# Patient Record
Sex: MALE | Race: WHITE | Employment: OTHER | URBAN - METROPOLITAN AREA
[De-identification: names, ages, dates, MRNs, and addresses within clinical notes are randomized per-mention and may not be internally consistent; named-entity substitution may affect disease eponyms.]

---

## 2017-02-15 ENCOUNTER — HOSPITAL ENCOUNTER (EMERGENCY)
Age: 82
Discharge: HOME | End: 2017-02-15
Attending: EMERGENCY MEDICINE
Payer: MEDICARE

## 2017-02-15 ENCOUNTER — APPOINTMENT (OUTPATIENT)
Dept: CT IMAGING | Age: 82
End: 2017-02-15
Attending: EMERGENCY MEDICINE
Payer: MEDICARE

## 2017-02-15 VITALS
SYSTOLIC BLOOD PRESSURE: 142 MMHG | HEART RATE: 61 BPM | DIASTOLIC BLOOD PRESSURE: 68 MMHG | TEMPERATURE: 97.8 F | HEIGHT: 65 IN | WEIGHT: 160 LBS | BODY MASS INDEX: 26.66 KG/M2 | RESPIRATION RATE: 15 BRPM | OXYGEN SATURATION: 95 %

## 2017-02-15 DIAGNOSIS — T50.905A ADVERSE EFFECTS OF MEDICATION, INITIAL ENCOUNTER: ICD-10-CM

## 2017-02-15 DIAGNOSIS — R42 DIZZINESS: Primary | ICD-10-CM

## 2017-02-15 DIAGNOSIS — M25.552 LEFT HIP PAIN: ICD-10-CM

## 2017-02-15 LAB
ALBUMIN SERPL BCP-MCNC: 3.8 G/DL (ref 3.5–5)
ALBUMIN/GLOB SERPL: 1.3 {RATIO} (ref 1.1–2.2)
ALP SERPL-CCNC: 96 U/L (ref 45–117)
ALT SERPL-CCNC: 60 U/L (ref 12–78)
ANION GAP BLD CALC-SCNC: 7 MMOL/L (ref 5–15)
AST SERPL W P-5'-P-CCNC: 41 U/L (ref 15–37)
BASOPHILS # BLD AUTO: 0 K/UL (ref 0–0.1)
BASOPHILS # BLD: 0 % (ref 0–1)
BILIRUB SERPL-MCNC: 1.1 MG/DL (ref 0.2–1)
BUN SERPL-MCNC: 18 MG/DL (ref 6–20)
BUN/CREAT SERPL: 20 (ref 12–20)
CALCIUM SERPL-MCNC: 8.7 MG/DL (ref 8.5–10.1)
CHLORIDE SERPL-SCNC: 103 MMOL/L (ref 97–108)
CO2 SERPL-SCNC: 27 MMOL/L (ref 21–32)
CREAT SERPL-MCNC: 0.9 MG/DL (ref 0.7–1.3)
EOSINOPHIL # BLD: 0 K/UL (ref 0–0.4)
EOSINOPHIL NFR BLD: 0 % (ref 0–7)
ERYTHROCYTE [DISTWIDTH] IN BLOOD BY AUTOMATED COUNT: 13.9 % (ref 11.5–14.5)
GLOBULIN SER CALC-MCNC: 2.9 G/DL (ref 2–4)
GLUCOSE SERPL-MCNC: 118 MG/DL (ref 65–100)
HCT VFR BLD AUTO: 46.6 % (ref 36.6–50.3)
HGB BLD-MCNC: 16.3 G/DL (ref 12.1–17)
LYMPHOCYTES # BLD AUTO: 11 % (ref 12–49)
LYMPHOCYTES # BLD: 1 K/UL (ref 0.8–3.5)
MCH RBC QN AUTO: 31.8 PG (ref 26–34)
MCHC RBC AUTO-ENTMCNC: 35 G/DL (ref 30–36.5)
MCV RBC AUTO: 91 FL (ref 80–99)
MONOCYTES # BLD: 0.7 K/UL (ref 0–1)
MONOCYTES NFR BLD AUTO: 8 % (ref 5–13)
NEUTS SEG # BLD: 7.4 K/UL (ref 1.8–8)
NEUTS SEG NFR BLD AUTO: 81 % (ref 32–75)
PLATELET # BLD AUTO: 162 K/UL (ref 150–400)
POTASSIUM SERPL-SCNC: 3.8 MMOL/L (ref 3.5–5.1)
PROT SERPL-MCNC: 6.7 G/DL (ref 6.4–8.2)
RBC # BLD AUTO: 5.12 M/UL (ref 4.1–5.7)
SODIUM SERPL-SCNC: 137 MMOL/L (ref 136–145)
TROPONIN I SERPL-MCNC: <0.04 NG/ML
WBC # BLD AUTO: 9.2 K/UL (ref 4.1–11.1)

## 2017-02-15 PROCEDURE — 99284 EMERGENCY DEPT VISIT MOD MDM: CPT

## 2017-02-15 PROCEDURE — 93005 ELECTROCARDIOGRAM TRACING: CPT

## 2017-02-15 PROCEDURE — 36415 COLL VENOUS BLD VENIPUNCTURE: CPT | Performed by: EMERGENCY MEDICINE

## 2017-02-15 PROCEDURE — 74011250636 HC RX REV CODE- 250/636: Performed by: EMERGENCY MEDICINE

## 2017-02-15 PROCEDURE — 96375 TX/PRO/DX INJ NEW DRUG ADDON: CPT

## 2017-02-15 PROCEDURE — 96361 HYDRATE IV INFUSION ADD-ON: CPT

## 2017-02-15 PROCEDURE — 84484 ASSAY OF TROPONIN QUANT: CPT | Performed by: EMERGENCY MEDICINE

## 2017-02-15 PROCEDURE — 85025 COMPLETE CBC W/AUTO DIFF WBC: CPT | Performed by: EMERGENCY MEDICINE

## 2017-02-15 PROCEDURE — 70450 CT HEAD/BRAIN W/O DYE: CPT

## 2017-02-15 PROCEDURE — 96374 THER/PROPH/DIAG INJ IV PUSH: CPT

## 2017-02-15 PROCEDURE — 80053 COMPREHEN METABOLIC PANEL: CPT | Performed by: EMERGENCY MEDICINE

## 2017-02-15 RX ORDER — OMEPRAZOLE 40 MG/1
40 CAPSULE, DELAYED RELEASE ORAL EVERY OTHER DAY
COMMUNITY
End: 2017-02-15

## 2017-02-15 RX ORDER — IBUPROFEN 200 MG
200 TABLET ORAL
COMMUNITY
End: 2018-03-30 | Stop reason: ALTCHOICE

## 2017-02-15 RX ORDER — METRONIDAZOLE 7.5 MG/G
GEL TOPICAL DAILY
COMMUNITY
End: 2018-06-29

## 2017-02-15 RX ORDER — ONDANSETRON 2 MG/ML
4 INJECTION INTRAMUSCULAR; INTRAVENOUS
Status: COMPLETED | OUTPATIENT
Start: 2017-02-15 | End: 2017-02-15

## 2017-02-15 RX ORDER — ASPIRIN 81 MG/1
81 TABLET ORAL DAILY
COMMUNITY

## 2017-02-15 RX ORDER — SODIUM CHLORIDE 0.9 % (FLUSH) 0.9 %
5-10 SYRINGE (ML) INJECTION EVERY 8 HOURS
Status: DISCONTINUED | OUTPATIENT
Start: 2017-02-15 | End: 2017-02-15 | Stop reason: HOSPADM

## 2017-02-15 RX ORDER — GUAIFENESIN 100 MG/5ML
81 LIQUID (ML) ORAL DAILY
COMMUNITY
End: 2017-02-15

## 2017-02-15 RX ORDER — LEVOTHYROXINE SODIUM 100 UG/1
0.1 TABLET ORAL
COMMUNITY
End: 2018-04-13 | Stop reason: DRUGHIGH

## 2017-02-15 RX ORDER — METRONIDAZOLE 10 MG/G
GEL TOPICAL DAILY
COMMUNITY
End: 2017-02-15

## 2017-02-15 RX ORDER — KETOROLAC TROMETHAMINE 30 MG/ML
15 INJECTION, SOLUTION INTRAMUSCULAR; INTRAVENOUS
Status: COMPLETED | OUTPATIENT
Start: 2017-02-15 | End: 2017-02-15

## 2017-02-15 RX ORDER — GLUCOSAMINE/CHONDR SU A SOD 750-600 MG
5000 TABLET ORAL DAILY
COMMUNITY
End: 2018-04-13 | Stop reason: ALTCHOICE

## 2017-02-15 RX ORDER — SODIUM CHLORIDE 0.9 % (FLUSH) 0.9 %
5-10 SYRINGE (ML) INJECTION AS NEEDED
Status: DISCONTINUED | OUTPATIENT
Start: 2017-02-15 | End: 2017-02-15 | Stop reason: HOSPADM

## 2017-02-15 RX ORDER — ATORVASTATIN CALCIUM 40 MG/1
40 TABLET, FILM COATED ORAL DAILY
COMMUNITY
End: 2018-04-13 | Stop reason: DRUGHIGH

## 2017-02-15 RX ADMIN — SODIUM CHLORIDE 500 ML: 900 INJECTION, SOLUTION INTRAVENOUS at 17:20

## 2017-02-15 RX ADMIN — KETOROLAC TROMETHAMINE 15 MG: 30 INJECTION, SOLUTION INTRAMUSCULAR at 17:19

## 2017-02-15 RX ADMIN — ONDANSETRON 4 MG: 2 INJECTION INTRAMUSCULAR; INTRAVENOUS at 17:19

## 2017-02-15 NOTE — ED TRIAGE NOTES
Patient comes to the ER via EMS from [facility]. Patient took Tramadol yesterday and was okay, today when he took Tramadol patient was nauseated, dizziness and dry mouth.

## 2017-02-15 NOTE — ED PROVIDER NOTES
HPI Comments: 80 y.o. male with past medical history significant for hypothyroid, GERD, hypercholestermia, coronary stent placement, and coronary artery bypass graft who presents via EMS with chief complaint of nausea. Pt c/o nausea, hoarseness, light-headedness, and difficulty walking straight after taking 2 tramadol for his L hip pain this morning. Pt denies that anything makes his light-headedness worse, or that its similar to past bouts of vertigo. Pt states he's been experiencing aching pain in his L-hip that radiates down to L thigh for the past 4-5 weeks that is worsened with sudden movement. Pt states he's been seeing physicians at [facility], but hasn't received any answer for his pain and wishes to have an MRI of his hip. Pt claims that he has taken tylenol arthritis pain medicine which is helpful. Pt claims he took 1 tramadol yesterday and didn't experience any unusual symptoms. Pt denies any recent falls. Pt denies any fever,cough, congestion, or HA. Pt denies having any appetite, and was only able to eat half a grapefruit today. There are no other acute medical concerns at this time. PCP: Brinda Monreal MD    Note written by Eugene Saavedra, as dictated by Tim Barber MD 5:21 PM        The history is provided by the patient. No  was used. Past Medical History:   Diagnosis Date    GERD (gastroesophageal reflux disease)     Hypercholesteremia     Hypothyroid        Past Surgical History:   Procedure Laterality Date    Hx coronary stent placement      Hx coronary artery bypass graft  1996         History reviewed. No pertinent family history. Social History     Social History    Marital status: SINGLE     Spouse name: N/A    Number of children: N/A    Years of education: N/A     Occupational History    Not on file.      Social History Main Topics    Smoking status: Former Smoker    Smokeless tobacco: Not on file    Alcohol use 4.2 oz/week 7 Glasses of wine per week    Drug use: No    Sexual activity: Not on file     Other Topics Concern    Not on file     Social History Narrative    No narrative on file         ALLERGIES: Sulfa (sulfonamide antibiotics)    Review of Systems   Constitutional: Positive for appetite change (No appetite). Negative for fever. HENT: Positive for sore throat (Dry, hoarseness). Negative for congestion. Respiratory: Negative for cough. Gastrointestinal: Positive for nausea. Negative for vomiting. Musculoskeletal: Positive for gait problem (Harder than usual to walk with cane. Felt off-balance). L hip pain   Neurological: Positive for light-headedness. Negative for headaches. All other systems reviewed and are negative. Vitals:    02/15/17 1641   BP: 133/62   Pulse: (!) 58   Resp: 18   Temp: 97.8 °F (36.6 °C)   SpO2: 94%   Weight: 72.6 kg (160 lb)   Height: 5' 5\" (1.651 m)            Physical Exam   Constitutional: He is oriented to person, place, and time. He appears well-developed and well-nourished. No distress. HENT:   Head: Normocephalic and atraumatic. Eyes: Conjunctivae are normal. Pupils are equal, round, and reactive to light. No scleral icterus. Pupils equal roudn reactive to light. Horizontal nystagmus   Neck: Neck supple. No tracheal deviation present. Cardiovascular: Normal rate, regular rhythm, normal heart sounds and intact distal pulses. Exam reveals no gallop and no friction rub. No murmur heard. Pulmonary/Chest: Effort normal and breath sounds normal. He has no wheezes. He has no rales. Abdominal: Soft. He exhibits no distension. There is no tenderness. There is no rebound and no guarding. Musculoskeletal: He exhibits no edema. Tenderness over L hip   Neurological: He is alert and oriented to person, place, and time. No cranial nerve deficit. Strength and sensation normal.     Skin: Skin is warm and dry. No rash noted.    Psychiatric: He has a normal mood and affect. Nursing note and vitals reviewed. Note written by Eugene Covington, as dictated by Franck Denis MD 5:32 PM    [facility]  ED Course       Procedures    ED EKG interpretation:  Rhythm: sinus bradycardia; and regular . Rate (approx.): 58;ST/T wave: normal.  Note written by Eugene Covington, as dictated by Franck Denis MD 5:40 PM    Progress Note:  Results, treatment, and follow up plan have been discussed with patient/family. Questions were answered. He feels much better. Franck Denis MD  7:06 PM    A/P: dizziness/N/unsteady after taking two Ultram this morning - suspect medication related; normal neuro exam; labs, CT, EKG ok; home with PCP/ortho f/u for hip pain.   Franck Denis MD  7:08 PM

## 2017-02-16 ENCOUNTER — OFFICE VISIT (OUTPATIENT)
Dept: GERIATRIC MEDICINE | Age: 82
End: 2017-02-16

## 2017-02-16 VITALS
TEMPERATURE: 98.5 F | RESPIRATION RATE: 18 BRPM | BODY MASS INDEX: 26.77 KG/M2 | HEART RATE: 63 BPM | OXYGEN SATURATION: 95 % | DIASTOLIC BLOOD PRESSURE: 63 MMHG | WEIGHT: 160.7 LBS | HEIGHT: 65 IN | SYSTOLIC BLOOD PRESSURE: 130 MMHG

## 2017-02-16 DIAGNOSIS — K21.9 GERD WITHOUT ESOPHAGITIS: ICD-10-CM

## 2017-02-16 DIAGNOSIS — I25.10 CORONARY ARTERY DISEASE INVOLVING NATIVE CORONARY ARTERY OF NATIVE HEART WITHOUT ANGINA PECTORIS: ICD-10-CM

## 2017-02-16 DIAGNOSIS — M16.0 PRIMARY OSTEOARTHRITIS OF BOTH HIPS: ICD-10-CM

## 2017-02-16 DIAGNOSIS — M16.0 PRIMARY OSTEOARTHRITIS OF BOTH HIPS: Primary | ICD-10-CM

## 2017-02-16 DIAGNOSIS — E78.2 MIXED HYPERLIPIDEMIA: ICD-10-CM

## 2017-02-16 DIAGNOSIS — M54.40 LOW BACK PAIN WITH SCIATICA, SCIATICA LATERALITY UNSPECIFIED, UNSPECIFIED BACK PAIN LATERALITY, UNSPECIFIED CHRONICITY: ICD-10-CM

## 2017-02-16 DIAGNOSIS — M54.32 SCIATICA OF LEFT SIDE: ICD-10-CM

## 2017-02-16 DIAGNOSIS — E03.9 ACQUIRED HYPOTHYROIDISM: Primary | ICD-10-CM

## 2017-02-16 LAB
ATRIAL RATE: 58 BPM
CALCULATED P AXIS, ECG09: 53 DEGREES
CALCULATED R AXIS, ECG10: 25 DEGREES
CALCULATED T AXIS, ECG11: 64 DEGREES
DIAGNOSIS, 93000: NORMAL
P-R INTERVAL, ECG05: 180 MS
Q-T INTERVAL, ECG07: 398 MS
QRS DURATION, ECG06: 92 MS
QTC CALCULATION (BEZET), ECG08: 390 MS
VENTRICULAR RATE, ECG03: 58 BPM

## 2017-02-16 RX ORDER — ACETAMINOPHEN AND CODEINE PHOSPHATE 300; 60 MG/1; MG/1
1 TABLET ORAL
Qty: 160 TAB | Refills: 3 | Status: SHIPPED | OUTPATIENT
Start: 2017-02-16 | End: 2018-03-30

## 2017-02-16 NOTE — DISCHARGE INSTRUCTIONS
Dizziness: Care Instructions  Your Care Instructions  Dizziness is the feeling of unsteadiness or fuzziness in your head. It is different than having vertigo, which is a feeling that the room is spinning or that you are moving or falling. It is also different from lightheadedness, which is the feeling that you are about to faint. It can be hard to know what causes dizziness. Some people feel dizzy when they have migraine headaches. Sometimes bouts of flu can make you feel dizzy. Some medical conditions, such as heart problems or high blood pressure, can make you feel dizzy. Many medicines can cause dizziness, including medicines for high blood pressure, pain, or anxiety. If a medicine causes your symptoms, your doctor may recommend that you stop or change the medicine. If it is a problem with your heart, you may need medicine to help your heart work better. If there is no clear reason for your symptoms, your doctor may suggest watching and waiting for a while to see if the dizziness goes away on its own. Follow-up care is a key part of your treatment and safety. Be sure to make and go to all appointments, and call your doctor if you are having problems. It's also a good idea to know your test results and keep a list of the medicines you take. How can you care for yourself at home? · If your doctor recommends or prescribes medicine, take it exactly as directed. Call your doctor if you think you are having a problem with your medicine. · Do not drive while you feel dizzy. · Try to prevent falls. Steps you can take include:  ¨ Using nonskid mats, adding grab bars near the tub, and using night-lights. ¨ Clearing your home so that walkways are free of anything you might trip on. ¨ Letting family and friends know that you have been feeling dizzy. This will help them know how to help you. When should you call for help? Call 911 anytime you think you may need emergency care.  For example, call if:  · You passed out (lost consciousness). · You have dizziness along with symptoms of a heart attack. These may include:  ¨ Chest pain or pressure, or a strange feeling in the chest.  ¨ Sweating. ¨ Shortness of breath. ¨ Nausea or vomiting. ¨ Pain, pressure, or a strange feeling in the back, neck, jaw, or upper belly or in one or both shoulders or arms. ¨ Lightheadedness or sudden weakness. ¨ A fast or irregular heartbeat. · You have symptoms of a stroke. These may include:  ¨ Sudden numbness, tingling, weakness, or loss of movement in your face, arm, or leg, especially on only one side of your body. ¨ Sudden vision changes. ¨ Sudden trouble speaking. ¨ Sudden confusion or trouble understanding simple statements. ¨ Sudden problems with walking or balance. ¨ A sudden, severe headache that is different from past headaches. Call your doctor now or seek immediate medical care if:  · You feel dizzy and have a fever, headache, or ringing in your ears. · You have new or increased nausea and vomiting. · Your dizziness does not go away or comes back. Watch closely for changes in your health, and be sure to contact your doctor if:  · You do not get better as expected. Where can you learn more? Go to http://sloan-george.info/. Enter O826 in the search box to learn more about \"Dizziness: Care Instructions. \"  Current as of: May 27, 2016  Content Version: 11.1  © 9287-1904 [employer]. Care instructions adapted under license by [employer] (which disclaims liability or warranty for this information). If you have questions about a medical condition or this instruction, always ask your healthcare professional. [place] [address] any warranty or liability for your use of this information. Hip Pain: Care Instructions  Your Care Instructions  Hip pain may be caused by many things, including overuse, a fall, or a twisting movement.  Another cause of hip pain is arthritis. Your pain may increase when you stand up, walk, or squat. The pain may come and go or may be constant. Home treatment can help relieve hip pain, swelling, and stiffness. If your pain is ongoing, you may need more tests and treatment. Follow-up care is a key part of your treatment and safety. Be sure to make and go to all appointments, and call your doctor if you are having problems. Its also a good idea to know your test results and keep a list of the medicines you take. How can you care for yourself at home? · Take pain medicines exactly as directed. ¨ If the doctor gave you a prescription medicine for pain, take it as prescribed. ¨ If you are not taking a prescription pain medicine, ask your doctor if you can take an over-the-counter medicine. · Rest and protect your hip. Take a break from any activity, including standing or walking, that may cause pain. · Put ice or a cold pack against your hip for 10 to 20 minutes at a time. Try to do this every 1 to 2 hours for the next 3 days (when you are awake) or until the swelling goes down. Put a thin cloth between the ice and your skin. · Sleep on your healthy side with a pillow between your knees, or sleep on your back with pillows under your knees. · If there is no swelling, you can put moist heat, a heating pad, or a warm cloth on your hip. Do gentle stretching exercises to help keep your hip flexible. · Learn how to prevent falls. Have your vision and hearing checked regularly. Wear slippers or shoes with a nonskid sole. · Stay at a healthy weight. · Wear comfortable shoes. When should you call for help? Call 911 anytime you think you may need emergency care. For example, call if:  · You have sudden chest pain and shortness of breath, or you cough up blood. · You are not able to stand or walk or bear weight. · Your buttocks, legs, or feet feel numb or tingly. · Your leg or foot is cool or pale or changes color.   · You have severe pain.  Call your doctor now or seek immediate medical care if:  · You have signs of infection, such as:  ¨ Increased pain, swelling, warmth, or redness in the hip area. ¨ Red streaks leading from the hip area. ¨ Pus draining from the hip area. ¨ A fever. · You have signs of a blood clot, such as:  ¨ Pain in your calf, back of the knee, thigh, or groin. ¨ Redness and swelling in your leg or groin. · You are not able to bend, straighten, or move your leg normally. · You have trouble urinating or having bowel movements. Watch closely for changes in your health, and be sure to contact your doctor if:  · You do not get better as expected. Where can you learn more? Go to http://sloan-george.info/. Enter Z614 in the search box to learn more about \"Hip Pain: Care Instructions. \"  Current as of: May 27, 2016  Content Version: 11.1  © 5745-2698 [employer]. Care instructions adapted under license by [employer] (which disclaims liability or warranty for this information). If you have questions about a medical condition or this instruction, always ask your healthcare professional. [place] [address] any warranty or liability for your use of this information. We hope that we have addressed all of your medical concerns. The examination and treatment you received in the Emergency Department were for an emergent problem and were not intended as complete care. It is important that you follow up with your healthcare provider(s) for ongoing care. If your symptoms worsen or do not improve as expected, and you are unable to reach your usual health care provider(s), you should return to the Emergency Department. Today's healthcare is undergoing tremendous change, and patient satisfaction surveys are one of the many tools to assess the quality of medical care.   You may receive a survey from the [employer] regarding your experience in the Emergency Department. I hope that your experience has been completely positive, particularly the medical care that I provided. As such, please participate in the survey; anything less than excellent does not meet my expectations or intentions. [facility] and [facility] participate in nationally recognized quality of care measures. If your blood pressure is greater than 120/80, as reported below, we urge that you seek medical care to address the potential of high blood pressure, commonly known as hypertension. Hypertension can be hereditary or can be caused by certain medical conditions, pain, stress, or \"white coat syndrome. \"       Please make an appointment with your health care provider(s) for follow up of your Emergency Department visit. VITALS:   Patient Vitals for the past 8 hrs:   Temp Pulse Resp BP SpO2   02/15/17 1745 - 70 15 134/57 93 %   02/15/17 1730 - - - 127/60 94 %   02/15/17 1700 - - - (!) 119/98 93 %   02/15/17 1641 97.8 °F (36.6 °C) (!) 58 18 133/62 94 %          Thank you for allowing us to provide you with medical care today. We realize that you have many choices for your emergency care needs. Please choose us in the future for any continued health care needs. Xavier Coombs MD    [facility], [facility].   Office: [phone]            Recent Results (from the past 24 hour(s))   CBC WITH AUTOMATED DIFF    Collection Time: 02/15/17  5:07 PM   Result Value Ref Range    WBC 9.2 4.1 - 11.1 K/uL    RBC 5.12 4.10 - 5.70 M/uL    HGB 16.3 12.1 - 17.0 g/dL    HCT 46.6 36.6 - 50.3 %    MCV 91.0 80.0 - 99.0 FL    MCH 31.8 26.0 - 34.0 PG    MCHC 35.0 30.0 - 36.5 g/dL    RDW 13.9 11.5 - 14.5 %    PLATELET 684 509 - 977 K/uL    NEUTROPHILS 81 (H) 32 - 75 %    LYMPHOCYTES 11 (L) 12 - 49 %    MONOCYTES 8 5 - 13 %    EOSINOPHILS 0 0 - 7 %    BASOPHILS 0 0 - 1 %    ABS. NEUTROPHILS 7.4 1.8 - 8.0 K/UL    ABS. LYMPHOCYTES 1.0 0.8 - 3.5 K/UL    ABS. MONOCYTES 0.7 0.0 - 1.0 K/UL    ABS. EOSINOPHILS 0.0 0.0 - 0.4 K/UL    ABS. BASOPHILS 0.0 0.0 - 0.1 K/UL   METABOLIC PANEL, COMPREHENSIVE    Collection Time: 02/15/17  5:07 PM   Result Value Ref Range    Sodium 137 136 - 145 mmol/L    Potassium 3.8 3.5 - 5.1 mmol/L    Chloride 103 97 - 108 mmol/L    CO2 27 21 - 32 mmol/L    Anion gap 7 5 - 15 mmol/L    Glucose 118 (H) 65 - 100 mg/dL    BUN 18 6 - 20 MG/DL    Creatinine 0.90 0.70 - 1.30 MG/DL    BUN/Creatinine ratio 20 12 - 20      GFR est AA >60 >60 ml/min/1.73m2    GFR est non-AA >60 >60 ml/min/1.73m2    Calcium 8.7 8.5 - 10.1 MG/DL    Bilirubin, total 1.1 (H) 0.2 - 1.0 MG/DL    ALT (SGPT) 60 12 - 78 U/L    AST (SGOT) 41 (H) 15 - 37 U/L    Alk. phosphatase 96 45 - 117 U/L    Protein, total 6.7 6.4 - 8.2 g/dL    Albumin 3.8 3.5 - 5.0 g/dL    Globulin 2.9 2.0 - 4.0 g/dL    A-G Ratio 1.3 1.1 - 2.2     TROPONIN I    Collection Time: 02/15/17  5:07 PM   Result Value Ref Range    Troponin-I, Qt. <0.04 <0.05 ng/mL       Ct Head Wo Cont    Result Date: 2/15/2017  EXAM:  CT HEAD WO CONT INDICATION:   Acute nausea and dizziness today COMPARISON: None. TECHNIQUE: Unenhanced CT of the head was performed using 5 mm images. Brain and bone windows were generated. CT dose reduction was achieved through use of a standardized protocol tailored for this examination and automatic exposure control for dose modulation. Adaptive statistical iterative reconstruction (ASIR) was utilized. FINDINGS: The ventricles and sulci are normal in size, shape and configuration and midline. Minimal small vessel ischemic changes are seen in the periventricular white matter. There is no intracranial hemorrhage, extra-axial collection, mass, mass effect or midline shift. The basilar cisterns are open. No acute infarct is identified. The bone windows demonstrate no abnormalities. The visualized portions of the paranasal sinuses and mastoid air cells are clear. Vascular calcification is noted. IMPRESSION: No acute abnormality.

## 2017-02-16 NOTE — ED NOTES
Discharge instructions given to pt. All questions answered and pt verbalized understanding. V/S stable @ time of discharge. Pt out of unit by wheelchair.

## 2017-02-16 NOTE — PROGRESS NOTES
HISTORY OF PRESENT ILLNESS  Talisha Ramesh is a 80 y.o. male. HPI   Patient is here to establish care with me . He has multiple issues going on today. He is complaining of light headedness. He feels dizzy as well. I checked the bp in the clinic and it records 110/60. I have d/w him that this is a very low bp and he should keep himself hydrated. He forgets to drink he states. He is also having the low back pain with the radiation to the lle. He has had spinal shots before but still continues to have a lot of pain the the low back and the left hip. I have d/w him the use of the tyl #4 for prn use for the pain. I have explained the SE and risks and benefits. He is interested in trying it. I have also d/w him the need to do the MRI to see the extent of the pathology in the low back and see the nerve compression if any. His lipids are stable on the lipitor. He is euthyroid on the current dose of the supplement. For the cvs prophylaxis he is taking the baby asa. He sees cardiology on a regular basis for the cad. Past Medical History   Diagnosis Date    GERD (gastroesophageal reflux disease)     Hypercholesteremia     Hypothyroid      No family history on file. Past Surgical History   Procedure Laterality Date    Hx coronary stent placement      Hx coronary artery bypass graft  1996     Current Outpatient Prescriptions   Medication Sig Dispense Refill    acetaminophen-codeine (TYLENOL #4) 300-60 mg per tablet Take 1 Tab by mouth every four (4) hours as needed for Pain. Max Daily Amount: 6 Tabs. 160 Tab 3    levothyroxine (SYNTHROID) 100 mcg tablet Take 100 mcg by mouth Daily (before breakfast).  Methylcellulose, with Sugar, (CITRUCEL, SUCROSE,) powd Take  by mouth daily.  atorvastatin (LIPITOR) 40 mg tablet Take 40 mg by mouth daily.  aspirin delayed-release 81 mg tablet Take 81 mg by mouth daily.  Biotin 2,500 mcg cap Take  by mouth daily.       metroNIDAZOLE (METROGEL) 0.75 % topical gel Apply  to affected area daily.  OMEGA-3 FATTY ACIDS/FISH OIL (OMEGA 3 FISH OIL PO) Take  by mouth daily.  vit C,H-Ss-avrbc-lutein-zeaxan (PRESERVISION AREDS 2) 198-881-37-1 mg-unit-mg-mg cap Take 2 Caps by mouth daily.  ibuprofen (ADVIL) 200 mg tablet Take 200 mg by mouth every six (6) hours as needed for Pain.  White Petrolatum-Mineral Oil (GENTEAL PM) 94-3 % oint Apply  to eye two (2) times a day. Allergies   Allergen Reactions    Toprol Xl [Metoprolol Succinate] Diarrhea    Sulfa (Sulfonamide Antibiotics) Hives           Review of Systems   Constitutional: Negative for chills, fever and malaise/fatigue. HENT: Negative for congestion and sore throat. Eyes: Negative for blurred vision and double vision. Respiratory: Negative for cough, sputum production and shortness of breath. Cardiovascular: Negative for chest pain and palpitations. Gastrointestinal: Negative for blood in stool, heartburn, nausea and vomiting. Genitourinary: Negative for dysuria, frequency and urgency. Musculoskeletal: Positive for joint pain (low back and left hip). Negative for falls and neck pain. Skin: Negative for itching and rash. Neurological: Positive for weakness (due to dehydration. his mouth is very dry. ). Negative for dizziness, focal weakness, seizures, loss of consciousness and headaches. Psychiatric/Behavioral: Negative for depression and suicidal ideas. The patient is not nervous/anxious. Physical Exam   Constitutional: He is oriented to person, place, and time. He appears well-developed and well-nourished. No distress. HENT:   Head: Atraumatic. Eyes: Conjunctivae and EOM are normal. Pupils are equal, round, and reactive to light. Right eye exhibits no discharge. Left eye exhibits no discharge. Neck: Normal range of motion. Neck supple. Cardiovascular: Normal rate, regular rhythm, normal heart sounds and intact distal pulses.   Exam reveals no gallop and no friction rub. No murmur heard. Pulmonary/Chest: Effort normal and breath sounds normal. No respiratory distress. He has no wheezes. He has no rales. Abdominal: Soft. Bowel sounds are normal. There is no tenderness. There is no guarding. Musculoskeletal: He exhibits no edema, tenderness or deformity. Left hip: He exhibits decreased range of motion. Lumbar back: He exhibits decreased range of motion and pain. Neurological: He is alert and oriented to person, place, and time. He has normal reflexes. No cranial nerve deficit. Skin: Skin is dry. No rash noted. He is not diaphoretic. No erythema. Psychiatric: He has a normal mood and affect. Judgment and thought content normal.       ASSESSMENT and PLAN    ICD-10-CM ICD-9-CM    1. Primary osteoarthritis of both hips M16.0 715.15    2. Coronary artery disease involving native coronary artery of native heart without angina pectoris I25.10 414.01    3. GERD without esophagitis K21.9 530.81    4. Mixed hyperlipidemia E78.2 272.2    5. Sciatica of left side M54.32 724.3 MRI LUMB SPINE W WO CONT   6. Low back pain with sciatica, sciatica laterality unspecified, unspecified back pain laterality, unspecified chronicity M54.40 724.3      Encounter Diagnoses   Name Primary?  Primary osteoarthritis of both hips Yes    Coronary artery disease involving native coronary artery of native heart without angina pectoris     GERD without esophagitis     Mixed hyperlipidemia     Sciatica of left side     Low back pain with sciatica, sciatica laterality unspecified, unspecified back pain laterality, unspecified chronicity      Orders Placed This Encounter    MRI LUMB SPINE W WO CONT    acetaminophen-codeine (TYLENOL #4) 300-60 mg per tablet     Orders Placed This Encounter    MRI LUMB SPINE W WO CONT     Standing Status:   Future     Standing Expiration Date:   3/16/2018     Order Specific Question:   Is Patient Allergic to Contrast Dye? Answer:    No  acetaminophen-codeine (TYLENOL #4) 300-60 mg per tablet     Sig: Take 1 Tab by mouth every four (4) hours as needed for Pain. Max Daily Amount: 6 Tabs. Dispense:  160 Tab     Refill:  3     Orders Placed This Encounter    MRI LUMB SPINE W WO CONT    acetaminophen-codeine (TYLENOL #4) 300-60 mg per tablet     Follow-up Disposition:  Return in about 4 weeks (around 3/16/2017).   reviewed diet, exercise and weight control

## 2017-02-16 NOTE — PROGRESS NOTES
Chief Complaint   Patient presents with    Physical     wellness check established care     Visit Vitals    /63 (BP 1 Location: Left arm, BP Patient Position: At rest)    Pulse 63    Temp 98.5 °F (36.9 °C) (Oral)    Resp 18    Ht 5' 5\" (1.651 m)    Wt 160 lb 11.2 oz (72.9 kg)    SpO2 95%    BMI 26.74 kg/m2     Pt is here wanting  to established care with [facility] MD.

## 2017-02-19 ENCOUNTER — HOSPITAL ENCOUNTER (EMERGENCY)
Age: 82
Discharge: HOME | End: 2017-02-19
Attending: EMERGENCY MEDICINE
Payer: MEDICARE

## 2017-02-19 VITALS
HEIGHT: 65 IN | SYSTOLIC BLOOD PRESSURE: 158 MMHG | DIASTOLIC BLOOD PRESSURE: 74 MMHG | BODY MASS INDEX: 26.66 KG/M2 | HEART RATE: 92 BPM | WEIGHT: 160 LBS | TEMPERATURE: 98.4 F | OXYGEN SATURATION: 97 % | RESPIRATION RATE: 18 BRPM

## 2017-02-19 DIAGNOSIS — K56.41 FECAL IMPACTION (HCC): ICD-10-CM

## 2017-02-19 DIAGNOSIS — M54.31 SCIATICA OF RIGHT SIDE: Primary | ICD-10-CM

## 2017-02-19 PROCEDURE — 96372 THER/PROPH/DIAG INJ SC/IM: CPT

## 2017-02-19 PROCEDURE — 99284 EMERGENCY DEPT VISIT MOD MDM: CPT

## 2017-02-19 PROCEDURE — 74011250636 HC RX REV CODE- 250/636: Performed by: EMERGENCY MEDICINE

## 2017-02-19 RX ORDER — POLYETHYLENE GLYCOL 3350 17 G/17G
17 POWDER, FOR SOLUTION ORAL DAILY
Qty: 595 G | Refills: 1 | Status: SHIPPED | OUTPATIENT
Start: 2017-02-19 | End: 2018-04-13 | Stop reason: ALTCHOICE

## 2017-02-19 RX ORDER — KETOROLAC TROMETHAMINE 30 MG/ML
30 INJECTION, SOLUTION INTRAMUSCULAR; INTRAVENOUS
Status: COMPLETED | OUTPATIENT
Start: 2017-02-19 | End: 2017-02-19

## 2017-02-19 RX ADMIN — KETOROLAC TROMETHAMINE 30 MG: 30 INJECTION, SOLUTION INTRAMUSCULAR at 21:37

## 2017-02-19 NOTE — ED NOTES
Pt reports last BM was Friday. Pt reports that he started a new prescription on Thursday. Pt reports that he has the urge to defecate but does not produce stool. Pt reports that he gave himself an enema at 0400 and has had 3-4 small liquid BM's. Pt states \"it feels like something is there\".

## 2017-02-19 NOTE — ED TRIAGE NOTES
Triage:  Pt to ED from independent living due to rectal pain and constipation. Pt states last BM Friday and since no BM, Pt has called PCP and informed to take laxative, Pt states minimal relief. Pt also states attempted digital disimpaction with no relief. EMS states stable transport, Pt able to ambulate to stretcher from EMS cot.

## 2017-02-20 NOTE — ED PROVIDER NOTES
HPI Comments: 80 y.o. male with past medical history significant for hypothyroid, GERD, and hypercholesteremia who presents from home via EMS with chief complaint of constipation. Patient notes he has not had a bowel movement for the past 2 days. Patient admits he has not had a normal bowel movement yesterday or today. Patient admits he had some small liquid bowel movements after an enema 3 hours ago; however, patient reports he still needs to go and has pain. Patient states hx of the present sx. Patient reports he started Tramadol, switched to Tylenol 3 due to side effects, and ultimately started Prednisone today. Patient reports he has tried these medications for a day each. Patient notes he has been taking the medication for a pinched nerve affecting the right thigh. There are no other acute medical concerns at this time. Social hx: Tobacco Use: No, Alcohol Use: Yes    PCP: Lorna Bermudez MD    Note written by Eugene Blanc, as dictated by Humberto Barrientos MD 6:58 PM      The history is provided by the patient. Past Medical History:   Diagnosis Date    GERD (gastroesophageal reflux disease)     Hypercholesteremia     Hypothyroid        Past Surgical History:   Procedure Laterality Date    Hx coronary stent placement      Hx coronary artery bypass graft  1996         History reviewed. No pertinent family history. Social History     Social History    Marital status: SINGLE     Spouse name: N/A    Number of children: N/A    Years of education: N/A     Occupational History    Not on file.      Social History Main Topics    Smoking status: Former Smoker    Smokeless tobacco: Not on file    Alcohol use 4.2 oz/week     7 Glasses of wine per week    Drug use: No    Sexual activity: Not on file     Other Topics Concern    Not on file     Social History Narrative         ALLERGIES: Toprol xl [metoprolol succinate] and Sulfa (sulfonamide antibiotics)    Review of Systems Constitutional: Negative for appetite change, chills and fever. HENT: Negative for rhinorrhea, sore throat and trouble swallowing. Eyes: Negative for photophobia. Respiratory: Negative for cough and shortness of breath. Cardiovascular: Negative for chest pain and palpitations. Gastrointestinal: Positive for constipation. Negative for abdominal pain, nausea and vomiting. Genitourinary: Negative for dysuria, frequency and hematuria. Musculoskeletal: Negative for arthralgias. Neurological: Negative for dizziness, syncope and weakness. Psychiatric/Behavioral: Negative for behavioral problems. The patient is not nervous/anxious. All other systems reviewed and are negative. Vitals:    02/19/17 1828   BP: 152/62   Pulse: 80   Resp: 18   Temp: 98.5 °F (36.9 °C)   SpO2: 98%   Weight: 72.6 kg (160 lb)   Height: 5' 5\" (1.651 m)            Physical Exam   Constitutional: He appears well-developed and well-nourished. HENT:   Head: Normocephalic and atraumatic. Mouth/Throat: Oropharynx is clear and moist.   Eyes: EOM are normal. Pupils are equal, round, and reactive to light. Neck: Normal range of motion. Neck supple. Cardiovascular: Normal rate, regular rhythm, normal heart sounds and intact distal pulses. Exam reveals no gallop and no friction rub. No murmur heard. Pulmonary/Chest: Effort normal. No respiratory distress. He has no wheezes. He has no rales. Abdominal: Soft. There is no tenderness. There is no rebound. Genitourinary:   Genitourinary Comments: Patient's stool is brown and Hemoccult test was negative  Patient has a moderate sized fecal impaction   Musculoskeletal: Normal range of motion. He exhibits no tenderness. Neurological: He is alert. No cranial nerve deficit. Motor; symmetric   Skin: No erythema. Psychiatric: He has a normal mood and affect. His behavior is normal.   Nursing note and vitals reviewed.      Note written by Eugene Card, as dictated by Umesh Hernandez MD 6:58 PM    MDM  ED Course       Procedures

## 2017-02-20 NOTE — DISCHARGE INSTRUCTIONS
Sciatica: Care Instructions  Your Care Instructions    Sciatica (say \"dll-WP-cs-kuh\") is an irritation of one of the sciatic nerves, which come from the spinal cord in the lower back. The sciatic nerves and their branches extend down through the buttock to the foot. Sciatica can develop when an injured disc in the back presses against a spinal nerve root. Its main symptom is pain, numbness, or weakness that is often worse in the leg or foot than in the back. Sciatica often will improve and go away with time. Early treatment usually includes medicines and exercises to relieve pain. Follow-up care is a key part of your treatment and safety. Be sure to make and go to all appointments, and call your doctor if you are having problems. It's also a good idea to know your test results and keep a list of the medicines you take. How can you care for yourself at home? · Take pain medicines exactly as directed. ¨ If the doctor gave you a prescription medicine for pain, take it as prescribed. ¨ If you are not taking a prescription pain medicine, ask your doctor if you can take an over-the-counter medicine. · Use heat or ice to relieve pain. ¨ To apply heat, put a warm water bottle, heating pad set on low, or warm cloth on your back. Do not go to sleep with a heating pad on your skin. ¨ To use ice, put ice or a cold pack on the area for 10 to 20 minutes at a time. Put a thin cloth between the ice and your skin. · Avoid sitting if possible, unless it feels better than standing. · Alternate lying down with short walks. Increase your walking distance as you are able to without making your symptoms worse. · Do not do anything that makes your symptoms worse. When should you call for help? Call 911 anytime you think you may need emergency care. For example, call if:  · You are unable to move a leg at all.   Call your doctor now or seek immediate medical care if:  · You have new or worse symptoms in your legs or buttocks. Symptoms may include:  ¨ Numbness or tingling. ¨ Weakness. ¨ Pain. · You lose bladder or bowel control. Watch closely for changes in your health, and be sure to contact your doctor if:  · You are not getting better as expected. Where can you learn more? Go to http://sloan-george.info/. Enter [phone] in the search box to learn more about \"Sciatica: Care Instructions. \"  Current as of: May 23, 2016  Content Version: 11.1  © 8894-2358 [employer]. Care instructions adapted under license by [employer] (which disclaims liability or warranty for this information). If you have questions about a medical condition or this instruction, always ask your healthcare professional. [address] any warranty or liability for your use of this information. We hope that we have addressed all of your medical concerns. The examination and treatment you received in the Emergency Department were for an emergent problem and were not intended as complete care. It is important that you follow up with your healthcare provider(s) for ongoing care. If your symptoms worsen or do not improve as expected, and you are unable to reach your usual health care provider(s), you should return to the Emergency Department. Today's healthcare is undergoing tremendous change, and patient satisfaction surveys are one of the many tools to assess the quality of medical care. You may receive a survey from the [employer] regarding your experience in the Emergency Department. I hope that your experience has been completely positive, particularly the medical care that I provided. As such, please participate in the survey; anything less than excellent does not meet my expectations or intentions. [facility] and [facility] participate in nationally recognized quality of care measures.   If your blood pressure is greater than 120/80, as reported below, we urge that you seek medical care to address the potential of high blood pressure, commonly known as hypertension. Hypertension can be hereditary or can be caused by certain medical conditions, pain, stress, or \"white coat syndrome. \"       Please make an appointment with your health care provider(s) for follow up of your Emergency Department visit. VITALS:   Patient Vitals for the past 8 hrs:   Temp Pulse Resp BP SpO2   02/19/17 1828 98.5 °F (36.9 °C) 80 18 152/62 98 %          Thank you for allowing us to provide you with medical care today. We realize that you have many choices for your emergency care needs. Please choose us in the future for any continued health care needs. MD [facility]: [phone]            No results found for this or any previous visit (from the past 24 hour(s)). No results found.

## 2017-02-20 NOTE — ED NOTES
Pt resting comfortably on stretcher. No obvious signs of distress. Call bell in reach. Pt updated on plan of care. Will continue to monitor.

## 2017-02-20 NOTE — ED NOTES
Discharge instructions given to pt. All questions answered and pt verbalized understanding. Pt assisted out via wheel chair to waiting area. Pt states son will be by in 30 minutes to pick Pt up.

## 2017-02-20 NOTE — ED NOTES
Pt ambulatory to waiting room. Pt given discharge paper work. No new questions or concern. No obvious signs of distress.

## 2017-02-21 NOTE — CALL BACK NOTE
[facility] Emergency Department Follow Up Call Record    Discharged to : Home/Family Home/Home Health/Skilled Facility/Rehab/Assisted Living/Other__home_____  1) Did you receive your discharge instructions? Yes        2) Do you understand them? Yes         3) Are you able to follow them? Yes          If NO, what can I clarify for you? 4) Do you understand your diagnosis? Yes         5) Do you know which symptoms should prompt you to call the doctor? Yes     6) Were you able to fill and  any medications that were prescribed? Yes     7) You were prescribed __miralax_________for ___constipation_________________. Common side effects of this medication are___rash_________________. This is not a complete list so please review the forms given from the pharmacy for a complete list.      8) Are there any questions about your medications? No            Have you scheduled any recommended doctors appointments (specialty, PCP) YES  If NO, what barriers are you encountering (transportation/lost contact info/cost/  didnt think necessary/no PCP  9) If discharged with Home Health, has the agency contacted you to schedule visit? No  10) Is there anyone available to help you at home (meals, errands, transportation    monitoring) (adult children, neighbors, private duty companions) Yes    11) Are you on a special diet? No         If YES, do you understand the requirements for this diet? Education provided? 12) If presented with cough, bronchitis, COPD, asthma, is it ok to ask that the   respiratory disease management educator call you? Not applicable      13)  A) If presented with fall, were you issued an assistive device in the ED    Are you using? Not applicable  B) If given RX for device, have you obtained? Not applicable       If NO, barriers? C) Therapist recommended:   Are you able to implement the suggestions? Not applicable        If NO, barriers to implementation?      D) Are you having any difficulties with mobility inside your home?     (steps, bed, tub)No   If YES, ask if the [facility] PT can contact patient and good time and number?  14)  At the end of your discharge instructions, there is information about accessing \[facility] [place] [facility]\"" & [facility] SERVICES, have you had a chance to review those? No         Do you have any questions about signing up for this service? We encourage our patients to be active participants in their healthcare and this site is one of the ways to do that. It will allow you to access parts of your medical record, email your doctors office, schedule appointments, and request medications refills . 15) Are there any other questions that I can answer for you regarding    your Emergency department visit?  NO             Estimated Call Time:____1:18 PM  _______________ Date/Time:_______________

## 2017-02-24 ENCOUNTER — HOSPITAL ENCOUNTER (OUTPATIENT)
Dept: MRI IMAGING | Age: 82
Discharge: HOME | End: 2017-02-24
Attending: FAMILY MEDICINE
Payer: MEDICARE

## 2017-02-24 DIAGNOSIS — M54.32 SCIATICA OF LEFT SIDE: ICD-10-CM

## 2017-02-24 PROCEDURE — A9585 GADOBUTROL INJECTION: HCPCS | Performed by: FAMILY MEDICINE

## 2017-02-24 PROCEDURE — 72158 MRI LUMBAR SPINE W/O & W/DYE: CPT

## 2017-02-24 PROCEDURE — 74011250636 HC RX REV CODE- 250/636: Performed by: FAMILY MEDICINE

## 2017-02-24 RX ADMIN — GADOBUTROL 7 ML: 604.72 INJECTION INTRAVENOUS at 09:00

## 2017-03-22 ENCOUNTER — TELEPHONE (OUTPATIENT)
Dept: GERIATRIC MEDICINE | Age: 82
End: 2017-03-22

## 2017-03-22 NOTE — TELEPHONE ENCOUNTER
Pt calls our office stating he would like to cancel his appointment with Dr. Ho Allan for Wednesday March 23rd. He states he would not like to reschedule this appointment at this time. Appointment has been cancelled in [place] scheduling system.

## 2017-08-08 ENCOUNTER — HOSPITAL ENCOUNTER (OUTPATIENT)
Dept: VASCULAR SURGERY | Age: 82
Discharge: HOME | End: 2017-08-08
Attending: FAMILY MEDICINE
Payer: MEDICARE

## 2017-08-08 DIAGNOSIS — R60.0 LOCALIZED EDEMA: ICD-10-CM

## 2017-08-08 PROCEDURE — 93970 EXTREMITY STUDY: CPT

## 2017-08-08 NOTE — PROCEDURES
[address]  *** FINAL REPORT ***    Name: Porsche Hernandez  MRN: [id number]    Outpatient  : 30 Aug 1935  HIS Order #: [id number]  [id number] [facility] Visit #: [id number]  Date: 08 Aug 2017    TYPE OF TEST: Peripheral Venous Testing    REASON FOR TEST  Edema of lower extremity    Right Leg:-  Deep venous thrombosis:           No  Superficial venous thrombosis:    No  Deep venous insufficiency:        Not examined  Superficial venous insufficiency: Not examined    Left Leg:-  Deep venous thrombosis:           No  Superficial venous thrombosis:    No  Deep venous insufficiency:        Not examined  Superficial venous insufficiency: Not examined      INTERPRETATION/FINDINGS  PROCEDURE:  Color duplex ultrasound imaging of lower extremity veins. FINDINGS:       Right: The common femoral, deep femoral, femoral, popliteal,  posterior tibial, peroneal, and great saphenous are patent and without   evidence of thrombus;  each is fully compressible and there is no  narrowing of the flow channel on color Doppler imaging. Phasic flow  is observed in the common femoral vein. Left:   The common femoral, deep femoral, femoral, popliteal,  posterior tibial, peroneal, and great saphenous are patent and without   evidence of thrombus;  each is fully compressible and there is no  narrowing of the flow channel on color Doppler imaging. Phasic flow  is observed in the common femoral vein. IMPRESSION:  No evidence of right or left lower extremity vein  thrombosis. Varicose vein is observed in the left politeal vein. There   is an incidental finding of a prominent left groin lymph node. ADDITIONAL COMMENTS    I have personally reviewed the data relevant to the interpretation of  this  study.     TECHNOLOGIST: Kg Gonzalez RVT  Signed: 2017 02:39 PM    PHYSICIAN: Toni Gore MD  Signed: 2017 07:16 AM

## 2018-03-30 ENCOUNTER — OFFICE VISIT (OUTPATIENT)
Dept: GERIATRIC MEDICINE | Age: 83
End: 2018-03-30

## 2018-03-30 VITALS
SYSTOLIC BLOOD PRESSURE: 138 MMHG | RESPIRATION RATE: 18 BRPM | HEART RATE: 68 BPM | TEMPERATURE: 97.8 F | DIASTOLIC BLOOD PRESSURE: 75 MMHG | HEIGHT: 65 IN | OXYGEN SATURATION: 97 % | BODY MASS INDEX: 27.02 KG/M2 | WEIGHT: 162.2 LBS

## 2018-03-30 DIAGNOSIS — E55.9 VITAMIN D DEFICIENCY: ICD-10-CM

## 2018-03-30 DIAGNOSIS — Z76.89 ENCOUNTER TO ESTABLISH CARE WITH NEW DOCTOR: ICD-10-CM

## 2018-03-30 DIAGNOSIS — M16.0 PRIMARY OSTEOARTHRITIS OF BOTH HIPS: ICD-10-CM

## 2018-03-30 DIAGNOSIS — R09.89 DIMINISHED PULSES IN LOWER EXTREMITY: ICD-10-CM

## 2018-03-30 DIAGNOSIS — R23.0 EXTREMITY CYANOSIS: ICD-10-CM

## 2018-03-30 DIAGNOSIS — R79.9 ABNORMAL FINDING OF BLOOD CHEMISTRY: ICD-10-CM

## 2018-03-30 DIAGNOSIS — E78.2 MIXED HYPERLIPIDEMIA: ICD-10-CM

## 2018-03-30 DIAGNOSIS — M25.552 PAIN IN JOINT INVOLVING PELVIC REGION AND THIGH, BILATERAL: ICD-10-CM

## 2018-03-30 DIAGNOSIS — Z00.00 ROUTINE ADULT HEALTH MAINTENANCE: ICD-10-CM

## 2018-03-30 DIAGNOSIS — M25.551 PAIN IN JOINT INVOLVING PELVIC REGION AND THIGH, BILATERAL: ICD-10-CM

## 2018-03-30 DIAGNOSIS — R35.1 NOCTURIA: ICD-10-CM

## 2018-03-30 DIAGNOSIS — R26.0 ATAXIC GAIT: ICD-10-CM

## 2018-03-30 DIAGNOSIS — R13.14 PHARYNGOESOPHAGEAL DYSPHAGIA: Primary | ICD-10-CM

## 2018-03-30 DIAGNOSIS — Z78.9 FULL CODE STATUS: ICD-10-CM

## 2018-03-30 DIAGNOSIS — Z12.5 ENCOUNTER FOR SCREENING FOR MALIGNANT NEOPLASM OF PROSTATE: ICD-10-CM

## 2018-03-30 DIAGNOSIS — E03.9 ACQUIRED HYPOTHYROIDISM: ICD-10-CM

## 2018-03-30 PROBLEM — H91.92 HEARING LOSS OF LEFT EAR: Status: ACTIVE | Noted: 2018-03-30

## 2018-03-30 PROBLEM — I25.10 CORONARY ARTERY DISEASE INVOLVING NATIVE CORONARY ARTERY OF NATIVE HEART WITHOUT ANGINA PECTORIS: Chronic | Status: ACTIVE | Noted: 2017-02-16

## 2018-03-30 PROBLEM — K62.89 ANAL OR RECTAL PAIN: Chronic | Status: ACTIVE | Noted: 2018-03-30

## 2018-03-30 RX ORDER — OMEPRAZOLE 40 MG/1
40 CAPSULE, DELAYED RELEASE ORAL DAILY
Qty: 30 CAP | Refills: 2 | Status: SHIPPED | OUTPATIENT
Start: 2018-03-30 | End: 2018-06-12 | Stop reason: SDUPTHER

## 2018-03-30 RX ORDER — MELATONIN 5 MG
5 CAPSULE ORAL
COMMUNITY
End: 2019-02-08 | Stop reason: ALTCHOICE

## 2018-03-30 RX ORDER — ACETAMINOPHEN 325 MG/1
650 TABLET ORAL
COMMUNITY
End: 2019-07-22

## 2018-03-30 RX ORDER — FINASTERIDE 5 MG/1
5 TABLET, FILM COATED ORAL DAILY
Qty: 30 TAB | Refills: 0 | Status: SHIPPED | OUTPATIENT
Start: 2018-03-30 | End: 2018-06-12 | Stop reason: SDUPTHER

## 2018-03-30 RX ORDER — DIAZEPAM 5 MG/1
5 TABLET ORAL
COMMUNITY
Start: 2018-01-02 | End: 2018-06-12 | Stop reason: SDUPTHER

## 2018-03-30 RX ORDER — MINOCYCLINE HYDROCHLORIDE 100 MG/1
50 TABLET ORAL DAILY
COMMUNITY
Start: 2018-03-07 | End: 2018-06-29

## 2018-03-30 RX ORDER — TRAMADOL HYDROCHLORIDE 50 MG/1
25-50 TABLET ORAL
Qty: 30 TAB | Refills: 0 | Status: SHIPPED | OUTPATIENT
Start: 2018-03-30 | End: 2018-04-13 | Stop reason: SDUPTHER

## 2018-03-30 NOTE — PROGRESS NOTES
ADVISED PATIENT OF THE FOLLOWING HEALTH MAINTAINCE DUE  Health Maintenance Due   Topic Date Due    DTaP/Tdap/Td series (1 - Tdap) 08/30/1956    ZOSTER VACCINE AGE 60>  06/30/1995    GLAUCOMA SCREENING Q2Y  08/30/2000    Pneumococcal 65+ Low/Medium Risk (1 of 2 - PCV13) 08/30/2000    Influenza Age 9 to Adult  08/01/2017    [address] [place] [address]  03/28/2018      Chief Complaint   Patient presents with   [address] here to establish care with NP in clinic. Patient wants to meet and discuss care with NP        1. Have you been to the ER, urgent care clinic since your last visit? Hospitalized since your last visit? No    2. Have you seen or consulted any other health care providers outside of the [address] [place] [address] since your last visit? Include any DEXA scan, mammography  or colon screening. No    3. Do you have an Advance Directive on file? no    4. Do you have a DNR on file? Will discuss with Patient         Patient is accompanied by self I have received verbal consent from [facility] to discuss any/all medical information while they are present in the room.

## 2018-03-30 NOTE — MR AVS SNAPSHOT
[address] [address] 
[phone] Patient: Roxana Dill MRN: [id number] AAQ:3/30/3076 Visit Information Date & Time Provider Department Dept. Phone Encounter #  
 3/30/2018  1:30 PM Merlin Ober, [address] [facility] [phone] [id number] Upcoming Health Maintenance Date Due DTaP/Tdap/Td series (1 - Tdap) 8/30/1956 ZOSTER VACCINE AGE 60> 6/30/1995 GLAUCOMA SCREENING Q2Y 8/30/2000 MEDICARE YEARLY EXAM 3/28/2018 Pneumococcal 65+ Low/Medium Risk (1 of 2 - PCV13) 5/1/2018* *Topic was postponed. The date shown is not the original due date. Allergies as of 3/30/2018  Review Complete On: 3/30/2018 By: Merlin Ober, NP Severity Noted Reaction Type Reactions Toprol Xl [Metoprolol Succinate] Medium 02/15/2017    Diarrhea Sulfa (Sulfonamide Antibiotics)  02/15/2017    Hives Current Immunizations  Never Reviewed No immunizations on file. Not reviewed this visit You Were Diagnosed With   
  
 Codes Comments Pharyngoesophageal dysphagia    -  Primary ICD-10-CM: R13.14 ICD-9-CM: 787.24 Nocturia     ICD-10-CM: R35.1 ICD-9-CM: 788.43 Pain in joint involving pelvic region and thigh, bilateral     ICD-10-CM: M25.551, M25.552 ICD-9-CM: 719.45 Encounter to establish care with new doctor     ICD-10-CM: Z76.89 
ICD-9-CM: V65.8 Routine adult health maintenance     ICD-10-CM: Z00.00 ICD-9-CM: V70.0 Diminished pulses in lower extremity     ICD-10-CM: R09.89 ICD-9-CM: 785.9 Extremity cyanosis     ICD-10-CM: R23.0 ICD-9-CM: 782.5 Ataxic gait     ICD-10-CM: R26.0 ICD-9-CM: 781.2 Full code status     ICD-10-CM: Z78.9 ICD-9-CM: V49.89 Mixed hyperlipidemia     ICD-10-CM: E78.2 ICD-9-CM: 272.2 Primary osteoarthritis of both hips     ICD-10-CM: M16.0 ICD-9-CM: 715.15 Acquired hypothyroidism     ICD-10-CM: E03.9 ICD-9-CM: 944. 9 Abnormal finding of blood chemistry     ICD-10-CM: R79.9 ICD-9-CM: 790.6 Encounter for screening for malignant neoplasm of prostate     ICD-10-CM: Z12.5 ICD-9-CM: V76.44 Vitamin D deficiency     ICD-10-CM: E55.9 ICD-9-CM: 268.9 Vitals BP Pulse Temp Resp Height(growth percentile) Weight(growth percentile) 138/75 (BP 1 Location: Left arm, BP Patient Position: Sitting) 68 97.8 °F (36.6 °C) (Oral) 18 5' 5\" (1.651 m) 162 lb 3.2 oz (73.6 kg) SpO2 BMI Smoking Status 97% 26.99 kg/m2 Former Smoker BMI and BSA Data Body Mass Index Body Surface Area  
 26.99 kg/m 2 1.84 m 2 Preferred Pharmacy Pharmacy Name Phone [facility] [phone] Your Updated Medication List  
  
   
This list is accurate as of 3/30/18  3:38 PM.  Always use your most recent med list.  
  
  
  
  
 acetaminophen 325 mg tablet Commonly known as:  TYLENOL Take 650 mg by mouth every four (4) hours as needed for Pain. aspirin delayed-release 81 mg tablet Take 81 mg by mouth daily. atorvastatin 40 mg tablet Commonly known as:  LIPITOR Take 40 mg by mouth daily. Biotin 2,500 mcg Cap Take 5,000 mcg by mouth daily. CITRUCEL (SUCROSE) Powd Generic drug:  Methylcellulose (with Sugar) Take  by mouth daily. diazePAM 5 mg tablet Commonly known as:  VALIUM Take 5 mg by mouth daily as needed. finasteride 5 mg tablet Commonly known as:  PROSCAR Take 1 Tab by mouth daily. Indications: benign prostatic hyperplasia with lower urinary tract sx GENTEAL PM 94-3 % Oint Generic drug:  White Petrolatum-Mineral Oil Apply  to eye two (2) times a day. levothyroxine 100 mcg tablet Commonly known as:  SYNTHROID Take 0.1 mcg by mouth Daily (before breakfast). melatonin 5 mg Cap capsule Take 5 mg by mouth nightly. metroNIDAZOLE 0.75 % topical gel Commonly known as:  Jef Bernadette Apply  to affected area daily. minocycline 100 mg tablet Commonly known as:  Morgan Enter Take 100 mg by mouth daily. OMEGA 3 FISH OIL PO Take  by mouth daily. omeprazole 40 mg capsule Commonly known as:  PRILOSEC Take 1 Cap by mouth daily. Indications: gastroesophageal reflux disease  
  
 polyethylene glycol 17 gram/dose powder Commonly known as:  Antonio Olguin Take 17 g by mouth daily. 1 tablespoon with 8 oz of water daily PRESERVISION AREDS 2 [phone] mg-unit-mg-mg Cap Generic drug:  vit C,Q-Ag-mazka-lutein-zeaxan Take 2 Caps by mouth daily. traMADol 50 mg tablet Commonly known as:  ULTRAM  
Take 0.5-1 Tabs by mouth every eight (8) hours as needed for Pain. Max Daily Amount: 150 mg. Indications: Pain Prescriptions Printed Refills  
 traMADol (ULTRAM) 50 mg tablet 0 Sig: Take 0.5-1 Tabs by mouth every eight (8) hours as needed for Pain. Max Daily Amount: 150 mg. Indications: Pain Class: Print Route: Oral  
 finasteride (PROSCAR) 5 mg tablet 0 Sig: Take 1 Tab by mouth daily. Indications: benign prostatic hyperplasia with lower urinary tract sx Class: Print Route: Oral  
 omeprazole (PRILOSEC) 40 mg capsule 2 Sig: Take 1 Cap by mouth daily. Indications: gastroesophageal reflux disease Class: Print Route: Oral  
  
We Performed the Following CBC WITH AUTOMATED DIFF [17691 CPT(R)] COLLECTION VENOUS BLOOD,VENIPUNCTURE [id number] CPT(R)] CULTURE, URINE [id number] CPT(R)] HEMOGLOBIN A1C WITH EAG [39475 CPT(R)] LIPID PANEL [27863 CPT(R)] METABOLIC PANEL, COMPREHENSIVE [65374 CPT(R)] PSA SCREENING (SCREENING) [ [facility]] REFERRAL TO GASTROENTEROLOGY [PZW10 Custom] Comments:  
 Evaluate for: Multiple episodes of choking and dysphagia. TSH REFLEX TO T4 [00049 CPT(R)] URINALYSIS W/ RFLX MICROSCOPIC [64546 CPT(R)] VITAMIN D, 25 HYDROXY [id number] CPT(R)] To-Do List   
 03/30/2018 Imaging:  CTA LOW EXT BI W CONT   
  
 03/30/2018 Imaging:  XR PELV 3 V Referral Information Referral ID Referred By Referred To  
  
 [id number] Claire Osorio P Not Available Visits Status Start Date End Date 1 New Request 3/30/18 3/30/19 If your referral has a status of pending review or denied, additional information will be sent to support the outcome of this decision. Referral ID Referred By Referred To  
 [id number] [address] [place] [address] Phone: [phone] Fax: [phone] Visits Status Start Date End Date 1 New Request 3/30/18 3/30/19 If your referral has a status of pending review or denied, additional information will be sent to support the outcome of this decision. Patient Instructions Learning About Swallowing Problems What are swallowing problems? Certain health problems that affect the nervous system can cause trouble swallowing. These conditions include stroke, ALS (also known as Ewa Gehrig's disease), Parkinson's disease, and multiple sclerosis. The muscles and nerves that help move food through the throat and esophagus may not work right. Growths, such as cancer, and other problems with your esophagus can also make it hard to swallow. The esophagus is the tube that leads from your throat to your stomach. How are swallowing problems diagnosed? A doctor or speech therapist will examine you to check for swallowing problems. You may get swallowing tests to check how well your throat muscles work. For these tests, you swallow a special liquid that helps the doctor see your throat and esophagus on an X-ray or video screen. Other tests use a thin, flexible tube called a scope to check for problems with your esophagus. The doctor puts the scope in your mouth and down your throat to look at your esophagus. What are the symptoms? Symptoms of swallowing problems may include: · Trouble getting food or liquids to go down on the first try. · Gagging, choking, or coughing when you swallow. · Having food or liquids come back up through your throat, mouth, or nose after you swallow. · Feeling like foods or liquids are stuck in some part of your throat or chest. 
· Pain when you swallow. How are swallowing problems treated? How swallowing problems are treated depends on the cause. The main goals of treatment will be to help you eat and swallow safely and get good nutrition. This is important for your health and quality of life. You may learn exercises to train your throat muscles to work together so you're able to swallow better. Learning certain ways to put food in your mouth or to position your head while eating may also help. Your doctor or a speech therapist may recommend changes to your diet to help make it easier to swallow. You may need to avoid certain foods or liquids. You also may need to change the thickness of foods or liquids in your diet. To eat and swallow safely, follow any instructions you get from your doctor or therapist. These ideas may help: 
· Sit upright when eating, drinking, and taking pills. · Take small bites of food. Chew completely and swallow before taking another bite. · Take small sips of liquids. Hold the liquid in your mouth as you prepare to swallow. · If eating makes you tired, eat smaller but more frequent meals. · If you cough or choke, lean forward and keep your chin tipped downward while you cough. Where can you learn more? Go to http://sloan-george.info/. Enter [id number] in the search box to learn more about \"Learning About Swallowing Problems. \" Current as of: March 20, 2017 Content Version: 11.4 © 0671-6297 [employer].  Care instructions adapted under license by [employer] (which disclaims liability or warranty for this information). If you have questions about a medical condition or this instruction, always ask your healthcare professional. [address] any warranty or liability for your use of this information. Hip Pain: Care Instructions Your Care Instructions Hip pain may be caused by many things, including overuse, a fall, or a twisting movement. Another cause of hip pain is arthritis. Your pain may increase when you stand up, walk, or squat. The pain may come and go or may be constant. Home treatment can help relieve hip pain, swelling, and stiffness. If your pain is ongoing, you may need more tests and treatment. Follow-up care is a key part of your treatment and safety. Be sure to make and go to all appointments, and call your doctor if you are having problems. It's also a good idea to know your test results and keep a list of the medicines you take. How can you care for yourself at home? · Take pain medicines exactly as directed. ¨ If the doctor gave you a prescription medicine for pain, take it as prescribed. ¨ If you are not taking a prescription pain medicine, ask your doctor if you can take an over-the-counter medicine. · Rest and protect your hip. Take a break from any activity, including standing or walking, that may cause pain. · Put ice or a cold pack against your hip for 10 to 20 minutes at a time. Try to do this every 1 to 2 hours for the next 3 days (when you are awake) or until the swelling goes down. Put a thin cloth between the ice and your skin. · Sleep on your healthy side with a pillow between your knees, or sleep on your back with pillows under your knees. · If there is no swelling, you can put moist heat, a heating pad, or a warm cloth on your hip. Do gentle stretching exercises to help keep your hip flexible. · Learn how to prevent falls. Have your vision and hearing checked regularly. Wear slippers or shoes with a nonskid sole. · Stay at a healthy weight. · Wear comfortable shoes. When should you call for help? Call 911 anytime you think you may need emergency care. For example, call if: 
? · You have sudden chest pain and shortness of breath, or you cough up blood. ? · You are not able to stand or walk or bear weight. ? · Your buttocks, legs, or feet feel numb or tingly. ? · Your leg or foot is cool or pale or changes color. ? · You have severe pain. ?Call your doctor now or seek immediate medical care if: 
? · You have signs of infection, such as: 
¨ Increased pain, swelling, warmth, or redness in the hip area. ¨ Red streaks leading from the hip area. ¨ Pus draining from the hip area. ¨ A fever. ? · You have signs of a blood clot, such as: 
¨ Pain in your calf, back of the knee, thigh, or groin. ¨ Redness and swelling in your leg or groin. ? · You are not able to bend, straighten, or move your leg normally. ? · You have trouble urinating or having bowel movements. ? Watch closely for changes in your health, and be sure to contact your doctor if: 
? · You do not get better as expected. Where can you learn more? Go to http://sloan-george.info/. Enter E946 in the search box to learn more about \"Hip Pain: Care Instructions. \" Current as of: March 20, 2017 Content Version: 11.4 © 1192-2604 [employer]. Care instructions adapted under license by [employer] (which disclaims liability or warranty for this information). If you have questions about a medical condition or this instruction, always ask your healthcare professional. [place] [address] any warranty or liability for your use of this information. Introducing [facility]! [employer] introduces [employer] patient portal. Now you can access parts of your medical record, email your doctor's office, and request medication refills online.    
 
1. In your internet browser, go to [email or web address] 2. Click on the First Time User? Click Here link in the Sign In box. You will see the New Member Sign Up page. 3. Enter your [email or web address] Access Code exactly as it appears below. You will not need to use this code after youve completed the sign-up process. If you do not sign up before the expiration date, you must request a new code. · [email or web address] Access Code: [email or web address] Expires: 6/28/2018  3:08 PM 
 
4. Enter the last four digits of your Social Security Number (xxxx) and Date of Birth (mm/dd/yyyy) as indicated and click Submit. You will be taken to the next sign-up page. 5. Create a [email or web address] ID. This will be your [email or web address] login ID and cannot be changed, so think of one that is secure and easy to remember. 6. Create a [email or web address] password. You can change your password at any time. 7. Enter your Password Reset Question and Answer. This can be used at a later time if you forget your password. 8. Enter your e-mail address. You will receive e-mail notification when new information is available in [address] 9. Click Sign Up. You can now view and download portions of your medical record. 10. Click the Download Summary menu link to download a portable copy of your medical information. If you have questions, please visit the Frequently Asked Questions section of the [email or web address] website. Remember, [email or web address] is NOT to be used for urgent needs. For medical emergencies, dial 911. Now available from your iPhone and Android! Please provide this summary of care documentation to your next provider. Your primary care clinician is listed as Keri Marcelo. If you have any questions after today's visit, please call [phone].

## 2018-04-01 LAB
25(OH)D3+25(OH)D2 SERPL-MCNC: 48.7 NG/ML (ref 30–100)
ALBUMIN SERPL-MCNC: 4.1 G/DL (ref 3.5–4.7)
ALBUMIN/GLOB SERPL: 1.9 {RATIO} (ref 1.2–2.2)
ALP SERPL-CCNC: 77 IU/L (ref 39–117)
ALT SERPL-CCNC: 26 IU/L (ref 0–44)
APPEARANCE UR: CLEAR
AST SERPL-CCNC: 20 IU/L (ref 0–40)
BACTERIA UR CULT: NO GROWTH
BASOPHILS # BLD AUTO: 0 X10E3/UL (ref 0–0.2)
BASOPHILS NFR BLD AUTO: 0 %
BILIRUB SERPL-MCNC: 1.2 MG/DL (ref 0–1.2)
BILIRUB UR QL STRIP: NEGATIVE
BUN SERPL-MCNC: 20 MG/DL (ref 8–27)
BUN/CREAT SERPL: 18 (ref 10–24)
CALCIUM SERPL-MCNC: 9.2 MG/DL (ref 8.6–10.2)
CHLORIDE SERPL-SCNC: 104 MMOL/L (ref 96–106)
CHOLEST SERPL-MCNC: 179 MG/DL (ref 100–199)
CO2 SERPL-SCNC: 22 MMOL/L (ref 18–29)
COLOR UR: YELLOW
CREAT SERPL-MCNC: 1.09 MG/DL (ref 0.76–1.27)
EOSINOPHIL # BLD AUTO: 0.1 X10E3/UL (ref 0–0.4)
EOSINOPHIL NFR BLD AUTO: 1 %
ERYTHROCYTE [DISTWIDTH] IN BLOOD BY AUTOMATED COUNT: 14.2 % (ref 12.3–15.4)
EST. AVERAGE GLUCOSE BLD GHB EST-MCNC: 114 MG/DL
GFR SERPLBLD CREATININE-BSD FMLA CKD-EPI: 63 ML/MIN/1.73
GFR SERPLBLD CREATININE-BSD FMLA CKD-EPI: 73 ML/MIN/1.73
GLOBULIN SER CALC-MCNC: 2.2 G/DL (ref 1.5–4.5)
GLUCOSE SERPL-MCNC: 83 MG/DL (ref 65–99)
GLUCOSE UR QL: NEGATIVE
HBA1C MFR BLD: 5.6 % (ref 4.8–5.6)
HCT VFR BLD AUTO: 48 % (ref 37.5–51)
HDLC SERPL-MCNC: 56 MG/DL
HGB BLD-MCNC: 16.5 G/DL (ref 13–17.7)
HGB UR QL STRIP: NEGATIVE
IMM GRANULOCYTES # BLD: 0 X10E3/UL (ref 0–0.1)
IMM GRANULOCYTES NFR BLD: 0 %
KETONES UR QL STRIP: NEGATIVE
LDLC SERPL CALC-MCNC: 68 MG/DL (ref 0–99)
LEUKOCYTE ESTERASE UR QL STRIP: NEGATIVE
LYMPHOCYTES # BLD AUTO: 1.9 X10E3/UL (ref 0.7–3.1)
LYMPHOCYTES NFR BLD AUTO: 25 %
MCH RBC QN AUTO: 32 PG (ref 26.6–33)
MCHC RBC AUTO-ENTMCNC: 34.4 G/DL (ref 31.5–35.7)
MCV RBC AUTO: 93 FL (ref 79–97)
MICRO URNS: NORMAL
MONOCYTES # BLD AUTO: 0.6 X10E3/UL (ref 0.1–0.9)
MONOCYTES NFR BLD AUTO: 9 %
NEUTROPHILS # BLD AUTO: 4.7 X10E3/UL (ref 1.4–7)
NEUTROPHILS NFR BLD AUTO: 65 %
NITRITE UR QL STRIP: NEGATIVE
PH UR STRIP: 5.5 [PH] (ref 5–7.5)
PLATELET # BLD AUTO: 175 X10E3/UL (ref 150–379)
POTASSIUM SERPL-SCNC: 4.4 MMOL/L (ref 3.5–5.2)
PROT SERPL-MCNC: 6.3 G/DL (ref 6–8.5)
PROT UR QL STRIP: NEGATIVE
PSA SERPL-MCNC: 3.7 NG/ML (ref 0–4)
RBC # BLD AUTO: 5.16 X10E6/UL (ref 4.14–5.8)
SODIUM SERPL-SCNC: 146 MMOL/L (ref 134–144)
SP GR UR: 1.02 (ref 1–1.03)
TRIGL SERPL-MCNC: 273 MG/DL (ref 0–149)
TSH SERPL DL<=0.005 MIU/L-ACNC: 0.8 UIU/ML (ref 0.45–4.5)
UROBILINOGEN UR STRIP-MCNC: 0.2 MG/DL (ref 0.2–1)
VLDLC SERPL CALC-MCNC: 55 MG/DL (ref 5–40)
WBC # BLD AUTO: 7.3 X10E3/UL (ref 3.4–10.8)

## 2018-04-01 NOTE — PROGRESS NOTES
Reason for Visit:      Chief Complaint   Patient presents with   [address] here to establish care with NP in clinic. Patient wants to meet and discuss care with NP      History of Present Illness:   Asad Leggett is a 80 y.o. male geriatric patient who presents today to establish care with me as his primary care provider. He has been seeing Dr. Jacques Orr for years but states he feels like he is not being heard or monitored as well as he believes he should. He believes that he is in good health and doesn't require as much care as other patients. Today he states he does not have any medical complaints. Once our interviewing process was completed I was able to uncover multiple medical concerns. He states he has been having lower back/hip pain for weeks. He states he has previously seen Dr. Robbie Velasquez with pain management for spinal stenosis but does not remember what the plan of care was regarding continued care. He states he was given a few injections and the pain went away. He does not believe he had any injury or trauma to inflame the back or hip. He also is positive for possible enlarged prostate as he urinates throughout the night about 3-4 times. He also is complaining now of dysphagia. He has choked on meals for months and didn't realize it was something to be concerned about. He is going to follow up with Dr. Jelly Dupont with GI for this ASAP. I offered him diagnostics prior to seeing Dr. Jelly Dupont but he has a relationship with him already and would like to get that looked at now. I also found that he has pain in both is inguinal regions as well as edema on the left leg most of the time. Bilaterally he is missing pulses as well as cyanosis to lower legs to toes without blanching. Discussed with him this could be related to possible venous blood or arterial blood flow abnormalities.  He does not remember anyone ordering or completing any vascular testing on him in the past except for when he had the CABAGE years ago. I have discussed with him the need for me to request records from those providers as I need to see the entire picture to understand what care he has received in the past and what his previous health concerns are. As for his support system he has one son that lives close and is involved in his care as well as 2 grandchildren and 4 great grandchildren. He wears hearing aids and glasses but hasn't had a glaucoma screening in years and is going next month for screening. He states he does not have any specific ambulatory aids but has a cane he uses from time to time when his balance is not well. Extended care was provided during this visit starting at 1:30 pm and ending at 3:15 pm. Total time spent with patient face to face =  105 mins     Diagnosis/Treatment Plan: The following orders have been placed for treatment of the diagnoses above:  1. Pharyngoesophageal dysphagia    2. Nocturia    3. Pain in joint involving pelvic region and thigh, bilateral    4. Encounter to establish care with new doctor    5. Routine adult health maintenance    6. Diminished pulses in lower extremity    7. Extremity cyanosis    8. Ataxic gait    9. Full code status    10. Mixed hyperlipidemia    11. Primary osteoarthritis of both hips    12. Acquired hypothyroidism    13. Abnormal finding of blood chemistry     14. Encounter for screening for malignant neoplasm of prostate     15. Vitamin D deficiency         ICD-10-CM ICD-9-CM    1.  Pharyngoesophageal dysphagia R13.14 787.24 traMADol (ULTRAM) 50 mg tablet      finasteride (PROSCAR) 5 mg tablet      HEMOGLOBIN A1C WITH EAG      CBC WITH AUTOMATED DIFF      URINALYSIS W/ RFLX MICROSCOPIC      CULTURE, URINE      METABOLIC PANEL, COMPREHENSIVE      LIPID PANEL      PSA SCREENING (SCREENING)      VITAMIN D, 25 HYDROXY      TSH REFLEX TO T4      COLLECTION VENOUS BLOOD,VENIPUNCTURE      CTA LOW EXT BI W CONT      XR PELV 3 V      REFERRAL TO GASTROENTEROLOGY      omeprazole (PRILOSEC) 40 mg capsule   2. Nocturia R35.1 788. 43 traMADol (ULTRAM) 50 mg tablet      finasteride (PROSCAR) 5 mg tablet      HEMOGLOBIN A1C WITH EAG      CBC WITH AUTOMATED DIFF      URINALYSIS W/ RFLX MICROSCOPIC      CULTURE, URINE      METABOLIC PANEL, COMPREHENSIVE      LIPID PANEL      PSA SCREENING (SCREENING)      VITAMIN D, 25 HYDROXY      TSH REFLEX TO T4      COLLECTION VENOUS BLOOD,VENIPUNCTURE      CTA LOW EXT BI W CONT      XR PELV 3 V      REFERRAL TO GASTROENTEROLOGY      omeprazole (PRILOSEC) 40 mg capsule   3. Pain in joint involving pelvic region and thigh, bilateral M25.551 719.45 traMADol (ULTRAM) 50 mg tablet    M25.552  finasteride (PROSCAR) 5 mg tablet      HEMOGLOBIN A1C WITH EAG      CBC WITH AUTOMATED DIFF      URINALYSIS W/ RFLX MICROSCOPIC      CULTURE, URINE      METABOLIC PANEL, COMPREHENSIVE      LIPID PANEL      PSA SCREENING (SCREENING)      VITAMIN D, 25 HYDROXY      TSH REFLEX TO T4      COLLECTION VENOUS BLOOD,VENIPUNCTURE      CTA LOW EXT BI W CONT      XR PELV 3 V      REFERRAL TO GASTROENTEROLOGY      omeprazole (PRILOSEC) 40 mg capsule   4. Encounter to establish care with new doctor Z76.89 V65.8 traMADol (ULTRAM) 50 mg tablet      finasteride (PROSCAR) 5 mg tablet      HEMOGLOBIN A1C WITH EAG      CBC WITH AUTOMATED DIFF      URINALYSIS W/ RFLX MICROSCOPIC      CULTURE, URINE      METABOLIC PANEL, COMPREHENSIVE      LIPID PANEL      PSA SCREENING (SCREENING)      VITAMIN D, 25 HYDROXY      TSH REFLEX TO T4      COLLECTION VENOUS BLOOD,VENIPUNCTURE      CTA LOW EXT BI W CONT      XR PELV 3 V      REFERRAL TO GASTROENTEROLOGY      omeprazole (PRILOSEC) 40 mg capsule   5.  Routine adult health maintenance Z00.00 V70.0 traMADol (ULTRAM) 50 mg tablet      finasteride (PROSCAR) 5 mg tablet      HEMOGLOBIN A1C WITH EAG      CBC WITH AUTOMATED DIFF      URINALYSIS W/ RFLX MICROSCOPIC      CULTURE, URINE      METABOLIC PANEL, COMPREHENSIVE      LIPID PANEL      PSA SCREENING (SCREENING)      VITAMIN D, 25 HYDROXY      TSH REFLEX TO T4      COLLECTION VENOUS BLOOD,VENIPUNCTURE      CTA LOW EXT BI W CONT      XR PELV 3 V      REFERRAL TO GASTROENTEROLOGY      omeprazole (PRILOSEC) 40 mg capsule   6. Diminished pulses in lower extremity R09.89 785.9 traMADol (ULTRAM) 50 mg tablet      finasteride (PROSCAR) 5 mg tablet      HEMOGLOBIN A1C WITH EAG      CBC WITH AUTOMATED DIFF      URINALYSIS W/ RFLX MICROSCOPIC      CULTURE, URINE      METABOLIC PANEL, COMPREHENSIVE      LIPID PANEL      PSA SCREENING (SCREENING)      VITAMIN D, 25 HYDROXY      TSH REFLEX TO T4      COLLECTION VENOUS BLOOD,VENIPUNCTURE      CTA LOW EXT BI W CONT      XR PELV 3 V      REFERRAL TO GASTROENTEROLOGY      omeprazole (PRILOSEC) 40 mg capsule   7. Extremity cyanosis R23.0 782.5 traMADol (ULTRAM) 50 mg tablet      finasteride (PROSCAR) 5 mg tablet      HEMOGLOBIN A1C WITH EAG      CBC WITH AUTOMATED DIFF      URINALYSIS W/ RFLX MICROSCOPIC      CULTURE, URINE      METABOLIC PANEL, COMPREHENSIVE      LIPID PANEL      PSA SCREENING (SCREENING)      VITAMIN D, 25 HYDROXY      TSH REFLEX TO T4      COLLECTION VENOUS BLOOD,VENIPUNCTURE      CTA LOW EXT BI W CONT      XR PELV 3 V      REFERRAL TO GASTROENTEROLOGY      omeprazole (PRILOSEC) 40 mg capsule   8. Ataxic gait R26.0 781.2 traMADol (ULTRAM) 50 mg tablet      finasteride (PROSCAR) 5 mg tablet      HEMOGLOBIN A1C WITH EAG      CBC WITH AUTOMATED DIFF      URINALYSIS W/ RFLX MICROSCOPIC      CULTURE, URINE      METABOLIC PANEL, COMPREHENSIVE      LIPID PANEL      PSA SCREENING (SCREENING)      VITAMIN D, 25 HYDROXY      TSH REFLEX TO T4      COLLECTION VENOUS BLOOD,VENIPUNCTURE      CTA LOW EXT BI W CONT      XR PELV 3 V      REFERRAL TO GASTROENTEROLOGY      omeprazole (PRILOSEC) 40 mg capsule   9.  Full code status Z78.9 V49.89 traMADol (ULTRAM) 50 mg tablet      finasteride (PROSCAR) 5 mg tablet      HEMOGLOBIN A1C WITH EAG      CBC WITH AUTOMATED DIFF URINALYSIS W/ RFLX MICROSCOPIC      CULTURE, URINE      METABOLIC PANEL, COMPREHENSIVE      LIPID PANEL      PSA SCREENING (SCREENING)      VITAMIN D, 25 HYDROXY      TSH REFLEX TO T4      COLLECTION VENOUS BLOOD,VENIPUNCTURE      CTA LOW EXT BI W CONT      XR PELV 3 V      REFERRAL TO GASTROENTEROLOGY      omeprazole (PRILOSEC) 40 mg capsule   10. Mixed hyperlipidemia E78.2 272.2 traMADol (ULTRAM) 50 mg tablet      finasteride (PROSCAR) 5 mg tablet      HEMOGLOBIN A1C WITH EAG      CBC WITH AUTOMATED DIFF      URINALYSIS W/ RFLX MICROSCOPIC      CULTURE, URINE      METABOLIC PANEL, COMPREHENSIVE      LIPID PANEL      PSA SCREENING (SCREENING)      VITAMIN D, 25 HYDROXY      TSH REFLEX TO T4      COLLECTION VENOUS BLOOD,VENIPUNCTURE      CTA LOW EXT BI W CONT      XR PELV 3 V      REFERRAL TO GASTROENTEROLOGY      omeprazole (PRILOSEC) 40 mg capsule   11. Primary osteoarthritis of both hips M16.0 715.15 traMADol (ULTRAM) 50 mg tablet      finasteride (PROSCAR) 5 mg tablet      HEMOGLOBIN A1C WITH EAG      CBC WITH AUTOMATED DIFF      URINALYSIS W/ RFLX MICROSCOPIC      CULTURE, URINE      METABOLIC PANEL, COMPREHENSIVE      LIPID PANEL      PSA SCREENING (SCREENING)      VITAMIN D, 25 HYDROXY      TSH REFLEX TO T4      COLLECTION VENOUS BLOOD,VENIPUNCTURE      CTA LOW EXT BI W CONT      XR PELV 3 V      REFERRAL TO GASTROENTEROLOGY      omeprazole (PRILOSEC) 40 mg capsule   12. Acquired hypothyroidism E03.9 244.9 traMADol (ULTRAM) 50 mg tablet      finasteride (PROSCAR) 5 mg tablet      HEMOGLOBIN A1C WITH EAG      CBC WITH AUTOMATED DIFF      URINALYSIS W/ RFLX MICROSCOPIC      CULTURE, URINE      METABOLIC PANEL, COMPREHENSIVE      LIPID PANEL      PSA SCREENING (SCREENING)      VITAMIN D, 25 HYDROXY      TSH REFLEX TO T4      COLLECTION VENOUS BLOOD,VENIPUNCTURE      CTA LOW EXT BI W CONT      XR PELV 3 V      REFERRAL TO GASTROENTEROLOGY      omeprazole (PRILOSEC) 40 mg capsule   13.  Abnormal finding of blood chemistry  R79.9 790.6 HEMOGLOBIN A1C WITH EAG   14. Encounter for screening for malignant neoplasm of prostate  Z12.5 V76.44 PSA SCREENING (SCREENING)   15. Vitamin D deficiency  E55.9 268.9 VITAMIN D, 25 HYDROXY      The following medication/treatments have been discontinued by the provider today:   Medications Discontinued During This Encounter   Medication Reason    acetaminophen-codeine (TYLENOL #4) 300-60 mg per tablet Not A Current Medication    ibuprofen (ADVIL) 200 mg tablet Alternate Therapy     Follow Up: Follow-up Disposition: Return in about 1 week (around 4/6/2018) for with the NP to review your labs results. Subjective: Allergies   Allergen Reactions    Toprol Xl [Metoprolol Succinate] Diarrhea    Sulfa (Sulfonamide Antibiotics) Hives     Prior to Admission medications    Medication Sig Start Date End Date Taking? Authorizing Provider   melatonin 5 mg cap capsule Take 5 mg by mouth nightly. Yes Historical Provider   acetaminophen (TYLENOL) 325 mg tablet Take 650 mg by mouth every four (4) hours as needed for Pain. Yes Historical Provider   diazePAM (VALIUM) 5 mg tablet Take 5 mg by mouth daily as needed. 1/2/18  Yes Historical Provider   minocycline (DYNACIN) 100 mg tablet Take 100 mg by mouth daily. 3/7/18  Yes Historical Provider   traMADol (ULTRAM) 50 mg tablet Take 0.5-1 Tabs by mouth every eight (8) hours as needed for Pain. Max Daily Amount: 150 mg. Indications: Pain 3/30/18  Yes Merlin Ober, NP   finasteride (PROSCAR) 5 mg tablet Take 1 Tab by mouth daily. Indications: benign prostatic hyperplasia with lower urinary tract sx 3/30/18  Yes Merlin Ober, NP   omeprazole (PRILOSEC) 40 mg capsule Take 1 Cap by mouth daily. Indications: gastroesophageal reflux disease 3/30/18  Yes Merlin Ober, NP   levothyroxine (SYNTHROID) 100 mcg tablet Take 0.1 mcg by mouth Daily (before breakfast).    Yes Eva Engel, MD   Methylcellulose, with Sugar, (CITRUCEL, SUCROSE,) powd Take  by mouth daily. Yes Eva Engel MD   atorvastatin (LIPITOR) 40 mg tablet Take 40 mg by mouth daily. Yes Eva Engel MD   aspirin delayed-release 81 mg tablet Take 81 mg by mouth daily. Yes Historical Provider   Biotin 2,500 mcg cap Take 5,000 mcg by mouth daily. Yes Historical Provider   metroNIDAZOLE (METROGEL) 0.75 % topical gel Apply  to affected area daily. Yes Historical Provider   OMEGA-3 FATTY ACIDS/FISH OIL (OMEGA 3 FISH OIL PO) Take  by mouth daily. Yes Historical Provider   vit C,B-Wc-plwjc-lutein-zeaxan (PRESERVISION AREDS 2) [phone] mg-unit-mg-mg cap Take 2 Caps by mouth daily. Yes Historical Provider   White Petrolatum-Mineral Oil (GENTEAL PM) 94-3 % oint Apply  to eye two (2) times a day. Yes Historical Provider   polyethylene glycol (MIRALAX) 17 gram/dose powder Take 17 g by mouth daily. 1 tablespoon with 8 oz of water daily 2/19/17   Brian Blackburn MD     Past Medical History:   Diagnosis Date    Arthritis     GERD (gastroesophageal reflux disease)     Hypercholesteremia     Hypothyroid       Past Surgical History:   Procedure Laterality Date    CARDIAC SURG PROCEDURE UNLIST      HX CORONARY ARTERY BYPASS GRAFT  1996    HX CORONARY STENT PLACEMENT      HX TONSIL AND ADENOIDECTOMY        Social History   Substance Use Topics    Smoking status: Former Smoker    Smokeless tobacco: Never Used    Alcohol use 4.2 oz/week     7 Glasses of wine per week      Family History   Problem Relation Age of Onset    No Known Problems Mother     No Known Problems Father       Latest Laboratory Results:   No visits with results within 3 Month(s) from this visit.   Latest known visit with results is:    Admission on 02/15/2017, Discharged on 02/15/2017   Component Date Value Ref Range Status    WBC 02/15/2017 9.2  4.1 - 11.1 K/uL Final    RBC 02/15/2017 5.12  4.10 - 5.70 M/uL Final    HGB 02/15/2017 16.3  12.1 - 17.0 g/dL Final    HCT 02/15/2017 46.6  36.6 - 50.3 % Final    MCV 02/15/2017 91.0  80.0 - 99.0 FL Final    MCH 02/15/2017 31.8  26.0 - 34.0 PG Final    MCHC 02/15/2017 35.0  30.0 - 36.5 g/dL Final    RDW 02/15/2017 13.9  11.5 - 14.5 % Final    PLATELET 67/50/0089 182  150 - 400 K/uL Final    NEUTROPHILS 02/15/2017 81* 32 - 75 % Final    LYMPHOCYTES 02/15/2017 11* 12 - 49 % Final    MONOCYTES 02/15/2017 8  5 - 13 % Final    EOSINOPHILS 02/15/2017 0  0 - 7 % Final    BASOPHILS 02/15/2017 0  0 - 1 % Final    ABS. NEUTROPHILS 02/15/2017 7.4  1.8 - 8.0 K/UL Final    ABS. LYMPHOCYTES 02/15/2017 1.0  0.8 - 3.5 K/UL Final    ABS. MONOCYTES 02/15/2017 0.7  0.0 - 1.0 K/UL Final    ABS. EOSINOPHILS 02/15/2017 0.0  0.0 - 0.4 K/UL Final    ABS. BASOPHILS 02/15/2017 0.0  0.0 - 0.1 K/UL Final    Sodium 02/15/2017 137  136 - 145 mmol/L Final    Potassium 02/15/2017 3.8  3.5 - 5.1 mmol/L Final    Chloride 02/15/2017 103  97 - 108 mmol/L Final    CO2 02/15/2017 27  21 - 32 mmol/L Final    Anion gap 02/15/2017 7  5 - 15 mmol/L Final    Glucose 02/15/2017 118* 65 - 100 mg/dL Final    BUN 02/15/2017 18  6 - 20 MG/DL Final    Creatinine 02/15/2017 0.90  0.70 - 1.30 MG/DL Final    BUN/Creatinine ratio 02/15/2017 20  12 - 20   Final    GFR est AA 02/15/2017 >60  >60 ml/min/1.73m2 Final    GFR est non-AA 02/15/2017 >60  >60 ml/min/1.73m2 Final    Comment: Estimated GFR is calculated using the IDMS-traceable Modification of Diet in Renal Disease (MDRD) Study equation, reported for both  Americans (GFRAA) and non- Americans (GFRNA), and normalized to 1.73m2 body surface area. The physician must decide which value applies to the patient. The MDRD study equation should only be used in individuals age 25 or older. It has not been validated for the following: pregnant women, patients with serious comorbid conditions, or on certain medications, or persons with extremes of body size, muscle mass, or nutritional status.       Calcium 02/15/2017 8.7  8.5 - 10.1 MG/DL Final    Bilirubin, total 02/15/2017 1.1* 0.2 - 1.0 MG/DL Final    ALT (SGPT) 02/15/2017 60  12 - 78 U/L Final    AST (SGOT) 02/15/2017 41* 15 - 37 U/L Final    Alk. phosphatase 02/15/2017 96  45 - 117 U/L Final    Protein, total 02/15/2017 6.7  6.4 - 8.2 g/dL Final    Albumin 02/15/2017 3.8  3.5 - 5.0 g/dL Final    Globulin 02/15/2017 2.9  2.0 - 4.0 g/dL Final    A-G Ratio 02/15/2017 1.3  1.1 - 2.2   Final    Troponin-I, Qt. 02/15/2017 <0.04  <0.05 ng/mL Final    Comment: The presence of detectable troponin above the reference range indicates myocardial injury which may be due to ischemia, myocarditis, trauma, etc.  Clinical correlation is necessary to establish the significance of this finding. Sequential testing is recommended to determine if the typical rise and fall of cTnI is demonstrated. Note:  Cardiac troponin I has a relatively long half life and may be present well after the CK MB has returned to baseline. The reference range is based on the 99th percentile of the referent population.       Ventricular Rate 02/15/2017 58  BPM Final    Atrial Rate 02/15/2017 58  BPM Final    P-R Interval 02/15/2017 180  ms Final    QRS Duration 02/15/2017 92  ms Final    Q-T Interval 02/15/2017 398  ms Final    QTC Calculation (Bezet) 02/15/2017 390  ms Final    Calculated P Axis 02/15/2017 53  degrees Final    Calculated R Axis 02/15/2017 25  degrees Final    Calculated T Axis 02/15/2017 64  degrees Final    Diagnosis 02/15/2017    Final                    Value:Sinus bradycardia  No previous ECGs available  Confirmed by Boyd Rosa MD, Edwin ([id number]) on 2/16/2017 8:26:19 AM       Objective:     Vitals:    03/30/18 1326   BP: 138/75   Pulse: 68   Resp: 18   Temp: 97.8 °F (36.6 °C)   TempSrc: Oral   SpO2: 97%   Weight: 162 lb 3.2 oz (73.6 kg)   Height: 5' 5\" (1.651 m)     Last 3 Recorded Weights in this Encounter    03/30/18 1326   Weight: 162 lb 3.2 oz (73.6 kg)     BP Readings from Last 3 Encounters: 03/30/18 138/75   02/19/17 158/74   02/16/17 130/63     Review of Systems   Constitutional: Negative for activity change, appetite change, fatigue and fever. HENT: Negative for congestion, dental problem, hearing loss, postnasal drip, sinus pressure, sneezing, sore throat and trouble swallowing. Eyes: Negative for discharge, redness and visual disturbance. Respiratory: Negative for apnea, cough, chest tightness, shortness of breath and wheezing. Cardiovascular: Negative for chest pain, palpitations and leg swelling. Gastrointestinal: Negative for abdominal distention, abdominal pain, blood in stool, constipation, diarrhea, nausea and vomiting. Endocrine: Negative for polydipsia, polyphagia and polyuria. Genitourinary: Negative for flank pain, frequency and urgency. Musculoskeletal: Positive for back pain, gait problem and myalgias. Negative for arthralgias, joint swelling and neck pain. Skin: Negative for color change, pallor, rash and wound. Allergic/Immunologic: Negative for environmental allergies and food allergies. Neurological: Negative for dizziness, tremors, weakness, light-headedness, numbness and headaches. Hematological: Negative for adenopathy. Psychiatric/Behavioral: Negative for agitation, confusion and sleep disturbance. The patient is not nervous/anxious. Physical Exam   Constitutional: He is oriented to person, place, and time and well-developed, well-nourished, and in no distress. Vital signs are normal. He appears to not be writhing in pain, not malnourished and not dehydrated. He appears unhealthy. He does not have a sickly appearance. No distress. Elderly, fragile in appearance,  male. Alert and responsive. In no acute distress. HENT:   Head: Normocephalic and atraumatic. Hair is abnormal.   Right Ear: Tympanic membrane, external ear and ear canal normal. Decreased hearing is noted.    Left Ear: Tympanic membrane, external ear and ear canal normal. Decreased hearing is noted. Nose: Mucosal edema present. Mouth/Throat: Uvula is midline, oropharynx is clear and moist and mucous membranes are normal. Mucous membranes are not pale, not dry and not cyanotic. No oral lesions. No uvula swelling. No posterior oropharyngeal edema or posterior oropharyngeal erythema. Mild cerumen in both canals, non obstructing. Eyes: Conjunctivae, EOM and lids are normal. Pupils are equal, round, and reactive to light. Lids are everted and swept, no foreign bodies found. Neck: Trachea normal and normal range of motion. Neck supple. JVD present. No thyromegaly present. Non JVD on Left, mild on the Right    Cardiovascular: Regular rhythm, S1 normal, S2 normal, intact distal pulses and normal pulses. Occasional extrasystoles are present. Bradycardia present. Exam reveals distant heart sounds. Exam reveals no gallop and no friction rub. Murmur heard. Systolic murmur is present with a grade of 2/6   No extremity edema is present during today's exam.    Pulmonary/Chest: Effort normal and breath sounds normal.   Abdominal: Soft. Normal appearance and bowel sounds are normal. There is no hepatosplenomegaly. There is no tenderness. There is no CVA tenderness. Musculoskeletal:        Right hip: He exhibits decreased range of motion, decreased strength and tenderness. Left hip: He exhibits decreased range of motion, decreased strength and tenderness. Right ankle: He exhibits decreased range of motion and abnormal pulse. Left ankle: He exhibits decreased range of motion and abnormal pulse. Lumbar back: He exhibits decreased range of motion, tenderness, pain and spasm. Bilateral lower extremity pale, cyanosis and clubbing on toenails. Unable to palpate pulse in bilateral extremities. Unable to perform in house dopplers to obtain extremity flow. He c/o pain, cold extremities, and weakness with gait abnormalities.     Neurological: He is alert and oriented to person, place, and time. He has intact cranial nerves. He displays weakness, atrophy and abnormal stance. A sensory deficit is present. He exhibits abnormal muscle tone. Coordination and gait abnormal. GCS score is 15. Skin: Skin is warm, dry and intact. No bruising and no rash noted. He is not diaphoretic. There is cyanosis. There is pallor. Nails show clubbing. Toenails are cyanosis, no cap refill to lower extremities. Psychiatric: Mood, memory, affect and judgment normal.   Baseline mood and affect unchanged from normal.         Disclaimer:   Advised Arron Ramirez to call back or return to office if symptoms worsen/change/persist.  Discussed expected course/resolution/complications of diagnosis in detail with patient. Medication risks/benefits/costs/interactions/alternatives discussed with patient and he was given an after visit summary which includes diagnoses, current medications, & vitals.  Arron Ramirez expressed understanding with the diagnosis and plan.     dat

## 2018-04-06 ENCOUNTER — HOSPITAL ENCOUNTER (OUTPATIENT)
Dept: CT IMAGING | Age: 83
Discharge: HOME | End: 2018-04-06
Attending: NURSE PRACTITIONER
Payer: MEDICARE

## 2018-04-06 ENCOUNTER — HOSPITAL ENCOUNTER (OUTPATIENT)
Dept: GENERAL RADIOLOGY | Age: 83
Discharge: HOME | End: 2018-04-06
Attending: NURSE PRACTITIONER
Payer: MEDICARE

## 2018-04-06 DIAGNOSIS — R13.14 PHARYNGOESOPHAGEAL DYSPHAGIA: ICD-10-CM

## 2018-04-06 DIAGNOSIS — Z00.00 ROUTINE ADULT HEALTH MAINTENANCE: ICD-10-CM

## 2018-04-06 DIAGNOSIS — M25.551 PAIN IN JOINT INVOLVING PELVIC REGION AND THIGH, BILATERAL: ICD-10-CM

## 2018-04-06 DIAGNOSIS — M16.0 PRIMARY OSTEOARTHRITIS OF BOTH HIPS: ICD-10-CM

## 2018-04-06 DIAGNOSIS — Z76.89 ENCOUNTER TO ESTABLISH CARE WITH NEW DOCTOR: ICD-10-CM

## 2018-04-06 DIAGNOSIS — E78.2 MIXED HYPERLIPIDEMIA: ICD-10-CM

## 2018-04-06 DIAGNOSIS — M25.552 PAIN IN JOINT INVOLVING PELVIC REGION AND THIGH, BILATERAL: ICD-10-CM

## 2018-04-06 DIAGNOSIS — Z78.9 FULL CODE STATUS: ICD-10-CM

## 2018-04-06 DIAGNOSIS — R23.0 EXTREMITY CYANOSIS: ICD-10-CM

## 2018-04-06 DIAGNOSIS — R09.89 DIMINISHED PULSES IN LOWER EXTREMITY: ICD-10-CM

## 2018-04-06 DIAGNOSIS — R35.1 NOCTURIA: ICD-10-CM

## 2018-04-06 DIAGNOSIS — E03.9 ACQUIRED HYPOTHYROIDISM: ICD-10-CM

## 2018-04-06 DIAGNOSIS — R26.0 ATAXIC GAIT: ICD-10-CM

## 2018-04-06 PROCEDURE — 73706 CT ANGIO LWR EXTR W/O&W/DYE: CPT

## 2018-04-06 PROCEDURE — 74011636320 HC RX REV CODE- 636/320: Performed by: NURSE PRACTITIONER

## 2018-04-06 PROCEDURE — 74011000258 HC RX REV CODE- 258: Performed by: NURSE PRACTITIONER

## 2018-04-06 PROCEDURE — 75635 CT ANGIO ABDOMINAL ARTERIES: CPT

## 2018-04-06 PROCEDURE — 73521 X-RAY EXAM HIPS BI 2 VIEWS: CPT

## 2018-04-06 RX ORDER — SODIUM CHLORIDE 0.9 % (FLUSH) 0.9 %
10 SYRINGE (ML) INJECTION
Status: COMPLETED | OUTPATIENT
Start: 2018-04-06 | End: 2018-04-06

## 2018-04-06 RX ADMIN — Medication 10 ML: at 10:42

## 2018-04-06 RX ADMIN — SODIUM CHLORIDE 100 ML: 900 INJECTION, SOLUTION INTRAVENOUS at 10:42

## 2018-04-06 RX ADMIN — IOPAMIDOL 100 ML: 755 INJECTION, SOLUTION INTRAVENOUS at 10:42

## 2018-04-13 ENCOUNTER — OFFICE VISIT (OUTPATIENT)
Dept: GERIATRIC MEDICINE | Age: 83
End: 2018-04-13

## 2018-04-13 VITALS
DIASTOLIC BLOOD PRESSURE: 78 MMHG | HEIGHT: 65 IN | BODY MASS INDEX: 26.99 KG/M2 | SYSTOLIC BLOOD PRESSURE: 130 MMHG | RESPIRATION RATE: 18 BRPM | OXYGEN SATURATION: 98 % | TEMPERATURE: 97.8 F | HEART RATE: 68 BPM | WEIGHT: 162 LBS

## 2018-04-13 DIAGNOSIS — D12.6 TUBULAR ADENOMA OF COLON: ICD-10-CM

## 2018-04-13 DIAGNOSIS — Z78.9 FULL CODE STATUS: ICD-10-CM

## 2018-04-13 DIAGNOSIS — R09.89 DIMINISHED PULSES IN LOWER EXTREMITY: ICD-10-CM

## 2018-04-13 DIAGNOSIS — R35.1 BENIGN PROSTATIC HYPERPLASIA WITH NOCTURIA: ICD-10-CM

## 2018-04-13 DIAGNOSIS — Z71.89 ADVANCE CARE PLANNING: ICD-10-CM

## 2018-04-13 DIAGNOSIS — Z00.00 MEDICARE ANNUAL WELLNESS VISIT, SUBSEQUENT: ICD-10-CM

## 2018-04-13 DIAGNOSIS — Z71.2 ENCOUNTER TO DISCUSS TEST RESULTS: ICD-10-CM

## 2018-04-13 DIAGNOSIS — M16.0 PRIMARY OSTEOARTHRITIS OF BOTH HIPS: ICD-10-CM

## 2018-04-13 DIAGNOSIS — R13.19 ESOPHAGEAL DYSPHAGIA: ICD-10-CM

## 2018-04-13 DIAGNOSIS — N40.1 BENIGN PROSTATIC HYPERPLASIA WITH NOCTURIA: ICD-10-CM

## 2018-04-13 DIAGNOSIS — L73.9 CHRONIC FOLLICULITIS: ICD-10-CM

## 2018-04-13 DIAGNOSIS — K22.2 SCHATZKI'S RING OF DISTAL ESOPHAGUS: Primary | Chronic | ICD-10-CM

## 2018-04-13 DIAGNOSIS — E89.0 POSTOPERATIVE HYPOTHYROIDISM: ICD-10-CM

## 2018-04-13 DIAGNOSIS — R23.0 EXTREMITY CYANOSIS: ICD-10-CM

## 2018-04-13 DIAGNOSIS — E78.2 MIXED HYPERLIPIDEMIA: ICD-10-CM

## 2018-04-13 DIAGNOSIS — R26.0 ATAXIC GAIT: ICD-10-CM

## 2018-04-13 RX ORDER — LEVOTHYROXINE SODIUM 50 UG/1
50 TABLET ORAL
Qty: 30 TAB | Refills: 0
Start: 2018-04-13 | End: 2018-08-06 | Stop reason: ALTCHOICE

## 2018-04-13 RX ORDER — TRAMADOL HYDROCHLORIDE 50 MG/1
25-50 TABLET ORAL
Qty: 30 TAB | Refills: 0 | Status: SHIPPED | OUTPATIENT
Start: 2018-04-13 | End: 2018-06-29

## 2018-04-13 RX ORDER — SIMVASTATIN 20 MG/1
20 TABLET, FILM COATED ORAL
Qty: 30 TAB | Refills: 0 | Status: SHIPPED | OUTPATIENT
Start: 2018-04-13 | End: 2018-05-25 | Stop reason: SDUPTHER

## 2018-04-13 NOTE — LETTER
4/13/2018 4:44 PM 
 
Mr. Ernie Medel 
[address]  [place] [id number] Dear Ernie Medel: Please find your most recent results below. Resulted Orders CBC WITH AUTOMATED DIFF Result Value Ref Range WBC 7.3 3.4 - 10.8 x10E3/uL  
 RBC 5.16 4.14 - 5.80 x10E6/uL HGB 16.5 13.0 - 17.7 g/dL HCT 48.0 37.5 - 51.0 % MCV 93 79 - 97 fL  
 MCH 32.0 26.6 - 33.0 pg  
 MCHC 34.4 31.5 - 35.7 g/dL  
 RDW 14.2 12.3 - 15.4 % PLATELET 967 118 - 281 x10E3/uL NEUTROPHILS 65 Not Estab. % Lymphocytes 25 Not Estab. % MONOCYTES 9 Not Estab. % EOSINOPHILS 1 Not Estab. % BASOPHILS 0 Not Estab. %  
 ABS. NEUTROPHILS 4.7 1.4 - 7.0 x10E3/uL Abs Lymphocytes 1.9 0.7 - 3.1 x10E3/uL  
 ABS. MONOCYTES 0.6 0.1 - 0.9 x10E3/uL  
 ABS. EOSINOPHILS 0.1 0.0 - 0.4 x10E3/uL  
 ABS. BASOPHILS 0.0 0.0 - 0.2 x10E3/uL IMMATURE GRANULOCYTES 0 Not Estab. %  
 ABS. IMM. GRANS. 0.0 0.0 - 0.1 x10E3/uL METABOLIC PANEL, COMPREHENSIVE Result Value Ref Range Glucose 83 65 - 99 mg/dL BUN 20 8 - 27 mg/dL Creatinine 1.09 0.76 - 1.27 mg/dL GFR est non-AA 63 >59 mL/min/1.73 GFR est AA 73 >59 mL/min/1.73  
 BUN/Creatinine ratio 18 10 - 24 Sodium 146 (H) 134 - 144 mmol/L Potassium 4.4 3.5 - 5.2 mmol/L Chloride 104 96 - 106 mmol/L  
 CO2 22 18 - 29 mmol/L Calcium 9.2 8.6 - 10.2 mg/dL Protein, total 6.3 6.0 - 8.5 g/dL Albumin 4.1 3.5 - 4.7 g/dL GLOBULIN, TOTAL 2.2 1.5 - 4.5 g/dL A-G Ratio 1.9 1.2 - 2.2 Bilirubin, total 1.2 0.0 - 1.2 mg/dL Alk. phosphatase 77 39 - 117 IU/L  
 AST (SGOT) 20 0 - 40 IU/L  
 ALT (SGPT) 26 0 - 44 IU/L  
URINALYSIS W/ RFLX MICROSCOPIC Result Value Ref Range Specific Gravity 1.024 1.005 - 1.030  
 pH (UA) 5.5 5.0 - 7.5 Color Yellow Yellow Appearance Clear Clear Leukocyte Esterase Negative Negative Protein Negative Negative/Trace Glucose Negative Negative Ketone Negative Negative Blood Negative Negative Bilirubin Negative Negative Urobilinogen 0.2 0.2 - 1.0 mg/dL Nitrites Negative Negative LIPID PANEL Result Value Ref Range Cholesterol, total 179 100 - 199 mg/dL Triglyceride 273 (H) 0 - 149 mg/dL HDL Cholesterol 56 >39 mg/dL VLDL, calculated 55 (H) 5 - 40 mg/dL LDL, calculated 68 0 - 99 mg/dL Narrative Performed at:  [address]  [id number] : Jacqueline Ibarra MD, Phone:  [phone] HEMOGLOBIN A1C WITH EAG Result Value Ref Range Hemoglobin A1c 5.6 4.8 - 5.6 % Comment:  
            Pre-diabetes: 5.7 - 6.4 Diabetes: >6.4 Glycemic control for adults with diabetes: <7.0 Estimated average glucose 114 mg/dL PSA SCREENING (SCREENING) Result Value Ref Range Prostate Specific Ag 3.7 0.0 - 4.0 ng/mL VITAMIN D, 25 HYDROXY Result Value Ref Range VITAMIN D, 25-HYDROXY 48.7 30.0 - 100.0 ng/mL TSH REFLEX TO T4 Result Value Ref Range TSH 0.800 0.450 - 4.500 uIU/mL RECOMMENDATIONS: 
 
See Patient information included with this letter. Please call me if you have any questions: [phone] Sincerely, 
 
 
Marita Head NP Discharge Instructions/Plan of Care 
04/13/2018 Your instruction for care after your visit today are as follows: Please remember to not change the treatment plan without letting us know as the orders are specific to your needs. If you do not receive your medications please let us know sooner rather than later. Any questions do not hesitate to call Ashley Camilo [facility] Nurse at [phone]. Discontinued Medication/Treatment: The following items have been discontinued today, please stop all use of these items below:  
Medications Discontinued During This Encounter Medication Reason  White Petrolatum-Mineral Oil (GENTEAL PM) 94-3 % oint Therapy Completed  polyethylene glycol (MIRALAX) 17 gram/dose powder Therapy Completed  OMEGA-3 FATTY ACIDS/FISH OIL (OMEGA 3 FISH OIL PO) Therapy Completed  Biotin 2,500 mcg cap Therapy Completed  levothyroxine (SYNTHROID) 100 mcg tablet Dose Adjustment  atorvastatin (LIPITOR) 40 mg tablet Dose Adjustment  traMADol (ULTRAM) 50 mg tablet Reorder Treatment Plan: ICD-10-CM Plan/Recommendations 1. Schatzki's ring of distal esophagus K22.2 REFERRAL TO GASTROENTEROLOGY- Dr. Pancho Walsh will schedule this for you and let you know when we can get you in there, ASAP. Please start the Omeprazole 40 mg capsules once daily as soon as possible 3. Chronic folliculitis L24.1 We agreed to let you work this out with the Dermatologist for another month. If we need to we will readdress this as it has lingered for too long. 4. 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 I will recheck your labs in 1 month to make sure this has made a difference. 5. Benign prostatic hyperplasia with nocturia N40.1 Please start the Proscar as this will help with your prostate and night time urinating. 6. Encounter to discuss test results Z71.89   
7. Tubular adenoma of colon D12.6 REFERRAL TO GASTROENTEROLOGY- Dr. Pancho Walsh will schedule this for you and let you know when we can get you in there, ASAP. 8. Ataxic gait R26.0 Continue to use the cane as needed for balance. 9. Diminished pulses in lower extremity R09.89 I have requested a second look at the CT's for better explanation. 10. 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 Mixed hyperlipidemia E78.2 Decrease your Lipitor to 1/2 tablet daily as we want to try and limit your use of Statins to protect as much memory as we can. 12. Primary osteoarthritis of both hips M16.0 traMADol (ULTRAM) 50 mg tablet- this has been sent to [facility] for home delivery. Be on the look out in the next week. Use it as needed for comfort or joint discomfort. 13. Medicare annual wellness visit, subsequent Z00.00 FULL CODE 14. Advance care planning Z71.89 FULL CODE  
15. Full code status Z78.9 FULL CODE Return Visit/Follow Up:   
Return in 1 month for laboratory testing to monitor your Thyroid medication, or earlier if you need care. Thank you for allowing us to participate in your health care.  
Amalia Gill NP

## 2018-04-13 NOTE — PATIENT INSTRUCTIONS
Advance Directives: Care Instructions  Your Care Instructions  An advance directive is a legal way to state your wishes at the end of your life. It tells your family and your doctor what to do if you can no longer say what you want. There are two main types of advance directives. You can change them any time that your wishes change. · A living will tells your family and your doctor your wishes about life support and other treatment. · A durable power of  for health care lets you name a person to make treatment decisions for you when you can't speak for yourself. This person is called a health care agent. If you do not have an advance directive, decisions about your medical care may be made by a doctor or a  who doesn't know you. It may help to think of an advance directive as a gift to the people who care for you. If you have one, they won't have to make tough decisions by themselves. Follow-up care is a key part of your treatment and safety. Be sure to make and go to all appointments, and call your doctor if you are having problems. It's also a good idea to know your test results and keep a list of the medicines you take. How can you care for yourself at home? · Discuss your wishes with your loved ones and your doctor. This way, there are no surprises. · Many states have a unique form. Or you might use a universal form that has been approved by many states. This kind of form can sometimes be completed and stored online. Your electronic copy will then be available wherever you have a connection to the Internet. In most cases, doctors will respect your wishes even if you have a form from a different state. · You don't need a  to do an advance directive. But you may want to get legal advice. · Think about these questions when you prepare an advance directive:  ¨ Who do you want to make decisions about your medical care if you are not able to?  Many people choose a family member or close friend. ¨ Do you know enough about life support methods that might be used? If not, talk to your doctor so you understand. ¨ What are you most afraid of that might happen? You might be afraid of having pain, losing your independence, or being kept alive by machines. ¨ Where would you prefer to die? Choices include your home, a hospital, or a nursing home. ¨ Would you like to have information about hospice care to support you and your family? ¨ Do you want to donate organs when you die? ¨ Do you want certain Yazdanism practices performed before you die? If so, put your wishes in the advance directive. · Read your advance directive every year, and make changes as needed. When should you call for help? Be sure to contact your doctor if you have any questions. Where can you learn more? Go to http://[email or web address].comgeorge.info/. Enter R264 in the search box to learn more about \"Advance Directives: Care Instructions. \"  Current as of: September 24, 2016  Content Version: 11.4  © 9023-4847 [employer]. Care instructions adapted under license by [employer] (which disclaims liability or warranty for this information). If you have questions about a medical condition or this instruction, always ask your healthcare professional. [place] [address] any warranty or liability for your use of this information. Benign Prostatic Hyperplasia: Care Instructions  Your Care Instructions    Benign prostatic hyperplasia, or BPH, is an enlarged prostate gland. The prostate is a small gland that makes some of the fluid in semen. Prostate enlargement happens to almost all men as they age. It is usually not serious. BPH does not cause prostate cancer. As the prostate gets bigger, it may partly block the flow of urine. You may have a hard time getting a urine stream started or completely stopped. BPH can cause dribbling.  You may have a weak urine stream, or you may have to urinate more often than you used to, especially at night. Most men find these problems easy to manage. You do not need treatment unless your symptoms bother you a lot or you have other problems, such as bladder infections or stones. In these cases, medicines may help. Surgery is not needed unless the urine flow is blocked or the symptoms do not get better with medicine. Follow-up care is a key part of your treatment and safety. Be sure to make and go to all appointments, and call your doctor if you are having problems. It's also a good idea to know your test results and keep a list of the medicines you take. How can you care for yourself at home? · Take plenty of time to urinate. Try to relax. · Try \"double voiding. \" Urinate as much you can, relax for a few moments, and then try to urinate again. · Sit on the toilet to urinate. · Read or think of other things while you are waiting. · Turn on a faucet, or try to picture running water. Some men find that this helps get their urine flowing. · If dribbling is a problem, wash your penis daily to avoid skin irritation and infection. · Avoid caffeine and alcohol. These drinks will increase how often you need to urinate. Spread your fluid intake throughout the day. If the urge to urinate often wakes you at night, limit your fluid intake in the evening. Urinate right before you go to bed. · Many over-the-counter cold and allergy medicines can make the symptoms of BPH worse. Avoid antihistamines, decongestants, and allergy pills, if you can. Read the warnings on the package. · If you take any prescription medicines, especially tranquilizers or antidepressants, ask your doctor or pharmacist whether they can cause urination problems. There may be other medicines you can use that do not cause urinary problems. · Be safe with medicines. Take your medicines exactly as prescribed. Call your doctor if you think you are having a problem with your medicine.   When should you call for help? Call your doctor now or seek immediate medical care if:  ? · You cannot urinate at all. ? · You have symptoms of a urinary infection. For example:  ¨ You have blood or pus in your urine. ¨ You have pain in your back just below your rib cage. This is called flank pain. ¨ You have a fever, chills, or body aches. ¨ It hurts to urinate. ¨ You have groin or belly pain. ? Watch closely for changes in your health, and be sure to contact your doctor if:  ? · It hurts when you ejaculate. ? · Your urinary problems get a lot worse or bother you a lot. Where can you learn more? Go to http://sloan-george.info/. Enter D721 in the search box to learn more about \"Benign Prostatic Hyperplasia: Care Instructions. \"  Current as of: March 14, 2017  Content Version: 11.4  © 3178-1417 [employer]. Care instructions adapted under license by [employer] (which disclaims liability or warranty for this information). If you have questions about a medical condition or this instruction, always ask your healthcare professional. [place] [address] any warranty or liability for your use of this information. Learning About the Diet for Swallowing Problems  What are swallowing problems? Difficulty swallowing is also called dysphagia (say \"yvc-DGJ-nmh-uh\"). It is most often a sign of a problem with your throat or esophagus. This is the tube that moves food and liquids from the back of your mouth to your stomach. Trouble swallowing can occur when the muscles and nerves that move food through the throat and esophagus are not working right. To help you swallow food, your doctor or speech therapist may advise a special dysphagia diet for you. Why is a special diet important? A dysphagia diet can help you handle some problems that can occur when it's hard to swallow food and liquids easily. These problems can include:  · Malnutrition.  This means you aren't getting enough healthy foods to keep your body working well. · Dehydration. This means you aren't getting enough liquids to keep your body healthy. · Aspiration. This means that food, liquid, or saliva goes down your windpipe (trachea) into your lungs, instead of down your esophagus to your stomach. This can lead to aspiration pneumonia, which is an inflammation of the lungs. What is the dysphagia diet? In the dysphagia diet, you change the foods you eat and the liquids you drink to make it easier to swallow them. You may:  · Change the texture of the foods you eat. Your doctor or speech therapist may advise you to eat one of these types of foods:  ¨ Solid, soft foods that have been cut up into small pieces. Extra gravy or sauce can help make these foods easier to swallow. ¨ Semi-solid foods, like ground meats or fruits and vegetables that are mashed with a fork. These foods require some chewing. Extra gravy or sauce is often served. ¨ Foods that are the texture of pudding. You don't have to chew these foods. Examples include mashed potatoes with gravy; yogurt; pudding; and pureed meats, fruits, and vegetables. · Thicken the liquids you drink. Your doctor or speech therapist will tell you what kind of thickener to use and how thick to make the liquids. ¨ Nectar-thick liquids leave a thin coating when they are poured from a spoon. ¨ Honey-thick liquids drip slowly when they are poured from a spoon. ¨ Pudding-thick liquids do not pour from a spoon. Your speech therapist will help you learn exercises to train your muscles to work together so you can swallow. You may also need to learn how to position your body or how to put food in your mouth to be able to swallow better. Some people have severe trouble swallowing and can't get enough food and liquids. In those cases, the doctor can insert a feeding tube so the person gets enough nutrients. Follow-up care is a key part of your treatment and safety. Be sure to make and go to all appointments, and call your doctor if you are having problems. It's also a good idea to know your test results and keep a list of the medicines you take. Where can you learn more? Go to http://sloan-george.info/. Enter A412 in the search box to learn more about \"Learning About the Diet for Swallowing Problems. \"  Current as of: March 20, 2017  Content Version: 11.4  © 5903-1652 [employer]. Care instructions adapted under license by [employer] (which disclaims liability or warranty for this information). If you have questions about a medical condition or this instruction, always ask your healthcare professional. [address] any warranty or liability for your use of this information. Folliculitis: Care Instructions  Your Care Instructions    Folliculitis (say \"mgm-DCE-mjb-LY-tus\") is an infection of the pouches (follicles) in the skin where hair grows. It can occur on any part of the body, but it is most common on the scalp, face, armpits, and groin. Bacteria, such as those found in a hot tub, can cause folliculitis. Folliculitis begins as a red, tender area near a strand of hair. The skin can itch or burn and may drain pus or blood. Sometimes folliculitis can lead to more serious skin infections. Your doctor usually can treat mild folliculitis with an antibiotic cream or ointment. If you have folliculitis on your scalp, you may use a shampoo that kills bacteria. Antibiotics you take as pills can treat infections deeper in the skin. For stubborn cases of folliculitis, laser treatment may be an option. Laser treatment uses strong beams of light to destroy the hair follicle. But hair will no longer grow in the treated area. Follow-up care is a key part of your treatment and safety. Be sure to make and go to all appointments, and call your doctor if you are having problems.  It's also a good idea to know your test results and keep a list of the medicines you take. How can you care for yourself at home? · Take your medicine exactly as prescribed. If your doctor prescribed antibiotics, take them as directed. Do not stop taking them just because you feel better. You need to take the full course of antibiotics. · Use a soap that kills bacteria to wash the infected area. If your scalp or beard is infected, use a shampoo with selenium or propylene glycol. Be careful. Do not scrub too long or too hard. · Mix 1 1/3 cup warm water and 1 tablespoon vinegar. Soak a cloth in the mixture, and place it over the infected skin until it cools off (usually 5 to 10 minutes). You can do this 3 to 6 times a day. · Do not share your razor, towel, or washcloth. That can spread folliculitis. · Use a new blade in your razor each time you shave to keep from re-infecting your skin. · If you tend to get folliculitis, avoid using hot tubs. They can contain bacteria that cause folliculitis. When should you call for help? Call your doctor now or seek immediate medical care if:  ? · You have symptoms of infection, such as:  ¨ Increased pain, swelling, warmth, or redness. ¨ Red streaks leading from the area. ¨ Pus draining from the area. ¨ A fever. ? Watch closely for changes in your health, and be sure to contact your doctor if:  ? · You do not get better as expected. Where can you learn more? Go to http://sloan-george.info/. Enter M257 in the search box to learn more about \"Folliculitis: Care Instructions. \"  Current as of: October 13, 2016  Content Version: 11.4  © 9009-6302 [employer]. Care instructions adapted under license by [employer] (which disclaims liability or warranty for this information).  If you have questions about a medical condition or this instruction, always ask your healthcare professional. [address] any warranty or liability for your use of this information.

## 2018-04-13 NOTE — PROGRESS NOTES
ADVISED PATIENT OF THE FOLLOWING HEALTH MAINTAINCE DUE  Health Maintenance Due   Topic Date Due    DTaP/Tdap/Td series (1 - Tdap) 08/30/1956    ZOSTER VACCINE AGE 60>  06/30/1995    GLAUCOMA SCREENING Q2Y  08/30/2000    MEDICARE YEARLY EXAM  03/28/2018      Chief Complaint   Patient presents with    Results     Patient here to discuss CT results and follow up care. 1. Have you been to the ER, urgent care clinic since your last visit? Hospitalized since your last visit? No    2. Have you seen or consulted any other health care providers outside of the [facility] since your last visit? Include any DEXA scan, mammography  or colon screening. No    3. Do you have an Advance Directive on file? no    4. Do you have a DNR on file? No        Patient is accompanied by self I have received verbal consent from Melodie Youssef to discuss any/all medical information while they are present in the room.

## 2018-04-16 NOTE — PROGRESS NOTES
Reason for Visit:      Chief Complaint   Patient presents with    Results     Patient here to discuss CT results and follow up care. History of Present Illness:   Nuvia Hernandez is a 80 y.o. male geriatric patient who presents today to discuss plan of care and results from laboratory work and CT that have been done. I have also reviewed all of Mr. Elizabeth Pina notes from his other providers: Dr. Racheal Lima, Dr. Michelle Castillo, Dr. Mitesh Shah, and Dr. Naye Grider. Dr. Katie Miller notes are inclusive of primary care. Mr. William Harris has been seeing Dr. Racheal Lima for GI for a few years. Last year he had a colonoscopy due to changes in bowel habits. At that time he was found to have 2 - tubular adenomas in which the MD advised he could return in 5 years for a follow up if he wanted to. The notes were specific to show that one of the polyps was not entirely delivered with the biopsy specimen. He has also been suffering with dysphagia, GERD, and reoccurrence of food getting stuck at the epigastric area. He has a previous history of Schatzki's ring dilatation in the past and has not been on any PPI for years. I have asked him to allow for a 2nd opinion as well as follow up with another GI for the dysphagia. He is willing to see another provider. I have recommended Dr. Hetal Talamantes with [facility]. He agrees to allow my nurse to get this appt scheduled as well as I have ordered him to start on Omeprazole 40 mg today. Reviewing his laboratory work: His PSA is slightly more elevated then it was on his last labs with Dr. Michelle Castillo. He is also complaining of more nocturia and bladder spasm with less emptying. I have educated him on the use of the Proscar to help with this issue. We also discussed decreasing his statin and his thyroid medication as his values are low and we should attempt to decrease his statins. His thyroid is \"too\" corinne pressed currently he agrees to decrease to 50 mcg daily.       Reviewing CTA of lower extremities: Currently the results are not helpful as it states there is only minimal calcification with patent vessels from the aortic bifurcations through the popliteal arteries but there is \"severe\" technical limitation on evaluation of the tibial vessels which is what I need to see since his has cyanosis of extremities with absent pulses due to vascular disease previous. I have requested radiology to interpret the technical limitation and let me know if we need another view of the test or not? He continues to complain of difficulty walking but the pain in his back and legs have settled down at the moment and he states he will call Dr. Katerine Castillo to follow up as his last treatment helped and he recommended more treatment if needed to help with pain. Currently he would like to try the Tramadol as needed for intermittent pain related to the back and legs. I have send a prescription to his mail order pharmacy. I have asked Mr. Jazlyn Chicas to follow up with me once he sees the GI Dr and if he has any questions about medication or treatments we discussed today as it was a lot of information. Diagnosis/Treatment Plan: The following orders have been placed for treatment of the diagnoses above:    ICD-10-CM ICD-9-CM    1. Schatzki's ring of distal esophagus K22.2 750.3 REFERRAL TO GASTROENTEROLOGY   2. Esophageal dysphagia R13.10 787.20 REFERRAL TO GASTROENTEROLOGY   3. Chronic folliculitis B56.3 318.1    4. Postoperative hypothyroidism E89.0 244.0 levothyroxine (SYNTHROID) 50 mcg tablet   5. Benign prostatic hyperplasia with nocturia N40.1 600.01     R35.1 788.43    6. Encounter to discuss test results Z71.89 V65.49    7. Tubular adenoma of colon D12.6 211.3    8. Ataxic gait R26.0 781.2    9. Diminished pulses in lower extremity R09.89 785.9    10. 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 Mixed hyperlipidemia E78.2 272.2 simvastatin (ZOCOR) 20 mg tablet   12. Primary osteoarthritis of both hips M16.0 715.15 traMADol (ULTRAM) 50 mg tablet   13.  Medicare annual wellness visit, subsequent Z00.00 V70.0 FULL CODE   14. Advance care planning Z71.89 V65.49 FULL CODE   15. Full code status Z78.9 V49.89 FULL CODE        The following medication/treatments have been discontinued by the provider today:   Medications Discontinued During This Encounter   Medication Reason    White Petrolatum-Mineral Oil (GENTEAL PM) 94-3 % oint Therapy Completed    polyethylene glycol (MIRALAX) 17 gram/dose powder Therapy Completed    OMEGA-3 FATTY ACIDS/FISH OIL (OMEGA 3 FISH OIL PO) Therapy Completed    Biotin 2,500 mcg cap Therapy Completed    levothyroxine (SYNTHROID) 100 mcg tablet Dose Adjustment    atorvastatin (LIPITOR) 40 mg tablet Dose Adjustment    traMADol (ULTRAM) 50 mg tablet Reorder       Follow Up: Follow-up Disposition:  Return in about 1 week (around 4/20/2018). Subjective: Allergies   Allergen Reactions    Toprol Xl [Metoprolol Succinate] Diarrhea    Sulfa (Sulfonamide Antibiotics) Hives     Prior to Admission medications    Medication Sig Start Date End Date Taking? Authorizing Provider   levothyroxine (SYNTHROID) 50 mcg tablet Take 1 Tab by mouth Daily (before breakfast). 4/13/18  Yes Shantanu Ray NP   simvastatin (ZOCOR) 20 mg tablet Take 1 Tab by mouth nightly. 4/13/18  Yes Shantanu Ray NP   traMADol (ULTRAM) 50 mg tablet Take 0.5-1 Tabs by mouth every eight (8) hours as needed for Pain. Max Daily Amount: 150 mg. Indications: NEUROPATHIC PAIN, Pain 4/13/18  Yes Shantanu Ray NP   melatonin 5 mg cap capsule Take 5 mg by mouth nightly. Yes Historical Provider   acetaminophen (TYLENOL) 325 mg tablet Take 650 mg by mouth every four (4) hours as needed for Pain. Yes Historical Provider   diazePAM (VALIUM) 5 mg tablet Take 5 mg by mouth daily as needed. 1/2/18  Yes Historical Provider   minocycline (DYNACIN) 100 mg tablet Take 50 mg by mouth daily.  3/7/18  Yes Historical Provider   Methylcellulose, with Sugar, (CITRUCEL, SUCROSE,) powd Take  by mouth daily. Yes Eva Engel MD   aspirin delayed-release 81 mg tablet Take 81 mg by mouth daily. Yes Historical Provider   metroNIDAZOLE (METROGEL) 0.75 % topical gel Apply  to affected area daily. Yes Historical Provider   vit C,L-Dz-gakdx-lutein-zeaxan (PRESERVISION AREDS 2) 575-245-02-0 mg-unit-mg-mg cap Take 2 Caps by mouth daily. Yes Historical Provider   finasteride (PROSCAR) 5 mg tablet Take 1 Tab by mouth daily. Indications: benign prostatic hyperplasia with lower urinary tract sx 3/30/18   Yael Hameed NP   omeprazole (PRILOSEC) 40 mg capsule Take 1 Cap by mouth daily.  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 ([facility] [address] 10/06/2016    Bilateral thumb pain 02/01/2017    CAD (coronary artery disease)     Cardiac murmur 43/90/0080    Folliculitis 02/76/3515    GERD (gastroesophageal reflux disease)     Hypercholesteremia     Hypothyroid     Left hip pain 02/01/2017    Lumbar radiculitis     Lumbar spondylitis (HCC)     Meniere disease     Osteoarthritis 02/19/2018    hands    Polyp of cecum 08/02/2017    5mm polyp in cecum, 8 mm polyp in sigmoid colon    Spinal enthesopathy of lumbar region ([facility] [address] 02/01/2017      Past Surgical History:   Procedure Laterality Date    CARDIAC SURG PROCEDURE UNLIST      HX CATARACT REMOVAL Bilateral     HX CHOLECYSTECTOMY  1994    HX CORONARY ARTERY BYPASS GRAFT  1996    HX CORONARY STENT PLACEMENT      HX ROTATOR CUFF REPAIR Left     HX TONSIL AND ADENOIDECTOMY        Social History   Substance Use Topics    Smoking status: Former Smoker    Smokeless tobacco: Never Used    Alcohol use 4.2 oz/week     7 Glasses of wine per week      Family History   Problem Relation Age of Onset    No Known Problems Mother     No Known Problems Father     No Known Problems Brother       Latest Laboratory Results:     Office Visit on 03/30/2018   Component Date Value Ref Range Status    Urine Culture, Routine 03/30/2018 No growth   Final    WBC 03/30/2018 7.3  3.4 - 10.8 x10E3/uL Final    RBC 03/30/2018 5.16  4.14 - 5.80 x10E6/uL Final    HGB 03/30/2018 16.5  13.0 - 17.7 g/dL Final    HCT 03/30/2018 48.0  37.5 - 51.0 % Final    MCV 03/30/2018 93  79 - 97 fL Final    MCH 03/30/2018 32.0  26.6 - 33.0 pg Final    MCHC 03/30/2018 34.4  31.5 - 35.7 g/dL Final    RDW 03/30/2018 14.2  12.3 - 15.4 % Final    PLATELET 98/35/9484 735  150 - 379 x10E3/uL Final    NEUTROPHILS 03/30/2018 65  Not Estab. % Final    Lymphocytes 03/30/2018 25  Not Estab. % Final    MONOCYTES 03/30/2018 9  Not Estab. % Final    EOSINOPHILS 03/30/2018 1  Not Estab. % Final    BASOPHILS 03/30/2018 0  Not Estab. % Final    ABS. NEUTROPHILS 03/30/2018 4.7  1.4 - 7.0 x10E3/uL Final    Abs Lymphocytes 03/30/2018 1.9  0.7 - 3.1 x10E3/uL Final    ABS. MONOCYTES 03/30/2018 0.6  0.1 - 0.9 x10E3/uL Final    ABS. EOSINOPHILS 03/30/2018 0.1  0.0 - 0.4 x10E3/uL Final    ABS. BASOPHILS 03/30/2018 0.0  0.0 - 0.2 x10E3/uL Final    IMMATURE GRANULOCYTES 03/30/2018 0  Not Estab. % Final    ABS. IMM.  GRANS. 03/30/2018 0.0  0.0 - 0.1 x10E3/uL Final    Glucose 03/30/2018 83  65 - 99 mg/dL Final    BUN 03/30/2018 20  8 - 27 mg/dL Final    Creatinine 03/30/2018 1.09  0.76 - 1.27 mg/dL Final    GFR est non-AA 03/30/2018 63  >59 mL/min/1.73 Final    GFR est AA 03/30/2018 73  >59 mL/min/1.73 Final    BUN/Creatinine ratio 03/30/2018 18  10 - 24 Final    Sodium 03/30/2018 146* 134 - 144 mmol/L Final    Potassium 03/30/2018 4.4  3.5 - 5.2 mmol/L Final    Chloride 03/30/2018 104  96 - 106 mmol/L Final    CO2 03/30/2018 22  18 - 29 mmol/L Final    Calcium 03/30/2018 9.2  8.6 - 10.2 mg/dL Final    Protein, total 03/30/2018 6.3  6.0 - 8.5 g/dL Final    Albumin 03/30/2018 4.1  3.5 - 4.7 g/dL Final    GLOBULIN, TOTAL 03/30/2018 2.2  1.5 - 4.5 g/dL Final    A-G Ratio 03/30/2018 1.9  1.2 - 2.2 Final    Bilirubin, total 03/30/2018 1.2  0.0 - 1.2 mg/dL Final    Alk. phosphatase 03/30/2018 77  39 - 117 IU/L Final    AST (SGOT) 03/30/2018 20  0 - 40 IU/L Final    ALT (SGPT) 03/30/2018 26  0 - 44 IU/L Final    Specific Gravity 03/30/2018 1.024  1.005 - 1.030 Final    pH (UA) 03/30/2018 5.5  5.0 - 7.5 Final    Color 03/30/2018 Yellow  Yellow Final    Appearance 03/30/2018 Clear  Clear Final    Leukocyte Esterase 03/30/2018 Negative  Negative Final    Protein 03/30/2018 Negative  Negative/Trace Final    Glucose 03/30/2018 Negative  Negative Final    Ketone 03/30/2018 Negative  Negative Final    Blood 03/30/2018 Negative  Negative Final    Bilirubin 03/30/2018 Negative  Negative Final    Urobilinogen 03/30/2018 0.2  0.2 - 1.0 mg/dL Final    Nitrites 03/30/2018 Negative  Negative Final    Microscopic Examination 03/30/2018 Comment   Final    Microscopic not indicated and not performed.  Cholesterol, total 03/30/2018 179  100 - 199 mg/dL Final    Triglyceride 03/30/2018 273* 0 - 149 mg/dL Final    HDL Cholesterol 03/30/2018 56  >39 mg/dL Final    VLDL, calculated 03/30/2018 55* 5 - 40 mg/dL Final    LDL, calculated 03/30/2018 68  0 - 99 mg/dL Final    Hemoglobin A1c 03/30/2018 5.6  4.8 - 5.6 % Final    Comment:          Pre-diabetes: 5.7 - 6.4           Diabetes: >6.4           Glycemic control for adults with diabetes: <7.0      Estimated average glucose 03/30/2018 114  mg/dL Final    Prostate Specific Ag 03/30/2018 3.7  0.0 - 4.0 ng/mL Final    Comment: Roche ECLIA methodology. According to the American Urological Association, Serum PSA should  decrease and remain at undetectable levels after radical  prostatectomy. The AUA defines biochemical recurrence as an initial  PSA value 0.2 ng/mL or greater followed by a subsequent confirmatory  PSA value 0.2 ng/mL or greater.   Values obtained with different assay methods or kits cannot be used  interchangeably. Results cannot be interpreted as absolute evidence  of the presence or absence of malignant disease.  VITAMIN D, 25-HYDROXY 03/30/2018 48.7  30.0 - 100.0 ng/mL Final    Comment: Vitamin D deficiency has been defined by the [facility] practice guideline as a  level of serum 25-OH vitamin D less than 20 ng/mL (1,2). The Endocrine Society went on to further define vitamin D  insufficiency as a level between 21 and 29 ng/mL (2). 1. IOM ([place] [employer]). 2010. Dietary reference     intakes for calcium and D. [facility]: The     [employer]. 2. Beck MF, Chichi NC, Braeden HERNÁNDEZ, et al.     Evaluation, treatment, and prevention of vitamin D     deficiency: an Endocrine Society clinical practice     guideline. JCEM. 2011 Jul; 96(7):1911-30.  TSH 03/30/2018 0.800  0.450 - 4.500 uIU/mL Final       Objective:   CODE STATUS: Full  Vitals:    04/13/18 1304   BP: 130/78   Pulse: 68   Resp: 18   Temp: 97.8 °F (36.6 °C)   TempSrc: Oral   SpO2: 98%   Weight: 162 lb (73.5 kg)   Height: 5' 5\" (1.651 m)     Wt Readings from Last 3 Encounters:   04/13/18 162 lb (73.5 kg)   03/30/18 162 lb 3.2 oz (73.6 kg)   02/19/17 160 lb (72.6 kg)     BP Readings from Last 3 Encounters:   04/13/18 130/78   03/30/18 138/75   02/19/17 158/74     Review of Systems   Constitutional: Negative for activity change, appetite change, fatigue and fever. HENT: Positive for sore throat, trouble swallowing and voice change. Negative for congestion, dental problem, hearing loss, postnasal drip, sinus pressure and sneezing. Eyes: Negative for discharge, redness and visual disturbance. Respiratory: Negative for apnea, cough, chest tightness, shortness of breath and wheezing. Cardiovascular: Negative for chest pain, palpitations and leg swelling.    Gastrointestinal: Negative for abdominal distention, abdominal pain, blood in stool, constipation, diarrhea, nausea and vomiting. Endocrine: Negative for polydipsia, polyphagia and polyuria. Genitourinary: Positive for difficulty urinating, frequency and urgency. Negative for flank pain. Musculoskeletal: Positive for arthralgias, back pain, gait problem and myalgias. Negative for joint swelling and neck pain. Skin: Negative for color change, pallor, rash and wound. Allergic/Immunologic: Negative for environmental allergies and food allergies. Neurological: Positive for weakness. Negative for dizziness, tremors, light-headedness, numbness and headaches. Hematological: Negative for adenopathy. Psychiatric/Behavioral: Negative for agitation, confusion and sleep disturbance. The patient is not nervous/anxious. Physical Exam   Constitutional: He is oriented to person, place, and time and well-developed, well-nourished, and in no distress. Vital signs are normal. He appears to not be writhing in pain, not malnourished and not dehydrated. He appears unhealthy. He does not have a sickly appearance. No distress. Elderly, fragile in appearance,  male. Alert and responsive. In no acute distress. HENT:   Head: Normocephalic and atraumatic. Hair is abnormal.   Right Ear: Tympanic membrane, external ear and ear canal normal. Decreased hearing is noted. Left Ear: Tympanic membrane, external ear and ear canal normal. Decreased hearing is noted. Nose: Mucosal edema present. Mouth/Throat: Uvula is midline and mucous membranes are normal. Mucous membranes are not pale, not dry and not cyanotic. No oral lesions. No uvula swelling. Posterior oropharyngeal erythema present. No posterior oropharyngeal edema. Mild cerumen in both canals, non obstructing. Eyes: Conjunctivae, EOM and lids are normal. Pupils are equal, round, and reactive to light. Lids are everted and swept, no foreign bodies found. Neck: Trachea normal and normal range of motion. Neck supple.  JVD present. No thyromegaly present. Non JVD on Left, mild on the Right    Cardiovascular: Regular rhythm, S1 normal, S2 normal, intact distal pulses and normal pulses. Occasional extrasystoles are present. Bradycardia present. Exam reveals distant heart sounds. Exam reveals no gallop and no friction rub. Murmur heard. Systolic murmur is present with a grade of 2/6   No extremity edema is present during today's exam.    Pulmonary/Chest: Effort normal and breath sounds normal.   Abdominal: Soft. Normal appearance and bowel sounds are normal. There is no hepatosplenomegaly. There is no tenderness. There is no CVA tenderness. Musculoskeletal:        Right hip: He exhibits decreased range of motion, decreased strength and tenderness. Left hip: He exhibits decreased range of motion, decreased strength and tenderness. Right ankle: He exhibits decreased range of motion and abnormal pulse. Left ankle: He exhibits decreased range of motion and abnormal pulse. Lumbar back: He exhibits decreased range of motion, tenderness, pain and spasm. Bilateral lower extremity pale, cyanosis and clubbing on toenails. Unable to palpate pulse in bilateral extremities. Unable to perform in house dopplers to obtain extremity flow. He c/o pain, cold extremities, and weakness with gait abnormalities. Neurological: He is alert and oriented to person, place, and time. He has intact cranial nerves. He displays weakness, atrophy and abnormal stance. A sensory deficit is present. He exhibits abnormal muscle tone. Coordination and gait abnormal. GCS score is 15. Skin: Skin is warm, dry and intact. No bruising and no rash noted. He is not diaphoretic. There is cyanosis. There is pallor. Nails show clubbing. Toenails are cyanosis, no cap refill to lower extremities.     Psychiatric: Mood, memory, affect and judgment normal.   Baseline mood and affect unchanged from normal.       Disclaimer:   Advised Silverio Whitmore to call back or return to office if symptoms worsen/change/persist.  Discussed expected course/resolution/complications of diagnosis in detail with patient. Medication risks/benefits/costs/interactions/alternatives discussed with patient and he was given an after visit summary which includes diagnoses, current medications, & vitals. Silverio Whitmore expressed understanding with the diagnosis and plan.

## 2018-04-16 NOTE — PROGRESS NOTES
Reason For Visit:   Levar Denson is a 80 y.o. male who presents for an annual Medicare Wellness Visit. Patient History:   PSH: Reviewed with patient  Past Surgical History:   Procedure Laterality Date    CARDIAC SURG PROCEDURE UNLIST      HX CATARACT REMOVAL Bilateral     HX CHOLECYSTECTOMY  1994    HX CORONARY ARTERY BYPASS GRAFT  1996    HX CORONARY STENT PLACEMENT      HX ROTATOR CUFF REPAIR Left     HX TONSIL AND ADENOIDECTOMY        SH: Reviewed with patient  Social History   Substance Use Topics    Smoking status: Former Smoker    Smokeless tobacco: Never Used    Alcohol use 4.2 oz/week     7 Glasses of wine per week     FH: Reviewed with patient  Family History   Problem Relation Age of Onset    No Known Problems Mother     No Known Problems Father     No Known Problems Brother      Medications/Allergies: Reviewed with patient. Current Outpatient Prescriptions on File Prior to Visit   Medication Sig Dispense Refill    melatonin 5 mg cap capsule Take 5 mg by mouth nightly.  acetaminophen (TYLENOL) 325 mg tablet Take 650 mg by mouth every four (4) hours as needed for Pain.  diazePAM (VALIUM) 5 mg tablet Take 5 mg by mouth daily as needed.  minocycline (DYNACIN) 100 mg tablet Take 50 mg by mouth daily.  Methylcellulose, with Sugar, (CITRUCEL, SUCROSE,) powd Take  by mouth daily.  aspirin delayed-release 81 mg tablet Take 81 mg by mouth daily.  metroNIDAZOLE (METROGEL) 0.75 % topical gel Apply  to affected area daily.  vit C,U-Bs-hiwmg-lutein-zeaxan (PRESERVISION AREDS 2) 326-920-08-1 mg-unit-mg-mg cap Take 2 Caps by mouth daily.  finasteride (PROSCAR) 5 mg tablet Take 1 Tab by mouth daily. Indications: benign prostatic hyperplasia with lower urinary tract sx 30 Tab 0    omeprazole (PRILOSEC) 40 mg capsule Take 1 Cap by mouth daily. Indications: gastroesophageal reflux disease 30 Cap 2     No current facility-administered medications on file prior to visit. Allergies   Allergen Reactions    Toprol Xl [Metoprolol Succinate] Diarrhea    Sulfa (Sulfonamide Antibiotics) Hives     Patient Care Team:  Lelo Logan NP as PCP - Lia Rodriguez MD as Physician (Cardiology)  Darvin Sotelo MD as Physician (Gastroenterology)  Keven Morocho MD as Physician (Pain Management)  Serina Samuel MD as Physician (Dermatology)  Nayeli Trejo MD as Physician (Orthopedic Surgery)  Objective:     Visit Vitals    /78 (BP 1 Location: Left arm, BP Patient Position: Sitting)    Pulse 68    Temp 97.8 °F (36.6 °C) (Oral)    Resp 18    Ht 5' 5\" (1.651 m)    Wt 162 lb (73.5 kg)    SpO2 98%    BMI 26.96 kg/m2    Body mass index is 26.96 kg/(m^2). No physical exam was performed today per Medicare Wellness Guidelines.    Health Maintenance:   Daily Aspirin: yes  Glaucoma Screening: UTD  Functional Assessment:   Depression Screen:   PHQ over the last two weeks 4/13/2018   Little interest or pleasure in doing things Not at all   Feeling down, depressed or hopeless Not at all   Total Score PHQ 2 0   Trouble falling or staying asleep, or sleeping too much Not at all   Feeling tired or having little energy Not at all   Poor appetite or overeating Not at all   Feeling bad about yourself - or that you are a failure or have let yourself or your family down Not at all   Moving or speaking so slowly that other people could have noticed; or the opposite being so fidgety that others notice Not at all   Thoughts of being better off dead, or hurting yourself in some way Not at all   How difficult have these problems made it for you to do your work, take care of your home and get along with others Not difficult at all      [facility] Exam 4/13/2018   What is the Year 1   What is the Season 1   What is the Date 1   What is the Day 1   What is the Month 1   Where are we State 1   Where are we Country 1   Where are we [place] or [facility] 1   [place] are we Floor 1 Name three objects, then ask the patient to say them 3   Serial sevens Subtract 7 from 100 in increments 2   Ask for the three objects repeated above 3   Name a pencil 1   Name a watch 1   Have the patient repeat this phrase \"No ifs, ands, or buts\" 1   Three stage command: Take the paper in your right hand 1   Fold the paper in half 1   Put the paper on the floor 1   Read and obey the following: CLOSE YOUR EYES 1   Have the patient write a sentence 1   Have the patient copy a figure 1   Mini Mental Score 27     Fall Risk:   Fall Risk Assessment, last 12 mths 4/13/2018   Able to walk? Yes   Fall in past 12 months? No      Abuse Screen:   Abuse Screening Questionnaire 4/13/2018   Do you ever feel afraid of your partner? N   Are you in a relationship with someone who physically or mentally threatens you? N   Is it safe for you to go home? Y      Hearing Status: impaired; wears hearing aides   Activities of Daily Living:Resides in [place] [address] at [facility]. hedoes not require help with any ADLs   Adult Nutrition Screen: eats a balanced diet  What are the patient's living arrangements - the patient lives alone. Does the patient use any ambulatory aids: no   If so, what type: None   Does the patient exhibit a steady gait?  no    Is the patient self reliant?  (ie can do own laundry, meals, household chores)  yes    Does the patient handle his/her own medications? yes   Does the patient handle his/her own money? yes   Is the patients home safe (ie good lighting, handrails on stairs and bath, etc.)? yes   Did you notice or did patient express any hearing difficulties? yes   Did you notice or did patient express any vision difficulties? yes   Were distance and reading eye charts used?   yes     Advance Care Planning:    Date of ACP Conversation: 04/16/18  Persons included in Conversation:  patient  Length of ACP Conversation in minutes:  16 minutes  Is the patient competent to make his/her health decisions: yes  Authorized Decision Maker (if patient is incapable of making informed decisions):Healthcare Agent/Medical Power of  under Advance Directive  For Patients with Decision Making Capacity: \"In these circumstances, what matters most to you? \"  Care focused more on comfort or quality of life. Conversation Outcomes / Follow-Up Plan: Reviewed existing Advance Directive   Recommended communicating the plan and making copies for the healthcare agent, personal physician, and others as appropriate (e.g., health system)  Referrals:    Was further evaluation necessary?no    Further medical records are not needed. Request will not be made. Treatment Plan: The following medication/treatments have been discontinued by the provider today:   Medications Discontinued During This Encounter   Medication Reason    White Petrolatum-Mineral Oil (GENTEAL PM) 94-3 % oint Therapy Completed    polyethylene glycol (MIRALAX) 17 gram/dose powder Therapy Completed    OMEGA-3 FATTY ACIDS/FISH OIL (OMEGA 3 FISH OIL PO) Therapy Completed    Biotin 2,500 mcg cap Therapy Completed    levothyroxine (SYNTHROID) 100 mcg tablet Dose Adjustment    atorvastatin (LIPITOR) 40 mg tablet Dose Adjustment    traMADol (ULTRAM) 50 mg tablet Reorder       1. Schatzki's ring of distal esophagus    2. Esophageal dysphagia    3. Chronic folliculitis    4. Postoperative hypothyroidism    5. Benign prostatic hyperplasia with nocturia    6. Encounter to discuss test results    7. Tubular adenoma of colon    8. Ataxic gait    9. Diminished pulses in lower extremity    10. Extremity cyanosis    11. Mixed hyperlipidemia    12. Primary osteoarthritis of both hips    13. Medicare annual wellness visit, subsequent    14. Advance care planning    15. Full code status      Disclaimer: This is an [employer] Exam (AWV). Patient verbalized understanding and agreement with the plan.   A copy of the After Visit Summary with personalized health plan was given to the patient today. Greater than 30 minutes was spent with patient discussing advance directives, wellness initiatives, and preventative measures of their health. I have reviewed the patient's medical history in detail and updated the computerized patient record.      Margaret Mercer NP

## 2018-04-17 ENCOUNTER — TELEPHONE (OUTPATIENT)
Dept: GERIATRIC MEDICINE | Age: 83
End: 2018-04-17

## 2018-04-17 ENCOUNTER — DOCUMENTATION ONLY (OUTPATIENT)
Dept: GERIATRIC MEDICINE | Age: 83
End: 2018-04-17

## 2018-04-17 DIAGNOSIS — I73.9 VASCULAR DISEASE, PERIPHERAL (HCC): Primary | ICD-10-CM

## 2018-04-17 NOTE — LETTER
4/17/2018 2:28 PM 
 
Mr. Juan A Maya 
[address] [place] [address] [phone] [place] [phone] Mr. Regina Anderson, I spoke with the Radiologist that read your study. He states there is not a need to perform the CTA over, he recommends we do an Ankle Brachial Index to examine the flow in the lower ankles and feet. I have ordered this test and would like to get it completed as soon as you are able. The discoloration to your lower extremities is not normal and I want to offer you the best options possible for high quality daily living. Please let us know if you would like Carlotta to schedule this for you or if you prefer to schedule it.   
 
 
 
Sincerely, 
 
 
Demarcus Apple NP

## 2018-04-17 NOTE — PROGRESS NOTES
Discuss with Dr. Kalina Cruz the radiologist that read Mr. Erik Miller CTA to request another look at Mr. Erik Miller imaging report as it says there is severe technical limitations on the exam. Per. Dr. Orquidea Christianson he states \"there is good flow\" but order an ELAINA if you are concerned. I will order the ELAINA as his lower extremities are still cyanotic and he continues to have symptoms of blood flow concerns.

## 2018-04-24 ENCOUNTER — HOSPITAL ENCOUNTER (OUTPATIENT)
Dept: VASCULAR SURGERY | Age: 83
Discharge: HOME | End: 2018-04-24
Attending: NURSE PRACTITIONER
Payer: MEDICARE

## 2018-04-24 DIAGNOSIS — I73.9 VASCULAR DISEASE, PERIPHERAL (HCC): ICD-10-CM

## 2018-04-24 PROCEDURE — 93922 UPR/L XTREMITY ART 2 LEVELS: CPT

## 2018-04-24 NOTE — PROCEDURES
[facility]  *** FINAL REPORT ***    Name: Sukumar Tejada  MRN: [id number]    Outpatient  : 30 Aug 1935  HIS Order #: [id number]  [id number] [facility] Visit #: [id number]  Date: 2018    TYPE OF TEST: Peripheral Arterial Testing    REASON FOR TEST  PVD    Right Leg  Segmentals: Normal                     mmHg  Brachial         132  High thigh  Low thigh  Calf  Posterior tibial 152  Dorsalis pedis   154  Peroneal  Metatarsal  Toe pressure      91  Doppler:  PVR:  Ankle/Brachial: 1.13    Left Leg  Segmentals: Normal                     mmHg  Brachial         136  High thigh  Low thigh  Calf  Posterior tibial 165  Dorsalis pedis   162  Peroneal  Metatarsal  Toe pressure     110  Doppler:  PVR:  Ankle/Brachial: 1.21    INTERPRETATION/FINDINGS  PROCEDURE:  Evaluation of lower extremity arteries with systolic blood   pressure measurement at the ankle and brachial level and calculation  of the ankle/brachial pressure index (ELAINA). Unless otherwise  indicated, the exam also includes pulse volume recording (PVR)  plethysmography at the ankle level. FINDINGS:  ELAINA is normal on the right and the left. PVR waveforms are   normal at the right and left ankle. IMPRESSION:  No evidence of hemodynamically significant lower  extremity arterial obstruction. ADDITIONAL COMMENTS    I have personally reviewed the data relevant to the interpretation of  this  study. TECHNOLOGIST: Lorraine Parrish  Signed: 2018 04:10 PM    PHYSICIAN: Maurizio Velasco MD  Signed: 2018 03:25 PM

## 2018-05-03 ENCOUNTER — HOSPITAL ENCOUNTER (OUTPATIENT)
Age: 83
Setting detail: OUTPATIENT SURGERY
Discharge: HOME HEALTH | End: 2018-05-03
Attending: INTERNAL MEDICINE | Admitting: INTERNAL MEDICINE
Payer: MEDICARE

## 2018-05-03 ENCOUNTER — ANESTHESIA EVENT (OUTPATIENT)
Dept: ENDOSCOPY | Age: 83
End: 2018-05-03
Payer: MEDICARE

## 2018-05-03 ENCOUNTER — ANESTHESIA (OUTPATIENT)
Dept: ENDOSCOPY | Age: 83
End: 2018-05-03
Payer: MEDICARE

## 2018-05-03 VITALS
RESPIRATION RATE: 29 BRPM | DIASTOLIC BLOOD PRESSURE: 80 MMHG | HEART RATE: 63 BPM | BODY MASS INDEX: 26.99 KG/M2 | WEIGHT: 162 LBS | TEMPERATURE: 97.9 F | HEIGHT: 65 IN | OXYGEN SATURATION: 95 % | SYSTOLIC BLOOD PRESSURE: 147 MMHG

## 2018-05-03 PROCEDURE — 88305 TISSUE EXAM BY PATHOLOGIST: CPT | Performed by: INTERNAL MEDICINE

## 2018-05-03 PROCEDURE — C1726 CATH, BAL DIL, NON-VASCULAR: HCPCS | Performed by: INTERNAL MEDICINE

## 2018-05-03 PROCEDURE — 77030018712 HC DEV BLLN INFL BSC -B: Performed by: INTERNAL MEDICINE

## 2018-05-03 PROCEDURE — 74011250636 HC RX REV CODE- 250/636

## 2018-05-03 PROCEDURE — 76060000035 HC ANESTHESIA 2 TO 2.5 HR: Performed by: INTERNAL MEDICINE

## 2018-05-03 PROCEDURE — 76040000003: Performed by: INTERNAL MEDICINE

## 2018-05-03 PROCEDURE — 77030009426 HC FCPS BIOP ENDOSC BSC -B: Performed by: INTERNAL MEDICINE

## 2018-05-03 RX ORDER — SODIUM CHLORIDE 0.9 % (FLUSH) 0.9 %
5-10 SYRINGE (ML) INJECTION EVERY 8 HOURS
Status: DISCONTINUED | OUTPATIENT
Start: 2018-05-03 | End: 2018-05-03 | Stop reason: HOSPADM

## 2018-05-03 RX ORDER — MIDAZOLAM HYDROCHLORIDE 1 MG/ML
.25-1 INJECTION, SOLUTION INTRAMUSCULAR; INTRAVENOUS
Status: ACTIVE | OUTPATIENT
Start: 2018-05-03 | End: 2018-05-03

## 2018-05-03 RX ORDER — NALOXONE HYDROCHLORIDE 0.4 MG/ML
0.4 INJECTION, SOLUTION INTRAMUSCULAR; INTRAVENOUS; SUBCUTANEOUS
Status: ACTIVE | OUTPATIENT
Start: 2018-05-03 | End: 2018-05-03

## 2018-05-03 RX ORDER — EPINEPHRINE 0.1 MG/ML
1 INJECTION INTRACARDIAC; INTRAVENOUS
Status: ACTIVE | OUTPATIENT
Start: 2018-05-03 | End: 2018-05-03

## 2018-05-03 RX ORDER — SODIUM CHLORIDE 0.9 % (FLUSH) 0.9 %
5-10 SYRINGE (ML) INJECTION AS NEEDED
Status: ACTIVE | OUTPATIENT
Start: 2018-05-03 | End: 2018-05-03

## 2018-05-03 RX ORDER — DEXTROMETHORPHAN/PSEUDOEPHED 2.5-7.5/.8
1.2 DROPS ORAL
Status: DISCONTINUED | OUTPATIENT
Start: 2018-05-03 | End: 2018-05-03 | Stop reason: HOSPADM

## 2018-05-03 RX ORDER — ATROPINE SULFATE 0.1 MG/ML
0.5 INJECTION INTRAVENOUS
Status: ACTIVE | OUTPATIENT
Start: 2018-05-03 | End: 2018-05-03

## 2018-05-03 RX ORDER — FLUMAZENIL 0.1 MG/ML
0.2 INJECTION INTRAVENOUS
Status: ACTIVE | OUTPATIENT
Start: 2018-05-03 | End: 2018-05-03

## 2018-05-03 RX ORDER — SODIUM CHLORIDE 9 MG/ML
100 INJECTION, SOLUTION INTRAVENOUS CONTINUOUS
Status: DISPENSED | OUTPATIENT
Start: 2018-05-03 | End: 2018-05-03

## 2018-05-03 RX ORDER — PROPOFOL 10 MG/ML
INJECTION, EMULSION INTRAVENOUS AS NEEDED
Status: DISCONTINUED | OUTPATIENT
Start: 2018-05-03 | End: 2018-05-03 | Stop reason: HOSPADM

## 2018-05-03 RX ORDER — SODIUM CHLORIDE, SODIUM LACTATE, POTASSIUM CHLORIDE, CALCIUM CHLORIDE 600; 310; 30; 20 MG/100ML; MG/100ML; MG/100ML; MG/100ML
INJECTION, SOLUTION INTRAVENOUS
Status: DISCONTINUED | OUTPATIENT
Start: 2018-05-03 | End: 2018-05-03 | Stop reason: HOSPADM

## 2018-05-03 RX ORDER — FENTANYL CITRATE 50 UG/ML
200 INJECTION, SOLUTION INTRAMUSCULAR; INTRAVENOUS
Status: ACTIVE | OUTPATIENT
Start: 2018-05-03 | End: 2018-05-03

## 2018-05-03 RX ADMIN — PROPOFOL 50 MG: 10 INJECTION, EMULSION INTRAVENOUS at 15:53

## 2018-05-03 RX ADMIN — PROPOFOL 50 MG: 10 INJECTION, EMULSION INTRAVENOUS at 15:50

## 2018-05-03 RX ADMIN — SODIUM CHLORIDE, SODIUM LACTATE, POTASSIUM CHLORIDE, CALCIUM CHLORIDE: 600; 310; 30; 20 INJECTION, SOLUTION INTRAVENOUS at 15:50

## 2018-05-03 NOTE — DISCHARGE INSTRUCTIONS
[address]  [place], [address]    EGD DISCHARGE INSTRUCTIONS    Nayeli Lawson  [id number]  1935    Discomfort:  Sore throat- throat lozenges or warm salt water gargle  redness at IV site- apply warm compress to area; if redness or soreness persist- contact your physician  Gaseous discomfort- walking, belching will help relieve any discomfort  You may not operate a vehicle for 12 hours  You may not engage in an occupation involving machinery or appliances for rest of today  You may not drink alcoholic beverages for at least 12 hours  Avoid making any critical decisions for at least 24 hour  DIET  You may resume your regular diet - however -  remember your colon is empty and a heavy meal will produce gas. Avoid these foods:  vegetables, fried / greasy foods, carbonated drinks    ACTIVITY  You may resume your normal daily activities   Spend the remainder of the day resting -  avoid any strenuous activity. CALL M.D. ANY SIGN OF   Increasing pain, nausea, vomiting  Abdominal distension (swelling)  New increased bleeding (oral or rectal)  Fever (chills)  Pain in chest area  Bloody discharge from nose or mouth  Shortness of breath    Follow-up Instructions:   Call Dr. Ajay Madrigal for any questions or problems and follow up with him as needed  Telephone # [phone]    ENDOSCOPY FINDINGS:   Your endoscopy showed schatzki ring in your esophagus, I dilated.     Signed By: Ajay Madrigal MD     5/3/2018  4:02 PM

## 2018-05-03 NOTE — ANESTHESIA POSTPROCEDURE EVALUATION
Post-Anesthesia Evaluation and Assessment    Patient: Nella Beckham MRN: [id number]  SSN: [id number]    YOB: 1935  Age: 80 y.o. Sex: male       Cardiovascular Function/Vital Signs  Visit Vitals    /80    Pulse 63    Temp 36.6 °C (97.9 °F)    Resp 29    Ht 5' 5\" (1.651 m)    Wt 73.5 kg (162 lb)    SpO2 95%    BMI 26.96 kg/m2       Patient is status post MAC anesthesia for Procedure(s):  ESOPHAGOGASTRODUODENOSCOPY (EGD)  ESOPHAGEAL DILATION  ESOPHAGOGASTRODUODENAL (EGD) BIOPSY. Nausea/Vomiting: None    Postoperative hydration reviewed and adequate. Pain:  Pain Scale 1: Numeric (0 - 10) (05/03/18 1620)  Pain Intensity 1: 0 (05/03/18 1620)   Managed    Neurological Status: At baseline    Mental Status and Level of Consciousness: Arousable    Pulmonary Status:   O2 Device: Room air (05/03/18 1620)   Adequate oxygenation and airway patent    Complications related to anesthesia: None    Post-anesthesia assessment completed.  No concerns    Signed By: Libby Shafer MD     May 3, 2018

## 2018-05-03 NOTE — ANESTHESIA POSTPROCEDURE EVALUATION
Post-Anesthesia Evaluation and Assessment    Patient: Livia Maldonado MRN: [id number]  SSN: [id number]    YOB: 1935  Age: 80 y.o. Sex: male       Cardiovascular Function/Vital Signs  Visit Vitals    /80    Pulse 63    Temp 36.6 °C (97.9 °F)    Resp 29    Ht 5' 5\" (1.651 m)    Wt 73.5 kg (162 lb)    SpO2 95%    BMI 26.96 kg/m2       Patient is status post MAC anesthesia for Procedure(s):  ESOPHAGOGASTRODUODENOSCOPY (EGD)  ESOPHAGEAL DILATION  ESOPHAGOGASTRODUODENAL (EGD) BIOPSY. Nausea/Vomiting: None    Postoperative hydration reviewed and adequate. Pain:  Pain Scale 1: Numeric (0 - 10) (05/03/18 1620)  Pain Intensity 1: 0 (05/03/18 1620)   Managed    Neurological Status: At baseline    Mental Status and Level of Consciousness: Arousable    Pulmonary Status:   O2 Device: Room air (05/03/18 1620)   Adequate oxygenation and airway patent    Complications related to anesthesia: None    Post-anesthesia assessment completed.  No concerns    Signed By: Ginger Rahman MD     May 3, 2018

## 2018-05-03 NOTE — ANESTHESIA PREPROCEDURE EVALUATION
Anesthetic History   No history of anesthetic complications            Review of Systems / Medical History  Patient summary reviewed, nursing notes reviewed and pertinent labs reviewed    Pulmonary  Within defined limits                 Neuro/Psych   Within defined limits           Cardiovascular              CAD    Exercise tolerance: >4 METS     GI/Hepatic/Renal     GERD          Comments: schatski's ring  C/o dysphagia Endo/Other      Hypothyroidism  Arthritis     Other Findings            Physical Exam    Airway  Mallampati: I  TM Distance: > 6 cm  Neck ROM: normal range of motion   Mouth opening: Normal     Cardiovascular    Rhythm: regular  Rate: normal         Dental  No notable dental hx       Pulmonary  Breath sounds clear to auscultation               Abdominal         Other Findings            Anesthetic Plan    ASA: 2  Anesthesia type: MAC          Induction: Intravenous  Anesthetic plan and risks discussed with: Patient

## 2018-05-03 NOTE — ROUTINE PROCESS
Nader Uriarte  1935  [id number]    Situation:  Verbal report received from: Frutoso Homans, RN  Procedure: Procedure(s):  ESOPHAGOGASTRODUODENOSCOPY (EGD)  ESOPHAGEAL DILATION  ESOPHAGOGASTRODUODENAL (EGD) BIOPSY    Background:    Preoperative diagnosis: DYSPHAGIA  Postoperative diagnosis: 1.- Schatzki's Ring    :  Dr. Elpidio Merlin  Assistant(s): Endoscopy Technician-1: Toby Mcdonough  Endoscopy RN-1: Any He RN    Specimens:   ID Type Source Tests Collected by Time Destination   1 : Esophageal BX Preservative   Johanna Nur MD 5/3/2018 1556 Pathology     H. Pylori  no    Assessment:  Intra-procedure medications     Anesthesia gave intra-procedure sedation and medications, see anesthesia flow sheet yes    Intravenous fluids: NS@ KVO     Vital signs stable     Abdominal assessment: round and soft     Recommendation:  Discharge patient per MD order.     Family or Friend   Permission to share finding with family or friend yes

## 2018-05-03 NOTE — PROCEDURES
[address], [address] [place] [address]        Esophago- Gastroduodenoscopy (EGD) Procedure Note    Levar Denson  1935  [id number]      Procedure: Endoscopic Gastroduodenoscopy with biopsy, esophageal dilation    Indication:  Dysphagia/odynophagia     Pre-operative Diagnosis: see indication above    Post-operative Diagnosis: see findings below    : Marcella Downing MD    Referring Provider:  Kristen Vick NP      Anesthesia/Sedation:  MAC anesthesia Propofol        Procedure Details     After infomed consent was obtained for the procedure, with all risks and benefits of procedure explained the patient was taken to the endoscopy suite and placed in the left lateral decubitus position. Following sequential administration of sedation as per above, the endoscope was inserted into the mouth and advanced under direct vision to third portion of the duodenum. A careful inspection was made as the gastroscope was withdrawn, including a retroflexed view of the proximal stomach; findings and interventions are described below. Findings:   Esophagus:schatzki dilated with CRE balloon gradually from 15, 16.5 then 18 mm    Random esophageal biopsies taken  Stomach: normal   Duodenum/jejunum: normal      Therapies:  As above    Specimens: as above         EBL: None      Complications:   None; patient tolerated the procedure well. Impression:    -See post-procedure diagnoses.     Recommendations:  -Follow up with me as needed    Signed By: Marcella Downing MD     5/3/2018  4:00 PM

## 2018-05-03 NOTE — IP AVS SNAPSHOT
[address] 
[phone] Patient: Livia Maldonado MRN: [id number] [id number] About your hospitalization You were admitted on:  May 3, 2018 You last received care in the:  [facility] ENDOSCOPY You were discharged on:  May 3, 2018 Why you were hospitalized Your primary diagnosis was:  Not on File Follow-up Information None Discharge Orders None A check navneet indicates which time of day the medication should be taken. My Medications CONTINUE taking these medications Instructions Each Dose to Equal  
 Morning Noon Evening Bedtime  
 acetaminophen 325 mg tablet Commonly known as:  TYLENOL Your last dose was: Your next dose is: Take 650 mg by mouth every four (4) hours as needed for Pain. 650 mg  
    
   
   
   
  
 aspirin delayed-release 81 mg tablet Your last dose was: Your next dose is: Take 81 mg by mouth daily. 81 mg  
    
   
   
   
  
 CITRUCEL (SUCROSE) Powd Generic drug:  Methylcellulose (with Sugar) Your last dose was: Your next dose is: Take  by mouth daily. diazePAM 5 mg tablet Commonly known as:  VALIUM Your last dose was: Your next dose is: Take 5 mg by mouth daily as needed. 5 mg  
    
   
   
   
  
 finasteride 5 mg tablet Commonly known as:  PROSCAR Your last dose was: Your next dose is: Take 1 Tab by mouth daily. Indications: benign prostatic hyperplasia with lower urinary tract sx  
 5 mg FISH -160-1,000 mg Cap Generic drug:  omega 3-dha-epa-fish oil Your last dose was: Your next dose is: Take  by mouth.  
     
   
   
   
  
 levothyroxine 50 mcg tablet Commonly known as:  SYNTHROID Your last dose was: Your next dose is: Take 1 Tab by mouth Daily (before breakfast). 50 mcg  
    
   
   
   
  
 melatonin 5 mg Cap capsule Your last dose was: Your next dose is: Take 5 mg by mouth nightly. 5 mg  
    
   
   
   
  
 metroNIDAZOLE 0.75 % topical gel Commonly known as:  Martin Beech Your last dose was: Your next dose is:    
   
   
 Apply  to affected area daily. minocycline 100 mg tablet Commonly known as:  Michelle Castrejon Your last dose was: Your next dose is: Take 50 mg by mouth daily. 50 mg  
    
   
   
   
  
 omeprazole 40 mg capsule Commonly known as:  PRILOSEC Your last dose was: Your next dose is: Take 1 Cap by mouth daily. Indications: gastroesophageal reflux disease 40 mg PRESERVISION AREDS 2 [phone] mg-unit-mg-mg Cap Generic drug:  vit C,P-Vl-ijsyi-lutein-zeaxan Your last dose was: Your next dose is: Take 2 Caps by mouth daily. 2 Cap  
    
   
   
   
  
 simvastatin 20 mg tablet Commonly known as:  ZOCOR Your last dose was: Your next dose is: Take 1 Tab by mouth nightly. 20 mg  
    
   
   
   
  
 traMADol 50 mg tablet Commonly known as:  ULTRAM  
   
Your last dose was: Your next dose is: Take 0.5-1 Tabs by mouth every eight (8) hours as needed for Pain. Max Daily Amount: 150 mg. Indications: NEUROPATHIC PAIN, Pain 25-50 mg Opioid Education Prescription Opioids: What You Need to Know: 
 
Prescription opioids can be used to help relieve moderate-to-severe pain and are often prescribed following a surgery or injury, or for certain health conditions. These medications can be an important part of treatment but also come with serious risks.   Opioids are strong pain medicines. Examples include hydrocodone, oxycodone, fentanyl, and morphine. Heroin is an example of an illegal opioid. It is important to work with your health care provider to make sure you are getting the safest, most effective care. WHAT ARE THE RISKS AND SIDE EFFECTS OF OPIOID USE? Prescription opioids carry serious risks of addiction and overdose, especially with prolonged use. An opioid overdose, often marked by slow breathing, can cause sudden death. The use of prescription opioids can have a number of side effects as well, even when taken as directed. · Tolerance-meaning you might need to take more of a medication for the same pain relief · Physical dependence-meaning you have symptoms of withdrawal when the medication is stopped. Withdrawal symptoms can include nausea, sweating, chills, diarrhea, stomach cramps, and muscle aches. Withdrawal can last up to several weeks, depending on which drug you took and how long you took it. · Increased sensitivity to pain · Constipation · Nausea, vomiting, and dry mouth · Sleepiness and dizziness · Confusion · Depression · Low levels of testosterone that can result in lower sex drive, energy, and strength · Itching and sweating RISKS ARE GREATER WITH:      
· History of drug misuse, substance use disorder, or overdose · Mental health conditions (such as depression or anxiety) · Sleep apnea · Older age (72 years or older) · Pregnancy Avoid alcohol while taking prescription opioids. Also, unless specifically advised by your health care provider, medications to avoid include: · Benzodiazepines (such as Xanax or Valium) · Muscle relaxants (such as Soma or Flexeril) · Hypnotics (such as Ambien or Lunesta) · Other prescription opioids KNOW YOUR OPTIONS Talk to your health care provider about ways to manage your pain that don't involve prescription opioids.   Some of these options may actually work better and have fewer risks and side effects. Options may include: 
· Pain relievers such as acetaminophen, ibuprofen, and naproxen · Some medications that are also used for depression or seizures · Physical therapy and exercise · Counseling to help patients learn how to cope better with triggers of pain and stress. · Application of heat or cold compress · Massage therapy · Relaxation techniques Be Informed Make sure you know the name of your medication, how much and how often to take it, and its potential risks & side effects. IF YOU ARE PRESCRIBED OPIOIDS FOR PAIN: 
· Never take opioids in greater amounts or more often than prescribed. Remember the goal is not to be pain-free but to manage your pain at a tolerable level. · Follow up with your primary care provider to: · Work together to create a plan on how to manage your pain. · Talk about ways to help manage your pain that don't involve prescription opioids. · Talk about any and all concerns and side effects. · Help prevent misuse and abuse. · Never sell or share prescription opioids · Help prevent misuse and abuse. · Store prescription opioids in a secure place and out of reach of others (this may include visitors, children, friends, and family). · Safely dispose of unused/unwanted prescription opioids: Find your community drug take-back program or your pharmacy mail-back program, or flush them down the toilet, following guidance from the Food and Drug Administration (www.fda.gov/Drugs/ResourcesForYou). · Visit www.cdc.gov/drugoverdose to learn about the risks of opioid abuse and overdose. · If you believe you may be struggling with addiction, tell your health care provider and ask for guidance or call [facility] at 8-043-031-HELP. Discharge Instructions [address] [place] [address] EGD DISCHARGE INSTRUCTIONS [facility] 
[id number] 
1935 Discomfort: 
Sore throat- throat lozenges or warm salt water gargle 
redness at IV site- apply warm compress to area; if redness or soreness persist- contact your physician Gaseous discomfort- walking, belching will help relieve any discomfort You may not operate a vehicle for 12 hours You may not engage in an occupation involving machinery or appliances for rest of today You may not drink alcoholic beverages for at least 12 hours Avoid making any critical decisions for at least 24 hour DIET You may resume your regular diet  however -  remember your colon is empty and a heavy meal will produce gas. Avoid these foods:  vegetables, fried / greasy foods, carbonated drinks ACTIVITY You may resume your normal daily activities Spend the remainder of the day resting -  avoid any strenuous activity. CALL M.D. ANY SIGN OF Increasing pain, nausea, vomiting Abdominal distension (swelling) New increased bleeding (oral or rectal) Fever (chills) Pain in chest area Bloody discharge from nose or mouth Shortness of breath Follow-up Instructions: 
 Call Dr. Mitzi Severs for any questions or problems and follow up with him as needed Telephone # [phone] ENDOSCOPY FINDINGS: 
 Your endoscopy showed schatzki ring in your esophagus, I dilated. Signed By: Mitzi Severs, MD   
 5/3/2018  4:02 PM 
  
 
 
  
  
  
[facility]! Dear Sherice Khoury: Thank you for requesting a [employer] account. Our records indicate that you already have an active [employer] account. You can access your account anytime at [email or web address] Did you know that you can access your hospital and ER discharge instructions at any time in [employer]? You can also review all of your test results from your hospital stay or ER visit. Additional Information If you have questions, please visit the Frequently Asked Questions section of the MyChart website at [email or web address] Remember, KeepIdeashart is NOT to be used for urgent needs. For medical emergencies, dial 911. Now available from your iPhone and Android! Introducing Tom Edgar As a [employer] patient, I wanted to make you aware of our electronic visit tool called Tom Edgar. [employer] 24/7 allows you to connect within minutes with a medical provider 24 hours a day, seven days a week via a mobile device or tablet or logging into a secure website from your computer. You can access Tom Edgar from anywhere in the [place]. A virtual visit might be right for you when you have a simple condition and feel like you just dont want to get out of bed, or cant get away from work for an appointment, when your regular [employer] provider is not available (evenings, weekends or holidays), or when youre out of town and need minor care. Electronic visits cost only $49 and if the [employer] 24/7 provider determines a prescription is needed to treat your condition, one can be electronically transmitted to a nearby pharmacy*. Please take a moment to enroll today if you have not already done so. The enrollment process is free and takes just a few minutes. To enroll, please download the [employer] 24/7 denton to your tablet or phone, or visit [email or web address] to enroll on your computer. And, as an [address] [place] [address] patient with a [employer] account, the results of your visits will be scanned into your electronic medical record and your primary care provider will be able to view the scanned results. We urge you to continue to see your regular [employer] provider for your ongoing medical care.   And while your primary care provider may not be the one available when you seek a Tom Edgar virtual visit, the peace of mind you get from getting a real diagnosis real time can be priceless. For more information on Tom Edgar, view our Frequently Asked Questions (FAQs) at [email or web address]. Sincerely, 
 
Jorge Bradley MD 
Chief Medical Officer [employer] *:  certain medications cannot be prescribed via Tom Edgar Providers Seen During Your Hospitalization Provider Specialty Primary office phone Flip Kulkarni MD Gastroenterology [phone] Your Primary Care Physician (PCP) Primary Care Physician Office Phone Office Fax Magedbalaji Thomas [phone] You are allergic to the following Allergen Reactions Toprol Xl (Metoprolol Succinate) Diarrhea Sulfa (Sulfonamide Antibiotics) Hives Recent Documentation Height Weight BMI Smoking Status 1.651 m 73.5 kg 26.96 kg/m2 Former Smoker Emergency Contacts Name Discharge Info Relation Home Work Mobile Negrita D 25 CAREGIVER [3] Son [22] [phone]    
 Patricio OLIVEIRA  Child [2] [phone] Patient Belongings The following personal items are in your possession at time of discharge: 
  Dental Appliances: Uppers  Visual Aid: Glasses, At bedside Please provide this summary of care documentation to your next provider. Signatures-by signing, you are acknowledging that this After Visit Summary has been reviewed with you and you have received a copy. Patient Signature:  ____________________________________________________________ Date:  ____________________________________________________________  
  
[facility] Provider Signature:  ____________________________________________________________ Date:  ____________________________________________________________

## 2018-05-03 NOTE — H&P
Vickie Gonzalez  2018 10:46 AM  Location: [facility]  Patient #: [id number]  : 1935  Single / Language: Titus Schaumann / Race: White  Male      History of Present Illness Tatum Gore MD; 2018 12:45 PM)  The patient is a 80year old male who presents with a complaint of Dysphagia. The patient presents for consultation at the request of Dr. Ana Draper). The dysphagia has been occurring in an intermittent pattern for months. The dysphagia is described as being located in the substernal  area. The symptoms are aggravated by solids only. There has been no associated abdominal discomfort, abdominal pain, anxiety, chest pain, depression, dryness of mouth, frequent pneumonia, halitosis, heartburn, hiccups, hoarseness following dysphagia, ingestion of chemicals, long history of heartburn, loss of appetite, nasal regurgitation, neck pain, neck swelling, neurological disease, odynophagia, Raynaud's phenomenon, throat pain, tracheobronchial aspiration, vomiting, weight loss, wheezing, prior gastric bypass surgery, history of dementia, history of diabetes mellitus, history of esophageal stricture, history of endoscopic dilation, history of Nissen fundoplication, history of sclerotherapy, history of esophageal banding, history of radiation, history of achalasia, history of esophageal dysmotility, history of esophagectomy with gastric pull-up, history of esophagectomy with colonic interposition, history of neurological disease, history of connective tissue disease, Keita's esophagus, eructation or other. Note for \"Dysphagia\": he gave me h/o  having schatzki ring, got EGD and dilatation done before. he is c/o for months of intermittent dysphagia for solids, no vomiting, no weight loss      Problem List/Past Medical (Benita Vegas; 2018 10:46 AM)  Arthritis    Hypercholesterolemia    Hypothyroidism      Past Surgical History (Benita Vegas; 2018 10:46 AM)  Cholecystectomy    Tonsillectomy    H/O heart bypass surgery (V45.81  Z95.1)      Allergies (Benita Vegas; 4/26/2018 10:46 AM)  Codeine/Codeine Derivatives    Sulfa Drugs      Medication History (Benita Vegas; 4/26/2018 10:57 AM)  Simvastatin  (20MG Tablet, Oral daily) Active. Fish Oil  (1200MG Capsule DR, Oral daily) Active. Metrogel  (0.75% Gel, External) Active. Melatonin  (5MG Tablet, Oral at bedtime) Active. Tylenol 8 Hour  (650MG Tablet ER, Oral four times daily, as needed) Active. DiazePAM  (5MG Tablet, Oral as needed) Active. Minocycline HCl  (100MG Capsule, Oral daily) Active. TraMADol HCl  (50MG Tablet, Oral every eight hours, as needed) Active. Finasteride  (5MG Tablet, Oral daily) Active. Omeprazole  (40MG Capsule DR, Oral daily) Active. Levothyroxine Sodium  (100MCG Tablet, Oral daily) Active. Methylcellulose  (daily) Active. Aspirin  (81MG Tablet DR, Oral daily) Active. MetroNIDAZOLE  (0.75% Gel, External daily) Active. PreserVision AREDS 2  (2 Oral daily) Active. Medications Reconciled     Social History (Benita Vegas; 4/26/2018 10:46 AM)  Alcohol Use   Moderate alcohol use, Drinks wine. Employment status   Retired. Marital status   Single. Tobacco Use   Former smoker. Diagnostic Studies History (Benita Vegas; 4/26/2018 10:46 AM)  Colonoscopy  [2017]: Health Maintenance History (Benita Vegas; 4/26/2018 10:46 AM)  Flu Vaccine   unknown  Pneumovax   unknown        Review of Systems ([address] [place] [address] MYNOR Vegas; 4/26/2018 10:46 AM)  General Not Present- Chronic Fatigue, Poor Appetite, Weight Gain and Weight Loss. Skin Not Present- Itching, Rash and Skin Color Changes. HEENT Not Present- Hearing Loss and Vertigo. Respiratory Not Present- Difficulty Breathing and TB exposure. Cardiovascular Not Present- Chest Pain, Use of Antibiotics before Dental Procedures and Use of Blood Thinners. Gastrointestinal Present- See HPI. Musculoskeletal Present- Arthritis.  Not Present- Hip Replacement Surgery and Knee Replacement Surgery. Neurological Not Present- Weakness. Psychiatric Not Present- Depression. Endocrine Present- Thyroid Problems. Not Present- Diabetes. Hematology Not Present- Anemia. Vitals (Benita Vegas; 4/26/2018 11:00 AM)  4/26/2018 10:58 AM  Weight: 164 lb   Height: 65 in   Body Surface Area: 1.82 m²   Body Mass Index: 27.29 kg/m²    Pulse: 64 (Regular)     BP: 128/74 (Sitting, Left Arm, Standard)              Physical Exam Anthony Shepherd MD; 4/26/2018 12:45 PM)  General  Mental Status - Alert. General Appearance - Cooperative, Pleasant, Not in acute distress. Orientation - Oriented X3. Build & Nutrition - Well nourished and Well developed. Integumentary  General Characteristics  Overall examination of the patient's skin reveals - no rashes, no bruises and no spider angiomas. Color - normal coloration of skin. Head and Neck  Neck  Global Assessment - full range of motion and supple, no bruit auscultated on the right, no bruit auscultated on the left, non-tender, no lymphadenopathy. Thyroid  Gland Characteristics - normal size and consistency. Eye  Eyeball - Left - No Exophthalmos. Eyeball - Right - No Exophthalmos. Sclera/Conjunctiva - Left - No Jaundice. Sclera/Conjunctiva - Right - No Jaundice. Chest and Lung Exam  Chest and lung exam reveals  - quiet, even and easy respiratory effort with no use of accessory muscles. Auscultation  Breath sounds - Normal. Adventitious sounds - No Adventitious sounds. Cardiovascular  Auscultation  Rhythm - Regular, No Tachycardia, No Bradycardia . Heart Sounds - Normal heart sounds , S1 WNL and S2 WNL, No S3, No Summation Gallop. Murmurs & Other Heart Sounds - Auscultation of the heart reveals - No Murmurs. Abdomen  Palpation/Percussion  Tenderness - Non-Tender. Rebound tenderness - No rebound. Rigidity (guarding) - No Rigidity. Dullness to percussion - No abnormal dullness to percussion. Liver - No hepatosplenomegaly.  Abdominal Mass Palpable - No masses. Other Characteristics - No Ascites. Auscultation  Auscultation of the abdomen reveals - Bowel sounds normal, No Abdominal bruits and No Succussion splash. Rectal - Did not examine. Peripheral Vascular  Upper Extremity  Inspection - Left - Normal - No Clubbing, No Cyanosis, No Edema, Pulses Intact. Right - Normal - No Clubbing, No Cyanosis, No Edema, Pulses Intact. Palpation - Edema - Left - No edema. Right - No edema. Lower Extremity  Inspection - Left - Inspection Normal. Right - Inspection Normal. Palpation - Edema - Left - No edema. Right - No edema. Neurologic  Neurologic evaluation reveals  - Cranial nerves grossly intact, no focal neurologic deficits. Motor  Involuntary Movements - Asterixis - not present. Musculoskeletal  Global Assessment  Gait and Station - normal gait and station. Assessment & Plan Wiliam Lacy MD; 4/26/2018 12:46 PM)  Dysphagia (787.20  R13.10)  Impression: he gave me h/o having schatzki ring, got EGD and dilatation done before. he is c/o for months of intermittent dysphagia for solids, no vomiting, no weight loss  EGD to look for any recurrence of schatzki ring, esophagitis, hernia, and any esophageal neoplasm  Current Plans  Endoscopy ([address]005) (Discussed risks and benefits with the patient to include: perforation, post polypectomy, or post biopsy bleeding, missed lesions, and sedation reactions.)  Pt Education - How to access health information online: discussed with patient and provided information. Date of Surgery Update:  Brenna Pritchett was seen and examined. History and physical has been reviewed. The patient has been examined.  There have been no significant clinical changes since the completion of the originally dated History and Physical.    Signed By: Shu Kc MD     May 3, 2018 3:46 PM

## 2018-05-03 NOTE — IP AVS SNAPSHOT
[address] [facility] [address] [place] [address] 
[phone] Patient: Silverio Whitmore MRN: [id number] WCA:[birth year] A check navneet indicates which time of day the medication should be taken. My Medications CONTINUE taking these medications Instructions Each Dose to Equal  
 Morning Noon Evening Bedtime  
 acetaminophen 325 mg tablet Commonly known as:  TYLENOL Your last dose was: Your next dose is: Take 650 mg by mouth every four (4) hours as needed for Pain. 650 mg  
    
   
   
   
  
 aspirin delayed-release 81 mg tablet Your last dose was: Your next dose is: Take 81 mg by mouth daily. 81 mg  
    
   
   
   
  
 CITRUCEL (SUCROSE) Powd Generic drug:  Methylcellulose (with Sugar) Your last dose was: Your next dose is: Take  by mouth daily. diazePAM 5 mg tablet Commonly known as:  VALIUM Your last dose was: Your next dose is: Take 5 mg by mouth daily as needed. 5 mg  
    
   
   
   
  
 finasteride 5 mg tablet Commonly known as:  PROSCAR Your last dose was: Your next dose is: Take 1 Tab by mouth daily. Indications: benign prostatic hyperplasia with lower urinary tract sx  
 5 mg FISH -160-1,000 mg Cap Generic drug:  omega 3-dha-epa-fish oil Your last dose was: Your next dose is: Take  by mouth.  
     
   
   
   
  
 levothyroxine 50 mcg tablet Commonly known as:  SYNTHROID Your last dose was: Your next dose is: Take 1 Tab by mouth Daily (before breakfast). 50 mcg  
    
   
   
   
  
 melatonin 5 mg Cap capsule Your last dose was: Your next dose is: Take 5 mg by mouth nightly. 5 mg  
    
   
   
   
  
 metroNIDAZOLE 0.75 % topical gel Commonly known as:  Lorena Bishop Your last dose was: Your next dose is:    
   
   
 Apply  to affected area daily. minocycline 100 mg tablet Commonly known as:  Con Naz Your last dose was: Your next dose is: Take 50 mg by mouth daily. 50 mg  
    
   
   
   
  
 omeprazole 40 mg capsule Commonly known as:  PRILOSEC Your last dose was: Your next dose is: Take 1 Cap by mouth daily. Indications: gastroesophageal reflux disease 40 mg PRESERVISION AREDS 2 [phone] mg-unit-mg-mg Cap Generic drug:  vit C,T-Gi-fxpmt-lutein-zeaxan Your last dose was: Your next dose is: Take 2 Caps by mouth daily. 2 Cap  
    
   
   
   
  
 simvastatin 20 mg tablet Commonly known as:  ZOCOR Your last dose was: Your next dose is: Take 1 Tab by mouth nightly. 20 mg  
    
   
   
   
  
 traMADol 50 mg tablet Commonly known as:  ULTRAM  
   
Your last dose was: Your next dose is: Take 0.5-1 Tabs by mouth every eight (8) hours as needed for Pain. Max Daily Amount: 150 mg. Indications: NEUROPATHIC PAIN, Pain  25-50 mg

## 2018-05-03 NOTE — PROGRESS NOTES

## 2018-05-18 ENCOUNTER — OFFICE VISIT (OUTPATIENT)
Dept: GERIATRIC MEDICINE | Age: 83
End: 2018-05-18

## 2018-05-18 VITALS
WEIGHT: 164 LBS | DIASTOLIC BLOOD PRESSURE: 66 MMHG | OXYGEN SATURATION: 98 % | BODY MASS INDEX: 27.32 KG/M2 | TEMPERATURE: 98.7 F | RESPIRATION RATE: 18 BRPM | SYSTOLIC BLOOD PRESSURE: 108 MMHG | HEIGHT: 65 IN | HEART RATE: 68 BPM

## 2018-05-18 DIAGNOSIS — Z51.89 ENCOUNTER FOR MEDICATION ADJUSTMENT: ICD-10-CM

## 2018-05-18 DIAGNOSIS — R13.19 ESOPHAGEAL DYSPHAGIA: ICD-10-CM

## 2018-05-18 DIAGNOSIS — N40.1 BENIGN PROSTATIC HYPERPLASIA WITH NOCTURIA: Primary | ICD-10-CM

## 2018-05-18 DIAGNOSIS — R35.1 BENIGN PROSTATIC HYPERPLASIA WITH NOCTURIA: Primary | ICD-10-CM

## 2018-05-18 DIAGNOSIS — D12.6 TUBULAR ADENOMA OF COLON: ICD-10-CM

## 2018-05-18 DIAGNOSIS — K22.2 SCHATZKI'S RING OF DISTAL ESOPHAGUS: ICD-10-CM

## 2018-05-18 DIAGNOSIS — Z71.2 ENCOUNTER TO DISCUSS TEST RESULTS: ICD-10-CM

## 2018-05-18 RX ORDER — CHOLECALCIFEROL (VITAMIN D3) 50 MCG
20 CAPSULE ORAL DAILY
COMMUNITY
End: 2018-06-12 | Stop reason: DRUGHIGH

## 2018-05-18 NOTE — PROGRESS NOTES
ADVISED PATIENT OF THE FOLLOWING HEALTH MAINTAINCE DUE  Health Maintenance Due   Topic Date Due    DTaP/Tdap/Td series (1 - Tdap) 08/30/1956    ZOSTER VACCINE AGE 60>  06/30/1995    GLAUCOMA SCREENING Q2Y  08/30/2000    Pneumococcal 65+ Low/Medium Risk (1 of 2 - PCV13) 08/30/2000      Chief Complaint   Patient presents with    Results     Patient being seen to discuss recent ELAINA testing  and endoscopy with NP.        1. Have you been to the ER, urgent care clinic since your last visit? Hospitalized since your last visit? No    2. Have you seen or consulted any other health care providers outside of the [address] [place] [address] since your last visit? Include any DEXA scan, mammography  or colon screening. No    3. Do you have an Advance Directive on file? Yes, patient provided copy today    4. Do you have a DNR on file? Full         Patient is accompanied by self I have received verbal consent from Quan Heather to discuss any/all medical information while they are present in the room.

## 2018-05-25 DIAGNOSIS — E78.2 MIXED HYPERLIPIDEMIA: ICD-10-CM

## 2018-05-25 RX ORDER — SIMVASTATIN 20 MG/1
TABLET, FILM COATED ORAL
Qty: 90 TAB | Refills: 1 | Status: SHIPPED | OUTPATIENT
Start: 2018-05-25 | End: 2018-09-08 | Stop reason: SDUPTHER

## 2018-06-12 ENCOUNTER — OFFICE VISIT (OUTPATIENT)
Dept: GERIATRIC MEDICINE | Age: 83
End: 2018-06-12

## 2018-06-12 VITALS
BODY MASS INDEX: 27.81 KG/M2 | WEIGHT: 166.9 LBS | SYSTOLIC BLOOD PRESSURE: 129 MMHG | TEMPERATURE: 98.6 F | HEART RATE: 71 BPM | RESPIRATION RATE: 18 BRPM | OXYGEN SATURATION: 98 % | HEIGHT: 65 IN | DIASTOLIC BLOOD PRESSURE: 77 MMHG

## 2018-06-12 DIAGNOSIS — M25.552 PAIN IN JOINT INVOLVING PELVIC REGION AND THIGH, BILATERAL: ICD-10-CM

## 2018-06-12 DIAGNOSIS — M25.549 PAIN IN MULTIPLE FINGER JOINTS: Primary | ICD-10-CM

## 2018-06-12 DIAGNOSIS — E03.9 ACQUIRED HYPOTHYROIDISM: ICD-10-CM

## 2018-06-12 DIAGNOSIS — D12.6 TUBULAR ADENOMA OF COLON: ICD-10-CM

## 2018-06-12 DIAGNOSIS — R35.1 NOCTURIA: ICD-10-CM

## 2018-06-12 DIAGNOSIS — M25.551 PAIN IN JOINT INVOLVING PELVIC REGION AND THIGH, BILATERAL: ICD-10-CM

## 2018-06-12 DIAGNOSIS — E78.2 MIXED HYPERLIPIDEMIA: ICD-10-CM

## 2018-06-12 DIAGNOSIS — Z79.899 MEDICATION MANAGEMENT: ICD-10-CM

## 2018-06-12 PROBLEM — N40.1 BENIGN PROSTATIC HYPERPLASIA WITH URINARY OBSTRUCTION: Status: ACTIVE | Noted: 2018-06-12

## 2018-06-12 PROBLEM — R73.01 IMPAIRED FASTING GLUCOSE: Status: ACTIVE | Noted: 2018-06-12

## 2018-06-12 PROBLEM — M25.569 ARTHRALGIA OF LOWER LEG: Status: ACTIVE | Noted: 2018-06-12

## 2018-06-12 PROBLEM — H81.09 MÉNIÈRE'S DISEASE: Status: ACTIVE | Noted: 2018-06-12

## 2018-06-12 PROBLEM — E78.5 HYPERLIPIDEMIA: Status: ACTIVE | Noted: 2017-02-16

## 2018-06-12 PROBLEM — I25.10 CORONARY ATHEROSCLEROSIS: Status: ACTIVE | Noted: 2018-06-12

## 2018-06-12 PROBLEM — M17.9 OSTEOARTHRITIS OF KNEE: Status: ACTIVE | Noted: 2018-06-12

## 2018-06-12 PROBLEM — F41.9 ANXIETY: Status: ACTIVE | Noted: 2018-06-12

## 2018-06-12 PROBLEM — R26.89 IMPAIRMENT OF BALANCE: Status: ACTIVE | Noted: 2018-06-12

## 2018-06-12 PROBLEM — L60.9 DISORDER OF NAIL: Status: ACTIVE | Noted: 2018-06-12

## 2018-06-12 PROBLEM — H90.5 CENTRAL HEARING LOSS: Status: ACTIVE | Noted: 2018-06-12

## 2018-06-12 PROBLEM — M48.061 SPINAL STENOSIS OF LUMBAR REGION: Status: ACTIVE | Noted: 2018-06-12

## 2018-06-12 PROBLEM — N13.8 BENIGN PROSTATIC HYPERPLASIA WITH URINARY OBSTRUCTION: Status: ACTIVE | Noted: 2018-06-12

## 2018-06-12 RX ORDER — DIAZEPAM 5 MG/1
5 TABLET ORAL
Qty: 30 TAB | Refills: 2 | Status: SHIPPED | OUTPATIENT
Start: 2018-06-12 | End: 2020-02-18 | Stop reason: SDUPTHER

## 2018-06-12 RX ORDER — FINASTERIDE 5 MG/1
5 TABLET, FILM COATED ORAL
Qty: 60 TAB | Refills: 1 | Status: SHIPPED | COMMUNITY
Start: 2018-06-12 | End: 2018-07-31 | Stop reason: SDUPTHER

## 2018-06-12 RX ORDER — OMEPRAZOLE 40 MG/1
40 CAPSULE, DELAYED RELEASE ORAL DAILY
Qty: 30 CAP | Refills: 2 | Status: SHIPPED | OUTPATIENT
Start: 2018-06-12 | End: 2018-09-08 | Stop reason: SDUPTHER

## 2018-06-12 NOTE — PATIENT INSTRUCTIONS
Joint Pain: Care Instructions  Your Care Instructions    Many people have small aches and pains from overuse or injury to muscles and joints. Joint injuries often happen during sports or recreation, work tasks, or projects around the home. An overuse injury can happen when you put too much stress on a joint or when you do an activity that stresses the joint over and over, such as using the computer or rowing a boat. You can take action at home to help your muscles and joints get better. You should feel better in 1 to 2 weeks, but it can take 3 months or more to heal completely. Follow-up care is a key part of your treatment and safety. Be sure to make and go to all appointments, and call your doctor if you are having problems. It's also a good idea to know your test results and keep a list of the medicines you take. How can you care for yourself at home? · Do not put weight on the injured joint for at least a day or two. · For the first day or two after an injury, do not take hot showers or baths, and do not use hot packs. The heat could make swelling worse. · Put ice or a cold pack on the sore joint for 10 to 20 minutes at a time. Try to do this every 1 to 2 hours for the next 3 days (when you are awake) or until the swelling goes down. Put a thin cloth between the ice and your skin. · Wrap the injury in an elastic bandage. Do not wrap it too tightly because this can cause more swelling. · Prop up the sore joint on a pillow when you ice it or anytime you sit or lie down during the next 3 days. Try to keep it above the level of your heart. This will help reduce swelling. · Take an over-the-counter pain medicine, such as acetaminophen (Tylenol), ibuprofen (Advil, Motrin), or naproxen (Aleve). Read and follow all instructions on the label. · After 1 or 2 days of rest, begin moving the joint gently.  While the joint is still healing, you can begin to exercise using activities that do not strain or hurt the painful joint. When should you call for help? Call your doctor now or seek immediate medical care if:  ? · You have signs of infection, such as:  ¨ Increased pain, swelling, warmth, and redness. ¨ Red streaks leading from the joint. ¨ A fever. ? Watch closely for changes in your health, and be sure to contact your doctor if:  ? · Your movement or symptoms are not getting better after 1 to 2 weeks of home treatment. Where can you learn more? Go to http://sloan-george.info/. Enter P205 in the search box to learn more about \"Joint Pain: Care Instructions. \"  Current as of: March 21, 2017  Content Version: 11.4  © 0592-0704 [employer]. Care instructions adapted under license by [employer] (which disclaims liability or warranty for this information). If you have questions about a medical condition or this instruction, always ask your healthcare professional. [place] [address] any warranty or liability for your use of this information. Colon Polyps: Care Instructions  Your Care Instructions    Colon polyps are growths in the colon or the rectum. The cause of most colon polyps is not known, and most people who get them do not have any problems. But a certain kind can turn into cancer. For this reason, regular testing for colon polyps is important for people age 48 and older and anyone who has an increased risk for colon cancer. Polyps are usually found through routine colon cancer screening tests. Although most colon polyps are not cancerous, they are usually removed and then tested for cancer. Screening for colon cancer saves lives because the cancer can usually be cured if it is caught early. If you have a polyp that is the type that can turn into cancer, you may need more tests to examine your entire colon. The doctor will remove any other polyps that he or she finds, and you will be tested more often.   Follow-up care is a key part of your treatment and safety. Be sure to make and go to all appointments, and call your doctor if you are having problems. It's also a good idea to know your test results and keep a list of the medicines you take. How can you care for yourself at home? Regular exams to look for colon polyps are the best way to prevent polyps from turning into colon cancer. These can include stool tests, sigmoidoscopy, colonoscopy, and CT colonography. Talk with your doctor about a testing schedule that is right for you. To prevent polyps  There is no home treatment that can prevent colon polyps. But these steps may help lower your risk for cancer. · Stay active. Being active can help you get to and stay at a healthy weight. Try to exercise on most days of the week. Walking is a good choice. · Eat well. Choose a variety of vegetables, fruits, legumes (such as peas and beans), fish, poultry, and whole grains. · Do not smoke. If you need help quitting, talk to your doctor about stop-smoking programs and medicines. These can increase your chances of quitting for good. · If you drink alcohol, limit how much you drink. Limit alcohol to 2 drinks a day for men and 1 drink a day for women. When should you call for help? Call your doctor now or seek immediate medical care if:  ? · You have severe belly pain. ? · Your stools are maroon or very bloody. ? Watch closely for changes in your health, and be sure to contact your doctor if:  ? · You have a fever. ? · You have nausea or vomiting. ? · You have a change in bowel habits (new constipation or diarrhea). ? · Your symptoms get worse or are not improving as expected. Where can you learn more? Go to http://sloan-george.info/. Enter 95 [id number] in the search box to learn more about \"Colon Polyps: Care Instructions. \"  Current as of: May 12, 2017  Content Version: 11.4  © 7196-8685 [employer].  Care instructions adapted under license by [employer] (which disclaims liability or warranty for this information). If you have questions about a medical condition or this instruction, always ask your healthcare professional. [address] any warranty or liability for your use of this information.

## 2018-06-12 NOTE — PROGRESS NOTES
Reason for Visit:      Chief Complaint   Patient presents with   [facility]     Patient here for repeat TSH    Shingles     Patient would like to discuss shingles vaccine.  Colonoscopy Evaluation     Patient would like to return to see Randi Hood for a colonoscopy.  Leg Pain     Patient would like to discuss seeing a rhematologist.     History of Present Illness:   Deb Francisco is a 80 y.o. male geriatric patient who presents today with concerns of medication orders, shingles vaccination, concerns with colon health, and continued joint pain and thigh tightness as well as finger pain and swelling. Labs: since decreasing his thyroid medication to 50 mcg daily. He needs to have the TSH rechecked today. I will also check a CMP, Rheumatoid Factor, Sed Rate, and Uric Acid to see if there is some corrolation around a joint disease process. I believe this is osteoarthritis related but he is destinee it that it could be RA in nature. Shingles Vaccination: Mr. Shelli Griffith is again destinee it on receiving this vaccination. I have given him the information sheet and the pros with the cons for administration. He has previously been vaccinated and is at low risk of developing a shingles outbreak. The shot is painful and only has efficacy of lasting about 3 years before we have to re-vaccinate again. Colon health/Tubular Adenomas of Colon Aug 2017: Mr. Shelli Griffith did not like the interaction with Dr. Jannet Chilel or since he would not do the colonoscopy at the same time of the EGD. He wants to return to Dr. José Zhang and have a colonoscopy completed again. He adamantly disagreed with me that he had the procedure last Aug 2017. I was able to obtain the records and showed him a copy to refresh his memory. He now remembers. He wants another colonoscopy done to rule out further colon polyp or left over polyp that could remain.  I have discussed with him the options of going to Dr. José Zhang or Dr. Jannet Chilel as Dr. José Zhang doesn't believe it is needed for another 5 years but Mary Partida never gave a repeat opinion. I have suggested he go to the colon surgeon specialist Dr. Enedina Ndiaye for his opinion and if he agrees to wait then we should let this be as we have attempted all directions to prevent further disease as we can. Mr. Susan Mejia agrees. Joint Pain/Hip Pain: this is chronic in nature and I have ruled out all vascular causes at this time. He has degenerative disc and joint disease in multiple areas. He has seen specialist for orthopedics, pain management, pain injections ect. . Mr. Susan Mejia thinks maybe the pain is related to a Rheumatoid cause. The index finger and thumb have larger joint in the metatarsals that appear to be arthritic in nature. Mr. Susan Mejia is inquiring about Embrel injections from the TV add could help him. I have explained to him that he does not have psoriatic arthritis and treatment of his type of arthritis using drugs like that would not be appropriate. I have agreed to order PT for hand and finger therapy to see if he can get more dexterity and laxatively from his hands and fingers as well as I will order some RA testing today with the Thyroid testing. Diagnosis/Treatment Plan: The following orders have been placed for treatment of the diagnoses above:    ICD-10-CM ICD-9-CM    1. Pain in multiple finger joints M25.549 719.44 REFERRAL TO PHYSICAL THERAPY   2. Pain in joint involving pelvic region and thigh, bilateral M25.551 719.45 RHEUMATOID FACTOR, QL    M25.552  SED RATE (ESR)      URIC ACID      T4, FREE      T3 TOTAL      TSH 3RD GENERATION   3. Nocturia R35.1 788.43 diazePAM (VALIUM) 5 mg tablet      finasteride (PROSCAR) 5 mg tablet      omeprazole (PRILOSEC) 40 mg capsule      METABOLIC PANEL, COMPREHENSIVE      COLLECTION VENOUS BLOOD,VENIPUNCTURE   4. Tubular adenoma of colon D12.6 211.3 REFERRAL TO GASTROENTEROLOGY   5.  Mixed hyperlipidemia E78.2 272.2 finasteride (PROSCAR) 5 mg tablet      omeprazole (PRILOSEC) 40 mg capsule      SED RATE (ESR)      URIC ACID      T4, FREE      T3 TOTAL      TSH 3RD GENERATION      METABOLIC PANEL, COMPREHENSIVE      COLLECTION VENOUS BLOOD,VENIPUNCTURE   6. Acquired hypothyroidism E03.9 244.9 SED RATE (ESR)      URIC ACID      T4, FREE      T3 TOTAL      TSH 3RD GENERATION   7. Medication management Z79.899 V58.69         The following medication/treatments have been discontinued by the provider today:   Medications Discontinued During This Encounter   Medication Reason    Omeprazole Magnesium 20 mg cpDR Dose Adjustment    diazePAM (VALIUM) 5 mg tablet Reorder    finasteride (PROSCAR) 5 mg tablet Reorder    omeprazole (PRILOSEC) 40 mg capsule Reorder    omega 3-dha-epa-fish oil (FISH OIL) 100-160-1,000 mg cap Not A Current Medication     Follow Up: Follow-up Disposition:  Return if symptoms worsen or fail to improve. Subjective: Allergies   Allergen Reactions    Toprol Xl [Metoprolol Succinate] Diarrhea    Codeine Other (comments)    Sulfa (Sulfonamide Antibiotics) Hives     Prior to Admission medications    Medication Sig Start Date End Date Taking? Authorizing Provider   diazePAM (VALIUM) 5 mg tablet Take 1 Tab by mouth every six (6) hours as needed. Max Daily Amount: 20 mg. Indications: Muscle Spasm 6/12/18  Yes Oleta Shape, NP   finasteride (PROSCAR) 5 mg tablet Take 1 Tab by mouth nightly. Indications: benign prostatic hyperplasia with lower urinary tract sx 6/12/18  Yes Oleta Shape, NP   omeprazole (PRILOSEC) 40 mg capsule Take 1 Cap by mouth daily. Indications: gastroesophageal reflux disease 6/12/18  Yes Oleta Shape, NP   simvastatin (ZOCOR) 20 mg tablet TAKE 1 TABLET BY MOUTH  NIGHTLY 5/25/18  Yes Oleta Shape, NP   levothyroxine (SYNTHROID) 50 mcg tablet Take 1 Tab by mouth Daily (before breakfast). 4/13/18  Yes Oleta Shape, NP   melatonin 5 mg cap capsule Take 5 mg by mouth nightly.    Yes Historical Provider   acetaminophen (TYLENOL) 325 mg tablet Take 650 mg by mouth every four (4) hours as needed for Pain. Yes Historical Provider   Methylcellulose, with Sugar, (CITRUCEL, SUCROSE,) powd Take  by mouth daily. Yes Eva Engel MD   aspirin delayed-release 81 mg tablet Take 81 mg by mouth daily. Yes Historical Provider   vit C,C-Ct-clrsr-lutein-zeaxan (PRESERVISION AREDS 2) 572-506-80-6 mg-unit-mg-mg cap Take 2 Caps by mouth daily. Yes Historical Provider   traMADol (ULTRAM) 50 mg tablet Take 0.5-1 Tabs by mouth every eight (8) hours as needed for Pain. Max Daily Amount: 150 mg. Indications: NEUROPATHIC PAIN, Pain 4/13/18   Demarcus Apple NP   minocycline [facility]) 100 mg tablet Take 50 mg by mouth daily. 3/7/18   Historical Provider   metroNIDAZOLE (METROGEL) 0.75 % topical gel Apply  to affected area daily.     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 ([address] 10/06/2016    Bilateral thumb pain 02/01/2017    CAD (coronary artery disease)     Cardiac murmur 25/49/3461    Folliculitis 10/70/3363    GERD (gastroesophageal reflux disease)     Hypercholesteremia     Hypothyroid     Left hip pain 02/01/2017    Lumbar radiculitis     Lumbar spondylitis ([address]     Meniere disease     Osteoarthritis 02/19/2018    hands    Osteopenia of both hands 02/18/2018    Polyp of cecum 08/02/2017    5mm polyp in cecum, 8 mm polyp in sigmoid colon    Spinal enthesopathy of lumbar region ([address] 02/01/2017      Past Surgical History:   Procedure Laterality Date    CARDIAC SURG PROCEDURE UNLIST      HX CATARACT REMOVAL Bilateral     HX CHOLECYSTECTOMY  1994    HX COLONOSCOPY  12/19/2012    tubular adenoma    HX COLONOSCOPY  08/02/2017    tubular adenomas    HX CORONARY ARTERY BYPASS GRAFT  1996    HX CORONARY STENT PLACEMENT      HX KNEE ARTHROSCOPY Left 2005    menisectomy    HX ROTATOR CUFF REPAIR Left     HX THYROIDECTOMY  1975    HX TONSIL AND ADENOIDECTOMY        Social History   Substance Use Topics    Smoking status: Former Smoker    Smokeless tobacco: Never Used    Alcohol use 4.2 oz/week     7 Glasses of wine per week      Family History   Problem Relation Age of Onset    No Known Problems Mother     No Known Problems Father     No Known Problems Brother       Objective:   CODE STATUS: Full  Vitals:    06/12/18 1036   BP: 129/77   Pulse: 71   Resp: 18   Temp: 98.6 °F (37 °C)   TempSrc: Oral   SpO2: 98%   Weight: 166 lb 14.4 oz (75.7 kg)   Height: 5' 5\" (1.651 m)     Wt Readings from Last 3 Encounters:   06/12/18 166 lb 14.4 oz (75.7 kg)   05/18/18 164 lb (74.4 kg)   05/03/18 162 lb (73.5 kg)     BP Readings from Last 3 Encounters:   06/12/18 129/77   05/18/18 108/66   05/03/18 147/80     Review of Systems   Constitutional: Negative for activity change, appetite change, fatigue and fever. HENT: Negative for congestion, dental problem, hearing loss, postnasal drip, sinus pressure, sneezing, sore throat, trouble swallowing and voice change. Eyes: Negative for discharge, redness and visual disturbance. Respiratory: Negative for apnea, cough, chest tightness, shortness of breath and wheezing. Cardiovascular: Negative for chest pain, palpitations and leg swelling. Gastrointestinal: Negative for abdominal distention, abdominal pain, blood in stool, constipation, diarrhea, nausea and vomiting. Endocrine: Negative for polydipsia, polyphagia and polyuria. Genitourinary: Positive for frequency. Negative for difficulty urinating, flank pain and urgency. Musculoskeletal: Positive for arthralgias, back pain, gait problem, joint swelling and myalgias. Negative for neck pain. Skin: Negative for color change, pallor, rash and wound. Allergic/Immunologic: Negative for environmental allergies and food allergies.    Neurological: Negative for dizziness, tremors, weakness, light-headedness, numbness and headaches. Hematological: Negative for adenopathy. Psychiatric/Behavioral: Negative for agitation, confusion and sleep disturbance. The patient is not nervous/anxious. Physical Exam   Constitutional: He is oriented to person, place, and time and well-developed, well-nourished, and in no distress. Vital signs are normal. He appears to not be writhing in pain, not malnourished and not dehydrated. He appears healthy. He does not have a sickly appearance. No distress. Elderly, fragile in appearance,  male. Alert and responsive. In no acute distress. HENT:   Head: Normocephalic and atraumatic. Hair is abnormal.   Right Ear: Tympanic membrane, external ear and ear canal normal. Decreased hearing is noted. Left Ear: Tympanic membrane, external ear and ear canal normal. Decreased hearing is noted. Nose: No mucosal edema. Mouth/Throat: Uvula is midline and mucous membranes are normal. Mucous membranes are not pale, not dry and not cyanotic. No oral lesions. No uvula swelling. No posterior oropharyngeal edema or posterior oropharyngeal erythema. Mild cerumen in both canals, non obstructing. Eyes: Conjunctivae, EOM and lids are normal. Pupils are equal, round, and reactive to light. Lids are everted and swept, no foreign bodies found. Neck: Trachea normal and normal range of motion. Neck supple. No JVD present. No thyromegaly present. Cardiovascular: Regular rhythm, S1 normal, S2 normal, intact distal pulses and normal pulses. Occasional extrasystoles are present. Bradycardia present. Exam reveals distant heart sounds. Exam reveals no gallop and no friction rub. Murmur heard. Systolic murmur is present with a grade of 2/6   No extremity edema is present during today's exam.    Pulmonary/Chest: Effort normal and breath sounds normal.   Abdominal: Soft. Normal appearance and bowel sounds are normal. There is no hepatosplenomegaly.  There is no tenderness. There is no CVA tenderness. Musculoskeletal:        Right hip: He exhibits decreased range of motion, decreased strength and tenderness. Left hip: He exhibits decreased range of motion, decreased strength and tenderness. Lumbar back: He exhibits decreased range of motion, tenderness, pain and spasm. Right hand: He exhibits decreased range of motion and swelling. Decreased sensation noted. Decreased strength noted. He exhibits thumb/finger opposition. Left hand: He exhibits decreased range of motion, tenderness, decreased capillary refill and deformity. Decreased sensation noted. Decreased strength noted. He exhibits thumb/finger opposition. Lymphadenopathy:     He has no cervical adenopathy. Neurological: He is alert and oriented to person, place, and time. He has intact cranial nerves. He displays weakness, atrophy and abnormal stance. A sensory deficit is present. He exhibits abnormal muscle tone. Coordination and gait abnormal. GCS score is 15. Skin: Skin is warm, dry and intact. No bruising and no rash noted. He is not diaphoretic. No pallor. Nails show no clubbing. Psychiatric: Memory and judgment normal. His mood appears anxious. He has a flat affect. Baseline mood and affect unchanged from normal.      Disclaimer:   Advised Nenita Chi to call back or return to office if symptoms worsen/change/persist. Discussed expected course/resolution/complications of diagnosis in detail with patient. Medication risks/benefits/costs/interactions/alternatives discussed with patient and he was given an after visit summary which includes diagnoses, current medications, & vitals. Nenita Chi expressed understanding with the diagnosis and plan.

## 2018-06-12 NOTE — LETTER
6/18/2018 9:57 AM 
 
Mr. Kiko Carlson 
[address]  [place] [address] [id number] Dear Kiko Carlson: Please find your most recent results below. Resulted Orders RHEUMATOID FACTOR, QL Result Value Ref Range Rheumatoid factor 12.9 0.0 - 13.9 IU/mL Narrative Performed at:  [address]  [id number] : Marvin Cole MD, Phone:  [phone] SED RATE (ESR) Result Value Ref Range Sed rate (ESR) 6 0 - 30 mm/hr Narrative Performed at:  [address]  [id number] : Marvin Cole MD, Phone:  [phone] URIC ACID Result Value Ref Range Uric acid 6.8 3.7 - 8.6 mg/dL Comment:  
              Therapeutic target for gout patients: <6.0 Narrative Performed at:  [address]  [id number] : Marvin Cole MD, Phone:  [phone] T4, FREE Result Value Ref Range T4, Free 1.80 (H) 0.82 - 1.77 ng/dL Narrative Performed at:  [address]  [id number] : Marvin Cole MD, Phone:  [phone] T3 TOTAL Result Value Ref Range T3, total 91 71 - 180 ng/dL Narrative Performed at:  [address]  [id number] : Marvin Cole MD, Phone:  [phone] TSH 3RD GENERATION Result Value Ref Range TSH 0.432 (L) 0.450 - 4.500 uIU/mL Narrative Performed at:  [address]  [id number] : Marvin Cole MD, Phone:  [phone] METABOLIC PANEL, COMPREHENSIVE Result Value Ref Range Glucose 104 (H) 65 - 99 mg/dL BUN 12 8 - 27 mg/dL Creatinine 0.97 0.76 - 1.27 mg/dL GFR est non-AA 72 >59 mL/min/1.73 GFR est AA 84 >59 mL/min/1.73  
 BUN/Creatinine ratio 12 10 - 24  Sodium 141 134 - 144 mmol/L  
 Potassium 4.4 3.5 - 5.2 mmol/L Chloride 104 96 - 106 mmol/L  
 CO2 23 20 - 29 mmol/L Comment: **Please note reference interval change** Calcium 9.5 8.6 - 10.2 mg/dL Protein, total 6.0 6.0 - 8.5 g/dL Albumin 3.9 3.5 - 4.7 g/dL GLOBULIN, TOTAL 2.1 1.5 - 4.5 g/dL A-G Ratio 1.9 1.2 - 2.2 Bilirubin, total 0.7 0.0 - 1.2 mg/dL Alk. phosphatase 70 39 - 117 IU/L  
 AST (SGOT) 20 0 - 40 IU/L  
 ALT (SGPT) 20 0 - 44 IU/L Narrative Performed at:  [place] [id number] : Deonte Carr MD, Phone:  [phone] RECOMMENDATIONS: 
 
RA factor is normal.  
 
Inflammatory markers are normal.  
 
Thyroid function is not responding to decrease of levothyroxine 50 mcg daily from 100 mcg. Per discussion with Dr. Yancy Tanner, Please decrease to 25 mcg and recheck TSH in 1 month. Please call me if you have any questions: [phone] Sincerely, 
 
 
Girish Lowry NP

## 2018-06-12 NOTE — PROGRESS NOTES
Reason for Visit:      Chief Complaint   Patient presents with    Results     Patient being seen to discuss recent ELAINA testing  and endoscopy with NP. History of Present Illness:   Jackelyn Mota is a 80 y.o. male geriatric patient who presents today with to follow up on his recent interaction with the GI dr. He would also like to review the vascular studies that were ordered and resulted. Post GI EGD: Mr. Susan Mejia recently had an EGD with Dr. Mary Partida due to chronic and reoccurrence dysphagia. Per notes from Dr. Mary Partida the patient had a significant schatzki dilated with CRE balloon gradually from 15, 16.5 then 18 mm. He also took biopsies of gastric tissue per guidelines. Mr. Susan Mejia states his trouble swallowing has resolved currently. Biopsy results were negative for any hyperplastic tissue or concern. Repeat colonoscopy was not performed at the time of this visit. Mr. Susan Mejia was upset that this was not done as well. I advised him that it is up to Dr. Mary Partida if he was to repeat it and at this time he did not believe it was necessary. He has been advised to take Omeprazole 40 mg every day to prevent reoccurrence of Schatzki ring. Shingles Vaccination: Mr. Susan Mejia is interested in receiving the new Shingrix shingles vaccination as he is deathly afraid of having shingles. I have advised him that at this time vaccination is not necessary at his age with his medical history but I will talk to Dr. Gadiel Cat for his position on vaccinating with the new shot. Nocturia: persistent and chronic in nature. He is open to trailing Proscar to see if this will help. I will order him a small supply as he does not want me to do 30 days worth as he would like to try it first.     Vascular Results: all were negative for any vascular occlusion, obstruction or insufficiency. This rules out vascular disease as the cause of his hip and thigh tightness he complains of.  He states in the last few weeks he has been doing well with the tightness and pain. At this time he would like to use the Tramadol prn for the pain. Medication Adjustment: with the vascular studies being negative, lipid profile is stable guidelines recommend the lowest or removal of statins in patients over 76years of age. Currently he is on 40 mg of Lipitor daily, he agrees to reduce this to 20 mg daily and we can re-evaluate in 1 month. His thyroid is rather low on the 100 mcg of Levothyroxine. Discussed reduction to 1/2 tablet = 50 mcg daily of supplement and recheck in 1 month to make sure the thyroid is still doing ok. Instructions have been sent to patient to following in a letter. Diagnosis/Treatment Plan: The following orders have been placed for treatment of the diagnoses above:    ICD-10-CM ICD-9-CM    1. Benign prostatic hyperplasia with nocturia N40.1 600.01     R35.1 788.43    2. Tubular adenoma of colon D12.6 211.3    3. Schatzki's ring of distal esophagus K22.2 750.3    4. Esophageal dysphagia R13.10 787.20    5. Encounter to discuss test results Z71.89 V65.49    6. Encounter for medication adjustment Z51.89 V58.83         The following medication/treatments have been discontinued by the provider today:   There are no discontinued medications. Follow Up: Follow-up Disposition:  Return in about 1 month (around 6/18/2018) for routine care and follow up to health concerns. Subjective: Allergies   Allergen Reactions    Toprol Xl [Metoprolol Succinate] Diarrhea    Codeine Other (comments)    Sulfa (Sulfonamide Antibiotics) Hives     Prior to Admission medications    Medication Sig Start Date End Date Taking? Authorizing Provider   omega 3-dha-epa-fish oil (FISH OIL) 100-160-1,000 mg cap Take  by mouth. Yes Historical Provider   levothyroxine (SYNTHROID) 50 mcg tablet Take 1 Tab by mouth Daily (before breakfast). 4/13/18  Yes Lizette Eduardo NP   melatonin 5 mg cap capsule Take 5 mg by mouth nightly.    Yes Historical Provider acetaminophen (TYLENOL) 325 mg tablet Take 650 mg by mouth every four (4) hours as needed for Pain. Yes Historical Provider   Methylcellulose, with Sugar, (CITRUCEL, SUCROSE,) powd Take  by mouth daily. Yes Eva Engel MD   aspirin delayed-release 81 mg tablet Take 81 mg by mouth daily. Yes Historical Provider   metroNIDAZOLE (METROGEL) 0.75 % topical gel Apply  to affected area daily. Yes Historical Provider   vit C,J-Dp-hcnzl-lutein-zeaxan (PRESERVISION AREDS 2) 593-728-70-6 mg-unit-mg-mg cap Take 2 Caps by mouth daily. Yes Historical Provider   diazePAM (VALIUM) 5 mg tablet Take 1 Tab by mouth every six (6) hours as needed. Max Daily Amount: 20 mg. Indications: Muscle Spasm 6/12/18   Oleta Shape, NP   finasteride (PROSCAR) 5 mg tablet Take 1 Tab by mouth nightly. Indications: benign prostatic hyperplasia with lower urinary tract sx 6/12/18   Oleta Shape, NP   omeprazole (PRILOSEC) 40 mg capsule Take 1 Cap by mouth daily. Indications: gastroesophageal reflux disease 6/12/18   Oleta Shape, NP   simvastatin (ZOCOR) 20 mg tablet TAKE 1 TABLET BY MOUTH  NIGHTLY 5/25/18   Oleta Shape, NP   traMADol (ULTRAM) 50 mg tablet Take 0.5-1 Tabs by mouth every eight (8) hours as needed for Pain. Max Daily Amount: 150 mg. Indications: NEUROPATHIC PAIN, Pain 4/13/18   Oleta Shape, NP   minocycline GOOD [facility]) 100 mg tablet Take 50 mg by mouth daily.  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 ([facility] [address] 10/06/2016    Bilateral thumb pain 02/01/2017    CAD (coronary artery disease)     Cardiac murmur 89/34/1214    Folliculitis 66/16/4419    GERD (gastroesophageal reflux disease)     Hypercholesteremia     Hypothyroid     Left hip pain 02/01/2017    Lumbar radiculitis     Lumbar spondylitis (HCC)     Meniere disease     Osteoarthritis 02/19/2018 hands    Osteopenia of both hands 02/18/2018    Polyp of cecum 08/02/2017    5mm polyp in cecum, 8 mm polyp in sigmoid colon    Spinal enthesopathy of lumbar region ([address] 02/01/2017      Past Surgical History:   Procedure Laterality Date    CARDIAC SURG PROCEDURE UNLIST      HX CATARACT REMOVAL Bilateral     HX CHOLECYSTECTOMY  1994    HX COLONOSCOPY  12/19/2012    tubular adenoma    HX COLONOSCOPY  08/02/2017    tubular adenomas    HX CORONARY ARTERY BYPASS GRAFT  1996    HX CORONARY STENT PLACEMENT      HX KNEE ARTHROSCOPY Left 2005    menisectomy    HX ROTATOR CUFF REPAIR Left     HX THYROIDECTOMY  1975    HX TONSIL AND ADENOIDECTOMY        Social History   Substance Use Topics    Smoking status: Former Smoker    Smokeless tobacco: Never Used    Alcohol use 4.2 oz/week     7 Glasses of wine per week      Family History   Problem Relation Age of Onset    No Known Problems Mother     No Known Problems Father     No Known Problems Brother       Latest Laboratory Results:     Lab Results   Component Value Date/Time    TSH 0.800 03/30/2018 03:28 PM     Lab Results   Component Value Date/Time    Cholesterol, total 179 03/30/2018 03:28 PM    HDL Cholesterol 56 03/30/2018 03:28 PM    LDL, calculated 68 03/30/2018 03:28 PM    VLDL, calculated 55 (H) 03/30/2018 03:28 PM    Triglyceride 273 (H) 03/30/2018 03:28 PM   VAS/US Results (most recent):    Results from Hospital Encounter encounter on 04/24/18   ANKLE BRACHIAL INDEX    Objective:   CODE STATUS: Full  Vitals:    05/18/18 1359   BP: 108/66   Pulse: 68   Resp: 18   Temp: 98.7 °F (37.1 °C)   TempSrc: Oral   SpO2: 98%   Weight: 164 lb (74.4 kg)   Height: 5' 5\" (1.651 m)     Wt Readings from Last 3 Encounters:   06/12/18 166 lb 14.4 oz (75.7 kg)   05/18/18 164 lb (74.4 kg)   05/03/18 162 lb (73.5 kg)     BP Readings from Last 3 Encounters:   06/12/18 129/77   05/18/18 108/66   05/03/18 147/80     Review of Systems   Constitutional: Negative for activity change, appetite change, fatigue and fever. HENT: Negative for congestion, dental problem, hearing loss, postnasal drip, sinus pressure, sneezing, sore throat, trouble swallowing and voice change. Eyes: Negative for discharge, redness and visual disturbance. Respiratory: Negative for apnea, cough, chest tightness, shortness of breath and wheezing. Cardiovascular: Negative for chest pain, palpitations and leg swelling. Gastrointestinal: Negative for abdominal distention, abdominal pain, blood in stool, constipation, diarrhea, nausea and vomiting. Endocrine: Negative for polydipsia, polyphagia and polyuria. Genitourinary: Positive for difficulty urinating, frequency and urgency. Negative for flank pain. Musculoskeletal: Positive for arthralgias, back pain, gait problem and myalgias. Negative for joint swelling and neck pain. Skin: Negative for color change, pallor, rash and wound. Allergic/Immunologic: Negative for environmental allergies and food allergies. Neurological: Positive for weakness. Negative for dizziness, tremors, light-headedness, numbness and headaches. Hematological: Negative for adenopathy. Psychiatric/Behavioral: Negative for agitation, confusion and sleep disturbance. The patient is not nervous/anxious. Physical Exam   Constitutional: He is oriented to person, place, and time and well-developed, well-nourished, and in no distress. Vital signs are normal. He appears to not be writhing in pain, not malnourished and not dehydrated. He appears healthy. He does not have a sickly appearance. No distress. Elderly, fragile in appearance,  male. Alert and responsive. In no acute distress. HENT:   Head: Normocephalic and atraumatic. Hair is abnormal.   Right Ear: Tympanic membrane, external ear and ear canal normal. Decreased hearing is noted. Left Ear: Tympanic membrane, external ear and ear canal normal. Decreased hearing is noted.    Nose: No mucosal edema.   Mouth/Throat: Uvula is midline and mucous membranes are normal. Mucous membranes are not pale, not dry and not cyanotic. No oral lesions. No uvula swelling. No posterior oropharyngeal edema or posterior oropharyngeal erythema. Mild cerumen in both canals, non obstructing. Eyes: Conjunctivae, EOM and lids are normal. Pupils are equal, round, and reactive to light. Lids are everted and swept, no foreign bodies found. Neck: Trachea normal and normal range of motion. Neck supple. No JVD present. No thyromegaly present. Cardiovascular: Regular rhythm, S1 normal, S2 normal, intact distal pulses and normal pulses. Occasional extrasystoles are present. Bradycardia present. Exam reveals distant heart sounds. Exam reveals no gallop and no friction rub. Murmur heard. Systolic murmur is present with a grade of 2/6   No extremity edema is present during today's exam.    Pulmonary/Chest: Effort normal and breath sounds normal.   Abdominal: Soft. Normal appearance and bowel sounds are normal. There is no hepatosplenomegaly. There is no tenderness. There is no CVA tenderness. Musculoskeletal:        Right hip: He exhibits decreased range of motion, decreased strength and tenderness. Left hip: He exhibits decreased range of motion, decreased strength and tenderness. Right ankle: He exhibits decreased range of motion and abnormal pulse. Left ankle: He exhibits decreased range of motion and abnormal pulse. Lumbar back: He exhibits decreased range of motion, tenderness, pain and spasm. Lymphadenopathy:     He has no cervical adenopathy. Neurological: He is alert and oriented to person, place, and time. He has intact cranial nerves. He displays weakness, atrophy and abnormal stance. A sensory deficit is present. He exhibits abnormal muscle tone. Coordination and gait abnormal. GCS score is 15. Skin: Skin is warm, dry and intact. No bruising and no rash noted.  He is not diaphoretic. There is cyanosis. There is pallor. Nails show clubbing. Toenails are cyanosis, no cap refill to lower extremities. Psychiatric: Mood, memory, affect and judgment normal.   Baseline mood and affect unchanged from normal.       Disclaimer:   Advised Melodie Youssef to call back or return to office if symptoms worsen/change/persist. Discussed expected course/resolution/complications of diagnosis in detail with patient. Medication risks/benefits/costs/interactions/alternatives discussed with patient and he was given an after visit summary which includes diagnoses, current medications, & vitals. Melodie Youssef expressed understanding with the diagnosis and plan.

## 2018-06-12 NOTE — PROGRESS NOTES
ADVISED PATIENT OF THE FOLLOWING HEALTH MAINTAINCE DUE  Health Maintenance Due   Topic Date Due    DTaP/Tdap/Td series (1 - Tdap) 08/30/1956    ZOSTER VACCINE AGE 60>  06/30/1995    GLAUCOMA SCREENING Q2Y  08/30/2000    Pneumococcal 65+ Low/Medium Risk (1 of 2 - PCV13) 08/30/2000      Chief Complaint   Patient presents with   [facility]     Patient here for repeat TSH    Shingles     Patient would like to discuss shingles vaccine.  Colonoscopy Evaluation     Patient would like to return to see Rekha Bradley for a colonoscopy.  Leg Pain     Patient would like to discuss seeing a rhematologist.       1. Have you been to the ER, urgent care clinic since your last visit? Hospitalized since your last visit? No    2. Have you seen or consulted any other health care providers outside of the [address] [place] [address] since your last visit? Include any DEXA scan, mammography  or colon screening. No    3. Do you have an Advance Directive on file? yes    4. Do you have a DNR on file? Full        Patient is accompanied by self I have received verbal consent from Nayeli Lawson to discuss any/all medical information while they are present in the room.

## 2018-06-12 NOTE — PATIENT INSTRUCTIONS
Learning About the Diet for Swallowing Problems  What are swallowing problems? Difficulty swallowing is also called dysphagia (say \"zxc-ULW-ajh-uh\"). It is most often a sign of a problem with your throat or esophagus. This is the tube that moves food and liquids from the back of your mouth to your stomach. Trouble swallowing can occur when the muscles and nerves that move food through the throat and esophagus are not working right. To help you swallow food, your doctor or speech therapist may advise a special dysphagia diet for you. Why is a special diet important? A dysphagia diet can help you handle some problems that can occur when it's hard to swallow food and liquids easily. These problems can include:  · Malnutrition. This means you aren't getting enough healthy foods to keep your body working well. · Dehydration. This means you aren't getting enough liquids to keep your body healthy. · Aspiration. This means that food, liquid, or saliva goes down your windpipe (trachea) into your lungs, instead of down your esophagus to your stomach. This can lead to aspiration pneumonia, which is an inflammation of the lungs. What is the dysphagia diet? In the dysphagia diet, you change the foods you eat and the liquids you drink to make it easier to swallow them. You may:  · Change the texture of the foods you eat. Your doctor or speech therapist may advise you to eat one of these types of foods:  ¨ Solid, soft foods that have been cut up into small pieces. Extra gravy or sauce can help make these foods easier to swallow. ¨ Semi-solid foods, like ground meats or fruits and vegetables that are mashed with a fork. These foods require some chewing. Extra gravy or sauce is often served. ¨ Foods that are the texture of pudding. You don't have to chew these foods. Examples include mashed potatoes with gravy; yogurt; pudding; and pureed meats, fruits, and vegetables. · Thicken the liquids you drink.  Your doctor or speech therapist will tell you what kind of thickener to use and how thick to make the liquids. ¨ Nectar-thick liquids leave a thin coating when they are poured from a spoon. ¨ Honey-thick liquids drip slowly when they are poured from a spoon. ¨ Pudding-thick liquids do not pour from a spoon. Your speech therapist will help you learn exercises to train your muscles to work together so you can swallow. You may also need to learn how to position your body or how to put food in your mouth to be able to swallow better. Some people have severe trouble swallowing and can't get enough food and liquids. In those cases, the doctor can insert a feeding tube so the person gets enough nutrients. Follow-up care is a key part of your treatment and safety. Be sure to make and go to all appointments, and call your doctor if you are having problems. It's also a good idea to know your test results and keep a list of the medicines you take. Where can you learn more? Go to http://sloan-george.info/. Enter M108 in the search box to learn more about \"Learning About the Diet for Swallowing Problems. \"  Current as of: March 20, 2017  Content Version: 11.4  © 9514-1913 [employer]wise, [employer]. Care instructions adapted under license by [employer] (which disclaims liability or warranty for this information). If you have questions about a medical condition or this instruction, always ask your healthcare professional. [place] [address] any warranty or liability for your use of this information.

## 2018-06-13 LAB
ALBUMIN SERPL-MCNC: 3.9 G/DL (ref 3.5–4.7)
ALBUMIN/GLOB SERPL: 1.9 {RATIO} (ref 1.2–2.2)
ALP SERPL-CCNC: 70 IU/L (ref 39–117)
ALT SERPL-CCNC: 20 IU/L (ref 0–44)
AST SERPL-CCNC: 20 IU/L (ref 0–40)
BILIRUB SERPL-MCNC: 0.7 MG/DL (ref 0–1.2)
BUN SERPL-MCNC: 12 MG/DL (ref 8–27)
BUN/CREAT SERPL: 12 (ref 10–24)
CALCIUM SERPL-MCNC: 9.5 MG/DL (ref 8.6–10.2)
CHLORIDE SERPL-SCNC: 104 MMOL/L (ref 96–106)
CO2 SERPL-SCNC: 23 MMOL/L (ref 20–29)
CREAT SERPL-MCNC: 0.97 MG/DL (ref 0.76–1.27)
ERYTHROCYTE [SEDIMENTATION RATE] IN BLOOD BY WESTERGREN METHOD: 6 MM/HR (ref 0–30)
GFR SERPLBLD CREATININE-BSD FMLA CKD-EPI: 72 ML/MIN/1.73
GFR SERPLBLD CREATININE-BSD FMLA CKD-EPI: 84 ML/MIN/1.73
GLOBULIN SER CALC-MCNC: 2.1 G/DL (ref 1.5–4.5)
GLUCOSE SERPL-MCNC: 104 MG/DL (ref 65–99)
POTASSIUM SERPL-SCNC: 4.4 MMOL/L (ref 3.5–5.2)
PROT SERPL-MCNC: 6 G/DL (ref 6–8.5)
RHEUMATOID FACT SERPL-ACNC: 12.9 IU/ML (ref 0–13.9)
SODIUM SERPL-SCNC: 141 MMOL/L (ref 134–144)
T3 SERPL-MCNC: 91 NG/DL (ref 71–180)
T4 FREE SERPL-MCNC: 1.8 NG/DL (ref 0.82–1.77)
TSH SERPL DL<=0.005 MIU/L-ACNC: 0.43 UIU/ML (ref 0.45–4.5)
URATE SERPL-MCNC: 6.8 MG/DL (ref 3.7–8.6)

## 2018-06-14 NOTE — PROGRESS NOTES
RA factor is normal.     Inflammatory markers are normal.     Thyroid function is not responding to decrease of 50 mcg daily from 100 mcg. Per discussion with Dr. Kun Darling, I have ordered him to decrease to 25 mcg and recheck TSH in 1 month.

## 2018-06-26 ENCOUNTER — OFFICE VISIT (OUTPATIENT)
Dept: GERIATRIC MEDICINE | Age: 83
End: 2018-06-26

## 2018-06-29 ENCOUNTER — OFFICE VISIT (OUTPATIENT)
Dept: GERIATRIC MEDICINE | Age: 83
End: 2018-06-29

## 2018-06-29 VITALS
HEART RATE: 62 BPM | WEIGHT: 165 LBS | RESPIRATION RATE: 18 BRPM | SYSTOLIC BLOOD PRESSURE: 116 MMHG | BODY MASS INDEX: 27.49 KG/M2 | OXYGEN SATURATION: 97 % | DIASTOLIC BLOOD PRESSURE: 64 MMHG | TEMPERATURE: 97.6 F | HEIGHT: 65 IN

## 2018-06-29 DIAGNOSIS — L08.89 CHRONIC BACTERIAL SKIN INFECTION: Primary | ICD-10-CM

## 2018-06-29 DIAGNOSIS — H66.012 ACUTE SUPPURATIVE OTITIS MEDIA OF LEFT EAR WITH SPONTANEOUS RUPTURE OF TYMPANIC MEMBRANE, RECURRENCE NOT SPECIFIED: ICD-10-CM

## 2018-06-29 DIAGNOSIS — R05.9 COUGH: ICD-10-CM

## 2018-06-29 DIAGNOSIS — H61.23 BILATERAL IMPACTED CERUMEN: ICD-10-CM

## 2018-06-29 DIAGNOSIS — R13.14 PHARYNGOESOPHAGEAL DYSPHAGIA: ICD-10-CM

## 2018-06-29 DIAGNOSIS — B96.89 CHRONIC BACTERIAL SKIN INFECTION: Primary | ICD-10-CM

## 2018-06-29 DIAGNOSIS — R49.9 HOARSENESS OR CHANGING VOICE: ICD-10-CM

## 2018-06-29 RX ORDER — SUCRALFATE 1 G/10ML
1 SUSPENSION ORAL 2 TIMES DAILY
Qty: 414 ML | Refills: 0 | Status: SHIPPED | OUTPATIENT
Start: 2018-06-29 | End: 2018-07-06 | Stop reason: ALTCHOICE

## 2018-06-29 RX ORDER — CIPROFLOXACIN AND DEXAMETHASONE 3; 1 MG/ML; MG/ML
4 SUSPENSION/ DROPS AURICULAR (OTIC) 2 TIMES DAILY
Qty: 7.5 ML | Refills: 0 | Status: SHIPPED | OUTPATIENT
Start: 2018-06-29 | End: 2018-07-04

## 2018-06-29 RX ORDER — AMOXICILLIN AND CLAVULANATE POTASSIUM 875; 125 MG/1; MG/1
1 TABLET, FILM COATED ORAL EVERY 12 HOURS
Qty: 14 TAB | Refills: 0 | Status: SHIPPED | OUTPATIENT
Start: 2018-06-29 | End: 2018-07-06

## 2018-06-29 RX ORDER — DIAPER,BRIEF,INFANT-TODD,DISP
EACH MISCELLANEOUS
COMMUNITY
End: 2019-02-08

## 2018-06-29 NOTE — LETTER
6/29/2018 3:48 PM 
 
RE:    Bren Azar  
[address]  [place] [address] Discharge Instructions/Plan of Care 06/29/18 
- Any questions do not hesitate to call Lubna Wheeler [facility] Nurse at [phone]. Discontinued Medication/Treatment: The following items have been discontinued today, please stop all use of these items below:  
Medications Discontinued During This Encounter Medication Reason  minocycline (DYNACIN) 100 mg tablet Not A Current Medication  traMADol (ULTRAM) 50 mg tablet Not A Current Medication  metroNIDAZOLE (METROGEL) 0.75 % topical gel Clinically Ineffective Treatment Plan: 1. Chronic bacterial skin infection 2. Pharyngoesophageal dysphagia 3. Bilateral impacted cerumen 4. Acute suppurative otitis media of left ear with spontaneous rupture of tympanic membrane, recurrence not specified 5. Hoarseness or changing voice 6. Cough Skin:  
- cultures will take days to weeks to return. As soon as I have a result I will let you know! - I will be glad to send you to a New Derm, but give me a chance to fix this here. - Showering Protocol: - Use only disposable/one time use cleansing cloths until further notice. - Do not put lotion on your body at all. - Use only Antibacterial Soap to wash with: face, hair & body until further notice. - I have given you a cleanser to get you started, purchase another over the counter. No oils,   lotions, or additive. Aveno is a great product but it can be off brand. - If you start to feel like you need moisture you can use Cerva from the store only. Ear Infection:  
- please treat both ears with the drops I ordered. This will heal the canal in the right as well as heal the infection in the left. - Oral antibiotics have been ordered for you to start taking over the weekend.  Be mindful that these could cause some upset stomach or diarrhea, please finish them as directed. Throat/Horseness/Swallowing: - Please increase your Omeprazole to 40 mg in AM & 40 mg in PM x 2 weeks then return to 40 mg once daily.  
- I have ordered a liquid mediation called Carafate. If insurance does not cover the liquid (as they sometimes do not) a tablet will be filled. You will need to dissolve the tablet in water (about 5 ml's, see cup provided in your bag). Take the medication 30 min's prior to eating a meal twice daily. If it gets filled as a liquid already, please keep in refrigerator and shake well before taking.  
- Please try to take your pills that are capsules in applesauce or pudding as I want the back of your throat to heal and not have anymore \"sticking\" episodes. - Warm salt water gargles will help with discomfort from the back of the throat. Orders Placed This Encounter  REMOVAL IMPACTED CERUMEN IRRIGATION/LVG UNILAT  CULTURE, FUNGUS, SKIN, HAIR, NAIL  CULTURE, FUNGUS, SKIN, HAIR, NAIL #2- Right upper scapula pustule  ANAEROBIC/AEROBIC/GRAM STAIN #1- Left Breast Pustula Order Specific Question:   QUEST Source Answer:   Kary Field  ANAEROBIC/AEROBIC/GRAM STAIN #2- Right upper scapula pustula  biotin 10,000 mcg cap Sig: Take  by mouth.  amoxicillin-clavulanate (AUGMENTIN) 875-125 mg per tablet Sig: Take 1 Tab by mouth every twelve (12) hours for 7 days. Indications: acute bacterial otitis media Dispense:  14 Tab Refill:  0  
 ciprofloxacin-dexamethasone (CIPRODEX) 0.3-0.1 % otic suspension Sig: Administer 4 Drops into each ear two (2) times a day for 5 days. Dispense:  7.5 mL Refill:  0  
 carbamide peroxide (DEBROX) 6.5 % otic solution Sig: Apply 2 drops to both ear canals once a week at night to keep wax from building up. Do not stop. Indications: IMPACTED CERUMEN Dispense:  7.5 mL Refill:  3  
 sucralfate (CARAFATE) 100 mg/mL suspension Sig: Take 10 mL by mouth two (2) times a day. Dispense:  414 mL Refill:  0 If insurance does not cover the liquid, please dispense the tablet with instructions to dissolve pill and make a slurry prior to taking medication. Return Visit/Follow Up:   
 
Return in 1 week for We appreciate you allowing us to participate in your health care.

## 2018-06-29 NOTE — PROGRESS NOTES
ADVISED PATIENT OF THE FOLLOWING HEALTH MAINTAINCE DUE  Health Maintenance Due   Topic Date Due    DTaP/Tdap/Td series (1 - Tdap) 08/30/1956    ZOSTER VACCINE AGE 60>  06/30/1995    GLAUCOMA SCREENING Q2Y  08/30/2000      Chief Complaint   Patient presents with    Advice Only     Patient being seen for dermatology consult.  Hoarse     Patient reports hoarse voice and a cough that he has had for about 3 weeks. 1. Have you been to the ER, urgent care clinic since your last visit? Hospitalized since your last visit? No    2. Have you seen or consulted any other health care providers outside of the [address] [place] [address] since your last visit? Include any DEXA scan, mammography  or colon screening. No    3. Do you have an Advance Directive on file? yes    4. Do you have a DNR on file? Full        Patient is accompanied by self I have received verbal consent from Fani Pugh to discuss any/all medical information while they are present in the room. [address], [address] [place] [address]  Phone: [phone] Fax: [phone]    [place] [address] [place] [address]  [facility], [address] [facility] [id number] [place] [address] [address] AT [address] [place] [address] Rd of Levar Lynch & [place] [facility] & [facility]  [facility] [address] [facility] [address]  Phone: [phone] Fax: [phone]  Thom Barcenas is for short term medications.      [address]  [facility]  [facility] [address]  Phone: [phone] Fax: [phone]  Patient uses [facility] for long term medications

## 2018-07-06 ENCOUNTER — OFFICE VISIT (OUTPATIENT)
Dept: GERIATRIC MEDICINE | Age: 83
End: 2018-07-06

## 2018-07-06 VITALS
HEART RATE: 66 BPM | WEIGHT: 165.9 LBS | BODY MASS INDEX: 27.64 KG/M2 | OXYGEN SATURATION: 98 % | SYSTOLIC BLOOD PRESSURE: 124 MMHG | DIASTOLIC BLOOD PRESSURE: 66 MMHG | RESPIRATION RATE: 16 BRPM | HEIGHT: 65 IN | TEMPERATURE: 97.8 F

## 2018-07-06 DIAGNOSIS — E03.9 ACQUIRED HYPOTHYROIDISM: ICD-10-CM

## 2018-07-06 DIAGNOSIS — R41.3 UNCOMPENSATED SHORT TERM MEMORY DEFICIT: ICD-10-CM

## 2018-07-06 DIAGNOSIS — Z79.899 MEDICATION MANAGEMENT: ICD-10-CM

## 2018-07-06 DIAGNOSIS — B96.89 CHRONIC BACTERIAL SKIN INFECTION: Primary | ICD-10-CM

## 2018-07-06 DIAGNOSIS — R41.0 EPISODIC CONFUSION: ICD-10-CM

## 2018-07-06 DIAGNOSIS — L08.89 CHRONIC BACTERIAL SKIN INFECTION: Primary | ICD-10-CM

## 2018-07-06 DIAGNOSIS — Z78.9 CONFLICTED ATTITUDE TOWARDS TREATMENT: ICD-10-CM

## 2018-07-06 DIAGNOSIS — F41.9 ANXIETY: ICD-10-CM

## 2018-07-06 DIAGNOSIS — R13.14 PHARYNGOESOPHAGEAL DYSPHAGIA: ICD-10-CM

## 2018-07-06 DIAGNOSIS — R49.9 HOARSENESS OR CHANGING VOICE: ICD-10-CM

## 2018-07-06 RX ORDER — SUCRALFATE 1 G/1
1 TABLET ORAL DAILY
Refills: 0 | COMMUNITY
Start: 2018-06-29 | End: 2018-08-06 | Stop reason: ALTCHOICE

## 2018-07-06 NOTE — PROGRESS NOTES
Reason for Visit:      Chief Complaint   Patient presents with    Advice Only     Patient being seen for dermatology consult.  Hoarse     Patient reports hoarse voice and a cough that he has had for about 3 weeks. History of Present Illness:   Fani Pugh is a 80 y.o. male geriatric patient who presents today with concerns:   Skin:   - cultures will take days to weeks to return. As soon as I have a result I will let you know!  - I will be glad to send you to a [facility], but give me a chance to fix this here. - Showering Protocol:    - Use only disposable/one time use cleansing cloths until further notice. - Do not put lotion on your body at all. - Use only Antibacterial Soap to wash with: face, hair & body until further notice. - I have given you a cleanser to get you started, purchase another over the counter. No oils, lotions, or additive. Aveno is a great product but it can be off brand. - If you start to feel like you need moisture you can use Cerva from the store only. Ear Infection:   - please treat both ears with the drops I ordered. This will heal the canal in the right as well as heal the infection in the left. - Oral antibiotics have been ordered for you to start taking over the weekend. Be mindful that these could cause some upset stomach or diarrhea, please finish them as directed. Throat/Horseness/Swallowing:   - Please increase your Omeprazole to 40 mg in AM & 40 mg in PM x 2 weeks then return to 40 mg once daily.   - I have ordered a liquid mediation called Carafate. If insurance does not cover the liquid (as they sometimes do not) a tablet will be filled. You will need to dissolve the tablet in water (about 5 ml's, see cup provided in your bag). Take the medication 30 min's prior to eating a meal twice daily.  If it gets filled as a liquid already, please keep in refrigerator and shake well before taking.   - Please try to take your pills that are capsules in applesauce or pudding as I want the back of your throat to heal and not have anymore \"sticking\" episodes. - Warm salt water gargles will help with discomfort from the back of the throat. Bilateral Ear Canal Irrigation Procedure:    Procedure discussed with patient including risks and benefits. Daisy Bailey had not further questions at this time. Supplies obtained. Ear irrigation tools obtained and bottle filled with 1:1 peroxide and warm water. · Left ear canal irrigation using the soft flexible \"Elephant\" ear system. Hard impacted cerumen was removed with instrumentation. · Right ear canal irrigation using the soft flexible \"Elephant\" ear system. Hard impacted cerumen was removed with instrumentation. Right ear was: 100% occluded with wet cerumen. Left ear was: 100% occluded with wet cerumen. Orders were given to utilize Debrox in each ear once weekly to keep the cerumen from returning. Right TM were visualized. Left TM were visualized. External canal is clear. Daisy Bailey tolerated the procedure well with no complications. Diagnosis/Treatment Plan: The following orders have been placed for treatment of the diagnoses above:    ICD-10-CM ICD-9-CM    1. Chronic bacterial skin infection L08.89 686.8 amoxicillin-clavulanate (AUGMENTIN) 875-125 mg per tablet    B96.89  CULTURE, FUNGUS, SKIN, HAIR, NAIL      ANAEROBIC/AEROBIC/GRAM STAIN      CULTURE, FUNGUS, SKIN, HAIR, NAIL      ANAEROBIC/AEROBIC/GRAM STAIN   2. Pharyngoesophageal dysphagia R13.14 787.24 amoxicillin-clavulanate (AUGMENTIN) 875-125 mg per tablet      sucralfate (CARAFATE) 100 mg/mL suspension   3. Bilateral impacted cerumen H61.23 380.4 amoxicillin-clavulanate (AUGMENTIN) 875-125 mg per tablet      carbamide peroxide (DEBROX) 6.5 % otic solution      REMOVAL IMPACTED CERUMEN IRRIGATION/LVG UNILAT   4.  Acute suppurative otitis media of left ear with spontaneous rupture of tympanic membrane, recurrence not specified H66.012 382.01 amoxicillin-clavulanate (AUGMENTIN) 875-125 mg per tablet      ciprofloxacin-dexamethasone (CIPRODEX) 0.3-0.1 % otic suspension      REMOVAL IMPACTED CERUMEN IRRIGATION/LVG UNILAT   5. Hoarseness or changing voice R49.9 784.49 amoxicillin-clavulanate (AUGMENTIN) 875-125 mg per tablet      sucralfate (CARAFATE) 100 mg/mL suspension   6. Cough R05 786.2 amoxicillin-clavulanate (AUGMENTIN) 875-125 mg per tablet      sucralfate (CARAFATE) 100 mg/mL suspension        The following medication/treatments have been discontinued by the provider today:   Medications Discontinued During This Encounter   Medication Reason    minocycline (DYNACIN) 100 mg tablet Not A Current Medication    traMADol (ULTRAM) 50 mg tablet Not A Current Medication    metroNIDAZOLE (METROGEL) 0.75 % topical gel Clinically Ineffective       Follow Up: Follow-up Disposition: Return in about 1 week (around 7/6/2018) for with the NP for follow up to your treatment plan. 1. Recheck ear infection  2. Recheck swallowing   3. Recheck cough/hoarsness     Subjective: Allergies   Allergen Reactions    Toprol Xl [Metoprolol Succinate] Diarrhea    Codeine Other (comments)    Sulfa (Sulfonamide Antibiotics) Hives     Prior to Admission medications    Medication Sig Start Date End Date Taking? Authorizing Provider   biotin 10,000 mcg cap Take  by mouth. Yes Historical Provider   amoxicillin-clavulanate (AUGMENTIN) 875-125 mg per tablet Take 1 Tab by mouth every twelve (12) hours for 7 days. Indications: acute bacterial otitis media 6/29/18 7/6/18 Yes Tom Olmstead NP   ciprofloxacin-dexamethasone (CIPRODEX) 0.3-0.1 % otic suspension Administer 4 Drops into each ear two (2) times a day for 5 days. 6/29/18 7/4/18 Yes Tom Olmstead NP   carbamide peroxide (DEBROX) 6.5 % otic solution Apply 2 drops to both ear canals once a week at night to keep wax from building up. Do not stop.   Indications: IMPACTED CERUMEN 6/29/18  Yes Katya Starch, NP   sucralfate (CARAFATE) 100 mg/mL suspension Take 10 mL by mouth two (2) times a day. 6/29/18  Yes Katya Starch, NP   diazePAM (VALIUM) 5 mg tablet Take 1 Tab by mouth every six (6) hours as needed. Max Daily Amount: 20 mg. Indications: Muscle Spasm 6/12/18  Yes Katya Starch, NP   finasteride (PROSCAR) 5 mg tablet Take 1 Tab by mouth nightly. Indications: benign prostatic hyperplasia with lower urinary tract sx 6/12/18  Yes Katya Starch, NP   omeprazole (PRILOSEC) 40 mg capsule Take 1 Cap by mouth daily. Indications: gastroesophageal reflux disease 6/12/18  Yes Katya Starch, NP   simvastatin (ZOCOR) 20 mg tablet TAKE 1 TABLET BY MOUTH  NIGHTLY 5/25/18  Yes Katya Starch, NP   levothyroxine (SYNTHROID) 50 mcg tablet Take 1 Tab by mouth Daily (before breakfast). Patient taking differently: Take 25 mcg by mouth Daily (before breakfast). 4/13/18  Yes Katya Starch, NP   melatonin 5 mg cap capsule Take 5 mg by mouth nightly. Yes Historical Provider   acetaminophen (TYLENOL) 325 mg tablet Take 650 mg by mouth every four (4) hours as needed for Pain. Yes Historical Provider   Methylcellulose, with Sugar, (CITRUCEL, SUCROSE,) powd Take  by mouth daily. Yes Eav Engel MD   aspirin delayed-release 81 mg tablet Take 81 mg by mouth daily. Yes Historical Provider   vit C,C-Sd-gdmic-lutein-zeaxan (PRESERVISION AREDS 2) 405-237-08-5 mg-unit-mg-mg cap Take 2 Caps by mouth daily.    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 ([facility] [address] 10/06/2016    Bilateral thumb pain 02/01/2017    CAD (coronary artery disease)     Cardiac murmur 33/76/0319    Folliculitis 46/03/9484    GERD (gastroesophageal reflux disease)     Hypercholesteremia     Hypothyroid     Left hip pain 02/01/2017    Lumbar radiculitis     Lumbar spondylitis ([facility] [address]     Meniere disease     Osteoarthritis 02/19/2018    hands    Osteopenia of both hands 02/18/2018    Polyp of cecum 08/02/2017    5mm polyp in cecum, 8 mm polyp in sigmoid colon    Spinal enthesopathy of lumbar region ([address] 02/01/2017      Past Surgical History:   Procedure Laterality Date    CARDIAC SURG PROCEDURE UNLIST      HX CATARACT REMOVAL Bilateral     HX CHOLECYSTECTOMY  1994    HX COLONOSCOPY  12/19/2012    tubular adenoma    HX COLONOSCOPY  08/02/2017    tubular adenomas    HX CORONARY ARTERY BYPASS GRAFT  1996    HX CORONARY STENT PLACEMENT      HX KNEE ARTHROSCOPY Left 2005    menisectomy    HX ROTATOR CUFF REPAIR Left     HX THYROIDECTOMY  1975    HX TONSIL AND ADENOIDECTOMY        Social History   Substance Use Topics    Smoking status: Former Smoker    Smokeless tobacco: Never Used    Alcohol use 4.2 oz/week     7 Glasses of wine per week      Family History   Problem Relation Age of Onset    No Known Problems Mother     No Known Problems Father     No Known Problems Brother       Objective:   CODE STATUS: Full  Vitals:    06/29/18 0951   BP: 116/64   Pulse: 62   Resp: 18   Temp: 97.6 °F (36.4 °C)   TempSrc: Oral   SpO2: 97%   Weight: 165 lb (74.8 kg)   Height: 5' 5\" (1.651 m)     Wt Readings from Last 3 Encounters:   06/29/18 165 lb (74.8 kg)   06/12/18 166 lb 14.4 oz (75.7 kg)   05/18/18 164 lb (74.4 kg)     BP Readings from Last 3 Encounters:   06/29/18 116/64   06/12/18 129/77   05/18/18 108/66     Review of Systems   Constitutional: Negative for activity change, appetite change, fatigue and fever. HENT: Positive for hearing loss, postnasal drip, rhinorrhea, sinus pain, sore throat, trouble swallowing and voice change. Negative for congestion, dental problem, sinus pressure and sneezing. Eyes: Negative for discharge, redness and visual disturbance. Respiratory: Negative for apnea, cough, chest tightness, shortness of breath and wheezing.     Cardiovascular: Negative for chest pain, palpitations and leg swelling. Gastrointestinal: Negative for abdominal distention, abdominal pain, blood in stool, constipation, diarrhea, nausea and vomiting. Endocrine: Negative for polydipsia, polyphagia and polyuria. Genitourinary: Positive for frequency. Negative for difficulty urinating, flank pain and urgency. Musculoskeletal: Positive for arthralgias, back pain, gait problem, joint swelling and myalgias. Negative for neck pain. Skin: Positive for pallor and rash. Negative for color change and wound. Allergic/Immunologic: Negative for environmental allergies and food allergies. Neurological: Negative for dizziness, tremors, weakness, light-headedness, numbness and headaches. Hematological: Negative for adenopathy. Psychiatric/Behavioral: Negative for agitation, confusion and sleep disturbance. The patient is not nervous/anxious. Physical Exam   Constitutional: He is oriented to person, place, and time and well-developed, well-nourished, and in no distress. Vital signs are normal. He appears to not be writhing in pain, not malnourished and not dehydrated. He appears healthy. He does not have a sickly appearance. No distress. Elderly, fragile in appearance,  male. Alert and responsive. In no acute distress. HENT:   Head: Normocephalic and atraumatic. Hair is abnormal.   Right Ear: External ear normal. A foreign body is present. Tympanic membrane is bulging. Decreased hearing is noted. Left Ear: External ear normal. A foreign body is present. Tympanic membrane is perforated. Decreased hearing is noted. Nose: Mucosal edema and rhinorrhea present. Mouth/Throat: Uvula is midline and mucous membranes are normal. Mucous membranes are not pale, not dry and not cyanotic. No oral lesions. Uvula swelling present. Posterior oropharyngeal edema and posterior oropharyngeal erythema present. Obstructing bilateral cerumen. Serous infection noted in both ears.     Eyes: Conjunctivae, EOM and lids are normal. Pupils are equal, round, and reactive to light. Lids are everted and swept, no foreign bodies found. Neck: Trachea normal and normal range of motion. Neck supple. No JVD present. No thyromegaly present. Cardiovascular: Regular rhythm, S1 normal, S2 normal, intact distal pulses and normal pulses. Occasional extrasystoles are present. Bradycardia present. Exam reveals distant heart sounds. Exam reveals no gallop and no friction rub. Murmur heard. Systolic murmur is present with a grade of 2/6   No extremity edema is present during today's exam.    Pulmonary/Chest: Effort normal and breath sounds normal.   Abdominal: Soft. Normal appearance and bowel sounds are normal. There is no hepatosplenomegaly. There is no tenderness. There is no CVA tenderness. Musculoskeletal:        Right hip: He exhibits decreased range of motion, decreased strength and tenderness. Left hip: He exhibits decreased range of motion, decreased strength and tenderness. Lumbar back: He exhibits decreased range of motion, tenderness, pain and spasm. Right hand: He exhibits decreased range of motion and swelling. Decreased sensation noted. Decreased strength noted. He exhibits thumb/finger opposition. Left hand: He exhibits decreased range of motion, tenderness, decreased capillary refill and deformity. Decreased sensation noted. Decreased strength noted. He exhibits thumb/finger opposition. Lymphadenopathy:     He has no cervical adenopathy. Neurological: He is alert and oriented to person, place, and time. He has intact cranial nerves. He displays weakness, atrophy and abnormal stance. A sensory deficit is present. He exhibits abnormal muscle tone. Coordination and gait abnormal. GCS score is 15. Skin: Skin is warm, dry and intact. No bruising and no rash noted. He is not diaphoretic. No pallor. Nails show no clubbing.    Psychiatric: Memory and judgment normal. His mood appears anxious. He has a flat affect. Baseline mood and affect unchanged from normal.         Disclaimer:   Advised Shady Dina to call back or return to office if symptoms worsen/change/persist. Discussed expected course/resolution/complications of diagnosis in detail with patient. Medication risks/benefits/costs/interactions/alternatives discussed with patient and he was given an after visit summary which includes diagnoses, current medications, & vitals. Shady Arroyo expressed understanding with the diagnosis and plan.

## 2018-07-06 NOTE — PROGRESS NOTES
ADVISED PATIENT OF THE FOLLOWING HEALTH MAINTAINCE DUE  Health Maintenance Due   Topic Date Due    DTaP/Tdap/Td series (1 - Tdap) 08/30/1956    ZOSTER VACCINE AGE 60>  06/30/1995    GLAUCOMA SCREENING Q2Y  08/30/2000      Chief Complaint   Patient presents with    Follow-up     Patient being seen for follow up skin and ear infection. 1. Have you been to the ER, urgent care clinic since your last visit? Hospitalized since your last visit? No    2. Have you seen or consulted any other health care providers outside of the [address] [place] [address] since your last visit? Include any DEXA scan, mammography  or colon screening. No    3. Do you have an Advance Directive on file? no    4. Do you have a DNR on file? Full        Patient is accompanied by self I have received verbal consent from Maria A Alexander to discuss any/all medical information while they are present in the room.

## 2018-07-06 NOTE — PATIENT INSTRUCTIONS
Learning About the Diet for Swallowing Problems  What are swallowing problems? Difficulty swallowing is also called dysphagia (say \"ave-NEQ-kue-uh\"). It is most often a sign of a problem with your throat or esophagus. This is the tube that moves food and liquids from the back of your mouth to your stomach. Trouble swallowing can occur when the muscles and nerves that move food through the throat and esophagus are not working right. To help you swallow food, your doctor or speech therapist may advise a special dysphagia diet for you. Why is a special diet important? A dysphagia diet can help you handle some problems that can occur when it's hard to swallow food and liquids easily. These problems can include:  · Malnutrition. This means you aren't getting enough healthy foods to keep your body working well. · Dehydration. This means you aren't getting enough liquids to keep your body healthy. · Aspiration. This means that food, liquid, or saliva goes down your windpipe (trachea) into your lungs, instead of down your esophagus to your stomach. This can lead to aspiration pneumonia, which is an inflammation of the lungs. What is the dysphagia diet? In the dysphagia diet, you change the foods you eat and the liquids you drink to make it easier to swallow them. You may:  · Change the texture of the foods you eat. Your doctor or speech therapist may advise you to eat one of these types of foods:  ¨ Solid, soft foods that have been cut up into small pieces. Extra gravy or sauce can help make these foods easier to swallow. ¨ Semi-solid foods, like ground meats or fruits and vegetables that are mashed with a fork. These foods require some chewing. Extra gravy or sauce is often served. ¨ Foods that are the texture of pudding. You don't have to chew these foods. Examples include mashed potatoes with gravy; yogurt; pudding; and pureed meats, fruits, and vegetables. · Thicken the liquids you drink.  Your doctor or speech therapist will tell you what kind of thickener to use and how thick to make the liquids. ¨ Nectar-thick liquids leave a thin coating when they are poured from a spoon. ¨ Honey-thick liquids drip slowly when they are poured from a spoon. ¨ Pudding-thick liquids do not pour from a spoon. Your speech therapist will help you learn exercises to train your muscles to work together so you can swallow. You may also need to learn how to position your body or how to put food in your mouth to be able to swallow better. Some people have severe trouble swallowing and can't get enough food and liquids. In those cases, the doctor can insert a feeding tube so the person gets enough nutrients. Follow-up care is a key part of your treatment and safety. Be sure to make and go to all appointments, and call your doctor if you are having problems. It's also a good idea to know your test results and keep a list of the medicines you take. Where can you learn more? Go to http://sloan-george.info/. Enter H758 in the search box to learn more about \"Learning About the Diet for Swallowing Problems. \"  Current as of: March 20, 2017  Content Version: 11.4  © 2419-6312 [employer]). Care instructions adapted under license by [employer] (which disclaims liability or warranty for this information). If you have questions about a medical condition or this instruction, always ask your healthcare professional. [place] [address] any warranty or liability for your use of this information. Learning About the Diet for Swallowing Problems  What are swallowing problems? Difficulty swallowing is also called dysphagia (say \"hli-TBB-trt-uh\"). It is most often a sign of a problem with your throat or esophagus. This is the tube that moves food and liquids from the back of your mouth to your stomach.   Trouble swallowing can occur when the muscles and nerves that move food through the throat and esophagus are not working right. To help you swallow food, your doctor or speech therapist may advise a special dysphagia diet for you. Why is a special diet important? A dysphagia diet can help you handle some problems that can occur when it's hard to swallow food and liquids easily. These problems can include:  · Malnutrition. This means you aren't getting enough healthy foods to keep your body working well. · Dehydration. This means you aren't getting enough liquids to keep your body healthy. · Aspiration. This means that food, liquid, or saliva goes down your windpipe (trachea) into your lungs, instead of down your esophagus to your stomach. This can lead to aspiration pneumonia, which is an inflammation of the lungs. What is the dysphagia diet? In the dysphagia diet, you change the foods you eat and the liquids you drink to make it easier to swallow them. You may:  · Change the texture of the foods you eat. Your doctor or speech therapist may advise you to eat one of these types of foods:  ¨ Solid, soft foods that have been cut up into small pieces. Extra gravy or sauce can help make these foods easier to swallow. ¨ Semi-solid foods, like ground meats or fruits and vegetables that are mashed with a fork. These foods require some chewing. Extra gravy or sauce is often served. ¨ Foods that are the texture of pudding. You don't have to chew these foods. Examples include mashed potatoes with gravy; yogurt; pudding; and pureed meats, fruits, and vegetables. · Thicken the liquids you drink. Your doctor or speech therapist will tell you what kind of thickener to use and how thick to make the liquids. ¨ Nectar-thick liquids leave a thin coating when they are poured from a spoon. ¨ Honey-thick liquids drip slowly when they are poured from a spoon. ¨ Pudding-thick liquids do not pour from a spoon.   Your speech therapist will help you learn exercises to train your muscles to work together so you can swallow. You may also need to learn how to position your body or how to put food in your mouth to be able to swallow better. Some people have severe trouble swallowing and can't get enough food and liquids. In those cases, the doctor can insert a feeding tube so the person gets enough nutrients. Follow-up care is a key part of your treatment and safety. Be sure to make and go to all appointments, and call your doctor if you are having problems. It's also a good idea to know your test results and keep a list of the medicines you take. Where can you learn more? Go to http://sloan-george.info/. Enter G699 in the search box to learn more about \"Learning About the Diet for Swallowing Problems. \"  Current as of: March 20, 2017  Content Version: 11.4  © 9872-8389 [employer]. Care instructions adapted under license by [employer] (which disclaims liability or warranty for this information). If you have questions about a medical condition or this instruction, always ask your healthcare professional. [address] any warranty or liability for your use of this information.

## 2018-07-07 NOTE — PATIENT INSTRUCTIONS
Learning About the Diet for Swallowing Problems  What are swallowing problems? Difficulty swallowing is also called dysphagia (say \"ayd-BGM-[id number]-[id number]\"). It is most often a sign of a problem with your throat or esophagus. This is the tube that moves food and liquids from the back of your mouth to your stomach. Trouble swallowing can occur when the muscles and nerves that move food through the throat and esophagus are not working right. To help you swallow food, your doctor or speech therapist may advise a special dysphagia diet for you. Why is a special diet important? A dysphagia diet can help you handle some problems that can occur when it's hard to swallow food and liquids easily. These problems can include:  · Malnutrition. This means you aren't getting enough healthy foods to keep your body working well. · Dehydration. This means you aren't getting enough liquids to keep your body healthy. · Aspiration. This means that food, liquid, or saliva goes down your windpipe (trachea) into your lungs, instead of down your esophagus to your stomach. This can lead to aspiration pneumonia, which is an inflammation of the lungs. What is the dysphagia diet? In the dysphagia diet, you change the foods you eat and the liquids you drink to make it easier to swallow them. You may:  · Change the texture of the foods you eat. Your doctor or speech therapist may advise you to eat one of these types of foods:  ¨ Solid, soft foods that have been cut up into small pieces. Extra gravy or sauce can help make these foods easier to swallow. ¨ Semi-solid foods, like ground meats or fruits and vegetables that are mashed with a fork. These foods require some chewing. Extra gravy or sauce is often served. ¨ Foods that are the texture of pudding. You don't have to chew these foods. Examples include mashed potatoes with gravy; yogurt; pudding; and pureed meats, fruits, and vegetables. · Thicken the liquids you drink.  Your doctor or speech therapist will tell you what kind of thickener to use and how thick to make the liquids. ¨ Nectar-thick liquids leave a thin coating when they are poured from a spoon. ¨ Honey-thick liquids drip slowly when they are poured from a spoon. ¨ Pudding-thick liquids do not pour from a spoon. Your speech therapist will help you learn exercises to train your muscles to work together so you can swallow. You may also need to learn how to position your body or how to put food in your mouth to be able to swallow better. Some people have severe trouble swallowing and can't get enough food and liquids. In those cases, the doctor can insert a feeding tube so the person gets enough nutrients. Follow-up care is a key part of your treatment and safety. Be sure to make and go to all appointments, and call your doctor if you are having problems. It's also a good idea to know your test results and keep a list of the medicines you take. Where can you learn more? Go to http://sloan-george.info/. Enter D834 in the search box to learn more about \"Learning About the Diet for Swallowing Problems. \"  Current as of: March 20, 2017  Content Version: 11.4  © 1659-3966 [employer], [employer]. Care instructions adapted under license by [employer] (which disclaims liability or warranty for this information). If you have questions about a medical condition or this instruction, always ask your healthcare professional. [place] [address] any warranty or liability for your use of this information.

## 2018-07-07 NOTE — PROGRESS NOTES
Reason for Visit:      Chief Complaint   Patient presents with    Follow-up     Patient being seen for follow up skin and ear infection. History of Present Illness:   Chari Thompson is a 80 y.o. male geriatric patient who presents today for requested follow up to bilateral ear infections with sinus infection, and Dysphagia related to GERD, and skin lesions. Mr. Arvin Bradley is very confused today during his follow up appointment. He states he is confused about the plan of care I wrote out last week for him and it is overwhelming for him. He states he doesn't remember the conversations or directions he was given. My nurse attempted to reorient him to the process and plans. We discussed the skin and waiting for the results of the skin lesion culture and keep our treatment of the skin to minimally invasive treatment as well as keeping his skin free of lotions. Bilateral ear infections have resolved and the canals are well with healed abrasions noted. He is swallowing much better and he has not had any pills get stuck this week with the new Carafate to help with his swallowing. I have advised him to continue the medication until finished. We also discussed his thyroid medication as he has been taking it for years at night before bed. My nurse educated him that it usually works best if taken 27 min's prior to having any food in his stomach. Advised him we will quentin him to schedule a follow up once we have the skin cultures back for plan of care discussion. Diagnosis/Treatment Plan: The following orders have been placed for treatment of the diagnoses above:    ICD-10-CM ICD-9-CM    1. Chronic bacterial skin infection L08.89 686.8     B96.89     2. Pharyngoesophageal dysphagia R13.14 787.24    3. Hoarseness or changing voice R49.9 784.49    4. Acquired hypothyroidism E03.9 244.9    5. Medication management Z79.899 V58.69    6. Anxiety F41.9 300.00    7. Conflicted attitude towards treatment Z91.19 V15.81    8. Episodic confusion R41.0 298.9    9. Uncompensated short term memory deficit R41.3 780.93         The following medication/treatments have been discontinued by the provider today:   Medications Discontinued During This Encounter   Medication Reason    sucralfate (CARAFATE) 100 mg/mL suspension Alternate Therapy       Follow Up: Follow-up Disposition:  Return in about 1 month (around 8/6/2018) for with the Nurse, for requested monitoring care. Subjective: Allergies   Allergen Reactions    Toprol Xl [Metoprolol Succinate] Diarrhea    Codeine Other (comments)    Sulfa (Sulfonamide Antibiotics) Hives     Prior to Admission medications    Medication Sig Start Date End Date Taking? Authorizing Provider   sucralfate (CARAFATE) 1 gram tablet Take 1 Tab by mouth daily. 6/29/18  Yes Historical Provider   biotin 10,000 mcg cap Take  by mouth. Yes Historical Provider   amoxicillin-clavulanate (AUGMENTIN) 875-125 mg per tablet Take 1 Tab by mouth every twelve (12) hours for 7 days. Indications: acute bacterial otitis media 6/29/18 7/6/18 Yes Callie David NP   carbamide peroxide (DEBROX) 6.5 % otic solution Apply 2 drops to both ear canals once a week at night to keep wax from building up. Do not stop. Indications: IMPACTED CERUMEN 6/29/18  Yes Callie Davdi NP   diazePAM (VALIUM) 5 mg tablet Take 1 Tab by mouth every six (6) hours as needed. Max Daily Amount: 20 mg. Indications: Muscle Spasm 6/12/18  Yes Callie David NP   finasteride (PROSCAR) 5 mg tablet Take 1 Tab by mouth nightly. Indications: benign prostatic hyperplasia with lower urinary tract sx 6/12/18  Yes Callie David NP   omeprazole (PRILOSEC) 40 mg capsule Take 1 Cap by mouth daily.  Indications: gastroesophageal reflux disease 6/12/18  Yes Callie David NP   simvastatin (ZOCOR) 20 mg tablet TAKE 1 TABLET BY MOUTH  NIGHTLY 5/25/18  Yes Callie David NP   levothyroxine (SYNTHROID) 50 mcg tablet Take 1 Tab by mouth Daily (before breakfast). Patient taking differently: Take 25 mcg by mouth Daily (before breakfast). 4/13/18  Yes Tom Olmstead NP   melatonin 5 mg cap capsule Take 5 mg by mouth nightly. Yes Historical Provider   acetaminophen (TYLENOL) 325 mg tablet Take 650 mg by mouth every four (4) hours as needed for Pain. Yes Historical Provider   Methylcellulose, with Sugar, (CITRUCEL, SUCROSE,) powd Take  by mouth daily. Yes Eva Engel MD   aspirin delayed-release 81 mg tablet Take 81 mg by mouth daily. Yes Historical Provider   vit C,Q-Az-ptuaq-lutein-zeaxan (PRESERVISION AREDS 2) 271-019-92-8 mg-unit-mg-mg cap Take 2 Caps by mouth daily.    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 ([facility] [address] 10/06/2016    Bilateral thumb pain 02/01/2017    CAD (coronary artery disease)     Cardiac murmur 87/76/0498    Folliculitis 55/17/7261    GERD (gastroesophageal reflux disease)     Hypercholesteremia     Hypothyroid     Left hip pain 02/01/2017    Lumbar radiculitis     Lumbar spondylitis (HCC)     Meniere disease     Osteoarthritis 02/19/2018    hands    Osteopenia of both hands 02/18/2018    Polyp of cecum 08/02/2017    5mm polyp in cecum, 8 mm polyp in sigmoid colon    Spinal enthesopathy of lumbar region ([facility] [address] 02/01/2017      Past Surgical History:   Procedure Laterality Date    CARDIAC SURG PROCEDURE UNLIST      HX CATARACT REMOVAL Bilateral     HX CHOLECYSTECTOMY  1994    HX COLONOSCOPY  12/19/2012    tubular adenoma    HX COLONOSCOPY  08/02/2017    tubular adenomas    HX CORONARY ARTERY BYPASS GRAFT  1996    HX CORONARY STENT PLACEMENT      HX KNEE ARTHROSCOPY Left 2005    menisectomy    HX ROTATOR CUFF REPAIR Left     HX THYROIDECTOMY  1975    HX TONSIL AND ADENOIDECTOMY        Social History   Substance Use Topics    Smoking status: Former Smoker    Smokeless tobacco: Never Used    Alcohol use 4.2 oz/week     7 Glasses of wine per week      Family History   Problem Relation Age of Onset    No Known Problems Mother     No Known Problems Father     No Known Problems Brother       Objective:   CODE STATUS: Full  Vitals:    07/06/18 1034   BP: 124/66   Pulse: 66   Resp: 16   Temp: 97.8 °F (36.6 °C)   TempSrc: Oral   SpO2: 98%   Weight: 165 lb 14.4 oz (75.3 kg)   Height: 5' 5\" (1.651 m)     Wt Readings from Last 3 Encounters:   07/06/18 165 lb 14.4 oz (75.3 kg)   06/29/18 165 lb (74.8 kg)   06/12/18 166 lb 14.4 oz (75.7 kg)     BP Readings from Last 3 Encounters:   07/06/18 124/66   06/29/18 116/64   06/12/18 129/77     Review of Systems   Constitutional: Negative for activity change, appetite change, fatigue and fever. HENT: Positive for sore throat, trouble swallowing and voice change. Negative for congestion, dental problem, hearing loss, postnasal drip, rhinorrhea, sinus pain, sinus pressure and sneezing. Eyes: Negative for discharge, redness and visual disturbance. Respiratory: Negative for apnea, cough, chest tightness, shortness of breath and wheezing. Cardiovascular: Negative for chest pain, palpitations and leg swelling. Gastrointestinal: Negative for abdominal distention, abdominal pain, blood in stool, constipation, diarrhea, nausea and vomiting. Endocrine: Negative for polydipsia, polyphagia and polyuria. Genitourinary: Positive for frequency. Negative for difficulty urinating, flank pain and urgency. Musculoskeletal: Positive for arthralgias, back pain, gait problem, joint swelling and myalgias. Negative for neck pain. Skin: Positive for pallor and rash. Negative for color change and wound. Allergic/Immunologic: Negative for environmental allergies and food allergies. Neurological: Negative for dizziness, tremors, weakness, light-headedness, numbness and headaches. Hematological: Negative for adenopathy. Psychiatric/Behavioral: Positive for confusion. Negative for agitation and sleep disturbance. The patient is nervous/anxious. Physical Exam   Constitutional: He is oriented to person, place, and time and well-developed, well-nourished, and in no distress. Vital signs are normal. He appears to not be writhing in pain, not malnourished and not dehydrated. He appears healthy. He does not have a sickly appearance. No distress. Elderly, fragile in appearance,  male. Alert and responsive. In no acute distress. HENT:   Head: Normocephalic and atraumatic. Hair is abnormal.   Right Ear: External ear normal. Decreased hearing is noted. Left Ear: External ear normal. No foreign bodies. Decreased hearing is noted. Nose: Mucosal edema and rhinorrhea present. Mouth/Throat: Uvula is midline and mucous membranes are normal. Mucous membranes are not pale, not dry and not cyanotic. No oral lesions. Uvula swelling present. Posterior oropharyngeal edema and posterior oropharyngeal erythema present. Canals are clear, abrasions healed. Eyes: Conjunctivae, EOM and lids are normal. Pupils are equal, round, and reactive to light. Lids are everted and swept, no foreign bodies found. Neck: Trachea normal and normal range of motion. Neck supple. No JVD present. No thyromegaly present. Cardiovascular: Regular rhythm, S1 normal, S2 normal, intact distal pulses and normal pulses. Occasional extrasystoles are present. Bradycardia present. Exam reveals distant heart sounds. Exam reveals no gallop and no friction rub. Murmur heard. Systolic murmur is present with a grade of 2/6   No extremity edema is present during today's exam.    Pulmonary/Chest: Effort normal and breath sounds normal.   Abdominal: Soft. Normal appearance and bowel sounds are normal. There is no hepatosplenomegaly. There is no tenderness. There is no CVA tenderness.    Musculoskeletal:        Right hip: He exhibits decreased range of motion, decreased strength and tenderness. Left hip: He exhibits decreased range of motion, decreased strength and tenderness. Lumbar back: He exhibits decreased range of motion, tenderness, pain and spasm. Right hand: He exhibits decreased range of motion and swelling. Decreased sensation noted. Decreased strength noted. He exhibits thumb/finger opposition. Left hand: He exhibits decreased range of motion, tenderness, decreased capillary refill and deformity. Decreased sensation noted. Decreased strength noted. He exhibits thumb/finger opposition. Lymphadenopathy:     He has no cervical adenopathy. Neurological: He is alert and oriented to person, place, and time. He has intact cranial nerves. He displays weakness, atrophy and abnormal stance. A sensory deficit is present. He exhibits abnormal muscle tone. Coordination and gait abnormal. GCS score is 15. Skin: Skin is warm, dry and intact. No bruising and no rash noted. He is not diaphoretic. No pallor. Nails show no clubbing. Psychiatric: Memory and judgment normal. His mood appears anxious. He has a flat affect. Baseline mood and affect unchanged from normal.         Disclaimer:   Advised Vanna Long to call back or return to office if symptoms worsen/change/persist. Discussed expected course/resolution/complications of diagnosis in detail with patient. Medication risks/benefits/costs/interactions/alternatives discussed with patient and he was given an after visit summary which includes diagnoses, current medications, & vitals. Vanna Long expressed understanding with the diagnosis and plan.

## 2018-07-25 ENCOUNTER — TELEPHONE (OUTPATIENT)
Dept: GERIATRIC MEDICINE | Age: 83
End: 2018-07-25

## 2018-07-25 NOTE — TELEPHONE ENCOUNTER
Call placed to patient, he is due for follow TSH level. Unable to leave message. Will attempt to call again.

## 2018-07-25 NOTE — TELEPHONE ENCOUNTER
Patient called, he saw missed call and was returning my call. I spoke with patient he is due for 1 month follow up TSH level.  He will come in tomorrow July 26 th.

## 2018-07-26 ENCOUNTER — CLINICAL SUPPORT (OUTPATIENT)
Dept: GERIATRIC MEDICINE | Age: 83
End: 2018-07-26

## 2018-07-26 DIAGNOSIS — E03.9 ACQUIRED HYPOTHYROIDISM: Primary | ICD-10-CM

## 2018-07-26 NOTE — PROGRESS NOTES
Marcelinodesmond Thompson presents for lab draw ordered by Tyra Gitelman RN MSN [id number]-NP    The following labs were drawn and sent to lab by Aria Bedolla:    TSH reflex T4    The following tubes were sent:    Tiger (1)    Patient tolerated procedure well, blood obtained via venipuncture to left antecubital

## 2018-07-27 LAB — TSH SERPL DL<=0.005 MIU/L-ACNC: 4.07 UIU/ML (ref 0.45–4.5)

## 2018-07-30 LAB
BACTERIA SPEC AEROBE CULT: NORMAL
BACTERIA SPEC ANAEROBE CULT: NORMAL
FUNGUS SPEC CULT: NORMAL
FUNGUS SPEC FUNGUS STN: NORMAL
GRAM STN SPEC: NORMAL
GRAM STN SPEC: NORMAL

## 2018-07-31 DIAGNOSIS — E78.2 MIXED HYPERLIPIDEMIA: ICD-10-CM

## 2018-07-31 DIAGNOSIS — R35.1 NOCTURIA: ICD-10-CM

## 2018-07-31 RX ORDER — FINASTERIDE 5 MG/1
TABLET, FILM COATED ORAL
Qty: 90 TAB | Refills: 1 | Status: SHIPPED | OUTPATIENT
Start: 2018-07-31 | End: 2019-01-19 | Stop reason: SDUPTHER

## 2018-07-31 NOTE — PROGRESS NOTES
Good thing is none of the pustules grew any specific bacteria or fungus. We should recheck the bumps to see if the topical treatment is working ? ??

## 2018-08-06 ENCOUNTER — OFFICE VISIT (OUTPATIENT)
Dept: GERIATRIC MEDICINE | Age: 83
End: 2018-08-06

## 2018-08-06 VITALS
DIASTOLIC BLOOD PRESSURE: 88 MMHG | HEIGHT: 65 IN | TEMPERATURE: 98.6 F | WEIGHT: 161.5 LBS | HEART RATE: 66 BPM | SYSTOLIC BLOOD PRESSURE: 160 MMHG | OXYGEN SATURATION: 98 % | RESPIRATION RATE: 18 BRPM | BODY MASS INDEX: 26.91 KG/M2

## 2018-08-06 DIAGNOSIS — K62.89 ANAL OR RECTAL PAIN: ICD-10-CM

## 2018-08-06 DIAGNOSIS — K59.1 FUNCTIONAL DIARRHEA: Primary | ICD-10-CM

## 2018-08-06 DIAGNOSIS — K64.4 EXTERNAL HEMORRHOID, BLEEDING: ICD-10-CM

## 2018-08-06 DIAGNOSIS — E89.0 POSTOPERATIVE HYPOTHYROIDISM: ICD-10-CM

## 2018-08-06 DIAGNOSIS — K64.4 INTERNAL AND EXTERNAL HEMORRHOIDS WITHOUT COMPLICATION: ICD-10-CM

## 2018-08-06 DIAGNOSIS — K64.8 INTERNAL AND EXTERNAL HEMORRHOIDS WITHOUT COMPLICATION: ICD-10-CM

## 2018-08-06 DIAGNOSIS — Z78.9 CONFLICTED ATTITUDE TOWARDS TREATMENT: ICD-10-CM

## 2018-08-06 DIAGNOSIS — R63.0 LOSS OF APPETITE: ICD-10-CM

## 2018-08-06 DIAGNOSIS — L08.89 CHRONIC BACTERIAL SKIN INFECTION: ICD-10-CM

## 2018-08-06 DIAGNOSIS — B96.89 CHRONIC BACTERIAL SKIN INFECTION: ICD-10-CM

## 2018-08-06 RX ORDER — HYDROCORTISONE ACETATE SUPPOSITORY 30 MG/1
1 SUPPOSITORY RECTAL 2 TIMES DAILY
Qty: 1 SUPPOSITORY | Refills: 1 | Status: SHIPPED | OUTPATIENT
Start: 2018-08-06 | End: 2018-08-10 | Stop reason: ALTCHOICE

## 2018-08-06 RX ORDER — LEVOTHYROXINE SODIUM 25 UG/1
25 TABLET ORAL
Qty: 90 TAB | Refills: 1 | Status: SHIPPED | OUTPATIENT
Start: 2018-08-06 | End: 2018-12-22 | Stop reason: SDUPTHER

## 2018-08-06 RX ORDER — LOPERAMIDE HCL 2 MG
TABLET ORAL
Qty: 30 TAB | Refills: 0 | Status: SHIPPED | OUTPATIENT
Start: 2018-08-06 | End: 2018-08-10 | Stop reason: ALTCHOICE

## 2018-08-06 RX ORDER — LOPERAMIDE HCL 2 MG
TABLET ORAL
Qty: 30 TAB | Refills: 0 | Status: SHIPPED | OUTPATIENT
Start: 2018-08-06 | End: 2018-08-06 | Stop reason: SDUPTHER

## 2018-08-06 NOTE — PROGRESS NOTES
Reason for Visit/HPI:    Uzma Martinez is a 80 y.o. male patient who presents today for   Chief Complaint   Patient presents with    Diarrhea     Patient start on Friday with diarrhea. He says its about every 20 minutes or so. He has noted blood and what he says is pus. Diagnosis/Treatment Plan:    Diagnoses and all orders for this visit:    1. Functional diarrhea ; Mr. Germania Ledbetter states he had diarrhea starting on Friday 8/3/18. He states it has been on going since then, non stop. He explains it to be loose, sometimes explosive in nature with mucous, blood, and watery. He also states he ate a frozen meal last week on Thursday that he usually does not eat and believes it could be related to this. He has not tried to calm the diarrhea on his own at all. He now complains of external hemorrhoids, large in nature and bleeding. He states it is very painful. He has requested that I perform a rectal exam as he believe one of the hemorrhoids could be \"extrangulated\". Procedure: Rectal exam: tenderness noted at rectum as well as internally with finger exam (procedure performed with Anne Marie Rivera LPN as the chaperon), internal hemorrhoids noted, external hemorrhoids noted, sphincter tone normal, PROSTATE EXAM: smooth and symmetric without nodules or tenderness, enlarged 1+. Patient withstood procedure well with no complications but significant discomfort noted. No bleeding.   -     hydrocortisone acetate (PROCTOCORT) 30 mg supp; Insert 1 Suppository into rectum two (2) times a day for 3 days.  -     Lactobac. rhamnosus GG-inulin (CULTURELLE PROBIOTICS) 10 billion cell -200 mg cap; Take 1 Cap by mouth two (2) times a day. -     loperamide (IMMODIUM) 2 mg tablet; Take 1/2 tablet by mouth every BM x 24 hrs. Then twice daily x 3 days, then once daily x 3 days, then stop. Advised patient to continue using his preparation H gel & wipes during the diarrhea.  I have also ordered him to start Imodium to slow the bowel movements down for 24 hours then I will re-evaluate using it bid or once daily to stop the loose stools. He is mildly dehydrated with tongue furrowing noted as well as some mild tenting on his chest. I have advised him to push the oral fluids as well as use Pedialyte to help with replacing electrolytes. I have also given him a BRAT diet information sheet and asked him to start this right away and when I speak with him on Tuesday AM, I will advise him of the next steps. He repeated the information back to me and had an opportunity to ask questions. 2. Loss of appetite    3. Anal or rectal pain  -     hydrocortisone acetate (PROCTOCORT) 30 mg supp; Insert 1 Suppository into rectum two (2) times a day for 3 days.  -     Lactobac. rhamnosus GG-inulin (CULTURELLE PROBIOTICS) 10 billion cell -200 mg cap; Take 1 Cap by mouth two (2) times a day. -     loperamide (IMMODIUM) 2 mg tablet; Take 1/2 tablet by mouth every BM x 24 hrs. Then twice daily x 3 days, then once daily x 3 days, then stop. 4. External hemorrhoid, bleeding  -     hydrocortisone acetate (PROCTOCORT) 30 mg supp; Insert 1 Suppository into rectum two (2) times a day for 3 days.  -     Lactobac. rhamnosus GG-inulin (CULTURELLE PROBIOTICS) 10 billion cell -200 mg cap; Take 1 Cap by mouth two (2) times a day. -     loperamide (IMMODIUM) 2 mg tablet; Take 1/2 tablet by mouth every BM x 24 hrs. Then twice daily x 3 days, then once daily x 3 days, then stop. 5. Postoperative hypothyroidism  -     hydrocortisone acetate (PROCTOCORT) 30 mg supp; Insert 1 Suppository into rectum two (2) times a day for 3 days.  -     Lactobac. rhamnosus GG-inulin (CULTURELLE PROBIOTICS) 10 billion cell -200 mg cap; Take 1 Cap by mouth two (2) times a day. -     loperamide (IMMODIUM) 2 mg tablet; Take 1/2 tablet by mouth every BM x 24 hrs. Then twice daily x 3 days, then once daily x 3 days, then stop. 6.  Internal and external hemorrhoids without complication  -     hydrocortisone acetate (PROCTOCORT) 30 mg supp; Insert 1 Suppository into rectum two (2) times a day for 3 days.  -     Lactobac. rhamnosus GG-inulin (CULTURELLE PROBIOTICS) 10 billion cell -200 mg cap; Take 1 Cap by mouth two (2) times a day. -     loperamide (IMMODIUM) 2 mg tablet; Take 1/2 tablet by mouth every BM x 24 hrs. Then twice daily x 3 days, then once daily x 3 days, then stop. 7. Conflicted attitude towards treatment    8. Chronic bacterial skin infection ; Discussed the results's from the recent cultures and skin scraping. They were all normal and came back without any agents of treatment. Currently his symptoms have resolved as there are not any pustules present on his torso. He has some small red blotches on his back but the skin and tissue looks great. I have advised him to continue with the antibacterial soap and continue to not use lotions as his skin is still very supple and the lotions can clog the pores again. Other orders; Refilled medications per request.   -     levothyroxine (SYNTHROID) 25 mcg tablet; Take 1 Tab by mouth Daily (before breakfast). Indications: hypothyroidism    Discontinued Care: The following treatment modalities have been discontinued by the provider today:   Medications Discontinued During This Encounter   Medication Reason    hydrocortisone-pramoxine (PROCTOFOAM HC) rectal foam Alternate Therapy    loperamide (IMMODIUM) 2 mg tablet Reorder    sucralfate (CARAFATE) 1 gram tablet Alternate Therapy    Methylcellulose, with Sugar, (CITRUCEL, SUCROSE,) powd Alternate Therapy    levothyroxine (SYNTHROID) 50 mcg tablet Alternate Therapy       Follow Up: Follow-up Disposition:  Return in about 3 days (around 8/9/2018) for with the NP for follow up to your treatment plan. Currently working with differential diagnosis related to possible infectious diarrhea, gastritis, dietary indiscretion, or diverticulosis. Subjective:      Allergies   Allergen Reactions    Toprol Xl [Metoprolol Succinate] Diarrhea    Codeine Other (comments)    Sulfa (Sulfonamide Antibiotics) Hives     Prior to Admission medications    Medication Sig Start Date End Date Taking? Authorizing Provider   levothyroxine (SYNTHROID) 25 mcg tablet Take 1 Tab by mouth Daily (before breakfast). Indications: hypothyroidism 8/6/18  Yes Alex Barrett NP   hydrocortisone acetate (PROCTOCORT) 30 mg supp Insert 1 Suppository into rectum two (2) times a day for 3 days. 8/6/18 8/9/18 Yes LIBIA Cedillo. rhamnosus GG-inulin (CULTURELLE PROBIOTICS) 10 billion cell -200 mg cap Take 1 Cap by mouth two (2) times a day. 8/6/18  Yes Alex Barrett NP   loperamide (IMMODIUM) 2 mg tablet Take 1/2 tablet by mouth every BM x 24 hrs. Then twice daily x 3 days, then once daily x 3 days, then stop. 8/6/18  Yes Alex Barrett NP   finasteride (PROSCAR) 5 mg tablet TAKE 1 TABLET BY MOUTH  NIGHTLY FOR BENIGN  PROSTATIC HYPERPLASIA WITH  LOWER URINARY TRACT  SYMPTOMS 7/31/18  Yes Alex Barrett NP   biotin 10,000 mcg cap Take  by mouth. Yes Historical Provider   carbamide peroxide (DEBROX) 6.5 % otic solution Apply 2 drops to both ear canals once a week at night to keep wax from building up. Do not stop. Indications: IMPACTED CERUMEN 6/29/18  Yes Alex Barrett NP   diazePAM (VALIUM) 5 mg tablet Take 1 Tab by mouth every six (6) hours as needed. Max Daily Amount: 20 mg. Indications: Muscle Spasm 6/12/18  Yes Alex Barrett NP   omeprazole (PRILOSEC) 40 mg capsule Take 1 Cap by mouth daily. Indications: gastroesophageal reflux disease 6/12/18  Yes Alex Barrett NP   simvastatin (ZOCOR) 20 mg tablet TAKE 1 TABLET BY MOUTH  NIGHTLY 5/25/18  Yes Alex Barrett NP   melatonin 5 mg cap capsule Take 5 mg by mouth nightly. Yes Historical Provider   acetaminophen (TYLENOL) 325 mg tablet Take 650 mg by mouth every four (4) hours as needed for Pain.    Yes Historical Provider   aspirin delayed-release 81 mg tablet Take 81 mg by mouth daily. Yes Historical Provider   vit C,I-Pj-eerqk-lutein-zeaxan (PRESERVISION AREDS 2) 151-294-69-2 mg-unit-mg-mg cap Take 2 Caps by mouth daily.    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 ([facility] [address] 10/06/2016    Bilateral thumb pain 02/01/2017    CAD (coronary artery disease)     Cardiac murmur 52/78/5229    Folliculitis 89/78/3039    GERD (gastroesophageal reflux disease)     Hypercholesteremia     Hypothyroid     Left hip pain 02/01/2017    Lumbar radiculitis     Lumbar spondylitis (HCC)     Meniere disease     Osteoarthritis 02/19/2018    hands    Osteopenia of both hands 02/18/2018    Polyp of cecum 08/02/2017    5mm polyp in cecum, 8 mm polyp in sigmoid colon    Spinal enthesopathy of lumbar region ([facility] [address] 02/01/2017     Past Surgical History:   Procedure Laterality Date    CARDIAC SURG PROCEDURE UNLIST      HX CATARACT REMOVAL Bilateral     HX CHOLECYSTECTOMY  1994    HX COLONOSCOPY  12/19/2012    tubular adenoma    HX COLONOSCOPY  08/02/2017    tubular adenomas    HX CORONARY ARTERY BYPASS GRAFT  1996    HX CORONARY STENT PLACEMENT      HX KNEE ARTHROSCOPY Left 2005    menisectomy    HX ROTATOR CUFF REPAIR Left     HX THYROIDECTOMY  1975    HX TONSIL AND ADENOIDECTOMY        Social History   Substance Use Topics    Smoking status: Former Smoker    Smokeless tobacco: Never Used    Alcohol use 4.2 oz/week     7 Glasses of wine per week      Family History   Problem Relation Age of Onset    No Known Problems Mother     No Known Problems Father     No Known Problems Brother      Advance Care Planning 8/6/2018   Patient's Healthcare Decision Maker is: Named in scanned ACP document   Primary Decision Maker Name Anuradha Ellis   Primary Decision Maker Phone Number [phone]   Primary Decision Maker Relationship to Patient Adult child   Confirm Advance Directive Yes, on file     Latest Laboratory Results:     Lab Results   Component Value Date/Time    WBC 7.3 03/30/2018 03:28 PM    HGB 16.5 03/30/2018 03:28 PM    HCT 48.0 03/30/2018 03:28 PM    PLATELET 499 33/36/0735 03:28 PM    MCV 93 03/30/2018 03:28 PM     Lab Results   Component Value Date/Time    Sodium 141 06/12/2018 11:26 AM    Potassium 4.4 06/12/2018 11:26 AM    Chloride 104 06/12/2018 11:26 AM    CO2 23 06/12/2018 11:26 AM    Anion gap 7 02/15/2017 05:07 PM    Glucose 104 (H) 06/12/2018 11:26 AM    BUN 12 06/12/2018 11:26 AM    Creatinine 0.97 06/12/2018 11:26 AM    BUN/Creatinine ratio 12 06/12/2018 11:26 AM    GFR est AA 84 06/12/2018 11:26 AM    GFR est non-AA 72 06/12/2018 11:26 AM    Calcium 9.5 06/12/2018 11:26 AM    Bilirubin, total 0.7 06/12/2018 11:26 AM    AST (SGOT) 20 06/12/2018 11:26 AM    Alk. phosphatase 70 06/12/2018 11:26 AM    Protein, total 6.0 06/12/2018 11:26 AM    Albumin 3.9 06/12/2018 11:26 AM    Globulin 2.9 02/15/2017 05:07 PM    A-G Ratio 1.9 06/12/2018 11:26 AM    ALT (SGPT) 20 06/12/2018 11:26 AM     Objective:     Vitals:    08/06/18 1348   BP: 160/88   Pulse: 66   Resp: 18   Temp: 98.6 °F (37 °C)   TempSrc: Oral   SpO2: 98%   Weight: 161 lb 8 oz (73.3 kg)   Height: 5' 5\" (1.651 m)     Wt Readings from Last 3 Encounters:   08/06/18 161 lb 8 oz (73.3 kg)   07/06/18 165 lb 14.4 oz (75.3 kg)   06/29/18 165 lb (74.8 kg)     BP Readings from Last 3 Encounters:   08/06/18 160/88   07/06/18 124/66   06/29/18 116/64     Review of Systems   Constitutional: Positive for fatigue. Negative for activity change, appetite change and fever. HENT: Negative for congestion, dental problem, hearing loss, postnasal drip, sinus pressure, sneezing, sore throat and trouble swallowing. Eyes: Negative for discharge, redness and visual disturbance. Respiratory: Negative for apnea, cough, chest tightness, shortness of breath and wheezing. Cardiovascular: Negative for chest pain, palpitations and leg swelling. Gastrointestinal: Positive for abdominal pain, anal bleeding, blood in stool, diarrhea and rectal pain. Negative for abdominal distention, constipation, nausea and vomiting. Endocrine: Negative for polydipsia, polyphagia and polyuria. Genitourinary: Negative for flank pain, frequency and urgency. Musculoskeletal: Negative for arthralgias, gait problem, joint swelling and neck pain. Skin: Positive for color change. Negative for pallor, rash and wound. Allergic/Immunologic: Negative for environmental allergies and food allergies. Neurological: Negative for dizziness, tremors, weakness, light-headedness, numbness and headaches. Hematological: Negative for adenopathy. Psychiatric/Behavioral: Negative for agitation, confusion and sleep disturbance. The patient is not nervous/anxious. Physical Exam   Constitutional: He is oriented to person, place, and time and well-developed, well-nourished, and in no distress. Vital signs are normal. He appears to not be writhing in pain, not malnourished and not dehydrated. He appears healthy. He does not have a sickly appearance. No distress. Elderly, fragile in appearance,  male. Alert and responsive. In no acute distress. HENT:   Head: Normocephalic and atraumatic. Hair is abnormal.   Right Ear: External ear normal. Decreased hearing is noted. Left Ear: External ear normal. Decreased hearing is noted. Nose: Nose normal. No mucosal edema or rhinorrhea. Mouth/Throat: Uvula is midline and oropharynx is clear and moist. Mucous membranes are not pale, dry and not cyanotic. No oral lesions. No uvula swelling. No posterior oropharyngeal edema or posterior oropharyngeal erythema. Canals are clear, abrasions healed. Eyes: Conjunctivae, EOM and lids are normal. Pupils are equal, round, and reactive to light. Lids are everted and swept, no foreign bodies found.    Neck: Trachea normal and normal range of motion. Neck supple. No JVD present. No thyromegaly present. Cardiovascular: Regular rhythm, S1 normal, S2 normal, intact distal pulses and normal pulses. Occasional extrasystoles are present. Bradycardia present. Exam reveals distant heart sounds. Exam reveals no gallop and no friction rub. Murmur heard. Systolic murmur is present with a grade of 2/6   No extremity edema is present during today's exam.    Pulmonary/Chest: Effort normal and breath sounds normal.   Abdominal: Soft. Normal appearance. He exhibits no distension and no fluid wave. Bowel sounds are hyperactive. There is no hepatosplenomegaly. There is tenderness in the left lower quadrant. There is no rigidity, no guarding and no CVA tenderness. Musculoskeletal:        Right hip: He exhibits decreased range of motion, decreased strength and tenderness. Left hip: He exhibits decreased range of motion, decreased strength and tenderness. Lumbar back: He exhibits decreased range of motion, tenderness, pain and spasm. Right hand: He exhibits decreased range of motion and swelling. Decreased sensation noted. Decreased strength noted. He exhibits thumb/finger opposition. Left hand: He exhibits decreased range of motion, tenderness, decreased capillary refill and deformity. Decreased sensation noted. Decreased strength noted. He exhibits thumb/finger opposition. Lymphadenopathy:     He has no cervical adenopathy. Neurological: He is alert and oriented to person, place, and time. He has intact cranial nerves. He displays weakness, atrophy and abnormal stance. A sensory deficit is present. He exhibits abnormal muscle tone. Coordination and gait abnormal. GCS score is 15. Skin: Skin is warm, dry and intact. No bruising and no rash noted. He is not diaphoretic. No pallor. Nails show no clubbing. Psychiatric: Memory and judgment normal. His mood appears anxious.  He has a flat affect. Baseline mood and affect unchanged from normal.       Disclaimer:   Mr. Talisha Ramesh has been advised to call or return to our office if symptoms worsen/change/persist. We as a care team including the patient; discussed expected course/resolution/complications of diagnosis in detail today. Medication risks/benefits/costs/interactions/alternatives discussed Talisha Ramesh was given an after visit summary which includes diagnoses, current medications, & vitals. Talisha Ramesh expressed understanding with the diagnosis and plan.

## 2018-08-06 NOTE — LETTER
[facility] 
[phone] POST APPOINTMENT INSTRUCTIONS/PLAN OF CARE 
08/06/18 Do not change the plan of care without letting the office know. If you do not receive your medications it is your responsibility to contact your pharmacy. [facility] [address], [address] Phone: [phone] Fax: [phone] [address]  
[address] [facility] [address] Phone: [phone] Fax: [phone] Discontinued Medication/Treatment:     
DO NOT TAKE THE FOLLOWING MEDICATION:  
1. Metamucil - Please stop this until further notice. Treatment Plan: 1. Functional diarrhea 2. Anal or rectal pain 3. External hemorrhoid, bleeding 4. Postoperative hypothyroidism Orders Placed This Encounter  levothyroxine (SYNTHROID) 25 mcg tablet Sig: Take 1 Tab by mouth Daily (before breakfast). Indications: hypothyroidism Dispense:  90 Tab Refill:  1  
 loperamide (IMMODIUM) 2 mg tablet Sig: Take 1/2 tablet by mouth every BM x 24 hrs. Then twice daily x 3 days, then once daily x 3 days, then stop. Dispense:  30 Tab Refill:  0  
 hydrocortisone-pramoxine (PROCTOFOAM HC) rectal foam  
  Sig: Insert 1 Applicator into rectum three (3) times daily as needed for Hemorrhoids. Dispense:  1 Can Refill:  0 Return Visit/Follow Up: We will call to check on you tomorrow AM. We appreciate you allowing us to participate in your health care. Any questions do not hesitate to call Isatu Tay [facility] Nurse at [phone]. PRESCRIPTION REFILL POLICY [employer] Statement to Patients 08/06/18 In an effort to ensure patient prescription refills are processed in the most efficient and expeditious manner, we are asking our patients to assist us by calling your Pharmacy for all prescription refills, this will include your  Mail Order Pharmacy. The pharmacy will contact our office electronically to continue the refill process. We need at least 48 hours (2 days) to fill prescriptions. We also encourage you to call your pharmacy before going to  your prescription to make sure it is ready. With regard to controlled substance prescription refill requests (narcotic refills, anxiety medications, or sleeping medications) we want to remind you that those require a physical script in hand to give the pharmacy to fill we ask that you allow us at least 72 hours (3 days) notice that you will need a refill. Thank you so much for your cooperation. The staff of [facility] at [facility]

## 2018-08-06 NOTE — PATIENT INSTRUCTIONS
Anal Pain: Care Instructions  Your Care Instructions  Pain in the opening to the rectum (anus) can be caused by diarrhea or constipation or by scratching a rectal itch. A common cause of anal pain is a tear in the lining of the lower rectum (anal fissure). This type of anal pain usually goes away when the problem clears up. Injury during anal sex or from an object being placed in the rectum also can cause pain. A rare cause of anal pain is spasms of the muscles in the rectum. Some of these conditions may cause some light bleeding. Home treatment usually can relieve anal pain. If you continue to have anal pain, your doctor may prescribe medicine to relieve pain and other symptoms. Depending on the cause, you may need other treatment. Follow-up care is a key part of your treatment and safety. Be sure to make and go to all appointments, and call your doctor if you are having problems. It's also a good idea to know your test results and keep a list of the medicines you take. How can you care for yourself at home? · Sit in a few inches of warm water (sitz bath) 3 times a day and after bowel movements. The warm water eases discomfort. Do not put soaps, salts, or shampoos in the water. · Drink plenty of fluids, enough so that your urine is light yellow or clear like water. If you have kidney, heart, or liver disease and have to limit fluids, talk with your doctor before you increase the amount of fluids you drink. · Include high-fiber foods, such as fruits, vegetables, beans, and whole grains, in your diet each day. · Take a fiber supplement, such as Benefiber, Citrucel, or Metamucil, every day. Read and follow all instructions on the label. · Use the toilet when you feel the urge. Or when you can, schedule time each day for a bowel movement. A daily routine may help. Take your time and do not strain when having a bowel movement. But do not sit on the toilet too long.   · Support your feet with a small step stool when you sit on the toilet. This helps flex your hips and places your pelvis in a squatting position. · Your doctor may recommend an over-the-counter laxative, such as Miralax, Milk of Magnesia, or Ex-Lax. Read and follow all instructions on the label, and do not use laxatives on a long-term basis. · Do not use over-the-counter ointments or creams without talking to your doctor. Some of these may not help. · Use baby wipes or medicated pads, such as Preparation H or Tucks, instead of toilet paper to clean after a bowel movement. These products do not irritate the anus. · Be safe with medicines. Read and follow all instructions on the label. ¨ If the doctor gave you a prescriptionmedicine for pain, give it as prescribed. ¨ If you are not taking a prescription pain medicine, ask your doctorif you can take an over-the-counter medicine. When should you call for help? Call your doctor now or seek immediate medical care if:    · You have new or worse pain.     · You have new or worse bleeding from the rectum.    Watch closely for changes in your health, and be sure to contact your doctor if:    · You have trouble passing stools.     · You do not get better as expected. Where can you learn more? Go to http://sloan-george.info/. Enter 486 0741 in the search box to learn more about \"Anal Pain: Care Instructions. \"  Current as of: May 12, 2017  Content Version: 11.7  © 5385-6797 [employer]. Care instructions adapted under license by [employer] (which disclaims liability or warranty for this information). If you have questions about a medical condition or this instruction, always ask your healthcare professional. [place] [address] any warranty or liability for your use of this information.        Hemorrhoids: Care Instructions  Your Care Instructions    Hemorrhoids are enlarged veins that develop in the anal canal. Bleeding during bowel movements, itching, swelling, and rectal pain are the most common symptoms. They can be uncomfortable at times, but hemorrhoids rarely are a serious problem. You can treat most hemorrhoids with simple changes to your diet and bowel habits. These changes include eating more fiber and not straining to pass stools. Most hemorrhoids do not need surgery or other treatment unless they are very large and painful or bleed a lot. Follow-up care is a key part of your treatment and safety. Be sure to make and go to all appointments, and call your doctor if you are having problems. It's also a good idea to know your test results and keep a list of the medicines you take. How can you care for yourself at home? · Sit in a few inches of warm water (sitz bath) 3 times a day and after bowel movements. The warm water helps with pain and itching. · Put ice on your anal area several times a day for 10 minutes at a time. Put a thin cloth between the ice and your skin. Follow this by placing a warm, wet towel on the area for another 10 to 20 minutes. · Take pain medicines exactly as directed. ¨ If the doctor gave you a prescription medicine for pain, take it as prescribed. ¨ If you are not taking a prescription pain medicine, ask your doctor if you can take an over-the-counter medicine. · Keep the anal area clean, but be gentle. Use water and a fragrance-free soap, such as [place], or use baby wipes or medicated pads, such as Tucks. · Wear cotton underwear and loose clothing to decrease moisture in the anal area. · Eat more fiber. Include foods such as whole-grain breads and cereals, raw vegetables, raw and dried fruits, and beans. · Drink plenty of fluids, enough so that your urine is light yellow or clear like water. If you have kidney, heart, or liver disease and have to limit fluids, talk with your doctor before you increase the amount of fluids you drink. · Use a stool softener that contains bran or psyllium.  You can save money by buying bran or psyllium (available in bulk at most health food stores) and sprinkling it on foods or stirring it into fruit juice. Or you can use a product such as Metamucil or Hydrocil. · Practice healthy bowel habits. ¨ Go to the bathroom as soon as you have the urge. ¨ Avoid straining to pass stools. Relax and give yourself time to let things happen naturally. ¨ Do not hold your breath while passing stools. ¨ Do not read while sitting on the toilet. Get off the toilet as soon as you have finished. · Take your medicines exactly as prescribed. Call your doctor if you think you are having a problem with your medicine. When should you call for help? Call 911 anytime you think you may need emergency care. For example, call if:    · You pass maroon or very bloody stools.    Call your doctor now or seek immediate medical care if:    · You have increased pain.     · You have increased bleeding.    Watch closely for changes in your health, and be sure to contact your doctor if:    · Your symptoms have not improved after 3 or 4 days. Where can you learn more? Go to http://sloan-george.info/. Enter F228 in the search box to learn more about \"Hemorrhoids: Care Instructions. \"  Current as of: May 12, 2017  Content Version: 11.7  © 7902-8983 [employer]. Care instructions adapted under license by [employer] (which disclaims liability or warranty for this information). If you have questions about a medical condition or this instruction, always ask your healthcare professional. [place] [address] any warranty or liability for your use of this information.

## 2018-08-06 NOTE — PROGRESS NOTES
ADVISED PATIENT OF THE FOLLOWING HEALTH MAINTAINCE DUE  Health Maintenance Due   Topic Date Due    DTaP/Tdap/Td series (1 - Tdap) 08/30/1956    ZOSTER VACCINE AGE 60>  06/30/1995    GLAUCOMA SCREENING Q2Y  08/30/2000    Influenza Age 9 to Adult  08/01/2018      Chief Complaint   Patient presents with    Diarrhea     Patient start on Friday with diarrhea. He says its about every 20 minutes or so. He has noted blood and what he says is pus.        1. Have you been to the ER, urgent care clinic since your last visit? Hospitalized since your last visit? No    2. Have you seen or consulted any other health care providers outside of the [address] [place] [address] since your last visit? Include any DEXA scan, mammography  or colon screening. No    3. Do you have an Advance Directive on file? yes    4. Do you have a DNR on file? Full        Patient is accompanied by self I have received verbal consent from Carly Humphreys to discuss any/all medical information while they are present in the room.       Advance Care Planning 8/6/2018   Patient's Healthcare Decision Maker is: Named in scanned ACP document   Primary Decision Maker Name Dinah Powell   Primary Decision Maker Phone Number [phone]   Primary Decision Maker Relationship to Patient Adult child   Confirm Advance Directive Yes, on file       [facility] [id number] [place] [address]  Phone: [phone] Fax: 1200 [facility], [facility] [address] [place] [address]  [address]  [place] [address]  Phone: [phone] Fax: [phone]

## 2018-08-06 NOTE — LETTER
[facility] 
[phone] POST APPOINTMENT INSTRUCTIONS/PLAN OF CARE 
08/06/18 Do not change the plan of care without letting the office know. If you do not receive your medications it is your responsibility to contact your pharmacy. [facility] [address], [address] [facility] Phone: [phone] Fax: [phone] [address]  
[address] [place] [address] Phone: [phone] Fax: [phone] Discontinued Medication/Treatment:     
DO NOT TAKE THE FOLLOWING MEDICATION:  
Medications Discontinued During This Encounter Medication Reason  Methylcellulose, with Sugar, (CITRUCEL, SUCROSE,) powd Alternate Therapy Treatment Plan: 1. Functional diarrhea 2. Anal or rectal pain 3. External hemorrhoid, bleeding 4. Postoperative hypothyroidism 5. Internal and external hemorrhoids without complication Orders Placed This Encounter  levothyroxine (SYNTHROID) 25 mcg tablet Sig: Take 1 Tab by mouth Daily (before breakfast). Indications: hypothyroidism Dispense:  90 Tab Refill:  1  
 hydrocortisone acetate (PROCTOCORT) 30 mg supp Sig: Insert 1 Suppository into rectum two (2) times a day for 3 days. Dispense:  1 Suppository Refill:  1  
 Lactobac. rhamnosus GG-inulin (CULTURELLE PROBIOTICS) 10 billion cell -200 mg cap Sig: Take 1 Cap by mouth two (2) times a day. Dispense:  60 Cap Refill:  3  
 loperamide (IMMODIUM) 2 mg tablet Sig: Take 1/2 tablet by mouth every BM x 24 hrs. Then twice daily x 3 days, then once daily x 3 days, then stop. Dispense:  30 Tab Refill:  0 Return Visit/Follow Up: We will call to check in Tuesday AM. We appreciate you allowing us to participate in your health care. Any questions do not hesitate to call [facility] Nurse at [phone]. PRESCRIPTION REFILL POLICY Liz Rosanna Statement to Patients 08/06/18 In an effort to ensure patient prescription refills are processed in the most efficient and expeditious manner, we are asking our patients to assist us by calling your Pharmacy for all prescription refills, this will include your  Mail Order Pharmacy. The pharmacy will contact our office electronically to continue the refill process. We need at least 48 hours (2 days) to fill prescriptions. We also encourage you to call your pharmacy before going to  your prescription to make sure it is ready. With regard to controlled substance prescription refill requests (narcotic refills, anxiety medications, or sleeping medications) we want to remind you that those require a physical script in hand to give the pharmacy to fill we ask that you allow us at least 72 hours (3 days) notice that you will need a refill. Thank you so much for your cooperation. The staff of [facility] at [facility]

## 2018-08-06 NOTE — Clinical Note
Please call Tuesday AM after 9 to check on patients diarrhea, review his medications and treatment, as well as discuss the BRAT Diet I put him on. He can slowly start reintroducing regular foods slowly if he has no diarrhea for 72 hours. Until then he should eat the BRAT diet to rest the bowel.

## 2018-08-06 NOTE — ACP (ADVANCE CARE PLANNING)
Advance Care Planning 8/6/2018   Patient's Healthcare Decision Maker is: Named in scanned ACP document   Primary Decision Maker Name Kelley Nieves   Primary Decision Maker Phone Number [phone]   Primary Decision Maker Relationship to Patient Adult child   Confirm Advance Directive Yes, on file

## 2018-08-07 ENCOUNTER — TELEPHONE (OUTPATIENT)
Dept: GERIATRIC MEDICINE | Age: 83
End: 2018-08-07

## 2018-08-07 NOTE — TELEPHONE ENCOUNTER
I called patient to ask how he was feeling today. He reports no diarrhea today. He was able to go to his dental appointment this am and get out without having diarrhea. He is following the DueProps. If he get worse or symptoms return he will call for an appointment.

## 2018-08-08 NOTE — PROGRESS NOTES
I was asked to correct billing for procedure code [id number] is not a chargeable code. I have corrected the billing to represent as such.

## 2018-08-10 ENCOUNTER — OFFICE VISIT (OUTPATIENT)
Dept: GERIATRIC MEDICINE | Age: 83
End: 2018-08-10

## 2018-08-10 VITALS
RESPIRATION RATE: 18 BRPM | HEART RATE: 63 BPM | BODY MASS INDEX: 27.16 KG/M2 | TEMPERATURE: 98.6 F | SYSTOLIC BLOOD PRESSURE: 122 MMHG | DIASTOLIC BLOOD PRESSURE: 63 MMHG | WEIGHT: 163 LBS | OXYGEN SATURATION: 98 % | HEIGHT: 65 IN

## 2018-08-10 DIAGNOSIS — K64.4 INTERNAL AND EXTERNAL HEMORRHOIDS WITHOUT COMPLICATION: ICD-10-CM

## 2018-08-10 DIAGNOSIS — R19.4 ALTERED BOWEL HABITS: Primary | ICD-10-CM

## 2018-08-10 DIAGNOSIS — K62.89 ANAL OR RECTAL PAIN: ICD-10-CM

## 2018-08-10 DIAGNOSIS — K64.8 INTERNAL AND EXTERNAL HEMORRHOIDS WITHOUT COMPLICATION: ICD-10-CM

## 2018-08-10 RX ORDER — LACTOBACILLUS RHAMNOSUS GG 15B CELL
1 CAPSULE, SPRINKLE ORAL 2 TIMES DAILY
Refills: 3 | COMMUNITY
Start: 2018-08-06 | End: 2018-10-15 | Stop reason: ALTCHOICE

## 2018-08-10 NOTE — PROGRESS NOTES
ADVISED PATIENT OF THE FOLLOWING HEALTH MAINTAINCE DUE  Health Maintenance Due   Topic Date Due    DTaP/Tdap/Td series (1 - Tdap) 08/30/1956    ZOSTER VACCINE AGE 60>  06/30/1995    GLAUCOMA SCREENING Q2Y  08/30/2000      Chief Complaint   Patient presents with    Diarrhea     Patient here for follow up with episodes of diarrhea. He reports the diarrhea is resolved and he is back to eating a regular diet. 1. Have you been to the ER, urgent care clinic since your last visit? Hospitalized since your last visit? No    2. Have you seen or consulted any other health care providers outside of the [facility] since your last visit? Include any DEXA scan, mammography  or colon screening. No    3. Do you have an Advance Directive on file? yes    4. Do you have a DNR on file? Full        Patient is accompanied by self I have received verbal consent from Patience Shabazz to discuss any/all medical information while they are present in the room.       Advance Care Planning 8/6/2018   Patient's Healthcare Decision Maker is: Named in scanned ACP document   Primary Decision Maker Name Gaudencio Blackwood   Primary Decision Maker Phone Number [phone]   Primary Decision Maker Relationship to Patient Adult child   Confirm Advance Directive Yes, on file       [employer] [address] [facility] [address] [facility]  [address] [facility] [address] [facility] [address]  Phone: [phone] Fax: 2005 [facility], . [address]  [address]  [address]  [facility] [address]  Phone: [phone] Fax: [phone]

## 2018-08-10 NOTE — PATIENT INSTRUCTIONS
Anal Pain: Care Instructions  Your Care Instructions  Pain in the opening to the rectum (anus) can be caused by diarrhea or constipation or by scratching a rectal itch. A common cause of anal pain is a tear in the lining of the lower rectum (anal fissure). This type of anal pain usually goes away when the problem clears up. Injury during anal sex or from an object being placed in the rectum also can cause pain. A rare cause of anal pain is spasms of the muscles in the rectum. Some of these conditions may cause some light bleeding. Home treatment usually can relieve anal pain. If you continue to have anal pain, your doctor may prescribe medicine to relieve pain and other symptoms. Depending on the cause, you may need other treatment. Follow-up care is a key part of your treatment and safety. Be sure to make and go to all appointments, and call your doctor if you are having problems. It's also a good idea to know your test results and keep a list of the medicines you take. How can you care for yourself at home? · Sit in a few inches of warm water (sitz bath) 3 times a day and after bowel movements. The warm water eases discomfort. Do not put soaps, salts, or shampoos in the water. · Drink plenty of fluids, enough so that your urine is light yellow or clear like water. If you have kidney, heart, or liver disease and have to limit fluids, talk with your doctor before you increase the amount of fluids you drink. · Include high-fiber foods, such as fruits, vegetables, beans, and whole grains, in your diet each day. · Take a fiber supplement, such as Benefiber, Citrucel, or Metamucil, every day. Read and follow all instructions on the label. · Use the toilet when you feel the urge. Or when you can, schedule time each day for a bowel movement. A daily routine may help. Take your time and do not strain when having a bowel movement. But do not sit on the toilet too long.   · Support your feet with a small step stool when you sit on the toilet. This helps flex your hips and places your pelvis in a squatting position. · Your doctor may recommend an over-the-counter laxative, such as Miralax, Milk of Magnesia, or Ex-Lax. Read and follow all instructions on the label, and do not use laxatives on a long-term basis. · Do not use over-the-counter ointments or creams without talking to your doctor. Some of these may not help. · Use baby wipes or medicated pads, such as Preparation H or Tucks, instead of toilet paper to clean after a bowel movement. These products do not irritate the anus. · Be safe with medicines. Read and follow all instructions on the label. ¨ If the doctor gave you a prescriptionmedicine for pain, give it as prescribed. ¨ If you are not taking a prescription pain medicine, ask your doctorif you can take an over-the-counter medicine. When should you call for help? Call your doctor now or seek immediate medical care if:    · You have new or worse pain.     · You have new or worse bleeding from the rectum.    Watch closely for changes in your health, and be sure to contact your doctor if:    · You have trouble passing stools.     · You do not get better as expected. Where can you learn more? Go to http://sloan-george.info/. Enter 486 6561 in the search box to learn more about \"Anal Pain: Care Instructions. \"  Current as of: May 12, 2017  Content Version: 11.7  © 6937-5356 [employer]. Care instructions adapted under license by [employer] (which disclaims liability or warranty for this information). If you have questions about a medical condition or this instruction, always ask your healthcare professional. [address] any warranty or liability for your use of this information. Acne: Care Instructions  Your Care Instructions  Acne is a skin problem that shows up as blackheads, whiteheads, and pimples.  It most often affects the face, neck, and upper body. Acne occurs when oil and dead skin cells clog the skin's pores. Acne usually starts during the teen years and often lasts into adulthood. Gentle cleansing every day controls most mild acne. If home treatment does not work, your doctor may prescribe creams, antibiotics, or a stronger medicine called isotretinoin. Sometimes birth control pills help women who have monthly acne flare-ups. Follow-up care is a key part of your treatment and safety. Be sure to make and go to all appointments, and call your doctor if you are having problems. It's also a good idea to know your test results and keep a list of the medicines you take. How can you care for yourself at home? · Gently wash your face 1 or 2 times a day with warm (not hot) water and a mild soap or cleanser. Always rinse well. · Use an over-the-counter lotion or gel that contains benzoyl peroxide. Start with a small amount of 2.5% benzoyl peroxide and increase the strength as needed. Benzoyl peroxide works well for acne, but you may need to use it for up to 2 months before your acne starts to improve. · Apply acne cream, lotion, or gel to all the places you get pimples, blackheads, or whiteheads, not just where you have them now. Follow the instructions carefully. If your skin gets too dry and scaly or red and sore, reduce the amount. For the best results, apply medicines as directed. Try not to miss doses. · Do not squeeze or pick pimples and blackheads. This can cause infection and scarring. · Use only oil-free makeup, sunscreen, and other skin care products that will not clog your pores. · Wash your hair every day, and try to keep it off your face and shoulders. Consider pinning it back or cutting it short. When should you call for help? Watch closely for changes in your health, and be sure to contact your doctor if:    · You have tried home treatment for 6 to 8 weeks and your acne is not better or gets worse.  Your doctor may need to add to or change your treatment.     · Your pimples become large and hard or filled with fluid.     · Scars form after pimples heal.     · You feel sad or hopeless, lack energy, or have other signs of depression while you are taking the prescription medicine isotretinoin.     · You start to have other symptoms, such as facial hair growth in women or bone and muscle pain. Where can you learn more? Go to http://sloan-george.info/. Enter V108 in the search box to learn more about \"Acne: Care Instructions. \"  Current as of: October 5, 2017  Content Version: 11.7  © 7405-0772 [employer]. Care instructions adapted under license by [employer] (which disclaims liability or warranty for this information). If you have questions about a medical condition or this instruction, always ask your healthcare professional. [address] any warranty or liability for your use of this information.

## 2018-08-10 NOTE — PROGRESS NOTES
Reason for Visit/HPI:    Mady Almazan is a 80 y.o. male patient who presents today for   Chief Complaint   Patient presents with    Diarrhea     Patient here for follow up with episodes of diarrhea. He reports the diarrhea is resolved and he is back to eating a regular diet. Diagnosis/Treatment Plan:    Diagnoses and all orders for this visit:    1. Altered bowel habits ; diarrhea has resolved and he is weaning off the Imodium. I have advised him to restart his Citracel as this can help with bowel health. 2. Anal or rectal pain ; Resolved without use of suppositories. 3. Internal and external hemorrhoids without complication ; Resolved. Discontinued Care: The following treatment modalities have been discontinued by the provider today:   Medications Discontinued During This Encounter   Medication Reason    loperamide (IMMODIUM) 2 mg tablet Therapy Completed    hydrocortisone acetate (PROCTOCORT) 30 mg supp Therapy Completed     Follow Up: Follow-up Disposition:  Return in about 3 months (around 11/10/2018) for routine care and follow up to health concerns. Subjective: Allergies   Allergen Reactions    Toprol Xl [Metoprolol Succinate] Diarrhea    Codeine Other (comments)    Sulfa (Sulfonamide Antibiotics) Hives     Prior to Admission medications    Medication Sig Start Date End Date Taking? Authorizing Provider   CULTURELLE 15 billion cell capsule Take 1 Tab by mouth two (2) times a day. 8/6/18  Yes Historical Provider   levothyroxine (SYNTHROID) 25 mcg tablet Take 1 Tab by mouth Daily (before breakfast). Indications: hypothyroidism 8/6/18  Yes LIBIA Ivan. rhamnosus GG-inulin (CULTURELLE PROBIOTICS) 10 billion cell -200 mg cap Take 1 Cap by mouth two (2) times a day.  8/6/18  Yes Umm Ch NP   finasteride (PROSCAR) 5 mg tablet TAKE 1 TABLET BY MOUTH  NIGHTLY FOR BENIGN  PROSTATIC HYPERPLASIA WITH  LOWER URINARY TRACT  SYMPTOMS 7/31/18  Yes Ping Rao Darcie Bocanegra NP   biotin 10,000 mcg cap Take  by mouth. Yes Historical Provider   carbamide peroxide (DEBROX) 6.5 % otic solution Apply 2 drops to both ear canals once a week at night to keep wax from building up. Do not stop. Indications: IMPACTED CERUMEN 6/29/18  Yes Rhonda Yip NP   diazePAM (VALIUM) 5 mg tablet Take 1 Tab by mouth every six (6) hours as needed. Max Daily Amount: 20 mg. Indications: Muscle Spasm 6/12/18  Yes Rhonda Yip NP   omeprazole (PRILOSEC) 40 mg capsule Take 1 Cap by mouth daily. Indications: gastroesophageal reflux disease 6/12/18  Yes Rhonda Yip NP   simvastatin (ZOCOR) 20 mg tablet TAKE 1 TABLET BY MOUTH  NIGHTLY 5/25/18  Yes Rhonda Yip NP   melatonin 5 mg cap capsule Take 5 mg by mouth nightly. Yes Historical Provider   acetaminophen (TYLENOL) 325 mg tablet Take 650 mg by mouth every four (4) hours as needed for Pain. Yes Historical Provider   aspirin delayed-release 81 mg tablet Take 81 mg by mouth daily. Yes Historical Provider   vit C,A-We-nmngb-lutein-zeaxan (PRESERVISION AREDS 2) 579-186-36-8 mg-unit-mg-mg cap Take 2 Caps by mouth daily.    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 ([facility] [address] 10/06/2016    Bilateral thumb pain 02/01/2017    CAD (coronary artery disease)     Cardiac murmur 18/52/4056    Folliculitis 72/36/8635    GERD (gastroesophageal reflux disease)     Hypercholesteremia     Hypothyroid     Left hip pain 02/01/2017    Lumbar radiculitis     Lumbar spondylitis (HCC)     Meniere disease     Osteoarthritis 02/19/2018    hands    Osteopenia of both hands 02/18/2018    Polyp of cecum 08/02/2017    5mm polyp in cecum, 8 mm polyp in sigmoid colon    Spinal enthesopathy of lumbar region ([facility] [address] 02/01/2017     Past Surgical History:   Procedure Laterality Date    CARDIAC SURG PROCEDURE UNLIST  HX CATARACT REMOVAL Bilateral     HX CHOLECYSTECTOMY  1994    HX COLONOSCOPY  12/19/2012    tubular adenoma    HX COLONOSCOPY  08/02/2017    tubular adenomas    HX CORONARY ARTERY BYPASS GRAFT  1996    HX CORONARY STENT PLACEMENT      HX KNEE ARTHROSCOPY Left 2005    menisectomy    HX ROTATOR CUFF REPAIR Left     HX THYROIDECTOMY  1975    HX TONSIL AND ADENOIDECTOMY        Social History   Substance Use Topics    Smoking status: Former Smoker    Smokeless tobacco: Never Used    Alcohol use 4.2 oz/week     7 Glasses of wine per week      Family History   Problem Relation Age of Onset    No Known Problems Mother     No Known Problems Father     No Known Problems Brother      Advance Care Planning 8/6/2018   Patient's Healthcare Decision Maker is: Named in scanned ACP document   Primary Decision Maker Name Christine Egan   Primary Decision Maker Phone Number [phone]   Primary Decision Maker Relationship to Patient Adult child   Confirm Advance Directive Yes, on file     Latest Laboratory Results:     Lab Results   Component Value Date/Time    WBC 7.3 03/30/2018 03:28 PM    HGB 16.5 03/30/2018 03:28 PM    HCT 48.0 03/30/2018 03:28 PM    PLATELET 312 98/66/6966 03:28 PM    MCV 93 03/30/2018 03:28 PM     Lab Results   Component Value Date/Time    Sodium 141 06/12/2018 11:26 AM    Potassium 4.4 06/12/2018 11:26 AM    Chloride 104 06/12/2018 11:26 AM    CO2 23 06/12/2018 11:26 AM    Anion gap 7 02/15/2017 05:07 PM    Glucose 104 (H) 06/12/2018 11:26 AM    BUN 12 06/12/2018 11:26 AM    Creatinine 0.97 06/12/2018 11:26 AM    BUN/Creatinine ratio 12 06/12/2018 11:26 AM    GFR est AA 84 06/12/2018 11:26 AM    GFR est non-AA 72 06/12/2018 11:26 AM    Calcium 9.5 06/12/2018 11:26 AM    Bilirubin, total 0.7 06/12/2018 11:26 AM    AST (SGOT) 20 06/12/2018 11:26 AM    Alk.  phosphatase 70 06/12/2018 11:26 AM    Protein, total 6.0 06/12/2018 11:26 AM    Albumin 3.9 06/12/2018 11:26 AM    Globulin 2.9 02/15/2017 05:07 PM    A-G Ratio 1.9 06/12/2018 11:26 AM    ALT (SGPT) 20 06/12/2018 11:26 AM     Objective:     Vitals:    08/10/18 1335   BP: 122/63   Pulse: 63   Resp: 18   Temp: 98.6 °F (37 °C)   TempSrc: Oral   SpO2: 98%   Weight: 163 lb (73.9 kg)   Height: 5' 5\" (1.651 m)     Wt Readings from Last 3 Encounters:   08/10/18 163 lb (73.9 kg)   08/06/18 161 lb 8 oz (73.3 kg)   07/06/18 165 lb 14.4 oz (75.3 kg)     BP Readings from Last 3 Encounters:   08/10/18 122/63   08/06/18 160/88   07/06/18 124/66     Review of Systems   Constitutional: Negative for activity change, appetite change, fatigue and fever. HENT: Negative for congestion, dental problem, hearing loss, postnasal drip, sinus pressure, sneezing, sore throat and trouble swallowing. Eyes: Negative for discharge, redness and visual disturbance. Respiratory: Negative for apnea, cough, chest tightness, shortness of breath and wheezing. Cardiovascular: Negative for chest pain, palpitations and leg swelling. Gastrointestinal: Negative for abdominal distention, abdominal pain, blood in stool, constipation, diarrhea, nausea and vomiting. Endocrine: Negative for polydipsia, polyphagia and polyuria. Genitourinary: Negative for flank pain, frequency and urgency. Musculoskeletal: Negative for arthralgias, gait problem, joint swelling and neck pain. Skin: Negative for color change, pallor, rash and wound. Allergic/Immunologic: Negative for environmental allergies and food allergies. Neurological: Negative for dizziness, tremors, weakness, light-headedness, numbness and headaches. Hematological: Negative for adenopathy. Psychiatric/Behavioral: Negative for agitation, confusion and sleep disturbance. The patient is not nervous/anxious. Physical Exam   Constitutional: He is oriented to person, place, and time and well-developed, well-nourished, and in no distress.  Vital signs are normal. He appears to not be writhing in pain, not malnourished and not dehydrated. He appears healthy. He does not have a sickly appearance. No distress. Elderly,  male. Alert and responsive. In no acute distress. HENT:   Head: Normocephalic and atraumatic. Hair is abnormal.   Right Ear: External ear normal. Decreased hearing is noted. Left Ear: External ear normal. Decreased hearing is noted. Nose: Nose normal. No mucosal edema or rhinorrhea. Mouth/Throat: Uvula is midline and oropharynx is clear and moist. Mucous membranes are not pale, not dry and not cyanotic. No oral lesions. No uvula swelling. No posterior oropharyngeal edema or posterior oropharyngeal erythema. Eyes: Conjunctivae, EOM and lids are normal. Pupils are equal, round, and reactive to light. Lids are everted and swept, no foreign bodies found. Neck: Trachea normal, normal range of motion and full passive range of motion without pain. Neck supple. No JVD present. No thyromegaly present. Cardiovascular: Regular rhythm, S1 normal, S2 normal, intact distal pulses and normal pulses. Occasional extrasystoles are present. Bradycardia present. Exam reveals no gallop and no friction rub. Murmur heard. No extremity edema is present during today's exam.    Pulmonary/Chest: Effort normal and breath sounds normal.   Abdominal: Soft. Normal appearance, normal aorta and bowel sounds are normal. He exhibits no distension and no fluid wave. There is no hepatosplenomegaly. There is no tenderness. There is no rigidity, no guarding and no CVA tenderness. Musculoskeletal:        Right hip: Normal.        Lumbar back: He exhibits decreased range of motion, tenderness, pain and spasm. Right hand: He exhibits normal range of motion and no swelling. Normal sensation noted. Normal strength noted. Left hand: He exhibits normal range of motion, no tenderness, normal capillary refill and no deformity. Normal sensation noted. Normal strength noted.    Lymphadenopathy:     He has no cervical adenopathy. Neurological: He is alert and oriented to person, place, and time. He has intact cranial nerves. He displays weakness. He displays no atrophy and normal stance. No sensory deficit. He exhibits normal muscle tone. Coordination abnormal. Gait normal. GCS score is 15. Skin: Skin is warm, dry and intact. No bruising and no rash noted. He is not diaphoretic. No pallor. Nails show no clubbing. Psychiatric: Mood, memory, affect and judgment normal.   Baseline mood and affect unchanged from normal.       Disclaimer:   Mr. Marianela Falcon has been advised to call or return to our office if symptoms worsen/change/persist. We as a care team including the patient; discussed expected course/resolution/complications of diagnosis in detail today. Medication risks/benefits/costs/interactions/alternatives discussed Marianela Falcon was given an after visit summary which includes diagnoses, current medications, & vitals. Marianela Falcon expressed understanding with the diagnosis and plan.

## 2018-09-07 DIAGNOSIS — K21.00 GASTROESOPHAGEAL REFLUX DISEASE WITH ESOPHAGITIS: Primary | ICD-10-CM

## 2018-09-07 DIAGNOSIS — R35.1 NOCTURIA: ICD-10-CM

## 2018-09-07 DIAGNOSIS — E78.2 MIXED HYPERLIPIDEMIA: ICD-10-CM

## 2018-09-08 RX ORDER — SIMVASTATIN 20 MG/1
TABLET, FILM COATED ORAL
Qty: 90 TAB | Refills: 1 | Status: SHIPPED | COMMUNITY
Start: 2018-09-08 | End: 2019-04-24 | Stop reason: ALTCHOICE

## 2018-09-08 RX ORDER — OMEPRAZOLE 40 MG/1
CAPSULE, DELAYED RELEASE ORAL
Qty: 90 CAP | Refills: 1 | Status: SHIPPED | COMMUNITY
Start: 2018-09-08 | End: 2019-02-04 | Stop reason: SDUPTHER

## 2018-10-08 ENCOUNTER — HOSPITAL ENCOUNTER (EMERGENCY)
Age: 83
Discharge: HOME | End: 2018-10-08
Attending: EMERGENCY MEDICINE
Payer: MEDICARE

## 2018-10-08 VITALS
OXYGEN SATURATION: 96 % | HEIGHT: 65 IN | SYSTOLIC BLOOD PRESSURE: 117 MMHG | TEMPERATURE: 98.5 F | BODY MASS INDEX: 26.92 KG/M2 | HEART RATE: 65 BPM | RESPIRATION RATE: 14 BRPM | DIASTOLIC BLOOD PRESSURE: 56 MMHG | WEIGHT: 161.6 LBS

## 2018-10-08 DIAGNOSIS — L03.90 CELLULITIS, UNSPECIFIED CELLULITIS SITE: ICD-10-CM

## 2018-10-08 DIAGNOSIS — L25.9 CONTACT DERMATITIS, UNSPECIFIED CONTACT DERMATITIS TYPE, UNSPECIFIED TRIGGER: Primary | ICD-10-CM

## 2018-10-08 PROCEDURE — 99283 EMERGENCY DEPT VISIT LOW MDM: CPT

## 2018-10-08 PROCEDURE — 74011250637 HC RX REV CODE- 250/637: Performed by: EMERGENCY MEDICINE

## 2018-10-08 PROCEDURE — 74011636637 HC RX REV CODE- 636/637: Performed by: EMERGENCY MEDICINE

## 2018-10-08 PROCEDURE — A9270 NON-COVERED ITEM OR SERVICE: HCPCS | Performed by: EMERGENCY MEDICINE

## 2018-10-08 RX ORDER — CEPHALEXIN 250 MG/1
250 CAPSULE ORAL
Status: COMPLETED | OUTPATIENT
Start: 2018-10-08 | End: 2018-10-08

## 2018-10-08 RX ORDER — PREDNISONE 20 MG/1
40 TABLET ORAL
Status: COMPLETED | OUTPATIENT
Start: 2018-10-08 | End: 2018-10-08

## 2018-10-08 RX ORDER — CEPHALEXIN 250 MG/1
250 CAPSULE ORAL 4 TIMES DAILY
Qty: 28 CAP | Refills: 0 | Status: SHIPPED | OUTPATIENT
Start: 2018-10-08 | End: 2018-10-15 | Stop reason: ALTCHOICE

## 2018-10-08 RX ORDER — PREDNISONE 20 MG/1
40 TABLET ORAL DAILY
Qty: 4 TAB | Refills: 0 | Status: SHIPPED | OUTPATIENT
Start: 2018-10-08 | End: 2018-10-10

## 2018-10-08 RX ORDER — HYDROCORTISONE 0.5 %
OINTMENT (GRAM) TOPICAL 2 TIMES DAILY
Qty: 15 G | Refills: 0 | Status: SHIPPED | OUTPATIENT
Start: 2018-10-08 | End: 2018-10-18

## 2018-10-08 RX ADMIN — CEPHALEXIN 250 MG: 250 CAPSULE ORAL at 15:39

## 2018-10-08 RX ADMIN — PREDNISONE 40 MG: 20 TABLET ORAL at 15:40

## 2018-10-08 NOTE — ED PROVIDER NOTES
HPI Comments: 66-year-old white male presents to the emergency department with a rash. Patient reports that several days ago he was using a special underwear at the gym for support. He says that he underwear was tight and hot and uncomfortable. He has noticed over the past few days since he is use that, but he has developed a red itchy irritated rash on his groin. It is worse in the right groin and onto the scrotum. He has also noticed it up into the left groin. Patient denies fevers. No vomiting. The rash is not painful but it is itchy and irritated. He has been putting lotion on the area but no other medicines. He denies having a rash like this in the past. Patient denies any new medications. He does state that he changed his soap last week. No headaches. No neck pain. No abdominal pain. No alcohol or tobacco. 
 
 
The history is provided by the patient. Past Medical History:  
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  
 hands  Osteopenia of both hands 02/18/2018  Polyp of cecum 08/02/2017 5mm polyp in cecum, 8 mm polyp in sigmoid colon  Spinal enthesopathy of lumbar region ([address] 02/01/2017 Past Surgical History:  
Procedure Laterality Date  CARDIAC SURG PROCEDURE UNLIST  HX CATARACT REMOVAL Bilateral   
 [facility]  HX COLONOSCOPY  12/19/2012  
 tubular adenoma  HX COLONOSCOPY  08/02/2017  
 tubular adenomas [address]  HX KNEE ARTHROSCOPY Left 2005  
 menisectomy  HX ROTATOR CUFF REPAIR Left [address]  HX TONSIL AND ADENOIDECTOMY Family History:  
Problem Relation Age of Onset  No Known Problems Mother  No Known Problems Father  No Known Problems Brother Social History Social History  Marital status: SINGLE Spouse name: N/A  
 Number of children: N/A  
 Years of education: N/A Occupational History  Not on file. Social History Main Topics  Smoking status: Former Smoker  Smokeless tobacco: Never Used  Alcohol use 4.2 oz/week  
  7 Glasses of wine per week  Drug use: No  
 Sexual activity: No  
 
Other Topics Concern  Not on file Social History Narrative ALLERGIES: Toprol xl [metoprolol succinate]; Codeine; and Sulfa (sulfonamide antibiotics) Review of Systems Constitutional: Negative for fever. HENT: Negative for congestion. Eyes: Negative for pain. Respiratory: Negative for shortness of breath. Cardiovascular: Negative for chest pain and leg swelling. Gastrointestinal: Negative for abdominal pain. Endocrine: Negative for polyuria. Genitourinary: Negative for flank pain. Musculoskeletal: Negative for gait problem and neck pain. Skin: Positive for color change and rash. Allergic/Immunologic: Negative for immunocompromised state. Neurological: Negative for headaches. Hematological: Does not bruise/bleed easily. Psychiatric/Behavioral: Negative for confusion. All other systems reviewed and are negative. Vitals:  
 10/08/18 1527 BP: 117/56 Pulse: 65 Resp: 14 Temp: 98.5 °F (36.9 °C) SpO2: 96% Weight: 73.3 kg (161 lb 9.6 oz) Height: 5' 5\" (1.651 m) Physical Exam  
Constitutional: He is oriented to person, place, and time. He appears well-developed and well-nourished. No distress. HENT:  
Head: Normocephalic and atraumatic.   
Right Ear: External ear normal.  
 Left Ear: External ear normal.  
Eyes: EOM are normal. Pupils are equal, round, and reactive to light. Neck: Normal range of motion. Neck supple. No JVD present. No tracheal deviation present. Cardiovascular: Normal rate, regular rhythm and normal heart sounds. Exam reveals no gallop and no friction rub. No murmur heard. Pulmonary/Chest: Effort normal and breath sounds normal. No stridor. No respiratory distress. He has no wheezes. He has no rales. Abdominal: Soft. Bowel sounds are normal. He exhibits no distension. There is no tenderness. There is no rebound and no guarding. Musculoskeletal: Normal range of motion. He exhibits no edema, tenderness or deformity. Neurological: He is alert and oriented to person, place, and time. He has normal reflexes. No cranial nerve deficit. He exhibits normal muscle tone. Coordination normal.  
Skin: Skin is warm and dry. Rash noted. He is not diaphoretic. There is erythema. Red and irritated rash to right groin, scrotum and mild amount in left groin, no vesicles, no fluctuance Psychiatric: He has a normal mood and affect. His behavior is normal. Judgment and thought content normal.  
Nursing note and vitals reviewed. MDM Number of Diagnoses or Management Options Diagnosis management comments: Patient has a contact dermatitis to the groin and now has developed cellulitis. We'll treat with Keflex as well as prednisone. Will give hydrocortisone ointment. PCP followup. Patient agrees. Amount and/or Complexity of Data Reviewed Decide to obtain previous medical records or to obtain history from someone other than the patient: yes Review and summarize past medical records: yes Independent visualization of images, tracings, or specimens: yes ED Course Procedures Good return precautions given to patient. Close follow up with PCP recommended. Patient and/or family voices understanding of this plan. Discharge instructions were explained by me and all concerns were addressed.

## 2018-10-08 NOTE — ED TRIAGE NOTES
TRIAGE NOTE: Patient arrived from home with c/o rash in the groin that started a couple days ago. Denies pain with urinating. +burning/itching

## 2018-10-08 NOTE — DISCHARGE INSTRUCTIONS
Cellulitis: Care Instructions  Your Care Instructions    Cellulitis is a skin infection caused by bacteria, most often strep or staph. It often occurs after a break in the skin from a scrape, cut, bite, or puncture, or after a rash. Cellulitis may be treated without doing tests to find out what caused it. But your doctor may do tests, if needed, to look for a specific bacteria, like methicillin-resistant Staphylococcus aureus (MRSA). The doctor has checked you carefully, but problems can develop later. If you notice any problems or new symptoms, get medical treatment right away. Follow-up care is a key part of your treatment and safety. Be sure to make and go to all appointments, and call your doctor if you are having problems. It's also a good idea to know your test results and keep a list of the medicines you take. How can you care for yourself at home? · Take your antibiotics as directed. Do not stop taking them just because you feel better. You need to take the full course of antibiotics. · Prop up the infected area on pillows to reduce pain and swelling. Try to keep the area above the level of your heart as often as you can. · If your doctor told you how to care for your wound, follow your doctor's instructions. If you did not get instructions, follow this general advice:  ¨ Wash the wound with clean water 2 times a day. Don't use hydrogen peroxide or alcohol, which can slow healing. ¨ You may cover the wound with a thin layer of petroleum jelly, such as Vaseline, and a nonstick bandage. ¨ Apply more petroleum jelly and replace the bandage as needed. · Be safe with medicines. Take pain medicines exactly as directed. ¨ If the doctor gave you a prescription medicine for pain, take it as prescribed. ¨ If you are not taking a prescription pain medicine, ask your doctor if you can take an over-the-counter medicine.   To prevent cellulitis in the future  · Try to prevent cuts, scrapes, or other injuries to your skin. Cellulitis most often occurs where there is a break in the skin. · If you get a scrape, cut, mild burn, or bite, wash the wound with clean water as soon as you can to help avoid infection. Don't use hydrogen peroxide or alcohol, which can slow healing. · If you have swelling in your legs (edema), support stockings and good skin care may help prevent leg sores and cellulitis. · Take care of your feet, especially if you have diabetes or other conditions that increase the risk of infection. Wear shoes and socks. Do not go barefoot. If you have athlete's foot or other skin problems on your feet, talk to your doctor about how to treat them. When should you call for help? Call your doctor now or seek immediate medical care if:    · You have signs that your infection is getting worse, such as:  ¨ Increased pain, swelling, warmth, or redness. ¨ Red streaks leading from the area. ¨ Pus draining from the area. ¨ A fever.     · You get a rash.    Watch closely for changes in your health, and be sure to contact your doctor if:    · You do not get better as expected. Where can you learn more? Go to http://sloan-george.info/. Shante Alfonso in the search box to learn more about \"Cellulitis: Care Instructions. \"  Current as of: April 18, 2018  Content Version: 11.8  © 5747-1323 "[employer]. Care instructions adapted under license by [employer] (which disclaims liability or warranty for this information). If you have questions about a medical condition or this instruction, always ask your healthcare professional. [place] [address] any warranty or liability for your use of this information. Dermatitis: Care Instructions  Your Care Instructions  Dermatitis is the general name used for any rash or inflammation of the skin. Different kinds of dermatitis cause different kinds of rashes.  Common causes of a rash include new medicines, plants (such as poison oak or poison ivy), heat, and stress. Certain illnesses can also cause a rash. An allergic reaction to something that touches your skin, such as latex, nickel, or poison ivy, is called contact dermatitis. Contact dermatitis may also be caused by something that irritates the skin, such as bleach, a chemical, or soap. These types of rashes cannot be spread from person to person. How long your rash will last depends on what caused it. Rashes may last a few days or months. Follow-up care is a key part of your treatment and safety. Be sure to make and go to all appointments, and call your doctor if you are having problems. It's also a good idea to know your test results and keep a list of the medicines you take. How can you care for yourself at home? · Do not scratch the rash. Cut your nails short, and file them smooth. Or wear gloves if this helps keep you from scratching. · Wash the area with water only. Pat dry. · Put cold, wet cloths on the rash to reduce itching. · Keep cool, and stay out of the sun. · Leave the rash open to the air as much as possible. · If the rash itches, use hydrocortisone cream. Follow the directions on the label. Calamine lotion may help for plant rashes. · Take an over-the-counter antihistamine, such as diphenhydramine (Benadryl) or loratadine (Claritin), to help calm the itching. Read and follow all instructions on the label. · If your doctor prescribed a cream, use it as directed. If your doctor prescribed medicine, take it exactly as directed. When should you call for help? Call your doctor now or seek immediate medical care if:    · You have symptoms of infection, such as:  ¨ Increased pain, swelling, warmth, or redness. ¨ Red streaks leading from the area. ¨ Pus draining from the area.   ¨ A fever.     · You have joint pain along with the rash.    Watch closely for changes in your health, and be sure to contact your doctor if:    · Your rash is changing or getting worse.     · You are not getting better as expected. Where can you learn more? Go to http://sloan-george.info/. Enter ([phone] in the search box to learn more about \"Dermatitis: Care Instructions. \"  Current as of: April 18, 2018  Content Version: 11.8  © 1827-1542 [employer], [employer]. Care instructions adapted under license by [employer] (which disclaims liability or warranty for this information). If you have questions about a medical condition or this instruction, always ask your healthcare professional. [place] [address] any warranty or liability for your use of this information.

## 2018-10-15 ENCOUNTER — OFFICE VISIT (OUTPATIENT)
Dept: GERIATRIC MEDICINE | Age: 83
End: 2018-10-15

## 2018-10-15 VITALS
SYSTOLIC BLOOD PRESSURE: 121 MMHG | DIASTOLIC BLOOD PRESSURE: 70 MMHG | HEIGHT: 65 IN | HEART RATE: 73 BPM | RESPIRATION RATE: 18 BRPM | OXYGEN SATURATION: 98 % | WEIGHT: 163 LBS | BODY MASS INDEX: 27.16 KG/M2 | TEMPERATURE: 97.1 F

## 2018-10-15 DIAGNOSIS — Z71.89 ACP (ADVANCE CARE PLANNING): ICD-10-CM

## 2018-10-15 DIAGNOSIS — R21 SKIN MACULE OR MACULAR RASH: ICD-10-CM

## 2018-10-15 DIAGNOSIS — Z66 DNR (DO NOT RESUSCITATE): ICD-10-CM

## 2018-10-15 DIAGNOSIS — L57.0 MULTIPLE ACTINIC KERATOSES: ICD-10-CM

## 2018-10-15 DIAGNOSIS — M25.549 PAIN IN MULTIPLE FINGER JOINTS: Primary | ICD-10-CM

## 2018-10-15 DIAGNOSIS — M65.312 TRIGGER FINGER OF LEFT THUMB: ICD-10-CM

## 2018-10-15 DIAGNOSIS — R21 RASH: ICD-10-CM

## 2018-10-15 PROBLEM — R26.89 IMPAIRMENT OF BALANCE: Status: RESOLVED | Noted: 2018-06-12 | Resolved: 2018-10-15

## 2018-10-15 PROBLEM — L60.9 DISORDER OF NAIL: Status: RESOLVED | Noted: 2018-06-12 | Resolved: 2018-10-15

## 2018-10-15 NOTE — PROGRESS NOTES
Reason for Visit/HPI:   
Gunner Elizabeth is a 80 y.o. male patient who presents today for Chief Complaint Patient presents with [address] Would like to discuss DNR.  Warts  
  lesions around neck, would like to discuss traetemtn options.  Joint Pain Joint pain in hands. Diagnosis/Treatment Plan:   
Diagnoses and all orders for this visit: 1. Pain in multiple finger joints  
-     REFERRAL TO HAND SURGERY 2. Trigger finger of left thumb 
-     REFERRAL TO DERMATOLOGY 3. Rash ; chronic, ongoing without resolution. Discussed referral to Derm as I am not getting resolution to his symptoms. He is also difficult with following my instructions for treatment as well. 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 -     REFERRAL TO DERMATOLOGY 4. Skin macule or macular rash ;  
   
 
-     REFERRAL TO DERMATOLOGY 5. Multiple actinic keratoses 
-     REFERRAL TO DERMATOLOGY 6. ACP (advance care planning) -     DO NOT RESUSCITATE 7. DNR (do not resuscitate) -     DO NOT RESUSCITATE Discontinued Care: The following treatment modalities have been discontinued by the provider today:  
Medications Discontinued During This Encounter Medication Reason  cephALEXin (KEFLEX) 250 mg capsule Therapy Completed  CULTURELLE 15 billion cell capsule Therapy Completed Follow Up: Follow-up Disposition: 
Return in about 1 month (around 11/15/2018) for with the NP for follow up to your treatment plan. Subjective: Allergies Allergen Reactions  Toprol Xl [Metoprolol Succinate] Diarrhea  Codeine Other (comments)  Sulfa (Sulfonamide Antibiotics) Hives Prior to Admission medications Medication Sig Start Date End Date Taking? Authorizing Provider  
hydrocortisone (CORTIZONE) 0.5 % ointment Apply  to affected area two (2) times a day for 10 days.  Apply to affected area 10/8/18 10/18/18 Yes Fer Loaiza MD  
 simvastatin (ZOCOR) 20 mg tablet TAKE 1 TABLET BY MOUTH  NIGHTLY 9/8/18  Yes Eugenio Hahn NP  
omeprazole (PRILOSEC) 40 mg capsule TAKE 1 CAPSULE BY MOUTH  DAILY FOR GASTROESOPHAGEAL  REFLUX DISEASE 9/8/18  Yes Eugenio Hahn NP  
levothyroxine (SYNTHROID) 25 mcg tablet Take 1 Tab by mouth Daily (before breakfast). Indications: hypothyroidism 8/6/18  Yes Eugenio Hahn NP Lactobac. rhamnosus GG-inulin (CULTURELLE PROBIOTICS) 10 billion cell -200 mg cap Take 1 Cap by mouth two (2) times a day. 8/6/18  Yes Eugenio Hahn NP  
finasteride (PROSCAR) 5 mg tablet TAKE 1 TABLET BY MOUTH  NIGHTLY FOR BENIGN  PROSTATIC HYPERPLASIA WITH  LOWER URINARY TRACT  SYMPTOMS 7/31/18  Yes Eugneio Hahn NP  
biotin 10,000 mcg cap Take  by mouth. Yes Historical Provider  
carbamide peroxide (DEBROX) 6.5 % otic solution Apply 2 drops to both ear canals once a week at night to keep wax from building up. Do not stop. Indications: IMPACTED CERUMEN 6/29/18  Yes Eugenio Hahn NP  
diazePAM (VALIUM) 5 mg tablet Take 1 Tab by mouth every six (6) hours as needed. Max Daily Amount: 20 mg. Indications: Muscle Spasm 6/12/18  Yes Eugenio Hahn NP  
acetaminophen (TYLENOL) 325 mg tablet Take 650 mg by mouth every four (4) hours as needed for Pain. Yes Historical Provider  
aspirin delayed-release 81 mg tablet Take 81 mg by mouth daily. Yes Historical Provider  
vit C,U-Pi-lxfhk-lutein-zeaxan (PRESERVISION AREDS 2) [id number] mg-unit-mg-mg cap Take 2 Caps by mouth daily. Yes Historical Provider  
melatonin 5 mg cap capsule Take 5 mg by mouth nightly. Historical Provider Past Medical History:  
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  
 hands  Osteopenia of both hands 02/18/2018  Polyp of cecum 08/02/2017 5mm polyp in cecum, 8 mm polyp in sigmoid colon  Spinal enthesopathy of lumbar region ([address] 02/01/2017 Past Surgical History:  
Procedure Laterality Date  CARDIAC SURG PROCEDURE UNLIST  HX CATARACT REMOVAL Bilateral   
 [address]  HX COLONOSCOPY  12/19/2012  
 tubular adenoma  HX COLONOSCOPY  08/02/2017  
 tubular adenomas [place]  HX CORONARY STENT PLACEMENT    
 HX KNEE ARTHROSCOPY Left 2005  
 menisectomy  HX ROTATOR CUFF REPAIR Left [address]  HX TONSIL AND ADENOIDECTOMY Social History Substance Use Topics  Smoking status: Former Smoker  Smokeless tobacco: Never Used  Alcohol use 4.2 oz/week  
  7 Glasses of wine per week Family History Problem Relation Age of Onset  No Known Problems Mother  No Known Problems Father  No Known Problems Brother Advance Care Planning 8/6/2018 Patient's Healthcare Decision Maker is: Named in scanned ACP document Primary Decision Maker Name France Arzola Primary Decision Maker Phone Number [phone] Primary Decision Maker Relationship to Patient Adult child Confirm Advance Directive Yes, on file Latest Laboratory Results:  
 
Lab Results Component Value Date/Time WBC 7.3 03/30/2018 03:28 PM  
 HGB 16.5 03/30/2018 03:28 PM  
 HCT 48.0 03/30/2018 03:28 PM  
 PLATELET 040 18/24/9860 03:28 PM  
 MCV 93 03/30/2018 03:28 PM  
 
Lab Results Component Value Date/Time  Sodium 141 06/12/2018 11:26 AM  
 Potassium 4.4 06/12/2018 11:26 AM  
 Chloride 104 06/12/2018 11:26 AM  
 CO2 23 06/12/2018 11:26 AM  
 Anion gap 7 02/15/2017 05:07 PM  
 Glucose 104 (H) 06/12/2018 11:26 AM  
 BUN 12 06/12/2018 11:26 AM  
 Creatinine 0.97 06/12/2018 11:26 AM  
 BUN/Creatinine ratio 12 06/12/2018 11:26 AM  
 GFR est AA 84 06/12/2018 11:26 AM  
 GFR est non-AA 72 06/12/2018 11:26 AM  
 Calcium 9.5 06/12/2018 11:26 AM  
 Bilirubin, total 0.7 06/12/2018 11:26 AM  
 AST (SGOT) 20 06/12/2018 11:26 AM  
 Alk. phosphatase 70 06/12/2018 11:26 AM  
 Protein, total 6.0 06/12/2018 11:26 AM  
 Albumin 3.9 06/12/2018 11:26 AM  
 Globulin 2.9 02/15/2017 05:07 PM  
 A-G Ratio 1.9 06/12/2018 11:26 AM  
 ALT (SGPT) 20 06/12/2018 11:26 AM  
 
Objective:  
 
Vitals:  
 10/15/18 1404 BP: 121/70 Pulse: 73 Resp: 18 Temp: 97.1 °F (36.2 °C) TempSrc: Oral  
SpO2: 98% Weight: 163 lb (73.9 kg) Height: 5' 5\" (1.651 m) Wt Readings from Last 3 Encounters:  
10/15/18 163 lb (73.9 kg) 10/08/18 161 lb 9.6 oz (73.3 kg) 08/10/18 163 lb (73.9 kg) BP Readings from Last 3 Encounters:  
10/15/18 121/70  
10/08/18 117/56  
08/10/18 122/63 Review of Systems Constitutional: Negative for activity change, appetite change, fatigue and fever. HENT: Negative for congestion, dental problem, hearing loss, postnasal drip, sinus pressure, sneezing, sore throat and trouble swallowing. Eyes: Negative for discharge, redness and visual disturbance. Respiratory: Negative for apnea, cough, chest tightness, shortness of breath and wheezing. Cardiovascular: Negative for chest pain, palpitations and leg swelling. Gastrointestinal: Negative for abdominal distention, abdominal pain, blood in stool, constipation, diarrhea, nausea and vomiting. Endocrine: Negative for polydipsia, polyphagia and polyuria. Genitourinary: Negative for flank pain, frequency and urgency. Musculoskeletal: Positive for arthralgias and joint swelling. Negative for gait problem and neck pain. Skin: Positive for color change and rash. Negative for pallor and wound. Allergic/Immunologic: Negative for environmental allergies and food allergies. Neurological: Negative for dizziness, tremors, weakness, light-headedness, numbness and headaches. Hematological: Negative for adenopathy. Psychiatric/Behavioral: Negative for agitation, confusion and sleep disturbance. The patient is not nervous/anxious. Physical Exam  
Constitutional: He is oriented to person, place, and time and well-developed, well-nourished, and in no distress. Vital signs are normal. He appears to not be writhing in pain, not malnourished and not dehydrated. He appears healthy. He does not have a sickly appearance. No distress. Elderly,  male. Alert and responsive. In no acute distress. HENT:  
Head: Normocephalic and atraumatic. Right Ear: External ear normal. Decreased hearing is noted. Left Ear: External ear normal. Decreased hearing is noted. Nose: Nose normal. No mucosal edema or rhinorrhea. Mouth/Throat: Uvula is midline and oropharynx is clear and moist. Mucous membranes are not pale, not dry and not cyanotic. No oral lesions. No uvula swelling. No posterior oropharyngeal edema or posterior oropharyngeal erythema. Eyes: Conjunctivae, EOM and lids are normal. Pupils are equal, round, and reactive to light. Lids are everted and swept, no foreign bodies found. Neck: Trachea normal, normal range of motion and full passive range of motion without pain. Neck supple. No JVD present. No thyromegaly present. Cardiovascular: Regular rhythm, S1 normal, S2 normal, intact distal pulses and normal pulses. Occasional extrasystoles are present. Bradycardia present. Exam reveals no gallop and no friction rub. Murmur heard. No extremity edema is present during today's exam.   
Pulmonary/Chest: Effort normal and breath sounds normal.  
Abdominal: Soft. Normal appearance, normal aorta and bowel sounds are normal. He exhibits no distension and no fluid wave.  There is no hepatosplenomegaly. There is no tenderness. There is no rigidity, no guarding and no CVA tenderness. Musculoskeletal:  
     Right hip: Normal.  
     Lumbar back: He exhibits decreased range of motion, tenderness, pain and spasm. Right hand: He exhibits decreased range of motion, tenderness and swelling. Normal sensation noted. Normal strength noted. Left hand: He exhibits decreased range of motion and tenderness. Normal sensation noted. Normal strength noted. Hands: 
States he has reoccurring \"trigger\" finger. Lymphadenopathy:  
  He has no cervical adenopathy. Neurological: He is alert and oriented to person, place, and time. He has intact cranial nerves. He displays weakness. He displays no atrophy and normal stance. No sensory deficit. He exhibits normal muscle tone. Coordination abnormal. Gait normal. GCS score is 15. Skin: Skin is warm, dry and intact. Lesion and rash noted. No bruising noted. Rash is macular, vesicular and urticarial. He is not diaphoretic. No cyanosis. There is pallor. Nails show no clubbing. 1. Multiple erythematous lesions on chest, back, and neck in varying degrees of size. (See photo attached) 2. Multiple actinic keratosis on back, neck, and chest.  
 
4. Multiple macules on neck both left and right. (See Photo) Psychiatric: Mood, memory, affect and judgment normal.  
Baseline mood and affect unchanged from normal.   
 
 
  
Disclaimer:  
Mr. Nikita Rice has been advised to call or return to our office if symptoms worsen/change/persist. We as a care team including the patient; discussed expected course/resolution/complications of diagnosis in detail today. Medication risks/benefits/costs/interactions/alternatives discussed Nikita Rice was given an after visit summary which includes diagnoses, current medications, & vitals. Nikita Rice expressed understanding with the diagnosis and plan.

## 2018-10-15 NOTE — PROGRESS NOTES
ADVISED PATIENT OF THE FOLLOWING HEALTH MAINTAINCE DUE Health Maintenance Due Topic Date Due  Shingrix Vaccine Age 50> (1 of 2) 08/30/1985  GLAUCOMA SCREENING Q2Y  08/30/2000  Influenza Age 5 to Adult  08/01/2018 Chief Complaint Patient presents with [address] Would like to discuss DNR.  Warts  
  lesions around neck, would like to discuss traetemtn options.  Joint Pain Joint pain in hands. 1. Have you been to the ER, urgent care clinic since your last visit? Hospitalized since your last visit? No 
 
2. Have you seen or consulted any other health care providers outside of the [address] [place] [address] since your last visit? Include any DEXA scan, mammography  or colon screening. No 
 
3. Do you have an Advance Directive on file? yes 4. Do you have a DNR on file? Full , here to discuss DNR Patient is accompanied by self I have received verbal consent from Sammy Ngo to discuss any/all medical information while they are present in the room. Advance Care Planning 8/6/2018 Patient's Healthcare Decision Maker is: Named in scanned ACP document Primary Decision Maker Name France Arzola Primary Decision Maker Phone Number [phone] Primary Decision Maker Relationship to Patient Adult child Confirm Advance Directive Yes, on file [employer] [address], [address] [facility] [address] Phone: [phone] Fax: [phone] [address] 
[address] [place] [address] Phone: [phone] Fax: [phone]

## 2018-10-15 NOTE — PATIENT INSTRUCTIONS
Actinic Keratosis: Care Instructions Your Care Instructions Actinic keratosis is a skin growth caused by sun damage. It can turn into skin cancer, but this isn't common. Actinic keratoses, also called solar keratoses, are small red, brown, or skin-colored scaly patches. They are most common on the face, neck, hands, and forearms. Your doctor can remove these growths by freezing or scraping them off or by putting medicines on them. Follow-up care is a key part of your treatment and safety. Be sure to make and go to all appointments, and call your doctor if you are having problems. It's also a good idea to know your test results and keep a list of the medicines you take. How can you care for yourself at home? · If your doctor told you how to care for the treated area, follow your doctor's instructions. If you did not get instructions, follow this general advice: ¨ Wash around the area with clean water 2 times a day. Don't use hydrogen peroxide or alcohol, which can slow healing. ¨ You may cover the area with a thin layer of petroleum jelly, such as Vaseline, and a nonstick bandage. ¨ Apply more petroleum jelly and replace the bandage as needed. To prevent actinic keratosis · Always wear sunscreen on exposed skin. Make sure to use a broad-spectrum sunscreen that has a sun protection factor (SPF) of 30 or higher. Use it every day, even when it is cloudy. · Wear long sleeves, a hat, and pants if you are going to be outdoors for a long time. · Avoid the sun between 10 a.m. and 4 p.m., the peak time for UV rays. · Do not use tanning booths or sunlamps. When should you call for help? Watch closely for changes in your health, and be sure to contact your doctor if: 
  · You have symptoms of infection, such as: 
¨ Increased pain, swelling, warmth, or redness. ¨ Red streaks leading from the area. ¨ Pus draining from the area. ¨ A fever. Where can you learn more? Go to http://sloan-george.info/. Enter L364 in the search box to learn more about \"Actinic Keratosis: Care Instructions. \" Current as of: April 18, 2018 Content Version: 11.8 © 3304-9606 [employer]. Care instructions adapted under license by [employer] (which disclaims liability or warranty for this information). If you have questions about a medical condition or this instruction, always ask your healthcare professional. [address] any warranty or liability for your use of this information. Trigger Finger: Care Instructions Your Care Instructions A trigger finger is a finger stuck in a bent position. The bent finger usually straightens out on its own. A trigger finger can be painful, but it normally is not a serious problem. Trigger fingers seem to occur more in some groups of people. These include people who have diabetes or arthritis or who have injured their hands in the past. This problem also occurs in musicians and people who  tools often. Rest, exercises, and other things you can do at home may help your trigger finger relax so that it can bend as it should. You may get a corticosteroid shot. This can reduce swelling and pain. Your doctor may put a splint on your finger. This will give your finger some rest and avoid irritating the joint. You may need surgery if the finger keeps locking in a bent position. Follow-up care is a key part of your treatment and safety. Be sure to make and go to all appointments, and call your doctor if you are having problems. It's also a good idea to know your test results and keep a list of the medicines you take. How can you care for yourself at home? · If your doctor put a splint on your finger, wear the splint as directed. Do not remove it until your doctor says you can. · You may need to change your activities to avoid movements that irritate the finger. · If your finger is swollen, put ice or a cold pack on your finger for 10 to 20 minutes at a time. Try to do this every 1 to 2 hours for the next 3 days (when you are awake) or until the swelling goes down. Put a thin cloth between the ice and your skin. · Prop up your hand on a pillow when you ice it or anytime you sit or lie down during the next 3 days. Try to keep it above the level of your heart. This will help reduce swelling. · Take your medicines exactly as prescribed. Call your doctor if you think you are having a problem with your medicine. · Ask your doctor if you can take an over-the-counter pain medicine, such as acetaminophen (Tylenol), ibuprofen (Advil, Motrin), or naproxen (Aleve). Be safe with medicines. Read and follow all instructions on the label. · If your doctor recommends exercises, do them as directed. When should you call for help? Call your doctor now or seek immediate medical care if: 
  · Your finger locks in a bent position and will not straighten.  
 Watch closely for changes in your health, and be sure to contact your doctor if: 
  · You do not get better as expected. Where can you learn more? Go to http://sloan-george.info/. Enter M826 in the search box to learn more about \"Trigger Finger: Care Instructions. \" Current as of: November 29, 2017 Content Version: 11.8 © 6432-6218 [employer]. Care instructions adapted under license by [employer]ur[employer] (which disclaims liability or warranty for this information). If you have questions about a medical condition or this instruction, always ask your healthcare professional. [address] any warranty or liability for your use of this information. Rash: Care Instructions Your Care Instructions A rash is any irritation or inflammation of the skin. Rashes have many possible causes, including allergy, infection, illness, heat, and emotional stress. Follow-up care is a key part of your treatment and safety. Be sure to make and go to all appointments, and call your doctor if you are having problems. It's also a good idea to know your test results and keep a list of the medicines you take. How can you care for yourself at home? · Wash the area with water only. Soap can make dryness and itching worse. Pat dry. · Put cold, wet cloths on the rash to reduce itching. · Keep cool, and stay out of the sun. · Leave the rash open to the air as much of the time as possible. · Sometimes petroleum jelly (Vaseline) can help relieve the discomfort caused by a rash. A moisturizing lotion, such as Cetaphil, also may help. Calamine lotion may help for rashes caused by contact with something (such as a plant or soap) that irritated the skin. Use it 3 or 4 times a day. · If your doctor prescribed a cream, use it as directed. If your doctor prescribed medicine, take it exactly as directed. · If your rash itches so badly that it interferes with your normal activities, take an over-the-counter antihistamine, such as diphenhydramine (Benadryl) or loratadine (Claritin). Read and follow all instructions on the label. When should you call for help? Call your doctor now or seek immediate medical care if: 
  · You have signs of infection, such as: 
¨ Increased pain, swelling, warmth, or redness. ¨ Red streaks leading from the area. ¨ Pus draining from the area. ¨ A fever.  
  · You have joint pain along with the rash.  
 Watch closely for changes in your health, and be sure to contact your doctor if: 
  · Your rash is changing or getting worse. For example, call if you have pain along with the rash, the rash is spreading, or you have new blisters.  
  · You do not get better after 1 week. Where can you learn more? Go to http://sloan-george.info/. Enter R372 in the search box to learn more about \"Rash: Care Instructions. \" Current as of: April 18, 2018 Content Version: 11.8 © 5840-9539 Healthwise[employer]orporated. Care instructions adapted under license by [employer] (which disclaims liability or warranty for this information). If you have questions about a medical condition or this instruction, always ask your healthcare professional. [address] any warranty or liability for your use of this information.

## 2018-10-17 ENCOUNTER — TELEPHONE (OUTPATIENT)
Dept: GERIATRIC MEDICINE | Age: 83
End: 2018-10-17

## 2018-10-17 DIAGNOSIS — M51.16 RADICULOPATHY DUE TO LUMBAR INTERVERTEBRAL DISC DISORDER: Primary | ICD-10-CM

## 2018-10-17 RX ORDER — PREDNISONE 20 MG/1
TABLET ORAL
Qty: 10 TAB | Refills: 0 | Status: SHIPPED | OUTPATIENT
Start: 2018-10-17 | End: 2019-02-08 | Stop reason: ALTCHOICE

## 2018-10-17 NOTE — TELEPHONE ENCOUNTER
MrRoseanne Hsieh came to the office today complaining of \"sitting too long on the couch\" yesterday and now is having lower, isolated back pain without nerve radiation. Discussed trying some stretching exercises and having a massage with the on campus fitness therapist. I also will order him some prednisone to calm the nerve and the radiculopathy.

## 2018-12-23 RX ORDER — LEVOTHYROXINE SODIUM 25 UG/1
TABLET ORAL
Qty: 90 TAB | Refills: 1 | Status: SHIPPED | OUTPATIENT
Start: 2018-12-23 | End: 2019-02-08

## 2019-01-19 DIAGNOSIS — R35.1 NOCTURIA: ICD-10-CM

## 2019-01-19 DIAGNOSIS — E78.2 MIXED HYPERLIPIDEMIA: ICD-10-CM

## 2019-01-19 RX ORDER — FINASTERIDE 5 MG/1
TABLET, FILM COATED ORAL
Qty: 90 TAB | Refills: 1 | Status: SHIPPED | OUTPATIENT
Start: 2019-01-19 | End: 2019-08-26 | Stop reason: SDUPTHER

## 2019-01-25 ENCOUNTER — CLINICAL SUPPORT (OUTPATIENT)
Dept: GERIATRIC MEDICINE | Age: 84
End: 2019-01-25

## 2019-01-25 DIAGNOSIS — E03.9 ACQUIRED HYPOTHYROIDISM: ICD-10-CM

## 2019-01-25 DIAGNOSIS — E78.2 MIXED HYPERLIPIDEMIA: ICD-10-CM

## 2019-01-25 DIAGNOSIS — M17.9 OSTEOARTHRITIS OF KNEE, UNSPECIFIED LATERALITY, UNSPECIFIED OSTEOARTHRITIS TYPE: ICD-10-CM

## 2019-01-25 DIAGNOSIS — I25.10 CORONARY ARTERY DISEASE INVOLVING NATIVE CORONARY ARTERY OF NATIVE HEART WITHOUT ANGINA PECTORIS: Chronic | ICD-10-CM

## 2019-01-25 DIAGNOSIS — R79.9 ABNORMAL BLOOD CHEMISTRY: ICD-10-CM

## 2019-01-25 DIAGNOSIS — E55.9 VITAMIN D DEFICIENCY: ICD-10-CM

## 2019-01-25 DIAGNOSIS — R63.0 LOSS OF APPETITE: Primary | ICD-10-CM

## 2019-01-25 DIAGNOSIS — E53.8 VITAMIN B12 DEFICIENCY: ICD-10-CM

## 2019-01-30 ENCOUNTER — OFFICE VISIT (OUTPATIENT)
Dept: GERIATRIC MEDICINE | Age: 84
End: 2019-01-30

## 2019-02-02 LAB
1,25(OH)2D3 SERPL-MCNC: 51.9 PG/ML (ref 19.9–79.3)
ALBUMIN SERPL-MCNC: 4 G/DL (ref 3.5–4.7)
ALBUMIN/GLOB SERPL: 1.9 {RATIO} (ref 1.2–2.2)
ALP SERPL-CCNC: 64 IU/L (ref 39–117)
ALT SERPL-CCNC: 15 IU/L (ref 0–44)
AST SERPL-CCNC: 15 IU/L (ref 0–40)
BASOPHILS # BLD AUTO: 0 X10E3/UL (ref 0–0.2)
BASOPHILS NFR BLD AUTO: 1 %
BILIRUB SERPL-MCNC: 0.7 MG/DL (ref 0–1.2)
BUN SERPL-MCNC: 18 MG/DL (ref 8–27)
BUN/CREAT SERPL: 20 (ref 10–24)
CALCIUM SERPL-MCNC: 8.9 MG/DL (ref 8.6–10.2)
CHLORIDE SERPL-SCNC: 105 MMOL/L (ref 96–106)
CHOLEST SERPL-MCNC: 229 MG/DL (ref 100–199)
CO2 SERPL-SCNC: 23 MMOL/L (ref 20–29)
CREAT SERPL-MCNC: 0.89 MG/DL (ref 0.76–1.27)
EOSINOPHIL # BLD AUTO: 0.1 X10E3/UL (ref 0–0.4)
EOSINOPHIL NFR BLD AUTO: 2 %
ERYTHROCYTE [DISTWIDTH] IN BLOOD BY AUTOMATED COUNT: 14.4 % (ref 12.3–15.4)
EST. AVERAGE GLUCOSE BLD GHB EST-MCNC: 114 MG/DL
FOLATE SERPL-MCNC: >20 NG/ML
GLOBULIN SER CALC-MCNC: 2.1 G/DL (ref 1.5–4.5)
GLUCOSE SERPL-MCNC: 108 MG/DL (ref 65–99)
HBA1C MFR BLD: 5.6 % (ref 4.8–5.6)
HCT VFR BLD AUTO: 46.4 % (ref 37.5–51)
HDLC SERPL-MCNC: 56 MG/DL
HGB BLD-MCNC: 15.4 G/DL (ref 13–17.7)
IMM GRANULOCYTES # BLD AUTO: 0 X10E3/UL (ref 0–0.1)
IMM GRANULOCYTES NFR BLD AUTO: 0 %
LDLC SERPL CALC-MCNC: 137 MG/DL (ref 0–99)
LYMPHOCYTES # BLD AUTO: 1.5 X10E3/UL (ref 0.7–3.1)
LYMPHOCYTES NFR BLD AUTO: 33 %
MCH RBC QN AUTO: 31.7 PG (ref 26.6–33)
MCHC RBC AUTO-ENTMCNC: 33.2 G/DL (ref 31.5–35.7)
MCV RBC AUTO: 96 FL (ref 79–97)
MONOCYTES # BLD AUTO: 0.3 X10E3/UL (ref 0.1–0.9)
MONOCYTES NFR BLD AUTO: 7 %
NEUTROPHILS # BLD AUTO: 2.6 X10E3/UL (ref 1.4–7)
NEUTROPHILS NFR BLD AUTO: 57 %
PLATELET # BLD AUTO: 168 X10E3/UL (ref 150–379)
POTASSIUM SERPL-SCNC: 4.2 MMOL/L (ref 3.5–5.2)
PROT SERPL-MCNC: 6.1 G/DL (ref 6–8.5)
RBC # BLD AUTO: 4.86 X10E6/UL (ref 4.14–5.8)
SODIUM SERPL-SCNC: 143 MMOL/L (ref 134–144)
T4 SERPL-MCNC: 7.8 UG/DL (ref 4.5–12)
TRIGL SERPL-MCNC: 182 MG/DL (ref 0–149)
TSH SERPL DL<=0.005 MIU/L-ACNC: 6.96 UIU/ML (ref 0.45–4.5)
VIT B12 SERPL-MCNC: 681 PG/ML (ref 232–1245)
VLDLC SERPL CALC-MCNC: 36 MG/DL (ref 5–40)
WBC # BLD AUTO: 4.5 X10E3/UL (ref 3.4–10.8)

## 2019-02-04 DIAGNOSIS — K21.00 GASTROESOPHAGEAL REFLUX DISEASE WITH ESOPHAGITIS: ICD-10-CM

## 2019-02-04 RX ORDER — OMEPRAZOLE 40 MG/1
CAPSULE, DELAYED RELEASE ORAL
Qty: 90 CAP | Refills: 2 | Status: SHIPPED | OUTPATIENT
Start: 2019-02-04 | End: 2019-11-02 | Stop reason: SDUPTHER

## 2019-02-08 ENCOUNTER — OFFICE VISIT (OUTPATIENT)
Dept: GERIATRIC MEDICINE | Age: 84
End: 2019-02-08

## 2019-02-08 VITALS
HEART RATE: 71 BPM | HEIGHT: 65 IN | RESPIRATION RATE: 18 BRPM | OXYGEN SATURATION: 98 % | SYSTOLIC BLOOD PRESSURE: 133 MMHG | WEIGHT: 162.4 LBS | BODY MASS INDEX: 27.06 KG/M2 | DIASTOLIC BLOOD PRESSURE: 68 MMHG

## 2019-02-08 DIAGNOSIS — E78.5 ELEVATED FASTING LIPID PROFILE: ICD-10-CM

## 2019-02-08 DIAGNOSIS — Z71.2 ENCOUNTER TO DISCUSS TEST RESULTS: ICD-10-CM

## 2019-02-08 DIAGNOSIS — L60.3 NAIL BRITTLENESS: ICD-10-CM

## 2019-02-08 DIAGNOSIS — E03.9 ACQUIRED HYPOTHYROIDISM: ICD-10-CM

## 2019-02-08 DIAGNOSIS — Z66 DNR (DO NOT RESUSCITATE): ICD-10-CM

## 2019-02-08 DIAGNOSIS — Z87.19 HISTORY OF IBS: ICD-10-CM

## 2019-02-08 DIAGNOSIS — E78.00 PURE HYPERCHOLESTEROLEMIA: ICD-10-CM

## 2019-02-08 DIAGNOSIS — R79.89 ELEVATED TSH: ICD-10-CM

## 2019-02-08 DIAGNOSIS — E89.0 POSTOPERATIVE HYPOTHYROIDISM: Primary | Chronic | ICD-10-CM

## 2019-02-08 DIAGNOSIS — L70.0 ACNE VULGARIS: ICD-10-CM

## 2019-02-08 PROBLEM — R73.01 IMPAIRED FASTING GLUCOSE: Status: RESOLVED | Noted: 2018-06-12 | Resolved: 2019-02-08

## 2019-02-08 PROBLEM — K62.89 ANAL OR RECTAL PAIN: Chronic | Status: RESOLVED | Noted: 2018-03-30 | Resolved: 2019-02-08

## 2019-02-08 RX ORDER — METRONIDAZOLE 10 MG/G
GEL TOPICAL
Qty: 45 G | Refills: 5
Start: 2019-02-08

## 2019-02-08 RX ORDER — DIAPER,BRIEF,INFANT-TODD,DISP
2 EACH MISCELLANEOUS DAILY
Qty: 240 CAP | Refills: 11
Start: 2019-02-08

## 2019-02-08 RX ORDER — LEVOTHYROXINE SODIUM 25 UG/1
TABLET ORAL
Qty: 90 TAB | Refills: 1
Start: 2019-02-08 | End: 2019-04-16 | Stop reason: ALTCHOICE

## 2019-02-08 NOTE — LETTER
[address] 
[address] 
[phone] [address] [facility] [id number] VISIT INSTRUCTIONS/PLAN OF CARE 02/08/19 Orders Placed This Encounter  REFERRAL TO DIETITIAN Referral Priority:   Routine Referral Type:   Consultation Referral Reason:   Specialty Services Required Referral Location:   [facility] MAIN OFFICE Requested Specialty:   Dietitian Number of Visits Requested:   1  
 levothyroxine (SYNTHROID) 25 mcg tablet  (increase your current medication as directed) Sig: Take 1 and 1/2 tablet daily as of 2/8/2019  biotin 10,000 mcg cap Sig: Take 2 Caps by mouth daily. To help with health of nails, skin, & hair.   
 
- Noted you wish to continue the Metrogel per dermatology.  
 
- Noted you wish to take your Citrucel (metamucil). - Please send us or let us know the addresses, telephone numbers, and any further information to contact your Medical POA's for our records as well as [facility] records. Return Visit/Follow Up:   
 
Return in 6 weeks around the end of March 2019 for labs to recheck your Lipid profile & thyroid. You will hear from the Dietician sometime next week she comes to [facility] on Wednesdays. She will schedule with you directly. We appreciate you allowing us to participate in your health care. Any questions do not hesitate to call Bennie Barth [facility] Nurse at [phone].

## 2019-02-08 NOTE — PROGRESS NOTES
Discussed with patient during his follow up exam today. CBC - stable. No signs of infection, blood loss anemia, or clotting issues. CMP- stable. No signs of kidney insufficiencies, liver enzymes are normal, nutrition is great. HGA1C- stable. No signs of diabetes. Lipid Panel- changes in the Total Cholesterol, Triglycerides are better, but LDL is elevated on Zocor 20 mg daily. TSH & T4- Elevated TSH at almost 7, T4 is normal/stable. Vitamin D- stable, no need for further supplement. Vitamin B 12 & Folate- stable. No need for further supplement. Ordering a nutrition consult to try diet changes to avoid starting another pill. Repeat Lipids & Thyroid Panel in 6 weeks from changes.

## 2019-02-08 NOTE — PROGRESS NOTES
ADVISED PATIENT OF THE FOLLOWING HEALTH MAINTAINCE DUE  Health Maintenance Due   Topic Date Due    GLAUCOMA SCREENING Q2Y  08/30/2000      Chief Complaint   Patient presents with    Results     Patient here to review lab results with NP.    Medication Evaluation     Patient has questions on medications. 1. Have you been to the ER, urgent care clinic since your last visit? Hospitalized since your last visit? No    2. Have you seen or consulted any other health care providers outside of the [address] [place] [address] since your last visit? Include any DEXA scan, mammography  or colon screening. No    3. Do you have an Advance Directive on file? yes    4. Do you have a DNR on file? DNR    Patient is accompanied by self I have received verbal consent from Melodie Youssef to discuss any/all medical information while they are present in the room.       Advance Care Planning 8/6/2018   Patient's Healthcare Decision Maker is: Named in scanned ACP document   Primary Decision Maker Name Maninder Devine   Primary Decision Maker Phone Number [phone]   Primary Decision Maker Relationship to Patient Adult child   Confirm Advance Directive Yes, on file         [employer] [id number] [address] [facility] [address]  Phone: [phone] Fax: [phone] [facility], . [address] [facility]  [address]  [facility] [address]  Phone: [phone] Fax: [phone]

## 2019-02-09 NOTE — PATIENT INSTRUCTIONS
Learning About High Cholesterol  What is high cholesterol? Cholesterol is a type of fat in your blood. It is needed for many body functions, such as making new cells. Cholesterol is made by your body. It also comes from food you eat. If you have too much cholesterol, it starts to build up in your arteries. This is called hardening of the arteries, or atherosclerosis. High cholesterol raises your risk of a heart attack and stroke. There are different types of cholesterol. LDL is the \"bad\" cholesterol. High LDL can raise your risk for heart disease, heart attack, and stroke. HDL is the \"good\" cholesterol. High HDL is linked with a lower risk for heart disease, heart attack, and stroke. Your cholesterol levels help your doctor find out your risk for having a heart attack or stroke. How can you prevent high cholesterol? A heart-healthy lifestyle can help you prevent high cholesterol. This lifestyle helps lower your risk for a heart attack and stroke. · Eat heart-healthy foods. ? Eat fruits, vegetables, whole grains (like oatmeal), dried beans and peas, nuts and seeds, soy products (like tofu), and fat-free or low-fat dairy products. ? Replace butter, margarine, and hydrogenated or partially hydrogenated oils with olive and canola oils. (Canola oil margarine without trans fat is fine.)  ? Replace red meat with fish, poultry, and soy protein (like tofu). ? Limit processed and packaged foods like chips, crackers, and cookies. · Be active. Exercise can improve your cholesterol level. Get at least 30 minutes of exercise on most days of the week. Walking is a good choice. You also may want to do other activities, such as running, swimming, cycling, or playing tennis or team sports. · Stay at a healthy weight. Lose weight if you need to. · Don't smoke. If you need help quitting, talk to your doctor about stop-smoking programs and medicines. These can increase your chances of quitting for good.   How is high cholesterol treated? The goal of treatment is to reduce your chances of having a heart attack or stroke. The goal is not to lower your cholesterol numbers only. · You may make lifestyle changes, such as eating healthy foods, not smoking, losing weight, and being more active. · You may have to take medicine. Follow-up care is a key part of your treatment and safety. Be sure to make and go to all appointments, and call your doctor if you are having problems. It's also a good idea to know your test results and keep a list of the medicines you take. Where can you learn more? Go to http://sloan-george.info/. Enter G343 in the search box to learn more about \"Learning About High Cholesterol. \"  Current as of: July 22, 2018  Content Version: 11.9  © 6557-3680 [employer]. Care instructions adapted under license by [employer] (which disclaims liability or warranty for this information). If you have questions about a medical condition or this instruction, always ask your healthcare professional. [place] [address] any warranty or liability for your use of this information. Hypothyroidism: Care Instructions  Your Care Instructions    You have hypothyroidism, which means that your body is not making enough thyroid hormone. This hormone helps your body use energy. If your thyroid level is low, you may feel tired, be constipated, have an increase in your blood pressure, or have dry skin or memory problems. You may also get cold easily, even when it is warm. Women with low thyroid levels may have heavy menstrual periods. A blood test to find your thyroid-stimulating hormone (TSH) level is used to check for hypothyroidism. A high TSH level may mean that you have low thyroid. When your body is not making enough thyroid hormone, TSH levels rise in an effort to make the body produce more. The treatment for hypothyroidism is to take thyroid hormone pills.  You should start to feel better in 1 to 2 weeks. But it can take several months to see changes in the TSH level. You will need regular visits with your doctor to make sure you have the right dose of medicine. Most people need treatment for the rest of their lives. You will need to see your doctor regularly to have blood tests and to make sure you are doing well. Follow-up care is a key part of your treatment and safety. Be sure to make and go to all appointments, and call your doctor if you are having problems. It's also a good idea to know your test results and keep a list of the medicines you take. How can you care for yourself at home? · Take your thyroid hormone medicine exactly as prescribed. Call your doctor if you think you are having a problem with your medicine. Most people do not have side effects if they take the right amount of medicine regularly. ? Take the medicine 30 minutes before breakfast, and do not take it with calcium, vitamins, or iron. ? Do not take extra doses of your thyroid medicine. It will not help you get better any faster, and it may cause side effects. ? If you forget to take a dose, do NOT take a double dose of medicine. Take your usual dose the next day. · Tell your doctor about all prescription, herbal, or over-the-counter products you take. · Take care of yourself. Eat a healthy diet, get enough sleep, and get regular exercise. When should you call for help? Call 911 anytime you think you may need emergency care. For example, call if:    · You passed out (lost consciousness).     · You have severe trouble breathing.     · You have a very slow heartbeat (less than 60 beats a minute).     · You have a low body temperature (95°F or below).    Call your doctor now or seek immediate medical care if:    · You feel tired, sluggish, or weak.     · You have trouble remembering things or concentrating.     · You do not begin to feel better 2 weeks after starting your medicine.  Watch closely for changes in your health, and be sure to contact your doctor if you have any problems. Where can you learn more? Go to http://sloan-george.info/. Enter E000 in the search box to learn more about \"Hypothyroidism: Care Instructions. \"  Current as of: March 14, 2018  Content Version: 11.9  © 0903-2007 [employer]. Care instructions adapted under license by [employer] (which disclaims liability or warranty for this information). If you have questions about a medical condition or this instruction, always ask your healthcare professional. [address] any warranty or liability for your use of this information.

## 2019-02-09 NOTE — PROGRESS NOTES
Reason for Visit/HPI:    Nella Beckham is a 80 y.o. male patient who presents today for   Chief Complaint   Patient presents with    Results     Patient here to review lab results with NP.    Medication Evaluation     Patient has questions on medications. ACP- patient has elected to be a DDNR. Education was given for him to place the documents on the back of his apartment door and please do not lock them away in a safe or file them. He states understanding. He still needs to get us the addresses and phone numbers of his POA's to chart. He has been given educational material on changes in thyroid labs, healthy omegas to incorporate in his diet and a referral to dietary for consult. Once he makes the discussed changes we will recheck labs that are ordered in about 6 weeks to check his progress with health. Diagnosis/Treatment Plan:    Diagnoses and all orders for this visit:    1. Postoperative hypothyroidism  Comments:  Chronic as he has had his entire thyroid removed sometime ago. He has been on supplement for sometime. Orders:  -     levothyroxine (SYNTHROID) 25 mcg tablet; Take 1 and 1/2 tablet daily as of 2/8/2019  -     REFERRAL TO DIETITIAN  -     THYROID PANEL W/TSH; Future    2. Encounter to discuss test results    3. Acquired hypothyroidism  Comments:  Chronic due to surgical removal of the gland. Currently on 25 mcg. I will increase it to 37 mg daily recheck in 6 weeks. 4. Elevated TSH  Comments:  Chronic due to surgical removal of the gland. Currently on 25 mcg. I will increase it to 37 mg daily recheck in 6 weeks. Orders:  -     levothyroxine (SYNTHROID) 25 mcg tablet; Take 1 and 1/2 tablet daily as of 2/8/2019  -     REFERRAL TO DIETITIAN  -     THYROID PANEL W/TSH; Future    5. Elevated fasting lipid profile  Comments:  Elevation with lipids. Total Cholesterol is up, triglycerides are slightly better, but LDL is now elevated. I have ordered dietary to try      6.  Pure hypercholesterolemia  Comments:  Chronic, ongoing. He does not eat in the cafe or the [employer] for any meals. He states he cooks at home. Orders:  -     REFERRAL TO DIETITIAN  -     LIPID PANEL; Future    7. History of IBS  Comments:  Confused on previous visit after diarrhea episode me advising him to restart his Citrucel again, he somehow confused the order to restart. He will cont to take. Orders:  -     Methylcellulose, with Sugar, (CITRUCEL, SUCROSE,) powd; Use daily for bowel regimen. 8. Acne vulgaris  Comments:  Pt states derm advised against my orders to stop the Metrogel. Since then he has restarted the drug and will continue it. Orders:  -     metroNIDAZOLE (METROGEL) 1 % topical gel; Use a thin layer to affected areas after washing    9. Nail brittleness  Comments:  concerned about jagged, dry nails. Discussed nutrient deficiencies vs nail health. Advised to increae his Biotene to 2 tabs daily. Orders:  -     biotin 10,000 mcg cap; Take 2 Caps by mouth daily. 10. DNR (do not resuscitate)  Comments:  patient has signed the [facility] and his wishes have been made known. Discontinued Care: The following treatment modalities have been discontinued by the provider today:   Medications Discontinued During This Encounter   Medication Reason    predniSONE (DELTASONE) 20 mg tablet Therapy Completed    Lactobac. rhamnosus GG-inulin (CULTURELLE PROBIOTICS) 10 billion cell -200 mg cap Therapy Completed    melatonin 5 mg cap capsule Therapy Completed    levothyroxine (SYNTHROID) 25 mcg tablet     biotin 10,000 mcg cap        Follow Up: Follow-up Disposition:  Return in about 7 weeks (around 3/29/2019) for follow up labs once you have made the changes in diet . Subjective:      Allergies   Allergen Reactions    Toprol Xl [Metoprolol Succinate] Diarrhea    Codeine Other (comments)    Sulfa (Sulfonamide Antibiotics) Hives     Prior to Admission medications    Medication Sig Start Date End Date Taking? Authorizing Provider   levothyroxine (SYNTHROID) 25 mcg tablet Take 1 and 1/2 tablet daily as of 2/8/2019 2/8/19  Yes Hattie Apodaca NP   metroNIDAZOLE (METROGEL) 1 % topical gel Use a thin layer to affected areas after washing 2/8/19  Yes Hattie Apodaca NP   Methylcellulose, with Sugar, (CITRUCEL, SUCROSE,) powd Use daily for bowel regimen. 2/8/19  Yes Hattie Apodaca NP   biotin 10,000 mcg cap Take 2 Caps by mouth daily. 2/8/19  Yes Hattie Apodaca NP   omeprazole (PRILOSEC) 40 mg capsule TAKE 1 CAPSULE BY MOUTH  DAILY FOR GASTROESOPHAGEAL  REFLUX DISEASE 2/4/19  Yes Hattie Apodaca NP   finasteride (PROSCAR) 5 mg tablet TAKE 1 TABLET BY MOUTH  NIGHTLY FOR BENIGN  PROSTATIC HYPERPLASIA WITH  LOWER URINARY TRACT  SYMPTOMS 1/19/19  Yes Hattie Apodaca NP   simvastatin (ZOCOR) 20 mg tablet TAKE 1 TABLET BY MOUTH  NIGHTLY 9/8/18  Yes Hattie Apodaca NP   carbamide peroxide (DEBROX) 6.5 % otic solution Apply 2 drops to both ear canals once a week at night to keep wax from building up. Do not stop. Indications: IMPACTED CERUMEN 6/29/18  Yes Hattie Apodaca NP   diazePAM (VALIUM) 5 mg tablet Take 1 Tab by mouth every six (6) hours as needed. Max Daily Amount: 20 mg. Indications: Muscle Spasm 6/12/18  Yes Hattie Apodaca NP   acetaminophen (TYLENOL) 325 mg tablet Take 650 mg by mouth every four (4) hours as needed for Pain. Yes Provider, Historical   aspirin delayed-release 81 mg tablet Take 81 mg by mouth daily. Yes Provider, Historical   vit C,B-Bw-rutru-lutein-zeaxan (PRESERVISION AREDS 2) 331-941-17-1 mg-unit-mg-mg cap Take 2 Caps by mouth daily.    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 ([facility] [address] 10/06/2016    Bilateral thumb pain 02/01/2017    CAD (coronary artery disease)     Cardiac murmur 10/06/2016    Folliculitis 26/29/6175    GERD (gastroesophageal reflux disease)     Hypercholesteremia     Hypothyroid     Left hip pain 02/01/2017    Lumbar radiculitis     Lumbar spondylitis (HCC)     Meniere disease     Mixed hyperlipidemia     Occlusion and stenosis of unspecified carotid artery     Osteoarthritis 02/19/2018    hands    Osteopenia of both hands 02/18/2018    Polyp of cecum 08/02/2017    5mm polyp in cecum, 8 mm polyp in sigmoid colon    Spinal enthesopathy of lumbar region ([address] 02/01/2017     Past Surgical History:   Procedure Laterality Date    CARDIAC SURG PROCEDURE UNLIST  04/2008    HX CATARACT REMOVAL Bilateral     HX CHOLECYSTECTOMY  1994    HX COLONOSCOPY  12/19/2012    tubular adenoma    HX COLONOSCOPY  08/02/2017    tubular adenomas    HX CORONARY ARTERY BYPASS GRAFT  1996    HX CORONARY STENT PLACEMENT      HX KNEE ARTHROSCOPY Left 2005    menisectomy    HX ROTATOR CUFF REPAIR Left     HX THYROIDECTOMY  1975    HX TONSIL AND ADENOIDECTOMY        Social History     Tobacco Use    Smoking status: Former Smoker    Smokeless tobacco: Never Used   Substance Use Topics    Alcohol use:  Yes     Alcohol/week: 4.2 oz     Types: 7 Glasses of wine per week    Drug use: No      Family History   Problem Relation Age of Onset    No Known Problems Mother     No Known Problems Father     No Known Problems Brother      Advance Care Planning 8/6/2018   Patient's Healthcare Decision Maker is: Named in scanned ACP document   Primary Decision Maker Name Umm Collier   Primary Decision Maker Phone Number [phone]   Primary Decision Maker Relationship to Patient Adult child   Confirm Advance Directive Yes, on file     Test Results:     Clinical Support on 01/25/2019   Component Date Value Ref Range Status    WBC 01/25/2019 4.5  3.4 - 10.8 x10E3/uL Final    RBC 01/25/2019 4.86  4.14 - 5.80 x10E6/uL Final    HGB 01/25/2019 15.4  13.0 - 17.7 g/dL Final    HCT 01/25/2019 46.4  37.5 - 51.0 % Final    MCV 01/25/2019 96  79 - 97 fL Final    MCH 01/25/2019 31.7  26.6 - 33.0 pg Final    MCHC 01/25/2019 33.2  31.5 - 35.7 g/dL Final    RDW 01/25/2019 14.4  12.3 - 15.4 % Final    PLATELET 92/70/1457 425  150 - 379 x10E3/uL Final    NEUTROPHILS 01/25/2019 57  Not Estab. % Final    Lymphocytes 01/25/2019 33  Not Estab. % Final    MONOCYTES 01/25/2019 7  Not Estab. % Final    EOSINOPHILS 01/25/2019 2  Not Estab. % Final    BASOPHILS 01/25/2019 1  Not Estab. % Final    ABS. NEUTROPHILS 01/25/2019 2.6  1.4 - 7.0 x10E3/uL Final    Abs Lymphocytes 01/25/2019 1.5  0.7 - 3.1 x10E3/uL Final    ABS. MONOCYTES 01/25/2019 0.3  0.1 - 0.9 x10E3/uL Final    ABS. EOSINOPHILS 01/25/2019 0.1  0.0 - 0.4 x10E3/uL Final    ABS. BASOPHILS 01/25/2019 0.0  0.0 - 0.2 x10E3/uL Final    IMMATURE GRANULOCYTES 01/25/2019 0  Not Estab. % Final    ABS. IMM. GRANS. 01/25/2019 0.0  0.0 - 0.1 x10E3/uL Final    Glucose 01/25/2019 108* 65 - 99 mg/dL Final    BUN 01/25/2019 18  8 - 27 mg/dL Final    Creatinine 01/25/2019 0.89  0.76 - 1.27 mg/dL Final    GFR est non-AA 01/25/2019 79  >59 mL/min/1.73 Final    GFR est AA 01/25/2019 91  >59 mL/min/1.73 Final    BUN/Creatinine ratio 01/25/2019 20  10 - 24 Final    Sodium 01/25/2019 143  134 - 144 mmol/L Final    Potassium 01/25/2019 4.2  3.5 - 5.2 mmol/L Final    Chloride 01/25/2019 105  96 - 106 mmol/L Final    CO2 01/25/2019 23  20 - 29 mmol/L Final    Calcium 01/25/2019 8.9  8.6 - 10.2 mg/dL Final    Protein, total 01/25/2019 6.1  6.0 - 8.5 g/dL Final    Albumin 01/25/2019 4.0  3.5 - 4.7 g/dL Final    GLOBULIN, TOTAL 01/25/2019 2.1  1.5 - 4.5 g/dL Final    A-G Ratio 01/25/2019 1.9  1.2 - 2.2 Final    Bilirubin, total 01/25/2019 0.7  0.0 - 1.2 mg/dL Final    Alk.  phosphatase 01/25/2019 64  39 - 117 IU/L Final    AST (SGOT) 01/25/2019 15  0 - 40 IU/L Final    ALT (SGPT) 01/25/2019 15  0 - 44 IU/L Final    Hemoglobin A1c 01/25/2019 5.6  4.8 - 5.6 % Final    Comment: Prediabetes: 5.7 - 6.4           Diabetes: >6.4           Glycemic control for adults with diabetes: <7.0      Estimated average glucose 01/25/2019 114  mg/dL Final    Cholesterol, total 01/25/2019 229* 100 - 199 mg/dL Final    Triglyceride 01/25/2019 182* 0 - 149 mg/dL Final    HDL Cholesterol 01/25/2019 56  >39 mg/dL Final    VLDL, calculated 01/25/2019 36  5 - 40 mg/dL Final    LDL, calculated 01/25/2019 137* 0 - 99 mg/dL Final    TSH 01/25/2019 6.960* 0.450 - 4.500 uIU/mL Final    Calcitriol (Vit D 1, 25 di-OH) 01/25/2019 51.9  19.9 - 79.3 pg/mL Final    Vitamin B12 01/25/2019 681  232 - 1,245 pg/mL Final    Folate 01/25/2019 >20.0  >3.0 ng/mL Final    Comment: A serum folate concentration of less than 3.1 ng/mL is  considered to represent clinical deficiency.  T4, Total 01/25/2019 7.8  4.5 - 12.0 ug/dL Final       Objective:     Vitals:    02/08/19 1338   BP: 133/68   Pulse: 71   Resp: 18   SpO2: 98%   Weight: 162 lb 6.4 oz (73.7 kg)   Height: 5' 5\" (1.651 m)     Wt Readings from Last 3 Encounters:   02/08/19 162 lb 6.4 oz (73.7 kg)   10/15/18 163 lb (73.9 kg)   10/08/18 161 lb 9.6 oz (73.3 kg)     BP Readings from Last 3 Encounters:   02/08/19 133/68   10/15/18 121/70   10/08/18 117/56     Review of Systems   Constitutional: Negative for activity change, appetite change, chills, fatigue and fever. HENT: Negative for congestion, dental problem, hearing loss, postnasal drip, sinus pressure, sneezing, sore throat and trouble swallowing. Eyes: Negative for discharge, redness and visual disturbance. Respiratory: Negative for apnea, cough, chest tightness, shortness of breath and wheezing. Cardiovascular: Negative for chest pain, palpitations and leg swelling. Gastrointestinal: Negative for abdominal distention, abdominal pain, blood in stool, constipation, diarrhea, nausea and vomiting. Endocrine: Negative for polydipsia, polyphagia and polyuria.    Genitourinary: Negative for flank pain, frequency and urgency. Musculoskeletal: Negative for arthralgias, gait problem, joint swelling and neck pain. Skin: Negative for color change, pallor, rash and wound. Allergic/Immunologic: Negative for environmental allergies and food allergies. Neurological: Negative for dizziness, tremors, weakness, light-headedness, numbness and headaches. Hematological: Negative for adenopathy. Psychiatric/Behavioral: Positive for confusion. Negative for agitation and sleep disturbance. The patient is not nervous/anxious. Physical Exam   Constitutional: He is oriented to person, place, and time and well-developed, well-nourished, and in no distress. Vital signs are normal. He appears to not be writhing in pain, not malnourished and not dehydrated. He appears healthy. He does not have a sickly appearance. No distress. Elderly, , male resident of [address] [place] [address]. Mild memory loss and recall deficiencies noted. HENT:   Head: Normocephalic and atraumatic. Right Ear: Tympanic membrane, external ear and ear canal normal. Decreased hearing is noted. Left Ear: Tympanic membrane, external ear and ear canal normal. Decreased hearing is noted. Nose: Rhinorrhea present. Mouth/Throat: Uvula is midline, oropharynx is clear and moist and mucous membranes are normal. Mucous membranes are not pale, not dry and not cyanotic. No oral lesions. No uvula swelling. No posterior oropharyngeal edema or posterior oropharyngeal erythema. Eyes: Conjunctivae, EOM and lids are normal. Pupils are equal, round, and reactive to light. Lids are everted and swept, no foreign bodies found. Neck: Trachea normal, normal range of motion and full passive range of motion without pain. Neck supple. No hepatojugular reflux and no JVD present. Carotid bruit is not present. No thyromegaly present.    Cardiovascular: Normal rate, regular rhythm, S1 normal, S2 normal, normal heart sounds, intact distal pulses and normal pulses. Occasional extrasystoles are present. Exam reveals no gallop and no friction rub. No murmur heard. No extremity edema is present during today's exam.    Pulmonary/Chest: Effort normal and breath sounds normal.   Abdominal: Soft. Normal appearance and bowel sounds are normal. There is no hepatosplenomegaly. There is no tenderness. There is no CVA tenderness. Neurological: He is alert and oriented to person, place, and time. He has normal sensation, normal strength, normal reflexes and intact cranial nerves. He displays no weakness. He exhibits normal muscle tone. Gait normal. Coordination and gait normal. GCS score is 15. Skin: Skin is warm, dry and intact. No bruising and no rash noted. He is not diaphoretic. No cyanosis. No pallor. Nails show no clubbing. Psychiatric: Mood and affect normal. He exhibits disordered thought content and abnormal remote memory. Baseline mood and affect unchanged from normal.    Nursing note and vitals reviewed. Disclaimer:   Mr. Leeann Rodney has been advised to call or return to our office if symptoms worsen/change/persist. We as a care team including the patient; discussed expected course/resolution/complications of diagnosis in detail today. Medication risks/benefits/costs/interactions/alternatives discussed Leeann Rodney was given an after visit summary which includes diagnoses, current medications, & vitals. Leeann Rodney expressed understanding with the diagnosis and plan.

## 2019-03-21 ENCOUNTER — OFFICE VISIT (OUTPATIENT)
Dept: GERIATRIC MEDICINE | Age: 84
End: 2019-03-21

## 2019-03-21 VITALS
RESPIRATION RATE: 18 BRPM | WEIGHT: 165.7 LBS | DIASTOLIC BLOOD PRESSURE: 66 MMHG | SYSTOLIC BLOOD PRESSURE: 130 MMHG | OXYGEN SATURATION: 98 % | HEART RATE: 65 BPM | BODY MASS INDEX: 27.57 KG/M2

## 2019-03-21 DIAGNOSIS — E78.5 ELEVATED FASTING LIPID PROFILE: ICD-10-CM

## 2019-03-21 DIAGNOSIS — K64.4 SKIN TAG OF ANUS: ICD-10-CM

## 2019-03-21 DIAGNOSIS — Z71.2 ENCOUNTER TO DISCUSS TEST RESULTS: ICD-10-CM

## 2019-03-21 DIAGNOSIS — E78.00 PURE HYPERCHOLESTEROLEMIA: ICD-10-CM

## 2019-03-21 DIAGNOSIS — E89.0 POSTOPERATIVE HYPOTHYROIDISM: Primary | ICD-10-CM

## 2019-03-21 NOTE — PROGRESS NOTES
ADVISED PATIENT OF THE FOLLOWING HEALTH MAINTAINCE DUE  Health Maintenance Due   Topic Date Due    GLAUCOMA SCREENING Q2Y  08/30/2000    MEDICARE YEARLY EXAM  04/14/2019      Chief Complaint   Patient presents with    Cholesterol Problem     Pt being seen for follow up with diet changes.  Buttocks pain     Pt reports having sore/painful area to rectal area. 1. Have you been to the ER, urgent care clinic since your last visit? Hospitalized since your last visit? No    2. Have you seen or consulted any other health care providers outside of the [address] [place] [address] since your last visit? Include any DEXA scan, mammography  or colon screening. No    3. Do you have an Advance Directive on file? yes    4. Do you have a DNR on file? DNR    Patient is accompanied by self I have received verbal consent from Terrence Umaña to discuss any/all medical information while they are present in the room. Advance Care Planning 8/6/2018   Patient's Healthcare Decision Maker is: Named in scanned ACP document   Primary Decision Maker Name Nabor Tripp   Primary Decision Maker Phone Number [phone]   Primary Decision Maker Relationship to Patient Adult child   Confirm Advance Directive Yes, on file         [facility]  [address]  [address] [facility] [address] [facility] [address]  Phone: [phone] Fax: [address] [facility], . [address] [place] [address]  [address]  [facility] [address]  Phone: [phone] Fax: [phone]        Patient reminded during visit to bring all medication bottles, OTC medications to all appointments.

## 2019-03-29 DIAGNOSIS — E78.00 PURE HYPERCHOLESTEROLEMIA: ICD-10-CM

## 2019-03-29 DIAGNOSIS — R79.89 ELEVATED TSH: ICD-10-CM

## 2019-03-29 DIAGNOSIS — E89.0 POSTOPERATIVE HYPOTHYROIDISM: Chronic | ICD-10-CM

## 2019-03-30 NOTE — PATIENT INSTRUCTIONS
Omega-3-Acid Ethyl Esters (By mouth)   Omega-3-Acid Ethyl Esters (oh-MAY-ga-3-AS-id ETH-il ES-ters)  Lowers high triglyceride levels. Brand Name(s): Lovaza, Omega-3 D-3 Wellness Pack Kit, Sure Result O3D3 System, PreViser   There may be other brand names for this medicine. When This Medicine Should Not Be Used: This medicine is not right for everyone. Do not use it if you had an allergic reaction to omega-3-acid ethyl esters. How to Use This Medicine:   Liquid Filled Capsule  · Your doctor will tell you how much medicine to use. Do not use more than directed. Take this medicine at the same time each day. · It is best to take this medicine with food or milk. · Swallow the capsule whole. Do not break, crush, chew, dissolve, or open the capsule. Do not let this medicine come into contact with foam plastic cups (Styrofoam). · Read and follow the patient instructions that come with this medicine. Talk to your doctor or pharmacist if you have any questions. · Missed dose: Take a dose as soon as you remember. If it is almost time for your next dose, wait until then and take a regular dose. Do not take extra medicine to make up for a missed dose. · Store the medicine in a closed container at room temperature, away from heat, moisture, and direct light. Drugs and Foods to Avoid:   Ask your doctor or pharmacist before using any other medicine, including over-the-counter medicines, vitamins, and herbal products. · Some medicines can affect how omega-3-acid ethyl esters work. Tell your doctor if you are using any of the following:   ¨ Clopidogrel  ¨ An NSAID pain or arthritis medicine (such as aspirin, diclofenac, ibuprofen, naproxen)  ¨ A blood thinner (such as warfarin)  Warnings While Using This Medicine:   · Tell your doctor if you are pregnant or breastfeeding, if you have liver disease, an underactive thyroid, a heart rhythm problem, or an allergy to fish or shellfish.   · Your doctor will do lab tests at regular visits to check on the effects of this medicine. Keep all appointments. · Keep all medicine out of the reach of children. Never share your medicine with anyone. Possible Side Effects While Using This Medicine:   Call your doctor right away if you notice any of these side effects:  · Allergic reaction: Itching or hives, swelling in your face or hands, swelling or tingling in your mouth or throat, chest tightness, trouble breathing  · Fast or uneven heartbeat, dizziness, fainting  · Unusual bleeding or bruising  If you notice these less serious side effects, talk with your doctor:   · Burping, stomach upset  · Change in taste  If you notice other side effects that you think are caused by this medicine, tell your doctor. Call your doctor for medical advice about side effects. You may report side effects to FDA at 4-877-FDA-3580  © 2017 [facility] Information is for End User's use only and may not be sold, redistributed or otherwise used for commercial purposes. The above information is an  only. It is not intended as medical advice for individual conditions or treatments. Talk to your doctor, nurse or pharmacist before following any medical regimen to see if it is safe and effective for you. High Cholesterol: Care Instructions  Your Care Instructions    Cholesterol is a type of fat in your blood. It is needed for many body functions, such as making new cells. Cholesterol is made by your body. It also comes from food you eat. High cholesterol means that you have too much of the fat in your blood. This raises your risk of a heart attack and stroke. LDL and HDL are part of your total cholesterol. LDL is the \"bad\" cholesterol. High LDL can raise your risk for heart disease, heart attack, and stroke. HDL is the \"good\" cholesterol. It helps clear bad cholesterol from the body. High HDL is linked with a lower risk of heart disease, heart attack, and stroke.   Your cholesterol levels help your doctor find out your risk for having a heart attack or stroke. You and your doctor can talk about whether you need to lower your risk and what treatment is best for you. A heart-healthy lifestyle along with medicines can help lower your cholesterol and your risk. The way you choose to lower your risk will depend on how high your risk is for heart attack and stroke. It will also depend on how you feel about taking medicines. Follow-up care is a key part of your treatment and safety. Be sure to make and go to all appointments, and call your doctor if you are having problems. It's also a good idea to know your test results and keep a list of the medicines you take. How can you care for yourself at home? · Eat a variety of foods every day. Good choices include fruits, vegetables, whole grains (like oatmeal), dried beans and peas, nuts and seeds, soy products (like tofu), and fat-free or low-fat dairy products. · Replace butter, margarine, and hydrogenated or partially hydrogenated oils with olive and canola oils. (Canola oil margarine without trans fat is fine.)  · Replace red meat with fish, poultry, and soy protein (like tofu). · Limit processed and packaged foods like chips, crackers, and cookies. · Bake, broil, or steam foods. Don't mckinney them. · Be physically active. Get at least 30 minutes of exercise on most days of the week. Walking is a good choice. You also may want to do other activities, such as running, swimming, cycling, or playing tennis or team sports. · Stay at a healthy weight or lose weight by making the changes in eating and physical activity listed above. Losing just a small amount of weight, even 5 to 10 pounds, can reduce your risk for having a heart attack or stroke. · Do not smoke. When should you call for help?   Watch closely for changes in your health, and be sure to contact your doctor if:    · You need help making lifestyle changes.     · You have questions about your medicine. Where can you learn more? Go to http://sloan-george.info/. Enter X702 in the search box to learn more about \"High Cholesterol: Care Instructions. \"  Current as of: July 22, 2018  Content Version: 11.9  © 3047-4127 [employer]. Care instructions adapted under license by [employer] (which disclaims liability or warranty for this information). If you have questions about a medical condition or this instruction, always ask your healthcare professional. [address] any warranty or liability for your use of this information.

## 2019-03-30 NOTE — PROGRESS NOTES
Reason for Visit/HPI:    Nahed Azar is a 80 y.o. male patient who presents today for   Chief Complaint   Patient presents with    Cholesterol Problem     Pt being seen for follow up with diet changes.  Buttocks pain     Pt reports having sore/painful area to rectal area. Follow Up: Follow-up and Dispositions    · Return in about 4 weeks (around 4/15/2019) for to completed your annual Medicare Wellness Visit. Diagnosis/Treatment Plan:    Diagnoses and all orders for this visit:    1. Postoperative hypothyroidism  Comments:  currently stable with treatment    2. Elevated fasting lipid profile  Comments:  Fish oil has caused him alot of burping, reflux, ect. ... 3. Pure hypercholesterolemia  Comments:  Doing well with Zocor. He is going to get just plain Omegas and start them. 4. Encounter to discuss test results  Comments:  Labs look great. 5. Skin tag of anus  Comments:  Exam completed with my nurse. He has an irritated skin tag, Discussed snipping it. He would like to try and get it to calm down on its own. Discontinued Care: The following treatment modalities have been discontinued by the provider today:   There are no discontinued medications. Subjective: Allergies   Allergen Reactions    Toprol Xl [Metoprolol Succinate] Diarrhea    Codeine Other (comments)    Sulfa (Sulfonamide Antibiotics) Hives     Prior to Admission medications    Medication Sig Start Date End Date Taking? Authorizing Provider   levothyroxine (SYNTHROID) 25 mcg tablet Take 1 and 1/2 tablet daily as of 2/8/2019 2/8/19  Yes Marlin Ready, NP   metroNIDAZOLE (METROGEL) 1 % topical gel Use a thin layer to affected areas after washing 2/8/19  Yes Marlin Ready, NP   Methylcellulose, with Sugar, (CITRUCEL, SUCROSE,) powd Use daily for bowel regimen. 2/8/19  Yes Marlin Ready, NP   biotin 10,000 mcg cap Take 2 Caps by mouth daily.  2/8/19  Yes Marlin Ready, NP   omeprazole (PRILOSEC) 40 mg capsule TAKE 1 CAPSULE BY MOUTH  DAILY FOR GASTROESOPHAGEAL  REFLUX DISEASE 2/4/19  Yes Juan C Rodrigues NP   finasteride (PROSCAR) 5 mg tablet TAKE 1 TABLET BY MOUTH  NIGHTLY FOR BENIGN  PROSTATIC HYPERPLASIA WITH  LOWER URINARY TRACT  SYMPTOMS 1/19/19  Yes Juan C Rodrigues NP   simvastatin (ZOCOR) 20 mg tablet TAKE 1 TABLET BY MOUTH  NIGHTLY 9/8/18  Yes Juan C Rodrigues NP   carbamide peroxide (DEBROX) 6.5 % otic solution Apply 2 drops to both ear canals once a week at night to keep wax from building up. Do not stop. Indications: IMPACTED CERUMEN 6/29/18  Yes Juan C Rodrigues NP   diazePAM (VALIUM) 5 mg tablet Take 1 Tab by mouth every six (6) hours as needed. Max Daily Amount: 20 mg. Indications: Muscle Spasm 6/12/18  Yes Juan C Rodrigues NP   acetaminophen (TYLENOL) 325 mg tablet Take 650 mg by mouth every four (4) hours as needed for Pain. Yes Provider, Historical   aspirin delayed-release 81 mg tablet Take 81 mg by mouth daily. Yes Provider, Historical   vit C,X-Km-rvvse-lutein-zeaxan (PRESERVISION AREDS 2) 537-619-12-1 mg-unit-mg-mg cap Take 2 Caps by mouth daily.    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 10/06/2016    Bilateral thumb pain 02/01/2017    CAD (coronary artery disease)     Cardiac murmur 17/28/8069    Folliculitis 00/49/6433    GERD (gastroesophageal reflux disease)     Hypercholesteremia     Hypothyroid     Left hip pain 02/01/2017    Lumbar radiculitis     Lumbar spondylitis (HCC)     Meniere disease     Mixed hyperlipidemia     Occlusion and stenosis of unspecified carotid artery     Osteoarthritis 02/19/2018    hands    Osteopenia of both hands 02/18/2018    Polyp of cecum 08/02/2017    5mm polyp in cecum, 8 mm polyp in sigmoid colon    Spinal enthesopathy of lumbar region ([facility] 02/01/2017 Past Surgical History:   Procedure Laterality Date    CARDIAC SURG PROCEDURE UNLIST  04/2008    HX CATARACT REMOVAL Bilateral     HX CHOLECYSTECTOMY  1994    HX COLONOSCOPY  12/19/2012    tubular adenoma    HX COLONOSCOPY  08/02/2017    tubular adenomas    HX CORONARY ARTERY BYPASS GRAFT  1996    HX CORONARY STENT PLACEMENT      HX KNEE ARTHROSCOPY Left 2005    menisectomy    HX ROTATOR CUFF REPAIR Left     HX THYROIDECTOMY  1975    HX TONSIL AND ADENOIDECTOMY        Social History     Tobacco Use    Smoking status: Former Smoker    Smokeless tobacco: Never Used   Substance Use Topics    Alcohol use: Yes     Alcohol/week: 4.2 oz     Types: 7 Glasses of wine per week    Drug use: No      Family History   Problem Relation Age of Onset    No Known Problems Mother     No Known Problems Father     No Known Problems Brother      Advance Care Planning 8/6/2018   Patient's Healthcare Decision Maker is: Named in scanned ACP document   Primary Decision Maker Name Dayami Colin   Primary Decision Maker Phone Number [phone]   Primary Decision Maker Relationship to Patient Adult child   Confirm Advance Directive Yes, on file     Test Results:     Clinical Support on 01/25/2019   Component Date Value Ref Range Status    WBC 01/25/2019 4.5  3.4 - 10.8 x10E3/uL Final    RBC 01/25/2019 4.86  4.14 - 5.80 x10E6/uL Final    HGB 01/25/2019 15.4  13.0 - 17.7 g/dL Final    HCT 01/25/2019 46.4  37.5 - 51.0 % Final    MCV 01/25/2019 96  79 - 97 fL Final    MCH 01/25/2019 31.7  26.6 - 33.0 pg Final    MCHC 01/25/2019 33.2  31.5 - 35.7 g/dL Final    RDW 01/25/2019 14.4  12.3 - 15.4 % Final    PLATELET 50/22/4466 985  150 - 379 x10E3/uL Final    NEUTROPHILS 01/25/2019 57  Not Estab. % Final    Lymphocytes 01/25/2019 33  Not Estab. % Final    MONOCYTES 01/25/2019 7  Not Estab. % Final    EOSINOPHILS 01/25/2019 2  Not Estab. % Final    BASOPHILS 01/25/2019 1  Not Estab. % Final    ABS.  NEUTROPHILS 01/25/2019 2.6  1.4 - 7.0 x10E3/uL Final    Abs Lymphocytes 01/25/2019 1.5  0.7 - 3.1 x10E3/uL Final    ABS. MONOCYTES 01/25/2019 0.3  0.1 - 0.9 x10E3/uL Final    ABS. EOSINOPHILS 01/25/2019 0.1  0.0 - 0.4 x10E3/uL Final    ABS. BASOPHILS 01/25/2019 0.0  0.0 - 0.2 x10E3/uL Final    IMMATURE GRANULOCYTES 01/25/2019 0  Not Estab. % Final    ABS. IMM. GRANS. 01/25/2019 0.0  0.0 - 0.1 x10E3/uL Final    Glucose 01/25/2019 108* 65 - 99 mg/dL Final    BUN 01/25/2019 18  8 - 27 mg/dL Final    Creatinine 01/25/2019 0.89  0.76 - 1.27 mg/dL Final    GFR est non-AA 01/25/2019 79  >59 mL/min/1.73 Final    GFR est AA 01/25/2019 91  >59 mL/min/1.73 Final    BUN/Creatinine ratio 01/25/2019 20  10 - 24 Final    Sodium 01/25/2019 143  134 - 144 mmol/L Final    Potassium 01/25/2019 4.2  3.5 - 5.2 mmol/L Final    Chloride 01/25/2019 105  96 - 106 mmol/L Final    CO2 01/25/2019 23  20 - 29 mmol/L Final    Calcium 01/25/2019 8.9  8.6 - 10.2 mg/dL Final    Protein, total 01/25/2019 6.1  6.0 - 8.5 g/dL Final    Albumin 01/25/2019 4.0  3.5 - 4.7 g/dL Final    GLOBULIN, TOTAL 01/25/2019 2.1  1.5 - 4.5 g/dL Final    A-G Ratio 01/25/2019 1.9  1.2 - 2.2 Final    Bilirubin, total 01/25/2019 0.7  0.0 - 1.2 mg/dL Final    Alk.  phosphatase 01/25/2019 64  39 - 117 IU/L Final    AST (SGOT) 01/25/2019 15  0 - 40 IU/L Final    ALT (SGPT) 01/25/2019 15  0 - 44 IU/L Final    Hemoglobin A1c 01/25/2019 5.6  4.8 - 5.6 % Final    Comment:          Prediabetes: 5.7 - 6.4           Diabetes: >6.4           Glycemic control for adults with diabetes: <7.0      Estimated average glucose 01/25/2019 114  mg/dL Final    Cholesterol, total 01/25/2019 229* 100 - 199 mg/dL Final    Triglyceride 01/25/2019 182* 0 - 149 mg/dL Final    HDL Cholesterol 01/25/2019 56  >39 mg/dL Final    VLDL, calculated 01/25/2019 36  5 - 40 mg/dL Final    LDL, calculated 01/25/2019 137* 0 - 99 mg/dL Final    TSH 01/25/2019 6.960* 0.450 - 4.500 uIU/mL Final    Calcitriol (Vit D 1, 25 di-OH) 01/25/2019 51.9  19.9 - 79.3 pg/mL Final    Vitamin B12 01/25/2019 681  232 - 1,245 pg/mL Final    Folate 01/25/2019 >20.0  >3.0 ng/mL Final    Comment: A serum folate concentration of less than 3.1 ng/mL is  considered to represent clinical deficiency.  T4, Total 01/25/2019 7.8  4.5 - 12.0 ug/dL Final       Objective:     Vitals:    03/21/19 1332   BP: 130/66   Pulse: 65   Resp: 18   SpO2: 98%   Weight: 165 lb 11.2 oz (75.2 kg)     Wt Readings from Last 3 Encounters:   03/21/19 165 lb 11.2 oz (75.2 kg)   02/08/19 162 lb 6.4 oz (73.7 kg)   10/15/18 163 lb (73.9 kg)     BP Readings from Last 3 Encounters:   03/21/19 130/66   02/08/19 133/68   10/15/18 121/70     Review of Systems   Constitutional: Negative for activity change, appetite change, chills, fatigue and fever. HENT: Negative for congestion, dental problem, hearing loss, postnasal drip, sinus pressure, sneezing, sore throat and trouble swallowing. Eyes: Negative for discharge, redness and visual disturbance. Respiratory: Negative for apnea, cough, chest tightness, shortness of breath and wheezing. Cardiovascular: Negative for chest pain, palpitations and leg swelling. Gastrointestinal: Negative for abdominal distention, abdominal pain, blood in stool, constipation, diarrhea, nausea and vomiting. Endocrine: Negative for polydipsia, polyphagia and polyuria. Genitourinary: Negative for flank pain, frequency and urgency. Musculoskeletal: Negative for arthralgias, gait problem, joint swelling and neck pain. Skin: Negative for color change, pallor, rash and wound. Allergic/Immunologic: Negative for environmental allergies and food allergies. Neurological: Negative for dizziness, tremors, weakness, light-headedness, numbness and headaches. Hematological: Negative for adenopathy. Psychiatric/Behavioral: Positive for confusion. Negative for agitation and sleep disturbance.  The patient is not nervous/anxious. Physical Exam   Constitutional: He is oriented to person, place, and time and well-developed, well-nourished, and in no distress. Vital signs are normal. He appears to not be writhing in pain, not malnourished and not dehydrated. He appears healthy. He does not have a sickly appearance. No distress. Elderly, , male resident of [address] [place] [address]. Mild memory loss and recall deficiencies noted. HENT:   Head: Normocephalic and atraumatic. Right Ear: Tympanic membrane, external ear and ear canal normal. Decreased hearing is noted. Left Ear: Tympanic membrane, external ear and ear canal normal. Decreased hearing is noted. Nose: Rhinorrhea present. Mouth/Throat: Uvula is midline, oropharynx is clear and moist and mucous membranes are normal. Mucous membranes are not pale, not dry and not cyanotic. No oral lesions. No uvula swelling. No posterior oropharyngeal edema or posterior oropharyngeal erythema. Eyes: Pupils are equal, round, and reactive to light. Conjunctivae, EOM and lids are normal. Lids are everted and swept, no foreign bodies found. Neck: Trachea normal, normal range of motion and full passive range of motion without pain. Neck supple. No hepatojugular reflux and no JVD present. Carotid bruit is not present. No thyromegaly present. Cardiovascular: Normal rate, regular rhythm, S1 normal, S2 normal, normal heart sounds, intact distal pulses and normal pulses. Occasional extrasystoles are present. Exam reveals no gallop and no friction rub. No murmur heard. No extremity edema is present during today's exam.    Pulmonary/Chest: Effort normal and breath sounds normal.   Abdominal: Soft. Normal appearance and bowel sounds are normal. There is no hepatosplenomegaly. There is no tenderness. There is no CVA tenderness. Genitourinary: Rectum normal.   Genitourinary Comments: Skin tag irritated on anus.     Neurological: He is alert and oriented to person, place, and time. He has normal sensation, normal strength, normal reflexes and intact cranial nerves. He displays no weakness. He exhibits normal muscle tone. Gait normal. Coordination and gait normal. GCS score is 15. Skin: Skin is warm, dry and intact. No bruising and no rash noted. He is not diaphoretic. No cyanosis. No pallor. Nails show no clubbing. Psychiatric: Mood and affect normal. He exhibits disordered thought content and abnormal remote memory. Baseline mood and affect unchanged from normal.    Nursing note and vitals reviewed. Disclaimer:   Mr. Vickie Gonzalez has been advised to call or return to our office if symptoms worsen/change/persist. We as a care team including the patient; discussed expected course/resolution/complications of diagnosis in detail today. Medication risks/benefits/costs/interactions/alternatives discussed Vickie Gonzalez was given an after visit summary which includes diagnoses, current medications, & vitals. Vickie Gonzalez expressed understanding with the diagnosis and plan.

## 2019-04-08 ENCOUNTER — CLINICAL SUPPORT (OUTPATIENT)
Dept: GERIATRIC MEDICINE | Age: 84
End: 2019-04-08

## 2019-04-08 DIAGNOSIS — E78.2 MIXED HYPERLIPIDEMIA: Primary | ICD-10-CM

## 2019-04-08 DIAGNOSIS — E89.0 POSTOPERATIVE HYPOTHYROIDISM: ICD-10-CM

## 2019-04-08 DIAGNOSIS — E78.5 ELEVATED FASTING LIPID PROFILE: ICD-10-CM

## 2019-04-08 NOTE — PROGRESS NOTES
Chief Complaint   Patient presents with   [facility]     Patient being seen for lab draw. Virginia Yanez presents for lab draw ordered by Mary Ricketts RN MSN [id number]-NP    The following labs were drawn and sent to lab by Mariana Castro LPN:    Lipid panel, Thyorid.      The following tubes were sent:    Tiger (1)    Patient tolerated procedure well, blood obtained via venipuncture to left antecubital

## 2019-04-09 LAB
CHOLEST SERPL-MCNC: 213 MG/DL (ref 100–199)
FT4I SERPL CALC-MCNC: 1.4 (ref 1.2–4.9)
HDLC SERPL-MCNC: 50 MG/DL
LDLC SERPL CALC-MCNC: 115 MG/DL (ref 0–99)
T3RU NFR SERPL: 25 % (ref 24–39)
T4 SERPL-MCNC: 5.7 UG/DL (ref 4.5–12)
TRIGL SERPL-MCNC: 241 MG/DL (ref 0–149)
TSH SERPL DL<=0.005 MIU/L-ACNC: 5.45 UIU/ML (ref 0.45–4.5)
VLDLC SERPL CALC-MCNC: 48 MG/DL (ref 5–40)

## 2019-04-10 ENCOUNTER — HOSPITAL ENCOUNTER (EMERGENCY)
Age: 84
Discharge: HOME | End: 2019-04-10
Attending: EMERGENCY MEDICINE
Payer: MEDICARE

## 2019-04-10 VITALS
DIASTOLIC BLOOD PRESSURE: 63 MMHG | BODY MASS INDEX: 28.1 KG/M2 | SYSTOLIC BLOOD PRESSURE: 133 MMHG | RESPIRATION RATE: 16 BRPM | OXYGEN SATURATION: 98 % | WEIGHT: 168.87 LBS | TEMPERATURE: 97.7 F | HEART RATE: 65 BPM

## 2019-04-10 DIAGNOSIS — H61.23 BILATERAL HEARING LOSS DUE TO CERUMEN IMPACTION: Primary | ICD-10-CM

## 2019-04-10 PROCEDURE — 99282 EMERGENCY DEPT VISIT SF MDM: CPT

## 2019-04-10 PROCEDURE — 77030015919

## 2019-04-10 PROCEDURE — 75810000150 HC RMVL IMPACTED CERUMEN 1 / 2

## 2019-04-10 RX ORDER — DOCUSATE SODIUM 60 MG/15ML
4 SYRUP ORAL
Qty: 30 ML | Refills: 0 | Status: SHIPPED | OUTPATIENT
Start: 2019-04-10 | End: 2019-04-16 | Stop reason: ALTCHOICE

## 2019-04-10 NOTE — ED NOTES
MD at bedside removing ear way from bilateral ears. Patient tolerating well. Patient reports hearing is much better.

## 2019-04-10 NOTE — ED PROVIDER NOTES
The history is provided by the patient. Ear Fullness    This is a new problem. The current episode started yesterday. The problem occurs constantly. The problem has been gradually worsening. Patient complains that both (left ear he can no longer hear ) ears are affected. There has been no fever. The patient is experiencing no pain. Pertinent negatives include no ear discharge, no headaches and no neck pain. Risk factors: wears hearing aids.          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 ([address] 10/06/2016    Bilateral thumb pain 02/01/2017    CAD (coronary artery disease)     Cardiac murmur 39/84/7443    Folliculitis 57/25/2134    GERD (gastroesophageal reflux disease)     Hypercholesteremia     Hypothyroid     Left hip pain 02/01/2017    Lumbar radiculitis     Lumbar spondylitis (HCC)     Meniere disease     Mixed hyperlipidemia     Occlusion and stenosis of unspecified carotid artery     Osteoarthritis 02/19/2018    hands    Osteopenia of both hands 02/18/2018    Polyp of cecum 08/02/2017    5mm polyp in cecum, 8 mm polyp in sigmoid colon    Spinal enthesopathy of lumbar region ([facility] 02/01/2017       Past Surgical History:   Procedure Laterality Date    CARDIAC SURG PROCEDURE UNLIST  04/2008    HX CATARACT REMOVAL Bilateral     HX CHOLECYSTECTOMY  1994    HX COLONOSCOPY  12/19/2012    tubular adenoma    HX COLONOSCOPY  08/02/2017    tubular adenomas    HX CORONARY ARTERY BYPASS GRAFT  1996    HX CORONARY ARTERY BYPASS GRAFT  1996    HX CORONARY STENT PLACEMENT      HX KNEE ARTHROSCOPY Left 2005    menisectomy    HX ROTATOR CUFF REPAIR Left     HX THYROIDECTOMY  1975    HX TONSIL AND ADENOIDECTOMY           Family History:   Problem Relation Age of Onset    No Known Problems Mother     No Known Problems Father     No Known Problems Brother        Social History Socioeconomic History    Marital status: SINGLE     Spouse name: Not on file    Number of children: Not on file    Years of education: Not on file    Highest education level: Not on file   Occupational History    Not on file   Social Needs    Financial resource strain: Not on file    Food insecurity:     Worry: Not on file     Inability: Not on file    Transportation needs:     Medical: Not on file     Non-medical: Not on file   Tobacco Use    Smoking status: Former Smoker    Smokeless tobacco: Never Used   Substance and Sexual Activity    Alcohol use: Yes     Alcohol/week: 4.2 oz     Types: 7 Glasses of wine per week    Drug use: No    Sexual activity: Never   Lifestyle    Physical activity:     Days per week: Not on file     Minutes per session: Not on file    Stress: Not on file   Relationships    Social connections:     Talks on phone: Not on file     Gets together: Not on file     Attends Caodaism service: Not on file     Active member of club or organization: Not on file     Attends meetings of clubs or organizations: Not on file     Relationship status: Not on file    Intimate partner violence:     Fear of current or ex partner: Not on file     Emotionally abused: Not on file     Physically abused: Not on file     Forced sexual activity: Not on file   Other Topics Concern    Not on file   Social History Narrative    Not on file         ALLERGIES: Toprol xl [metoprolol succinate]; Codeine; and Sulfa (sulfonamide antibiotics)    Review of Systems   HENT: Negative for ear discharge. Musculoskeletal: Negative for neck pain. Neurological: Negative for headaches. All other systems reviewed and are negative. Vitals:    04/10/19 1350   BP: 133/63   Pulse: 65   Resp: 16   Temp: 97.7 °F (36.5 °C)   SpO2: 98%   Weight: 76.6 kg (168 lb 14 oz)            Physical Exam   Constitutional: He appears well-developed and well-nourished. No distress.    HENT:   Head: Normocephalic and atraumatic. Right Ear: No drainage. No foreign bodies. No middle ear effusion. Left Ear: No drainage. No foreign bodies. No middle ear effusion. Bilateral cerumen impaction, left side is complete and right side is near-complete   Eyes: Conjunctivae are normal.   Neck: Neck supple. No tracheal deviation present. Cardiovascular: Normal rate and regular rhythm. Pulmonary/Chest: Effort normal. No respiratory distress. Abdominal: He exhibits no distension. Musculoskeletal: Normal range of motion. He exhibits no deformity. Neurological: He is alert. No cranial nerve deficit. Skin: Skin is warm and dry. Psychiatric: His behavior is normal.   Nursing note and vitals reviewed. MDM     80 y.o. male presents with acute hearing loss in left ear and diminished hearing on right with feeling of \"rattling\" in right ear with hearing aids. Bilateral cerumen impactions were removed and Pt had resolution of symptoms. Provided colace for treatment with future exacerbations and avoid putting anything into ears other than external cleaning prior to aid use. EAR CERUMEN REMOVAL NOTEWRITER (ASAP ONLY)  Date/Time: 4/10/2019 2:09 PM  Performed by: Sena Reyes MD  Authorized by: Sena Reyes MD     Consent:     Consent obtained:  Verbal    Consent given by:  Patient    Risks discussed:  Bleeding, infection, dizziness, incomplete removal, pain and TM perforation    Alternatives discussed:  No treatment, delayed treatment and alternative treatment  Procedure details:     Location:  L ear and R ear    Procedure type: curette    Post-procedure details: Inspection:  TM intact    Hearing quality:  Improved    Patient tolerance of procedure:   Tolerated well, no immediate complications

## 2019-04-10 NOTE — ED TRIAGE NOTES
Triage Note: Patient reports he is unable to hear out of his left ear. Denies pain. Patient wears bilateral hearing aids, but reports he has checked his batteries.

## 2019-04-16 ENCOUNTER — OFFICE VISIT (OUTPATIENT)
Dept: GERIATRIC MEDICINE | Age: 84
End: 2019-04-16

## 2019-04-16 VITALS
BODY MASS INDEX: 27.66 KG/M2 | WEIGHT: 166 LBS | DIASTOLIC BLOOD PRESSURE: 66 MMHG | HEIGHT: 65 IN | RESPIRATION RATE: 18 BRPM | OXYGEN SATURATION: 98 % | SYSTOLIC BLOOD PRESSURE: 119 MMHG | HEART RATE: 64 BPM

## 2019-04-16 DIAGNOSIS — Z71.89 ACP (ADVANCE CARE PLANNING): ICD-10-CM

## 2019-04-16 DIAGNOSIS — Z00.00 MEDICARE ANNUAL WELLNESS VISIT, SUBSEQUENT: Primary | ICD-10-CM

## 2019-04-16 DIAGNOSIS — E78.5 ELEVATED FASTING LIPID PROFILE: ICD-10-CM

## 2019-04-16 DIAGNOSIS — E78.00 PURE HYPERCHOLESTEROLEMIA: ICD-10-CM

## 2019-04-16 DIAGNOSIS — E78.2 MIXED HYPERLIPIDEMIA: ICD-10-CM

## 2019-04-16 DIAGNOSIS — E89.0 POSTOPERATIVE HYPOTHYROIDISM: ICD-10-CM

## 2019-04-16 DIAGNOSIS — Z66 DNR (DO NOT RESUSCITATE): ICD-10-CM

## 2019-04-16 PROBLEM — M25.552 PAIN OF LEFT HIP JOINT: Status: ACTIVE | Noted: 2019-04-16

## 2019-04-16 RX ORDER — EZETIMIBE AND SIMVASTATIN 10; 40 MG/1; MG/1
1 TABLET ORAL
Qty: 30 TAB | Refills: 0 | Status: SHIPPED | OUTPATIENT
Start: 2019-04-16 | End: 2019-04-24 | Stop reason: ALTCHOICE

## 2019-04-16 RX ORDER — LEVOTHYROXINE SODIUM 25 UG/1
TABLET ORAL
Qty: 45 TAB | Refills: 1
Start: 2019-04-16 | End: 2019-05-31 | Stop reason: SDUPTHER

## 2019-04-16 NOTE — PROGRESS NOTES
ADVISED PATIENT OF THE FOLLOWING HEALTH MAINTAINCE DUE Health Maintenance Due Topic Date Due  GLAUCOMA SCREENING Q2Y  08/30/2000  MEDICARE YEARLY EXAM  04/14/2019 Chief Complaint Patient presents with [address] [facility] [address] Annual Wellness Visit AMW visit  Results Discuss lab results 1. Have you been to the ER, urgent care clinic since your last visit? Hospitalized since your last visit? No 
 
2. Have you seen or consulted any other health care providers outside of the [address] [place] [address] since your last visit? Include any DEXA scan, mammography  or colon screening. No 
 
3. Do you have an Advance Directive on file? yes 4. Do you have a DNR on file? DNR Patient is accompanied by self I have received verbal consent from Flavia Barajas to discuss any/all medical information while they are present in the room. Advance Care Planning 4/16/2019 Patient's Healthcare Decision Maker is: Named in scanned ACP document Primary Decision Maker Name -  
Primary Decision Maker Phone Number -  
Primary Decision Maker Relationship to Patient -  
Confirm Advance Directive Yes, on file Does the patient have other document types Do Not Resuscitate [email or web address] [address], [address] [place] [address] [id number] Phone: [phone] Fax: [phone] [address] 
[facility] [place] [address] Phone: [phone] Fax: [phone] Patient reminded during visit to bring all medication bottles, OTC medications to all appointments.

## 2019-04-16 NOTE — PROGRESS NOTES
Reason For Visit:  
 
Chief Complaint Patient presents with [facility] Annual Wellness Visit AMW visit  Results Discuss lab results Tam Bailey is a 80 y.o. male who presents for an annual Medicare Wellness Visit. Patient History: PSH: Reviewed with patient Past Surgical History:  
Procedure Laterality Date  CARDIAC SURG PROCEDURE UNLIST  04/2008  HX CATARACT REMOVAL Bilateral   
 [facility]  HX COLONOSCOPY  12/19/2012  
 tubular adenoma  HX COLONOSCOPY  08/02/2017  
 tubular adenomas [facility]  HX CORONARY STENT PLACEMENT    
 HX KNEE ARTHROSCOPY Left 2005  
 menisectomy  HX ROTATOR CUFF REPAIR Left [address]  HX TONSIL AND ADENOIDECTOMY SH: Reviewed with patient Social History Tobacco Use  Smoking status: Former Smoker  Smokeless tobacco: Never Used Substance Use Topics  Alcohol use: Yes Alcohol/week: 4.2 oz Types: 7 Glasses of wine per week  Drug use: No  
 
FH: Reviewed with patient Family History Problem Relation Age of Onset  No Known Problems Mother  No Known Problems Father  No Known Problems Brother Medications/Allergies: Reviewed with patient. Current Outpatient Medications on File Prior to Visit Medication Sig Dispense Refill  omega 3-dha-epa-fish oil (FISH OIL) 100-160-1,000 mg cap Take  by mouth.  vit C,J-Xi-kydvu-lutein-zeaxan (PRESERVISION AREDS-2) 114-764-75-1 mg-unit-mg-mg cap capsule Take  by mouth.  carbamide peroxide (DEBROX) 6.5 % otic solution Administer 5 Drops into each ear two (2) times a day.  metroNIDAZOLE (METROGEL) 1 % topical gel Use a thin layer to affected areas after washing 45 g 5  Methylcellulose, with Sugar, (CITRUCEL, SUCROSE,) powd Use daily for bowel regimen. 850 g 10  
 biotin 10,000 mcg cap Take 2 Caps by mouth daily.  [facility]  
  omeprazole (PRILOSEC) 40 mg capsule TAKE 1 CAPSULE BY MOUTH  DAILY FOR GASTROESOPHAGEAL  REFLUX DISEASE 90 Cap 2  
 finasteride (PROSCAR) 5 mg tablet TAKE 1 TABLET BY MOUTH  NIGHTLY FOR BENIGN  PROSTATIC HYPERPLASIA WITH  LOWER URINARY TRACT  SYMPTOMS 90 Tab 1  
 simvastatin (ZOCOR) 20 mg tablet TAKE 1 TABLET BY MOUTH  NIGHTLY 90 Tab 1  
 diazePAM (VALIUM) 5 mg tablet Take 1 Tab by mouth every six (6) hours as needed. Max Daily Amount: 20 mg. Indications: Muscle Spasm 30 Tab 2  
 acetaminophen (TYLENOL) 325 mg tablet Take 650 mg by mouth every four (4) hours as needed for Pain.  aspirin delayed-release 81 mg tablet Take 81 mg by mouth daily. No current facility-administered medications on file prior to visit. Allergies Allergen Reactions  Toprol Xl [Metoprolol Succinate] Diarrhea  Codeine Other (comments)  Sulfa (Sulfonamide Antibiotics) Hives Patient Care Team: 
Hakan Manzano NP as PCP - General 
Rj Holbrook MD as Physician (Cardiology) Leobardo Diamond MD as Physician (Gastroenterology) Quinton Wright MD as Physician (Pain Management) Brandon Castillo MD as Physician (Dermatology) Elias Shah MD as Physician (Orthopedic Surgery) Marjan Burks MD as Physician (Colon and Rectal Surgery) Yayo Whitmore MD as Physician (Orthopedic Surgery) Zuleima Galvez MD as Physician (Gastroenterology) Petey Duckworth MD as Physician (Colon and Rectal Surgery) Objective:  
 
Visit Vitals /66 (BP 1 Location: Right arm, BP Patient Position: Sitting) Pulse 64 Resp 18 Ht 5' 5\" (1.651 m) Wt 166 lb (75.3 kg) SpO2 98% BMI 27.62 kg/m² Body mass index is 27.62 kg/m². Last Weight Metrics: 
Weight Loss Metrics 4/16/2019 4/10/2019 3/21/2019 2/8/2019 10/15/2018 10/8/2018 8/10/2018 Today's Wt 166 lb 168 lb 14 oz 165 lb 11.2 oz 162 lb 6.4 oz 163 lb 161 lb 9.6 oz 163 lb  
 BMI 27.62 kg/m2 28.1 kg/m2 27.57 kg/m2 27.02 kg/m2 27.12 kg/m2 26.89 kg/m2 27.12 kg/m2 No physical exam was performed today per Medicare Wellness Guidelines. Health Maintenance:  
Daily Aspirin: yes Immunizations: stated as up to date, no records available Health Maintenance reviewed Health Maintenance Due Topic Date Due  MEDICARE YEARLY EXAM  2019 Functional Assessment:  
ALCOHOL SCREENING:  
How often do you have a drink containing alcohol: 4 or more times per week How many drinks do you have on a typical day when you are drinkin or 2 How often do you have 6 or more drinks on one occasion: Never How often during the last year have you found that you were not able to stop drinking once you had started: Never How often during the last year have you failed to do what was normally expected from you because of drinking: Never How often during the last year have you needed a first drink in the morning to get yourself going after a heavy drinking session: Never How often during the last year have you had a feeling of guilt or remorse after drinking: Never How often during the last year have you been unable to remember what happened the night before because you had been drinking: Never Have you or someone else been injured as a result of your drinking: Never Have you or someone else been injured as a result of your drinking: Never Has a relative or friend, or a doctor or other health worker been concerned about your drinking or suggested you cut down: Never AUDIT Score: 5 ADL FUNCTIONALITY ADL Assessment 2019 Feeding yourself No Help Needed Getting from bed to chair No Help Needed Getting dressed No Help Needed Bathing or showering No Help Needed Walk across the room (includes cane/walker) No Help Needed Using the telphone No Help Needed Taking your medications No Help Needed Preparing meals No Help Needed Managing money (expenses/bills) No Help Needed Moderately strenuous housework (laundry) No Help Needed Shopping for personal items (toiletries/medicines) No Help Needed Shopping for groceries No Help Needed Driving No Help Needed Climbing a flight of stairs No Help Needed Getting to places beyond walking distances No Help Needed DEPRESSION SCREENING:  
3 most recent PHQ Screens 4/16/2019 Little interest or pleasure in doing things Not at all Feeling down, depressed, irritable, or hopeless Not at all Total Score PHQ 2 0 Trouble falling or staying asleep, or sleeping too much Several days Feeling tired or having little energy Not at all Poor appetite, weight loss, or overeating Not at all Feeling bad about yourself - or that you are a failure or have let yourself or your family down Not at all Trouble concentrating on things such as school, work, reading, or watching TV Not at all Moving or speaking so slowly that other people could have noticed; or the opposite being so fidgety that others notice Not at all Thoughts of being better off dead, or hurting yourself in some way Not at all PHQ 9 Score 1 How difficult have these problems made it for you to do your work, take care of your home and get along with others - Mini Mental State Exam 4/16/2019 4/13/2018 What is the Year 1 1 What is the Season 1 1 What is the Date 1 1 What is the Day 1 1 What is the Month 1 1 Where are we State 1 1 Where are we Country 1 1 Where are we [place] [facility] 1 1 Where are we Floor 1 1 Name three objects, then ask the patient to say them 3 3 Serial sevens Subtract 7 from 100 in increments 2 2 Ask for the three objects repeated above 2 3 Name a pencil 1 1 Name a watch 1 1 Have the patient repeat this phrase \"No ifs, ands, or buts\" 1 1 Three stage command: Take the paper in your right hand 1 1 Fold the paper in half 1 1 Put the paper on the floor 1 1  
 Read and obey the following: CLOSE YOUR EYES 1 1 Have the patient write a sentence 1 1 Have the patient copy a figure 1 1 Mini Mental Score 26 27 FALL RISK:  
Fall Risk Assessment, last 12 mths 4/16/2019 Able to walk? Yes Fall in past 12 months? No  
  
ABUSE SCREEN:  
Abuse Screening Questionnaire 4/16/2019 Do you ever feel afraid of your partner? Yuliana Host Are you in a relationship with someone who physically or mentally threatens you? Yuliana Host Is it safe for you to go home? Y  
  
HEARING SCREEN:wears hearing aides NUTRITION SCREEN: portion control, heart healthy and healthy eater What are the patient's living arrangements - the patient lives alone. This patient resides in [place] [address] on the [place] [facility]. Does the patient use any ambulatory aids: no If so, what type: None Does the patient exhibit a steady gait? yes Is the patient self reliant?  (ie can do own laundry, meals, household chores)  yes Does the patient handle his/her own medications? yes Does the patient handle his/her own money? yes Is the patients home safe (ie good lighting, handrails on stairs and bath, etc.)? yes Did you notice or did patient express any hearing difficulties? yes Did you notice or did patient express any vision difficulties? yes Were distance and reading eye charts used? yes Advance Care Planning & Durable Do Not Resuscitate :   
 
Advance Care Planning 4/16/2019 Patient's Healthcare Decision Maker is: Named in scanned ACP document Primary Decision Maker Name -  
Primary Decision Maker Phone Number -  
Primary Decision Maker Relationship to Patient -  
Confirm Advance Directive Yes, on file Does the patient have other document types Do Not Resuscitate Date of ACP Conversation: 04/16/19 Location of Conversation: In Office Visit 04/16/19. Persons included in Conversation:  patient Length of ACP Conversation in minutes:  16 minutes Is the patient deemed incompetent to make his/her health decisions: no Authorized Decision Maker: Healthcare Agent/Medical Power of  under Advance Directive Conversation: The following was discussed with the Sandra Cervantes with time given to answer questions concerning advance care planning: 
Understanding of medical condition Understanding of CPR, goals and expected outcomes, benefits and burdens discussed. Information on CPR success rates provided (e.g. for CPR in hospital, survival to d/c at two weeks is 22%, for chronically ill or elderly/frail survival is less than 3%); Individual asked to communicate understanding of information in his/her own words. Explored fears and concerns regarding CPR or possible outcomes Provided ACP educational materials: Understanding Advance Directives by 2025 Gavin Maurer (www. [employer].org) Reviewed existing Advance Directive Reviewed exisiting DDNR, patient does not want to make any changes. Referrals:   
 
Currently does not require any referrals at this time. Treatment Plan: The following medication/treatments have been discontinued by the provider today after performing detailed medication reconciliation with patient:  
Medications Discontinued During This Encounter Medication Reason  docusate (COLACE) 60 mg/15 mL syrup Therapy Completed  levothyroxine (SYNTHROID) 25 mcg tablet Alternate Therapy Disclaimer: This is an [employer] Exam (AWV). Patient verbalized understanding and agreement with the plan. A copy of the After Visit Summary with personalized health plan was given to the patient today. Greater than 30 minutes was spent with patient discussing advance directives, wellness initiatives, and preventative measures of their health. I have reviewed the patient's medical history in detail and updated the computerized patient record.   
 
 
Ulises Mayers, RN, MSN, ACNPC-AG  
 Acute Care/Adult Gerontology Nurse Practitioner [facility] [address] [facility] [place], [facility] 
[phone] (office) [phone] (cell) [phone] (office fax)

## 2019-04-16 NOTE — PROGRESS NOTES
Discussed with patient the following results and recommendations:     Lipid panel is not getting better it is slightly more concerning. I think it is time to change treatment to hypertriglyceridemia and cholesterol treatment. I will order medications once I check with insurance. TSH- continues on 25 mcg 1 1/2 tabs daily, increase to 2 tabs 3 days per week. Once these changes have been made recheck labs in 6 weeks from the date of changes.

## 2019-04-23 ENCOUNTER — TELEPHONE (OUTPATIENT)
Dept: GERIATRIC MEDICINE | Age: 84
End: 2019-04-23

## 2019-04-23 DIAGNOSIS — E78.5 HYPERLIPIDEMIA, UNSPECIFIED HYPERLIPIDEMIA TYPE: ICD-10-CM

## 2019-04-23 DIAGNOSIS — I25.83 CORONARY ATHEROSCLEROSIS DUE TO LIPID RICH PLAQUE: Primary | ICD-10-CM

## 2019-04-23 DIAGNOSIS — I25.10 CORONARY ATHEROSCLEROSIS DUE TO LIPID RICH PLAQUE: Primary | ICD-10-CM

## 2019-04-23 RX ORDER — SIMVASTATIN 40 MG/1
40 TABLET, FILM COATED ORAL
Qty: 90 TAB | Refills: 1 | Status: SHIPPED | COMMUNITY
Start: 2019-04-23 | End: 2019-10-30 | Stop reason: SDUPTHER

## 2019-04-23 RX ORDER — EZETIMIBE 10 MG/1
10 TABLET ORAL EVERY EVENING
Qty: 90 TAB | Refills: 1 | Status: SHIPPED | COMMUNITY
Start: 2019-04-23 | End: 2019-10-22 | Stop reason: SDUPTHER

## 2019-04-23 NOTE — TELEPHONE ENCOUNTER
I called and spoke with pt advised him that Baldo Vega had ordered him a combination medication of two pills in one for his cholesterol. This will cost him $105 for 90 days. If the medication is ordered sperate its no cost. Pt agrees to order the two sperate medications. Will send to mail order. Np gave orders to order sperate Zetia 10 mg PO every day and Zocor 40 mg PO every day .

## 2019-04-24 ENCOUNTER — OFFICE VISIT (OUTPATIENT)
Dept: GERIATRIC MEDICINE | Age: 84
End: 2019-04-24

## 2019-04-24 VITALS
HEART RATE: 75 BPM | BODY MASS INDEX: 27.96 KG/M2 | DIASTOLIC BLOOD PRESSURE: 67 MMHG | OXYGEN SATURATION: 98 % | SYSTOLIC BLOOD PRESSURE: 120 MMHG | TEMPERATURE: 98.4 F | RESPIRATION RATE: 18 BRPM | WEIGHT: 168 LBS

## 2019-04-24 DIAGNOSIS — R53.83 MALAISE AND FATIGUE: ICD-10-CM

## 2019-04-24 DIAGNOSIS — J00 ACUTE RHINITIS: ICD-10-CM

## 2019-04-24 DIAGNOSIS — R50.9 FEBRILE ILLNESS, ACUTE: ICD-10-CM

## 2019-04-24 DIAGNOSIS — R53.81 MALAISE AND FATIGUE: ICD-10-CM

## 2019-04-24 DIAGNOSIS — R13.12 OROPHARYNGEAL DYSPHAGIA: ICD-10-CM

## 2019-04-24 DIAGNOSIS — J06.0 SORE THROAT AND LARYNGITIS: ICD-10-CM

## 2019-04-24 DIAGNOSIS — R51.9 ACUTE NONINTRACTABLE HEADACHE, UNSPECIFIED HEADACHE TYPE: ICD-10-CM

## 2019-04-24 DIAGNOSIS — J06.0 SORE THROAT AND LARYNGITIS: Primary | ICD-10-CM

## 2019-04-24 DIAGNOSIS — L04.9 ACUTE LYMPHADENITIS: ICD-10-CM

## 2019-04-24 DIAGNOSIS — H93.8X3 EAR FULLNESS, BILATERAL: ICD-10-CM

## 2019-04-24 LAB
FLUAV+FLUBV AG NOSE QL IA.RAPID: NEGATIVE POS/NEG
FLUAV+FLUBV AG NOSE QL IA.RAPID: NEGATIVE POS/NEG
VALID INTERNAL CONTROL?: NO

## 2019-04-24 RX ORDER — AMOXICILLIN 400 MG/5ML
POWDER, FOR SUSPENSION ORAL
Qty: 175 ML | Refills: 0 | Status: SHIPPED | OUTPATIENT
Start: 2019-04-24 | End: 2019-06-12 | Stop reason: ALTCHOICE

## 2019-04-24 RX ORDER — AMOXICILLIN 400 MG/5ML
POWDER, FOR SUSPENSION ORAL
Qty: 400 ML | Refills: 0 | Status: SHIPPED | OUTPATIENT
Start: 2019-04-24 | End: 2019-04-24

## 2019-04-24 RX ORDER — LIDOCAINE HYDROCHLORIDE 20 MG/ML
SOLUTION OROPHARYNGEAL
Qty: 100 BOTTLE | Refills: 0 | Status: SHIPPED | OUTPATIENT
Start: 2019-04-24 | End: 2019-06-12 | Stop reason: ALTCHOICE

## 2019-04-24 NOTE — PATIENT INSTRUCTIONS
Fatigue: Care Instructions  Your Care Instructions    Fatigue is a feeling of tiredness, exhaustion, or lack of energy. You may feel fatigue because of too much or not enough activity. It can also come from stress, lack of sleep, boredom, and poor diet. Many medical problems, such as viral infections, can cause fatigue. Emotional problems, especially depression, are often the cause of fatigue. Fatigue is most often a symptom of another problem. Treatment for fatigue depends on the cause. For example, if you have fatigue because you have a certain health problem, treating this problem also treats your fatigue. If depression or anxiety is the cause, treatment may help. Follow-up care is a key part of your treatment and safety. Be sure to make and go to all appointments, and call your doctor if you are having problems. It's also a good idea to know your test results and keep a list of the medicines you take. How can you care for yourself at home? · Get regular exercise. But don't overdo it. Go back and forth between rest and exercise. · Get plenty of rest.  · Eat a healthy diet. Do not skip meals, especially breakfast.  · Reduce your use of caffeine, tobacco, and alcohol. Caffeine is most often found in coffee, tea, cola drinks, and chocolate. · Limit medicines that can cause fatigue. This includes tranquilizers and cold and allergy medicines. When should you call for help? Watch closely for changes in your health, and be sure to contact your doctor if:    · You have new symptoms such as fever or a rash.     · Your fatigue gets worse.     · You have been feeling down, depressed, or hopeless. Or you may have lost interest in things that you usually enjoy.     · You are not getting better as expected. Where can you learn more? Go to http://sloan-george.info/. Enter T430 in the search box to learn more about \"Fatigue: Care Instructions. \"  Current as of: September 23, 2018  Content Version: 11.9  © 7600-0580 [employer]. Care instructions adapted under license by [employer] (which disclaims liability or warranty for this information). If you have questions about a medical condition or this instruction, always ask your healthcare professional. [address] any warranty or liability for your use of this information. Swollen Lymph Nodes: Care Instructions  Your Care Instructions    Lymph nodes are small, bean-shaped glands throughout the body. They help your body fight germs and infections. Lymph nodes often swell when there is a problem such as an injury, infection, or tumor. · The nodes in your neck, under your chin, or behind your ears may swell when you have a cold or sore throat. · An injury or infection in a leg or foot can make the nodes in your groin swell. · Sometimes medicine can make lymph nodes swell, but this is rare. Treatment depends on what caused your nodes to swell. Usually the nodes return to normal size without a problem. Follow-up care is a key part of your treatment and safety. Be sure to make and go to all appointments, and call your doctor if you are having problems. It's also a good idea to know your test results and keep a list of the medicines you take. How can you care for yourself at home? · Take your medicines exactly as prescribed. Call your doctor if you think you are having a problem with your medicine. · Avoid irritation. ? Do not squeeze or pick at the lump. ? Do not stick a needle in it. · Prevent infection. Do not squeeze, drain, or puncture a painful lump. Doing this can irritate or inflame the lump, push any existing infection deeper into the skin, or cause severe bleeding. · Get extra rest. Slow down just a little from your usual routine. · Drink plenty of fluids, enough so that your urine is light yellow or clear like water.  If you have kidney, heart, or liver disease and have to limit fluids, talk with your doctor before you increase the amount of fluids you drink. · Take an over-the-counter pain medicine, such as acetaminophen (Tylenol), ibuprofen (Advil, Motrin), or naproxen (Aleve). Read and follow all instructions on the label. · Do not take two or more pain medicines at the same time unless the doctor told you to. Many pain medicines have acetaminophen, which is Tylenol. Too much acetaminophen (Tylenol) can be harmful. When should you call for help? Call your doctor now or seek immediate medical care if:    · You have worse symptoms of infection, such as:  ? Increased pain, swelling, warmth, or redness. ? Red streaks leading from the area. ? Pus draining from the area. ? A fever.    Watch closely for changes in your health, and be sure to contact your doctor if:    · Your lymph nodes do not get smaller or do not return to normal.     · You do not get better as expected. Where can you learn more? Go to http://sloan-george.info/. Enter Z190 in the search box to learn more about \"Swollen Lymph Nodes: Care Instructions. \"  Current as of: July 30, 2018  Content Version: 11.9  © 7875-2715 [employer]. Care instructions adapted under license by [employer] (which disclaims liability or warranty for this information). If you have questions about a medical condition or this instruction, always ask your healthcare professional. [place] [address] any warranty or liability for your use of this information. Sore Throat: Care Instructions  Your Care Instructions    Infection by bacteria or a virus causes most sore throats. Cigarette smoke, dry air, air pollution, allergies, and yelling can also cause a sore throat. Sore throats can be painful and annoying. Fortunately, most sore throats go away on their own. If you have a bacterial infection, your doctor may prescribe antibiotics.   Follow-up care is a key part of your treatment and safety. Be sure to make and go to all appointments, and call your doctor if you are having problems. It's also a good idea to know your test results and keep a list of the medicines you take. How can you care for yourself at home? · If your doctor prescribed antibiotics, take them as directed. Do not stop taking them just because you feel better. You need to take the full course of antibiotics. · Gargle with warm salt water once an hour to help reduce swelling and relieve discomfort. Use 1 teaspoon of salt mixed in 1 cup of warm water. · Take an over-the-counter pain medicine, such as acetaminophen (Tylenol), ibuprofen (Advil, Motrin), or naproxen (Aleve). Read and follow all instructions on the label. · Be careful when taking over-the-counter cold or flu medicines and Tylenol at the same time. Many of these medicines have acetaminophen, which is Tylenol. Read the labels to make sure that you are not taking more than the recommended dose. Too much acetaminophen (Tylenol) can be harmful. · Drink plenty of fluids. Fluids may help soothe an irritated throat. Hot fluids, such as tea or soup, may help decrease throat pain. · Use over-the-counter throat lozenges to soothe pain. Regular cough drops or hard candy may also help. These should not be given to young children because of the risk of choking. · Do not smoke or allow others to smoke around you. If you need help quitting, talk to your doctor about stop-smoking programs and medicines. These can increase your chances of quitting for good. · Use a vaporizer or humidifier to add moisture to your bedroom. Follow the directions for cleaning the machine. When should you call for help?   Call your doctor now or seek immediate medical care if:    · You have new or worse trouble swallowing.     · Your sore throat gets much worse on one side.    Watch closely for changes in your health, and be sure to contact your doctor if you do not get better as expected. Where can you learn more? Go to http://sloan-george.info/. Enter [id number] [id number] in the search box to learn more about \"Sore Throat: Care Instructions. \"  Current as of: March 27, 2018  Content Version: 11.9  © 6253-6384 [employer]. Care instructions adapted under license by [employer] (which disclaims liability or warranty for this information). If you have questions about a medical condition or this instruction, always ask your healthcare professional. [place] [address] any warranty or liability for your use of this information.

## 2019-04-24 NOTE — PROGRESS NOTES
Reason for Visit/HPI:    Tam Bailey is a 80 y.o. male patient who presents today for   Chief Complaint   Patient presents with    Sore Throat     Pt being seen for sore thorat and cough that started yesturday, He has headache, ears hurt and he cant eat. Follow Up: Follow-up and Dispositions    · Return in about 1 day (around 4/25/2019) for if not 80% better, come in for IV Fluids. Call the office to report status. .        Diagnosis/Treatment Plan:    Diagnoses and all orders for this visit:    1. Sore throat and laryngitis  Comments:  New onset yesterday AM. Now unable to swallow without severe pain. He has not tried any therapy at home. Orders:  -     AMB POC MAHNAZ INFLUENZA A/B TEST - NEGATIVE IN OFFICE   -     amoxicillin (AMOXIL) 400 mg/5 mL suspension; Take 25 mls by mouth bid x 7 days. Keep in fridge, shake well. -     lidocaine (XYLOCAINE) 2 % solution; Use 10 mls diluted in warm water to gargle and spit, up to 3 x daily as needed for throat soreness. Do not swallow. 2. Oropharyngeal dysphagia  Comments:  Due to sore throat. Orders:  -     AMB POC MAHNAZ INFLUENZA A/B TEST  -     amoxicillin (AMOXIL) 400 mg/5 mL suspension; Take 25 mls by mouth bid x 7 days. Keep in fridge, shake well. -     lidocaine (XYLOCAINE) 2 % solution; Use 10 mls diluted in warm water to gargle and spit, up to 3 x daily as needed for throat soreness. Do not swallow. 3. Ear fullness, bilateral  Comments:  Previously had his ears cleaned out at the ER on the 10th. He reports new onset fullness & pain in both ears. TM is intact bilaterally. Orders:  -     AMB POC MAHNAZ INFLUENZA A/B TEST  -     amoxicillin (AMOXIL) 400 mg/5 mL suspension; Take 25 mls by mouth bid x 7 days. Keep in fridge, shake well. -     lidocaine (XYLOCAINE) 2 % solution; Use 10 mls diluted in warm water to gargle and spit, up to 3 x daily as needed for throat soreness. Do not swallow.     4. Acute nonintractable headache, unspecified headache type  Comments:  Probably due to low grade fever. Administered Tylenol 650 mg in office for symptoms. Orders:  -     AMB POC MAHNAZ INFLUENZA A/B TEST  -     amoxicillin (AMOXIL) 400 mg/5 mL suspension; Take 25 mls by mouth bid x 7 days. Keep in fridge, shake well. -     lidocaine (XYLOCAINE) 2 % solution; Use 10 mls diluted in warm water to gargle and spit, up to 3 x daily as needed for throat soreness. Do not swallow. 5. Acute rhinitis  Comments:  Redness, edema noted. He denies any runny nose or post nasal drip. Orders:  -     AMB POC MAHNAZ INFLUENZA A/B TEST  -     amoxicillin (AMOXIL) 400 mg/5 mL suspension; Take 25 mls by mouth bid x 7 days. Keep in fridge, shake well. -     lidocaine (XYLOCAINE) 2 % solution; Use 10 mls diluted in warm water to gargle and spit, up to 3 x daily as needed for throat soreness. Do not swallow. 6. Febrile illness, acute  Comments:  mild fever in office 99.2   Orders:  -     AMB POC MAHNAZ INFLUENZA A/B TEST   -     amoxicillin (AMOXIL) 400 mg/5 mL suspension; Take 25 mls by mouth bid x 7 days. Keep in fridge, shake well. -     lidocaine (XYLOCAINE) 2 % solution; Use 10 mls diluted in warm water to gargle and spit, up to 3 x daily as needed for throat soreness. Do not swallow. 7. Malaise and fatigue  Comments:  increasing fatigue since yesterday. Advised pt if he is not feeling at least 80% better by tomorrow to call the office. Orders:  -     AMB POC MAHNAZ INFLUENZA A/B TEST  -     amoxicillin (AMOXIL) 400 mg/5 mL suspension; Take 25 mls by mouth bid x 7 days. Keep in fridge, shake well. -     lidocaine (XYLOCAINE) 2 % solution; Use 10 mls diluted in warm water to gargle and spit, up to 3 x daily as needed for throat soreness. Do not swallow. 8. Acute lymphadenitis  Comments:  bilateral lymphadenitis with pain on palpation. Orders:  -     AMB POC MAHNAZ INFLUENZA A/B TEST  -     amoxicillin (AMOXIL) 400 mg/5 mL suspension;  Take 25 mls by mouth bid x 7 days. Keep in fridge, shake well. -     lidocaine (XYLOCAINE) 2 % solution; Use 10 mls diluted in warm water to gargle and spit, up to 3 x daily as needed for throat soreness. Do not swallow. Discontinued Care: The following treatment modalities have been discontinued by the provider today:   Medications Discontinued During This Encounter   Medication Reason    ezetimibe-simvastatin (VYTORIN) 10-40 mg per tablet Alternate Therapy    simvastatin (ZOCOR) 20 mg tablet Alternate Therapy       Subjective: Allergies   Allergen Reactions    Toprol Xl [Metoprolol Succinate] Diarrhea    Codeine Other (comments)    Sulfa (Sulfonamide Antibiotics) Hives     Prior to Admission medications    Medication Sig Start Date End Date Taking? Authorizing Provider   amoxicillin (AMOXIL) 400 mg/5 mL suspension Take 25 mls by mouth bid x 7 days. Keep in fridge, shake well. 4/24/19  Yes Genaro Knutson NP   lidocaine (XYLOCAINE) 2 % solution Use 10 mls diluted in warm water to gargle and spit, up to 3 x daily as needed for throat soreness. Do not swallow. 4/24/19  Yes Genaro Knutson NP   omega 3-dha-epa-fish oil (FISH OIL) 100-160-1,000 mg cap Take  by mouth. Yes Provider, Historical   vit C,X-Vz-cbxpr-lutein-zeaxan (PRESERVISION AREDS-2) 365-424-04-1 mg-unit-mg-mg cap capsule Take  by mouth. Yes Provider, Historical   carbamide peroxide (DEBROX) 6.5 % otic solution Administer 5 Drops into each ear two (2) times a day. Yes Provider, Historical   levothyroxine (SYNTHROID) 25 mcg tablet Take 1.5 Tabs by mouth Daily (before breakfast) AND 2 Tabs every Monday, Wednesday, Friday. Indications: hypothyroidism 4/16/19  Yes Genaro Knutson NP   metroNIDAZOLE (METROGEL) 1 % topical gel Use a thin layer to affected areas after washing 2/8/19  Yes Genaro Knutson NP   Methylcellulose, with Sugar, (CITRUCEL, SUCROSE,) powd Use daily for bowel regimen.  2/8/19  Yes Genaro Knutson NP   biotin 10,000 mcg cap Take 2 Caps by mouth daily. 2/8/19  Yes Robinson Tucker NP   omeprazole (PRILOSEC) 40 mg capsule TAKE 1 CAPSULE BY MOUTH  DAILY FOR GASTROESOPHAGEAL  REFLUX DISEASE 2/4/19  Yes Robinson Tucker NP   finasteride (PROSCAR) 5 mg tablet TAKE 1 TABLET BY MOUTH  NIGHTLY FOR BENIGN  PROSTATIC HYPERPLASIA WITH  LOWER URINARY TRACT  SYMPTOMS 1/19/19  Yes Robinson Tucker NP   diazePAM (VALIUM) 5 mg tablet Take 1 Tab by mouth every six (6) hours as needed. Max Daily Amount: 20 mg. Indications: Muscle Spasm 6/12/18  Yes Robinson Tucker NP   acetaminophen (TYLENOL) 325 mg tablet Take 650 mg by mouth every four (4) hours as needed for Pain. Yes Provider, Historical   aspirin delayed-release 81 mg tablet Take 81 mg by mouth daily. Yes Provider, Historical   ezetimibe (ZETIA) 10 mg tablet Take 1 Tab by mouth every evening. Indications: excessive fat in the blood, combined high blood cholesterol and triglyceride level 4/23/19   Robinson Tucker NP   simvastatin (ZOCOR) 40 mg tablet Take 1 Tab by mouth nightly.  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 [facility] 10/06/2016    Bilateral thumb pain 02/01/2017    CAD (coronary artery disease)     Cardiac murmur 37/97/2945    Folliculitis 93/87/6656    GERD (gastroesophageal reflux disease)     Hypercholesteremia     Hypothyroid     Left hip pain 02/01/2017    Lumbar radiculitis     Lumbar spondylitis (HCC)     Meniere disease     Mixed hyperlipidemia     Occlusion and stenosis of unspecified carotid artery     Osteoarthritis 02/19/2018    hands    Osteopenia of both hands 02/18/2018    Polyp of cecum 08/02/2017    5mm polyp in cecum, 8 mm polyp in sigmoid colon    Spinal enthesopathy of lumbar [facility] 02/01/2017     Past Surgical History:   Procedure Laterality Date    CARDIAC SURG PROCEDURE UNLIST  04/2008    HX CATARACT REMOVAL Bilateral     HX CHOLECYSTECTOMY  1994    HX COLONOSCOPY  12/19/2012    tubular adenoma    HX COLONOSCOPY  08/02/2017    tubular adenomas    HX CORONARY ARTERY BYPASS GRAFT  1996    HX CORONARY ARTERY BYPASS GRAFT  1996    HX CORONARY STENT PLACEMENT      HX KNEE ARTHROSCOPY Left 2005    menisectomy    HX ROTATOR CUFF REPAIR Left     HX THYROIDECTOMY  1975    HX TONSIL AND ADENOIDECTOMY        Social History     Tobacco Use    Smoking status: Former Smoker    Smokeless tobacco: Never Used   Substance Use Topics    Alcohol use:  Yes     Alcohol/week: 4.2 oz     Types: 7 Glasses of wine per week    Drug use: No      Family History   Problem Relation Age of Onset    No Known Problems Mother     No Known Problems Father     No Known Problems Brother      Advance Care Planning 4/16/2019   Patient's Healthcare Decision Maker is: Named in scanned ACP document   Primary Decision Maker Name -   Primary Decision Maker Phone Number -   Primary Decision Maker Relationship to Patient -   Confirm Advance Directive Yes, on file   Does the patient have other document types Do Not Resuscitate     Test Results:     Office Visit on 04/24/2019   Component Date Value Ref Range Status    VALID INTERNAL CONTROL POC 04/24/2019 No   Final    Influenza A Ag POC 04/24/2019 Negative  Negative Pos/Neg Final    Influenza B Ag POC 04/24/2019 Negative  Negative Pos/Neg Final   Orders Only on 03/29/2019   Component Date Value Ref Range Status    Cholesterol, total 04/08/2019 213* 100 - 199 mg/dL Final    Triglyceride 04/08/2019 241* 0 - 149 mg/dL Final    HDL Cholesterol 04/08/2019 50  >39 mg/dL Final    VLDL, calculated 04/08/2019 48* 5 - 40 mg/dL Final    LDL, calculated 04/08/2019 115* 0 - 99 mg/dL Final    TSH 04/08/2019 5.450* 0.450 - 4.500 uIU/mL Final    T4, Total 04/08/2019 5.7  4.5 - 12.0 ug/dL Final    T3 Uptake 04/08/2019 25  24 - 39 % Final    Free Thyroxine Index (FTI) 04/08/2019 1.4  1.2 - 4.9 Final   Clinical Support on 01/25/2019   Component Date Value Ref Range Status    WBC 01/25/2019 4.5  3.4 - 10.8 x10E3/uL Final    RBC 01/25/2019 4.86  4.14 - 5.80 x10E6/uL Final    HGB 01/25/2019 15.4  13.0 - 17.7 g/dL Final    HCT 01/25/2019 46.4  37.5 - 51.0 % Final    MCV 01/25/2019 96  79 - 97 fL Final    MCH 01/25/2019 31.7  26.6 - 33.0 pg Final    MCHC 01/25/2019 33.2  31.5 - 35.7 g/dL Final    RDW 01/25/2019 14.4  12.3 - 15.4 % Final    PLATELET 20/98/0137 022  150 - 379 x10E3/uL Final    NEUTROPHILS 01/25/2019 57  Not Estab. % Final    Lymphocytes 01/25/2019 33  Not Estab. % Final    MONOCYTES 01/25/2019 7  Not Estab. % Final    EOSINOPHILS 01/25/2019 2  Not Estab. % Final    BASOPHILS 01/25/2019 1  Not Estab. % Final    ABS. NEUTROPHILS 01/25/2019 2.6  1.4 - 7.0 x10E3/uL Final    Abs Lymphocytes 01/25/2019 1.5  0.7 - 3.1 x10E3/uL Final    ABS. MONOCYTES 01/25/2019 0.3  0.1 - 0.9 x10E3/uL Final    ABS. EOSINOPHILS 01/25/2019 0.1  0.0 - 0.4 x10E3/uL Final    ABS. BASOPHILS 01/25/2019 0.0  0.0 - 0.2 x10E3/uL Final    IMMATURE GRANULOCYTES 01/25/2019 0  Not Estab. % Final    ABS. IMM.  GRANS. 01/25/2019 0.0  0.0 - 0.1 x10E3/uL Final    Glucose 01/25/2019 108* 65 - 99 mg/dL Final    BUN 01/25/2019 18  8 - 27 mg/dL Final    Creatinine 01/25/2019 0.89  0.76 - 1.27 mg/dL Final    GFR est non-AA 01/25/2019 79  >59 mL/min/1.73 Final    GFR est AA 01/25/2019 91  >59 mL/min/1.73 Final    BUN/Creatinine ratio 01/25/2019 20  10 - 24 Final    Sodium 01/25/2019 143  134 - 144 mmol/L Final    Potassium 01/25/2019 4.2  3.5 - 5.2 mmol/L Final    Chloride 01/25/2019 105  96 - 106 mmol/L Final    CO2 01/25/2019 23  20 - 29 mmol/L Final    Calcium 01/25/2019 8.9  8.6 - 10.2 mg/dL Final    Protein, total 01/25/2019 6.1  6.0 - 8.5 g/dL Final    Albumin 01/25/2019 4.0  3.5 - 4.7 g/dL Final    GLOBULIN, TOTAL 01/25/2019 2.1  1.5 - 4.5 g/dL Final    A-G Ratio 01/25/2019 1.9  1.2 - 2.2 Final    Bilirubin, total 01/25/2019 0.7  0.0 - 1.2 mg/dL Final    Alk. phosphatase 01/25/2019 64  39 - 117 IU/L Final    AST (SGOT) 01/25/2019 15  0 - 40 IU/L Final    ALT (SGPT) 01/25/2019 15  0 - 44 IU/L Final    Hemoglobin A1c 01/25/2019 5.6  4.8 - 5.6 % Final    Comment:          Prediabetes: 5.7 - 6.4           Diabetes: >6.4           Glycemic control for adults with diabetes: <7.0      Estimated average glucose 01/25/2019 114  mg/dL Final    Cholesterol, total 01/25/2019 229* 100 - 199 mg/dL Final    Triglyceride 01/25/2019 182* 0 - 149 mg/dL Final    HDL Cholesterol 01/25/2019 56  >39 mg/dL Final    VLDL, calculated 01/25/2019 36  5 - 40 mg/dL Final    LDL, calculated 01/25/2019 137* 0 - 99 mg/dL Final    TSH 01/25/2019 6.960* 0.450 - 4.500 uIU/mL Final    Calcitriol (Vit D 1, 25 di-OH) 01/25/2019 51.9  19.9 - 79.3 pg/mL Final    Vitamin B12 01/25/2019 681  232 - 1,245 pg/mL Final    Folate 01/25/2019 >20.0  >3.0 ng/mL Final    Comment: A serum folate concentration of less than 3.1 ng/mL is  considered to represent clinical deficiency.  T4, Total 01/25/2019 7.8  4.5 - 12.0 ug/dL Final       Objective:     Vitals:    04/24/19 0940   BP: 120/67   Pulse: 75   Resp: 18   Temp: 98.4 °F (36.9 °C)   TempSrc: Oral   SpO2: 98%   Weight: 168 lb (76.2 kg)     Wt Readings from Last 3 Encounters:   04/24/19 168 lb (76.2 kg)   04/16/19 166 lb (75.3 kg)   04/10/19 168 lb 14 oz (76.6 kg)     BP Readings from Last 3 Encounters:   04/24/19 120/67   04/16/19 119/66   04/10/19 133/63     Review of Systems   Constitutional: Positive for activity change, fatigue and fever. Negative for appetite change and chills. HENT: Positive for ear pain, mouth sores, rhinorrhea, sore throat and trouble swallowing.  Negative for congestion, dental problem, hearing loss, postnasal drip, sinus pressure and sneezing. Eyes: Negative for discharge, redness and visual disturbance. Respiratory: Negative for apnea, cough, chest tightness, shortness of breath and wheezing. Cardiovascular: Negative for chest pain and palpitations. Gastrointestinal: Negative for abdominal distention, abdominal pain, blood in stool, constipation, diarrhea, nausea and vomiting. Endocrine: Negative for polydipsia, polyphagia and polyuria. Genitourinary: Negative for flank pain, frequency and urgency. Musculoskeletal: Negative for arthralgias, gait problem, joint swelling and neck pain. Skin: Negative for color change, pallor, rash and wound. Allergic/Immunologic: Negative for environmental allergies and food allergies. Neurological: Negative for dizziness, tremors, weakness, light-headedness, numbness and headaches. Hematological: Negative for adenopathy. Psychiatric/Behavioral: Negative for agitation, confusion and sleep disturbance. The patient is not nervous/anxious. Physical Exam   Constitutional: He is oriented to person, place, and time. Vital signs are normal. He appears dehydrated. He appears to not be writhing in pain and not malnourished. He appears healthy. He has a sickly appearance. He appears distressed. Elderly, , male resident of [address] [place] [address]. Mild memory loss and recall deficiencies noted. He appears to be fragile today. HENT:   Head: Normocephalic and atraumatic. Right Ear: External ear and ear canal normal. A middle ear effusion is present. Decreased hearing is noted. Left Ear: External ear and ear canal normal. Tympanic membrane is bulging. A middle ear effusion is present. Decreased hearing is noted. Nose: Mucosal edema and rhinorrhea present. Mouth/Throat: Uvula is midline. Mucous membranes are not pale, dry and not cyanotic. Oral lesions present. Uvula swelling present.  Posterior oropharyngeal edema and posterior oropharyngeal erythema present. Eyes: Pupils are equal, round, and reactive to light. Conjunctivae, EOM and lids are normal. Lids are everted and swept, no foreign bodies found. Neck: Trachea normal, normal range of motion and full passive range of motion without pain. Neck supple. No hepatojugular reflux and no JVD present. Carotid bruit is not present. No thyromegaly present. Cardiovascular: Normal rate, regular rhythm, S1 normal, S2 normal, normal heart sounds, intact distal pulses and normal pulses. Occasional extrasystoles are present. Exam reveals no gallop and no friction rub. No murmur heard. No extremity edema is present during today's exam.    Pulmonary/Chest: Effort normal and breath sounds normal.   Abdominal: Soft. Normal appearance and bowel sounds are normal. There is no hepatosplenomegaly. There is no tenderness. There is no CVA tenderness. Genitourinary: Rectum normal.   Genitourinary Comments: Skin tag irritated on anus. Neurological: He is alert and oriented to person, place, and time. He has normal sensation, normal strength, normal reflexes and intact cranial nerves. He displays no weakness. He exhibits normal muscle tone. Gait normal. Coordination and gait normal. GCS score is 15. Skin: Skin is warm, dry and intact. No bruising and no rash noted. He is not diaphoretic. No cyanosis. No pallor. Nails show no clubbing. Psychiatric: Mood, memory, affect and judgment normal.   Baseline mood and affect unchanged from normal.    Nursing note and vitals reviewed. Disclaimer:   Mr. Bren Azar has been advised to call or return to our office if symptoms worsen/change/persist. We as a care team including the patient; discussed expected course/resolution/complications of diagnosis in detail today. Medication risks/benefits/costs/interactions/alternatives discussed Bren Azar was given an after visit summary which includes diagnoses, current medications, & vitals.  Bren Azar expressed understanding with the diagnosis and plan.

## 2019-04-24 NOTE — PROGRESS NOTES
ADVISED PATIENT OF THE FOLLOWING HEALTH MAINTAINCE DUE  There are no preventive care reminders to display for this patient. Chief Complaint   Patient presents with    Sore Throat     Pt being seen for sore thorat and cough that started yesturday, He has headache, ears hurt and he cant eat. 1. Have you been to the ER, urgent care clinic since your last visit? Hospitalized since your last visit? No    2. Have you seen or consulted any other health care providers outside of the [address] [place] [address] since your last visit? Include any DEXA scan, mammography  or colon screening. No    3. Do you have an Advance Directive on file? yes    4. Do you have a DNR on file? DNR    Patient is accompanied by self I have received verbal consent from Snow Potter to discuss any/all medical information while they are present in the room. Advance Care Planning 4/16/2019   Patient's Healthcare Decision Maker is: Named in scanned ACP document   Primary Decision Maker Name -   Primary Decision Maker Phone Number -   Primary Decision Maker Relationship to Patient -   Confirm Advance Directive Yes, on file   Does the patient have other document types Do Not Resuscitate         [facility] [address] [place] [address] [address]  [address] [facility] [address] [facility] [address]  Phone: [phone] Fax: 1200 [facility], . [address] [place] [address]  [address]  [place] [address]  Phone: [phone] Fax: [phone]    Patient reminded during visit to bring all medication bottles, OTC medications to all appointments.    Results for orders placed or performed in visit on 04/24/19   AMB POC MAHNAZ INFLUENZA A/B TEST   Result Value Ref Range    VALID INTERNAL CONTROL POC No     Influenza A Ag POC Negative Negative Pos/Neg    Influenza B Ag POC Negative Negative Pos/Neg

## 2019-05-13 ENCOUNTER — OFFICE VISIT (OUTPATIENT)
Dept: GERIATRIC MEDICINE | Age: 84
End: 2019-05-13

## 2019-05-13 VITALS
SYSTOLIC BLOOD PRESSURE: 138 MMHG | WEIGHT: 166 LBS | RESPIRATION RATE: 18 BRPM | DIASTOLIC BLOOD PRESSURE: 62 MMHG | HEART RATE: 76 BPM | HEIGHT: 65 IN | BODY MASS INDEX: 27.66 KG/M2

## 2019-05-13 DIAGNOSIS — T15.91XA ACUTE FOREIGN BODY OF RIGHT EYE, INITIAL ENCOUNTER: Primary | ICD-10-CM

## 2019-05-13 DIAGNOSIS — H57.11 EYE PAIN, RIGHT: ICD-10-CM

## 2019-05-13 NOTE — PROGRESS NOTES
ADVISED PATIENT OF THE FOLLOWING HEALTH MAINTAINCE DUE  There are no preventive care reminders to display for this patient. Chief Complaint   Patient presents with    Eye Problem     Pt here for right eye irritation that started Thursday. 1. Have you been to the ER, urgent care clinic since your last visit? Hospitalized since your last visit? No    2. Have you seen or consulted any other health care providers outside of the [address] [place] [address] since your last visit? Include any DEXA scan, mammography  or colon screening. No    3. Do you have an Advance Directive on file? yes    4. Do you have a DNR on file? DNR    Patient is accompanied by self I have received verbal consent from Maria A Alexander to discuss any/all medical information while they are present in the room. Advance Care Planning 4/16/2019   Patient's Healthcare Decision Maker is: Named in scanned ACP document   Primary Decision Maker Name -   Primary Decision Maker Phone Number -   Primary Decision Maker Relationship to Patient -   Confirm Advance Directive Yes, on file   Does the patient have other document types Do Not Resuscitate         [facility] [id number] [place] [address] [facility] [address]  [address] [facility] [address] [facility] [address]  Phone: [phone] Fax: 1200 [facility], . [address] [facility]  [address]  [place] [address]  Phone: [phone] Fax: [phone]    [id number] [address] [facility]  [facility] [address] [address]  Phone: [phone] Fax: [phone]        Patient reminded during visit to bring all medication bottles, OTC medications to all appointments.

## 2019-05-13 NOTE — PATIENT INSTRUCTIONS
Feeling of an Object in the Eye: Care Instructions  Your Care Instructions    Sometimes people feel like there is something in their eye. This is called a foreign body sensation. A doctor may not find anything wrong with your eye. If you had something very small in your eye, like a speck of dirt, tears may have washed it out. Or you may have a small scratch on the surface of the eye (cornea), which can make it feel as if something is still in your eye. The doctor will check your vision and examine your eye. Your eye may be numbed with drops. Sometimes a drop of colored fluid is put in the eye. This lets the doctor have a better view of the surface of the eye. You may get drops to put in your eye after you go home. Or you may just need to watch for a change in your symptoms. Follow-up care is a key part of your treatment and safety. Be sure to make and go to all appointments, and call your doctor if you are having problems. It's also a good idea to know your test results and keep a list of the medicines you take. How can you care for yourself at home? · Do not rub your eye. · If the doctor prescribed eyedrops or ointment, use them as directed. Be sure the dropper or bottle tip is clean. · To put in eyedrops or ointment:  ? Tilt your head back, and pull your lower eyelid down with one finger. ? Drop or squirt the medicine inside the lower lid. ? Close your eye for 30 to 60 seconds to let the drops or ointment move around. ? Do not touch the ointment or dropper tip to your eyelashes or any other surface. When should you call for help? Call your doctor now or seek immediate medical care if:    · You have new or worse eye pain.     · Light hurts your eye.     · You have new or worse redness in your eye.     · You have symptoms of an eye infection, such as:  ? Pus or thick discharge coming from the eye.  ? Redness or swelling around the eye.  ?  A fever.     · You have vision changes.    Watch closely for changes in your health, and be sure to contact your doctor if:    · You do not get better as expected. Where can you learn more? Go to http://sloan-george.info/. Enter H000 in the search box to learn more about \"Feeling of an Object in the Eye: Care Instructions. \"  Current as of: September 23, 2018  Content Version: 11.9  © 9376-1507 [employer]. Care instructions adapted under license by [employer] (which disclaims liability or warranty for this information). If you have questions about a medical condition or this instruction, always ask your healthcare professional. [place] [address] any warranty or liability for your use of this information.

## 2019-05-13 NOTE — PROGRESS NOTES
Reason for Visit/HPI:    Vanna Long is a 80 y.o. male patient who presents today for   Chief Complaint   Patient presents with    Eye Problem     Pt here for right eye irritation that started Thursday. Follow Up: Follow-up and Dispositions    · Return if symptoms worsen or fail to improve. Diagnosis/Treatment Plan:    Diagnoses and all orders for this visit:    1. Acute foreign body of right eye, initial encounter    2. Eye pain, right        Discontinued Care: The following treatment modalities have been discontinued by the provider today:   There are no discontinued medications. Subjective: Allergies   Allergen Reactions    Toprol Xl [Metoprolol Succinate] Diarrhea    Codeine Other (comments)    Sulfa (Sulfonamide Antibiotics) Hives     Prior to Admission medications    Medication Sig Start Date End Date Taking? Authorizing Provider   ezetimibe (ZETIA) 10 mg tablet Take 1 Tab by mouth every evening. Indications: excessive fat in the blood, combined high blood cholesterol and triglyceride level 4/23/19  Yes Lia Olguin NP   simvastatin (ZOCOR) 40 mg tablet Take 1 Tab by mouth nightly. Indications: high amount of triglyceride in the blood, combined high blood cholesterol and triglyceride level 4/23/19  Yes Lia Olguin NP   omega 3-dha-epa-fish oil (FISH OIL) 100-160-1,000 mg cap Take  by mouth. Yes Provider, Historical   vit C,V-Hw-omowe-lutein-zeaxan (PRESERVISION AREDS-2) 218-703-20-1 mg-unit-mg-mg cap capsule Take  by mouth. Yes Provider, Historical   carbamide peroxide (DEBROX) 6.5 % otic solution Administer 5 Drops into each ear two (2) times a day. Yes Provider, Historical   levothyroxine (SYNTHROID) 25 mcg tablet Take 1.5 Tabs by mouth Daily (before breakfast) AND 2 Tabs every Monday, Wednesday, Friday.  Indications: hypothyroidism 4/16/19  Yes Lia Olguin NP   metroNIDAZOLE (METROGEL) 1 % topical gel Use a thin layer to affected areas after washing 2/8/19  Yes Ana Harman NP   Methylcellulose, with Sugar, (CITRUCEL, SUCROSE,) powd Use daily for bowel regimen. 2/8/19  Yes Ana Harman NP   biotin 10,000 mcg cap Take 2 Caps by mouth daily. 2/8/19  Yes Ana Harman NP   omeprazole (PRILOSEC) 40 mg capsule TAKE 1 CAPSULE BY MOUTH  DAILY FOR GASTROESOPHAGEAL  REFLUX DISEASE 2/4/19  Yes Ana Harman NP   finasteride (PROSCAR) 5 mg tablet TAKE 1 TABLET BY MOUTH  NIGHTLY FOR BENIGN  PROSTATIC HYPERPLASIA WITH  LOWER URINARY TRACT  SYMPTOMS 1/19/19  Yes Ana Harman NP   diazePAM (VALIUM) 5 mg tablet Take 1 Tab by mouth every six (6) hours as needed. Max Daily Amount: 20 mg. Indications: Muscle Spasm 6/12/18  Yes Ana Harman NP   acetaminophen (TYLENOL) 325 mg tablet Take 650 mg by mouth every four (4) hours as needed for Pain. Yes Provider, Historical   aspirin delayed-release 81 mg tablet Take 81 mg by mouth daily. Yes Provider, Historical   lidocaine (XYLOCAINE) 2 % solution Use 10 mls diluted in warm water to gargle and spit, up to 3 x daily as needed for throat soreness. Do not swallow. 4/24/19   Ana Harman NP   amoxicillin (AMOXIL) 400 mg/5 mL suspension Take 12 mls by mouth bid x 7 days. Keep in fridge, shake well.  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 ([facility] [address] 10/06/2016    Bilateral thumb pain 02/01/2017    CAD (coronary artery disease)     Cardiac murmur 54/03/2990    Folliculitis 59/68/5784    GERD (gastroesophageal reflux disease)     Hypercholesteremia     Hypothyroid     Left hip pain 02/01/2017    Lumbar radiculitis     Lumbar spondylitis (HCC)     Meniere disease     Mixed hyperlipidemia     Occlusion and stenosis of unspecified carotid artery     Osteoarthritis 02/19/2018    hands    Osteopenia of both hands 02/18/2018    Polyp of cecum 08/02/2017    5mm polyp in cecum, 8 mm polyp in sigmoid colon    Spinal enthesopathy of lumbar region [facility]) 02/01/2017     Past Surgical History:   Procedure Laterality Date    CARDIAC SURG PROCEDURE UNLIST  04/2008    HX CATARACT REMOVAL Bilateral     HX CHOLECYSTECTOMY  1994    HX COLONOSCOPY  12/19/2012    tubular adenoma    HX COLONOSCOPY  08/02/2017    tubular adenomas    HX CORONARY ARTERY BYPASS GRAFT  1996    HX CORONARY ARTERY BYPASS GRAFT  1996    HX CORONARY STENT PLACEMENT      HX KNEE ARTHROSCOPY Left 2005    menisectomy    HX ROTATOR CUFF REPAIR Left     HX THYROIDECTOMY  1975    HX TONSIL AND ADENOIDECTOMY        Social History     Tobacco Use    Smoking status: Former Smoker    Smokeless tobacco: Never Used   Substance Use Topics    Alcohol use:  Yes     Alcohol/week: 4.2 oz     Types: 7 Glasses of wine per week    Drug use: No      Family History   Problem Relation Age of Onset    No Known Problems Mother     No Known Problems Father     No Known Problems Brother      Advance Care Planning 4/16/2019   Patient's Healthcare Decision Maker is: Named in scanned ACP document   Primary Decision Maker Name -   Primary Decision Maker Phone Number -   Primary Decision Maker Relationship to Patient -   Confirm Advance Directive Yes, on file   Does the patient have other document types Do Not Resuscitate     Test Results:     Office Visit on 04/24/2019   Component Date Value Ref Range Status    VALID INTERNAL CONTROL POC 04/24/2019 No   Final    Influenza A Ag POC 04/24/2019 Negative  Negative Pos/Neg Final    Influenza B Ag POC 04/24/2019 Negative  Negative Pos/Neg Final   Orders Only on 03/29/2019   Component Date Value Ref Range Status    Cholesterol, total 04/08/2019 213* 100 - 199 mg/dL Final    Triglyceride 04/08/2019 241* 0 - 149 mg/dL Final    HDL Cholesterol 04/08/2019 50  >39 mg/dL Final    VLDL, calculated 04/08/2019 48* 5 - 40 mg/dL Final    LDL, calculated 04/08/2019 115* 0 - 99 mg/dL Final    TSH 04/08/2019 5.450* 0.450 - 4.500 uIU/mL Final    T4, Total 04/08/2019 5.7  4.5 - 12.0 ug/dL Final    T3 Uptake 04/08/2019 25  24 - 39 % Final    Free Thyroxine Index (FTI) 04/08/2019 1.4  1.2 - 4.9 Final       Objective:     Vitals:    05/13/19 1517   BP: 138/62   Pulse: 76   Resp: 18   Weight: 166 lb (75.3 kg)   Height: 5' 5\" (1.651 m)     Wt Readings from Last 3 Encounters:   05/13/19 166 lb (75.3 kg)   04/24/19 168 lb (76.2 kg)   04/16/19 166 lb (75.3 kg)     BP Readings from Last 3 Encounters:   05/13/19 138/62   04/24/19 120/67   04/16/19 119/66     Review of Systems   Constitutional: Negative for activity change, appetite change, chills, fatigue and fever. HENT: Negative for congestion, dental problem, hearing loss, postnasal drip, sinus pressure, sneezing, sore throat and trouble swallowing. Eyes: Positive for photophobia, pain, redness and itching. Negative for discharge and visual disturbance. Respiratory: Negative for apnea, cough, chest tightness, shortness of breath and wheezing. Cardiovascular: Negative for chest pain, palpitations and leg swelling. Gastrointestinal: Negative for abdominal distention, abdominal pain, blood in stool, constipation, diarrhea, nausea and vomiting. Endocrine: Negative for polydipsia, polyphagia and polyuria. Genitourinary: Negative for flank pain, frequency and urgency. Musculoskeletal: Negative for arthralgias, gait problem, joint swelling and neck pain. Skin: Negative for color change, pallor, rash and wound. Allergic/Immunologic: Negative for environmental allergies and food allergies. Neurological: Negative for dizziness, tremors, weakness, light-headedness, numbness and headaches. Hematological: Negative for adenopathy. Psychiatric/Behavioral: Negative for agitation, confusion and sleep disturbance. The patient is not nervous/anxious.       Physical Exam   Constitutional: He is oriented to person, place, and time and well-developed, well-nourished, and in no distress. Vital signs are normal. He appears to not be writhing in pain, not malnourished and not dehydrated. He appears healthy. He does not have a sickly appearance. No distress. HENT:   Head: Normocephalic and atraumatic. Right Ear: Hearing, tympanic membrane, external ear and ear canal normal.   Left Ear: Hearing, tympanic membrane, external ear and ear canal normal.   Nose: Nose normal.   Mouth/Throat: Uvula is midline, oropharynx is clear and moist and mucous membranes are normal. Mucous membranes are not pale, not dry and not cyanotic. No oral lesions. No uvula swelling. No posterior oropharyngeal edema or posterior oropharyngeal erythema. Eyes: Pupils are equal, round, and reactive to light. EOM and lids are normal. Lids are everted and swept, no foreign bodies found. Right conjunctiva is injected. Right conjunctiva has a hemorrhage. Slit lamp exam:       The right eye shows no corneal abrasion, no corneal ulcer, no foreign body, no hyphema, no hypopyon, no fluorescein uptake and no anterior chamber bulge. Neck: Trachea normal, normal range of motion and full passive range of motion without pain. Neck supple. No hepatojugular reflux and no JVD present. Carotid bruit is not present. No thyromegaly present. Cardiovascular: Normal rate, regular rhythm, S1 normal, S2 normal, normal heart sounds, intact distal pulses and normal pulses. Occasional extrasystoles are present. Exam reveals no gallop and no friction rub. No murmur heard. No extremity edema is present during today's exam.    Pulmonary/Chest: Effort normal and breath sounds normal.   Abdominal: Soft. Normal appearance and bowel sounds are normal. There is no hepatosplenomegaly. There is no tenderness. There is no CVA tenderness. Neurological: He is alert and oriented to person, place, and time.  He has normal sensation, normal strength, normal reflexes and intact cranial nerves. He displays no weakness. He exhibits normal muscle tone. Gait normal. Coordination and gait normal. GCS score is 15. Skin: Skin is warm, dry and intact. No bruising and no rash noted. He is not diaphoretic. No cyanosis. No pallor. Nails show no clubbing. Psychiatric: Mood, memory, affect and judgment normal.   Baseline mood and affect unchanged from normal.    Nursing note and vitals reviewed. Disclaimer:   Mr. Uzma Martinez has been advised to call or return to our office if symptoms worsen/change/persist. We as a care team including the patient; discussed expected course/resolution/complications of diagnosis in detail today. Medication risks/benefits/costs/interactions/alternatives discussed Uzma Martinez was given an after visit summary which includes diagnoses, current medications, & vitals. Uzma Martinez expressed understanding with the diagnosis and plan.

## 2019-05-26 DIAGNOSIS — E89.0 POSTOPERATIVE HYPOTHYROIDISM: ICD-10-CM

## 2019-05-27 RX ORDER — LEVOTHYROXINE SODIUM 25 UG/1
TABLET ORAL
Qty: 90 TAB | Refills: 1 | OUTPATIENT
Start: 2019-05-27

## 2019-05-30 ENCOUNTER — CLINICAL SUPPORT (OUTPATIENT)
Dept: GERIATRIC MEDICINE | Age: 84
End: 2019-05-30

## 2019-05-30 DIAGNOSIS — E89.0 POSTOPERATIVE HYPOTHYROIDISM: Primary | ICD-10-CM

## 2019-05-30 NOTE — PROGRESS NOTES
Chief Complaint   Patient presents with   [facility]     Patient here for lab draw.      Jaja Welsh presents for lab draw ordered by Benedict Parish RN MSN [id number]AG-NP    The following labs were drawn and sent to lab by Nick Candelaria LPN:    Thyroid panel    The following tubes were sent:    Tiger (1)    Patient tolerated procedure well, blood obtained via venipuncture to left antecubital

## 2019-05-31 LAB
FT4I SERPL CALC-MCNC: 1.7 (ref 1.2–4.9)
T3RU NFR SERPL: 26 % (ref 24–39)
T4 SERPL-MCNC: 6.5 UG/DL (ref 4.5–12)

## 2019-05-31 RX ORDER — LEVOTHYROXINE SODIUM 25 UG/1
TABLET ORAL
Qty: 90 TAB | Refills: 1 | Status: SHIPPED | COMMUNITY
Start: 2019-05-31 | End: 2019-06-06 | Stop reason: SDUPTHER

## 2019-05-31 NOTE — PROGRESS NOTES
Thyroid looks great. Continue the levothyroxine 25 mcg tablet, take 1.5 tabs daily and 2 whole tabs Monday, Wed, & Friday. Recheck in 3 months.

## 2019-06-06 ENCOUNTER — TELEPHONE (OUTPATIENT)
Dept: GERIATRIC MEDICINE | Age: 84
End: 2019-06-06

## 2019-06-06 DIAGNOSIS — Z76.0 MEDICATION REFILL: Primary | ICD-10-CM

## 2019-06-06 DIAGNOSIS — E89.0 POSTOPERATIVE HYPOTHYROIDISM: ICD-10-CM

## 2019-06-06 DIAGNOSIS — E03.9 ACQUIRED HYPOTHYROIDISM: ICD-10-CM

## 2019-06-06 RX ORDER — HYDROCORTISONE 25 MG/G
CREAM TOPICAL
Refills: 1 | COMMUNITY
Start: 2019-05-21

## 2019-06-06 RX ORDER — LEVOTHYROXINE SODIUM 25 UG/1
TABLET ORAL
Qty: 30 TAB | Refills: 0 | Status: SHIPPED | OUTPATIENT
Start: 2019-06-06 | End: 2019-09-16 | Stop reason: SDUPTHER

## 2019-06-11 ENCOUNTER — TELEPHONE (OUTPATIENT)
Dept: GERIATRIC MEDICINE | Age: 84
End: 2019-06-11

## 2019-06-11 NOTE — TELEPHONE ENCOUNTER
I spoke with patient and advised him of what NP states. Patient will come in tomorrow 6/12 at 10:30 am for cough.

## 2019-06-11 NOTE — TELEPHONE ENCOUNTER
Pt called this morning wanting to get a cough medicine for terrible cough. Pt stated that it could be upper respiratory infection.

## 2019-06-12 ENCOUNTER — OFFICE VISIT (OUTPATIENT)
Dept: GERIATRIC MEDICINE | Age: 84
End: 2019-06-12

## 2019-06-12 VITALS
BODY MASS INDEX: 27.51 KG/M2 | DIASTOLIC BLOOD PRESSURE: 74 MMHG | HEIGHT: 65 IN | SYSTOLIC BLOOD PRESSURE: 129 MMHG | HEART RATE: 78 BPM | OXYGEN SATURATION: 95 % | RESPIRATION RATE: 20 BRPM | WEIGHT: 165.1 LBS

## 2019-06-12 DIAGNOSIS — J00 ACUTE RHINITIS: Primary | ICD-10-CM

## 2019-06-12 DIAGNOSIS — R09.89 CHEST CONGESTION: ICD-10-CM

## 2019-06-12 DIAGNOSIS — H93.8X3 EAR FULLNESS, BILATERAL: ICD-10-CM

## 2019-06-12 DIAGNOSIS — J06.0 SORE THROAT AND LARYNGITIS: ICD-10-CM

## 2019-06-12 DIAGNOSIS — R05.8 COUGH WITH CONGESTION OF PARANASAL SINUS: ICD-10-CM

## 2019-06-12 DIAGNOSIS — R09.81 COUGH WITH CONGESTION OF PARANASAL SINUS: ICD-10-CM

## 2019-06-12 DIAGNOSIS — J32.9 RECURRENT SINUS INFECTIONS: ICD-10-CM

## 2019-06-12 RX ORDER — AMOXICILLIN AND CLAVULANATE POTASSIUM 875; 125 MG/1; MG/1
1 TABLET, FILM COATED ORAL 2 TIMES DAILY
Qty: 20 TAB | Refills: 0 | Status: SHIPPED | OUTPATIENT
Start: 2019-06-12 | End: 2019-06-22

## 2019-06-12 RX ORDER — GUAIFENESIN DEXTROMETHORPHAN HYDROBROMIDE ORAL SOLUTION 10; 100 MG/5ML; MG/5ML
10 SOLUTION ORAL
Qty: 355 ML | Refills: 0 | Status: SHIPPED | OUTPATIENT
Start: 2019-06-12 | End: 2019-06-17

## 2019-06-12 NOTE — PATIENT INSTRUCTIONS
Chronic Sinusitis: Care Instructions  Your Care Instructions    Sinusitis is an infection of the lining of the sinus cavities in your head. It causes pain and pressure in your head and face. Sinusitis can be short-term (acute) or long-term (chronic). Chronic sinusitis lasts 12 weeks or longer. It is often caused by a bacterial or fungal infection. Other things, such as allergies, may also be involved. Chronic sinusitis may be hard to treat. It can lead to permanent changes in the mucous membranes that line the sinuses. It may make future sinus infections more likely. The infection may take some time to treat. Antibiotics are usually used if the infection is caused by bacteria. You may also need to use a corticosteroid nasal spray. If the infection is not cured after you try two or more different antibiotics, you may want to talk with your doctor about surgery or allergy testing. If the sinusitis is caused by a fungal infection, you may need to take antifungals or other medicines. You may also need surgery. Follow-up care is a key part of your treatment and safety. Be sure to make and go to all appointments, and call your doctor if you are having problems. It's also a good idea to know your test results and keep a list of the medicines you take. How can you care for yourself at home? Medicines    · Be safe with medicines. Take your medicines exactly as prescribed. Call your doctor if you think you are having a problem with your medicine. You will get more details on the specific medicines your doctor prescribes.     · Take your antibiotics as directed. Do not stop taking them just because you feel better. You need to take the full course of antibiotics.     · Your doctor may recommend a corticosteroid nasal spray, wash, drops, or pills. Take this medicine exactly as prescribed.    At home    · Breathe warm, moist air. You can use a steamy shower, a hot bath, or a sink filled with hot water.  Avoid cold, dry air. Using a humidifier in your home may help. Follow the instructions for cleaning the machine.     · Use saline (saltwater) nasal washes every day. This helps keep your nasal passages open. It also can wash out mucus and bacteria. ? You can buy saline nose drops at a grocery store or drugstore. ? You can make your own at home. Add 1 teaspoon of salt and 1 teaspoon of baking soda to 2 cups of distilled water. If you make your own, fill a bulb syringe with the solution. Then insert the tip into your nostril and squeeze gently. [place] your nose.     · Put a warm, wet towel or a warm gel pack on your face 3 or 4 times a day. Leave it on 5 to 10 minutes each time.     · Do not smoke or breathe secondhand smoke. Smoking can make sinusitis worse. If you need help quitting, talk to your doctor about stop-smoking programs and medicines. These can increase your chances of quitting for good. When should you call for help? Call your doctor now or seek immediate medical care if:    · You have new or worse symptoms of infection, such as:  ? Increased pain, swelling, warmth, or redness. ? Red streaks leading from the area. ? Pus draining from the area. ? A fever.    Watch closely for changes in your health, and be sure to contact your doctor if:    · The mucus from your nose becomes thicker (like pus) or has new blood in it.     · You do not get better as expected. Where can you learn more? Go to http://sloan-george.info/. Enter T328 in the search box to learn more about \"Chronic Sinusitis: Care Instructions. \"  Current as of: March 27, 2018  Content Version: 11.9  © 6551-9139 [employer]. Care instructions adapted under license by [employer] (which disclaims liability or warranty for this information).  If you have questions about a medical condition or this instruction, always ask your healthcare professional. Rosanna Watkins disclaims any warranty or liability for your use of this information. Cough: Care Instructions  Your Care Instructions    A cough is your body's response to something that bothers your throat or airways. Many things can cause a cough. You might cough because of a cold or the flu, bronchitis, or asthma. Smoking, postnasal drip, allergies, and stomach acid that backs up into your throat also can cause coughs. A cough is a symptom, not a disease. Most coughs stop when the cause, such as a cold, goes away. You can take a few steps at home to cough less and feel better. Follow-up care is a key part of your treatment and safety. Be sure to make and go to all appointments, and call your doctor if you are having problems. It's also a good idea to know your test results and keep a list of the medicines you take. How can you care for yourself at home? · Drink lots of water and other fluids. This helps thin the mucus and soothes a dry or sore throat. Honey or lemon juice in hot water or tea may ease a dry cough. · Take cough medicine as directed by your doctor. · Prop up your head on pillows to help you breathe and ease a dry cough. · Try cough drops to soothe a dry or sore throat. Cough drops don't stop a cough. Medicine-flavored cough drops are no better than candy-flavored drops or hard candy. · Do not smoke. Avoid secondhand smoke. If you need help quitting, talk to your doctor about stop-smoking programs and medicines. These can increase your chances of quitting for good. When should you call for help? Call 911 anytime you think you may need emergency care.  For example, call if:    · You have severe trouble breathing.    Call your doctor now or seek immediate medical care if:    · You cough up blood.     · You have new or worse trouble breathing.     · You have a new or higher fever.     · You have a new rash.    Watch closely for changes in your health, and be sure to contact your doctor if:    · You cough more deeply or more often, especially if you notice more mucus or a change in the color of your mucus.     · You have new symptoms, such as a sore throat, an earache, or sinus pain.     · You do not get better as expected. Where can you learn more? Go to http://sloan-george.info/. Enter D279 in the search box to learn more about \"Cough: Care Instructions. \"  Current as of: September 5, 2018  Content Version: 11.9  © 3803-3836 [employer]. Care instructions adapted under license by [employer] (which disclaims liability or warranty for this information). If you have questions about a medical condition or this instruction, always ask your healthcare professional. [place] [address] any warranty or liability for your use of this information. Upper Respiratory Infection (Cold): Care Instructions  Your Care Instructions    An upper respiratory infection, or URI, is an infection of the nose, sinuses, or throat. URIs are spread by coughs, sneezes, and direct contact. The common cold is the most frequent kind of URI. The flu and sinus infections are other kinds of URIs. Almost all URIs are caused by viruses. Antibiotics won't cure them. But you can treat most infections with home care. This may include drinking lots of fluids and taking over-the-counter pain medicine. You will probably feel better in 4 to 10 days. The doctor has checked you carefully, but problems can develop later. If you notice any problems or new symptoms, get medical treatment right away. Follow-up care is a key part of your treatment and safety. Be sure to make and go to all appointments, and call your doctor if you are having problems. It's also a good idea to know your test results and keep a list of the medicines you take. How can you care for yourself at home? · To prevent dehydration, drink plenty of fluids, enough so that your urine is light yellow or clear like water.  Choose water and other caffeine-free clear liquids until you feel better. If you have kidney, heart, or liver disease and have to limit fluids, talk with your doctor before you increase the amount of fluids you drink. · Take an over-the-counter pain medicine, such as acetaminophen (Tylenol), ibuprofen (Advil, Motrin), or naproxen (Aleve). Read and follow all instructions on the label. · Before you use cough and cold medicines, check the label. These medicines may not be safe for young children or for people with certain health problems. · Be careful when taking over-the-counter cold or flu medicines and Tylenol at the same time. Many of these medicines have acetaminophen, which is Tylenol. Read the labels to make sure that you are not taking more than the recommended dose. Too much acetaminophen (Tylenol) can be harmful. · Get plenty of rest.  · Do not smoke or allow others to smoke around you. If you need help quitting, talk to your doctor about stop-smoking programs and medicines. These can increase your chances of quitting for good. When should you call for help? Call 911 anytime you think you may need emergency care. For example, call if:    · You have severe trouble breathing.    Call your doctor now or seek immediate medical care if:    · You seem to be getting much sicker.     · You have new or worse trouble breathing.     · You have a new or higher fever.     · You have a new rash.    Watch closely for changes in your health, and be sure to contact your doctor if:    · You have a new symptom, such as a sore throat, an earache, or sinus pain.     · You cough more deeply or more often, especially if you notice more mucus or a change in the color of your mucus.     · You do not get better as expected. Where can you learn more? Go to http://sloan-george.info/. Enter S334 in the search box to learn more about \"Upper Respiratory Infection (Cold): Care Instructions. \"  Current as of: September 5, 2018  Content Version: 11.9  © 1816-7666 [employer]. Care instructions adapted under license by [employer] (which disclaims liability or warranty for this information). If you have questions about a medical condition or this instruction, always ask your healthcare professional. [address] any warranty or liability for your use of this information.

## 2019-06-12 NOTE — PROGRESS NOTES
ADVISED PATIENT OF THE FOLLOWING HEALTH MAINTAINCE DUE  There are no preventive care reminders to display for this patient. Chief Complaint   Patient presents with    Cough     Being seen for non productive dry cough that started last wednesday. 1. Have you been to the ER, urgent care clinic since your last visit? Hospitalized since your last visit? No    2. Have you seen or consulted any other health care providers outside of the [address] [place] [address] since your last visit? Include any DEXA scan, mammography  or colon screening. No    3. Do you have an Advance Directive on file? yes    4. Do you have a DNR on file? DNR    Patient is accompanied by self I have received verbal consent from Jamilah Roe to discuss any/all medical information while they are present in the room. Advance Care Planning 4/16/2019   Patient's Healthcare Decision Maker is: Named in scanned ACP document   Primary Decision Maker Name -   Primary Decision Maker Phone Number -   Primary Decision Maker Relationship to Patient -   Confirm Advance Directive Yes, on file   Does the patient have other document types Do Not Resuscitate         [facility] [id number] [place] [address] - [address] [place] [address] [address]  [address] [facility] [address] [facility] [address]  Phone: [phone] Fax: [address] [facility], . [address] [facility] [place]  [address]  [facility] [address]  Phone: [phone] Fax: [phone]    [id number] [address] [facility] [address]  [address] [facility] [address]  Phone: [phone] Fax: [phone]        Patient reminded during visit to bring all medication bottles, OTC medications to all appointments.

## 2019-06-12 NOTE — PROGRESS NOTES
Reason for Visit   Shady Arroyo is a 80 y.o. male patient who presents today for :  Chief Complaint   Patient presents with    Cough     Being seen for non productive dry cough that started last wednesday. Follow Up: Follow-up and Dispositions    · Return in about 1 week (around 6/19/2019) for with the NP for follow up to your treatment plan. Diagnosis/Treatment Plan:    Mr. Freddy Hutchinson is here today with complaints of reoccurring respiratory infection. Diagnoses and all orders for this visit:    1. Acute rhinitis  -     fluticasone (FLONASE SENSIMIST) 27.5 mcg/actuation nasal spray; 2 Sprays by Both Nostrils route daily. -     amoxicillin-clavulanate (AUGMENTIN) 875-125 mg per tablet; Take 1 Tab by mouth two (2) times a day for 10 days.  -     guaiFENesin-dextromethorphan (ROBAFEN DM COUGH)  mg/5 mL liqd; Take 10 mL by mouth every six (6) hours as needed for Cough for up to 5 days. Indications: cough    2. Sore throat and laryngitis  -     fluticasone (FLONASE SENSIMIST) 27.5 mcg/actuation nasal spray; 2 Sprays by Both Nostrils route daily. -     amoxicillin-clavulanate (AUGMENTIN) 875-125 mg per tablet; Take 1 Tab by mouth two (2) times a day for 10 days.  -     guaiFENesin-dextromethorphan (ROBAFEN DM COUGH)  mg/5 mL liqd; Take 10 mL by mouth every six (6) hours as needed for Cough for up to 5 days. Indications: cough    3. Cough with congestion of paranasal sinus  -     fluticasone (FLONASE SENSIMIST) 27.5 mcg/actuation nasal spray; 2 Sprays by Both Nostrils route daily. -     amoxicillin-clavulanate (AUGMENTIN) 875-125 mg per tablet; Take 1 Tab by mouth two (2) times a day for 10 days.  -     guaiFENesin-dextromethorphan (ROBAFEN DM COUGH)  mg/5 mL liqd; Take 10 mL by mouth every six (6) hours as needed for Cough for up to 5 days. Indications: cough    4.  Chest congestion  -     fluticasone (FLONASE SENSIMIST) 27.5 mcg/actuation nasal spray; 2 Sprays by Both Nostrils route daily.  -     amoxicillin-clavulanate (AUGMENTIN) 875-125 mg per tablet; Take 1 Tab by mouth two (2) times a day for 10 days.  -     guaiFENesin-dextromethorphan (ROBAFEN DM COUGH)  mg/5 mL liqd; Take 10 mL by mouth every six (6) hours as needed for Cough for up to 5 days. Indications: cough    5. Recurrent sinus infections  -     fluticasone (FLONASE SENSIMIST) 27.5 mcg/actuation nasal spray; 2 Sprays by Both Nostrils route daily. -     amoxicillin-clavulanate (AUGMENTIN) 875-125 mg per tablet; Take 1 Tab by mouth two (2) times a day for 10 days.  -     guaiFENesin-dextromethorphan (ROBAFEN DM COUGH)  mg/5 mL liqd; Take 10 mL by mouth every six (6) hours as needed for Cough for up to 5 days. Indications: cough    6. Ear fullness, bilateral      Discontinued Care: The following treatment modalities have been discontinued by the provider today:   Medications Discontinued During This Encounter   Medication Reason    amoxicillin (AMOXIL) 400 mg/5 mL suspension Therapy Completed    lidocaine (XYLOCAINE) 2 % solution Therapy Completed     Subjective: Allergies   Allergen Reactions    Toprol Xl [Metoprolol Succinate] Diarrhea    Codeine Other (comments)    Sulfa (Sulfonamide Antibiotics) Hives     Prior to Admission medications    Medication Sig Start Date End Date Taking? Authorizing Provider   fluticasone (FLONASE SENSIMIST) 27.5 mcg/actuation nasal spray 2 Sprays by Both Nostrils route daily. 6/12/19  Yes Hyun Jaramillo NP   amoxicillin-clavulanate (AUGMENTIN) 875-125 mg per tablet Take 1 Tab by mouth two (2) times a day for 10 days. 6/12/19 6/22/19 Yes Hyun Jaramillo NP   guaiFENesin-dextromethorphan (ROBAFEN DM COUGH)  mg/5 mL liqd Take 10 mL by mouth every six (6) hours as needed for Cough for up to 5 days.  Indications: cough 6/12/19 6/17/19 Yes Hyun Jaramillo NP   hydrocortisone (HYTONE) 2.5 % topical cream APPLY CREAM THREE TIMES DAILY TO ECZEMA ON FACE 5/21/19  Yes Provider, Historical   levothyroxine (SYNTHROID) 25 mcg tablet Take 1.5 tablets daily, then 2 tablets every Monday, Wednesday, Friday. Indications: hypothyroidism 6/6/19  Yes Hakan Manzano NP   ezetimibe (ZETIA) 10 mg tablet Take 1 Tab by mouth every evening. Indications: excessive fat in the blood, combined high blood cholesterol and triglyceride level 4/23/19  Yes Hakan Manzano NP   simvastatin (ZOCOR) 40 mg tablet Take 1 Tab by mouth nightly. Indications: high amount of triglyceride in the blood, combined high blood cholesterol and triglyceride level 4/23/19  Yes Hakan Manzano NP   omega 3-dha-epa-fish oil (FISH OIL) 100-160-1,000 mg cap Take  by mouth. Yes Provider, Historical   vit C,F-Lf-fwgad-lutein-zeaxan (PRESERVISION AREDS-2) 086-407-57-1 mg-unit-mg-mg cap capsule Take  by mouth. Yes Provider, Historical   carbamide peroxide (DEBROX) 6.5 % otic solution Administer 5 Drops into each ear two (2) times a day. Yes Provider, Historical   metroNIDAZOLE (METROGEL) 1 % topical gel Use a thin layer to affected areas after washing 2/8/19  Yes Hakan Manzano NP   Methylcellulose, with Sugar, (CITRUCEL, SUCROSE,) powd Use daily for bowel regimen. 2/8/19  Yes Hakan Manzano NP   biotin 10,000 mcg cap Take 2 Caps by mouth daily. 2/8/19  Yes Hakan Manzano NP   omeprazole (PRILOSEC) 40 mg capsule TAKE 1 CAPSULE BY MOUTH  DAILY FOR GASTROESOPHAGEAL  REFLUX DISEASE 2/4/19  Yes Hakan Manzano NP   finasteride (PROSCAR) 5 mg tablet TAKE 1 TABLET BY MOUTH  NIGHTLY FOR BENIGN  PROSTATIC HYPERPLASIA WITH  LOWER URINARY TRACT  SYMPTOMS 1/19/19  Yes Hakan Manzano NP   diazePAM (VALIUM) 5 mg tablet Take 1 Tab by mouth every six (6) hours as needed. Max Daily Amount: 20 mg. Indications: Muscle Spasm 6/12/18  Yes Hakan Manzano NP   acetaminophen (TYLENOL) 325 mg tablet Take 650 mg by mouth every four (4) hours as needed for Pain.    Yes Provider, Historical   aspirin delayed-release 81 mg tablet Take 81 mg by mouth daily. 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 ([facility] [address] 10/06/2016    Bilateral thumb pain 02/01/2017    CAD (coronary artery disease)     Cardiac murmur 15/83/7263    Folliculitis 79/54/2339    GERD (gastroesophageal reflux disease)     Hypercholesteremia     Hypothyroid     Left hip pain 02/01/2017    Lumbar radiculitis     Lumbar spondylitis (HCC)     Meniere disease     Mixed hyperlipidemia     Occlusion and stenosis of unspecified carotid artery     Osteoarthritis 02/19/2018    hands    Osteopenia of both hands 02/18/2018    Polyp of cecum 08/02/2017    5mm polyp in cecum, 8 mm polyp in sigmoid colon    Spinal enthesopathy of lumbar region ([facility] 02/01/2017     Past Surgical History:   Procedure Laterality Date    CARDIAC SURG PROCEDURE UNLIST  04/2008    HX CATARACT REMOVAL Bilateral     HX CHOLECYSTECTOMY  1994    HX COLONOSCOPY  12/19/2012    tubular adenoma    HX COLONOSCOPY  08/02/2017    tubular adenomas    HX CORONARY ARTERY BYPASS GRAFT  1996    HX CORONARY ARTERY BYPASS GRAFT  1996    HX CORONARY STENT PLACEMENT      HX KNEE ARTHROSCOPY Left 2005    menisectomy    HX ROTATOR CUFF REPAIR Left     HX THYROIDECTOMY  1975    HX TONSIL AND ADENOIDECTOMY        Social History     Tobacco Use    Smoking status: Former Smoker    Smokeless tobacco: Never Used   Substance Use Topics    Alcohol use:  Yes     Alcohol/week: 4.2 oz     Types: 7 Glasses of wine per week    Drug use: No      Family History   Problem Relation Age of Onset    No Known Problems Mother     No Known Problems Father     No Known Problems Brother      Advance Care Planning 4/16/2019   Patient's Healthcare Decision Maker is: Named in scanned ACP document   Primary Decision Maker Name -   Primary Decision Maker Phone Number -   Primary Decision Maker Relationship to Patient -   Confirm Advance Directive Yes, on file   Does the patient have other document types Do Not Resuscitate     Test Results:     Clinical Support on 05/30/2019   Component Date Value Ref Range Status    T4, Total 05/30/2019 6.5  4.5 - 12.0 ug/dL Final    T3 Uptake 05/30/2019 26  24 - 39 % Final    Free Thyroxine Index (FTI) 05/30/2019 1.7  1.2 - 4.9 Final   Office Visit on 04/24/2019   Component Date Value Ref Range Status    VALID INTERNAL CONTROL POC 04/24/2019 No   Final    Influenza A Ag POC 04/24/2019 Negative  Negative Pos/Neg Final    Influenza B Ag POC 04/24/2019 Negative  Negative Pos/Neg Final   Orders Only on 03/29/2019   Component Date Value Ref Range Status    Cholesterol, total 04/08/2019 213* 100 - 199 mg/dL Final    Triglyceride 04/08/2019 241* 0 - 149 mg/dL Final    HDL Cholesterol 04/08/2019 50  >39 mg/dL Final    VLDL, calculated 04/08/2019 48* 5 - 40 mg/dL Final    LDL, calculated 04/08/2019 115* 0 - 99 mg/dL Final    TSH 04/08/2019 5.450* 0.450 - 4.500 uIU/mL Final    T4, Total 04/08/2019 5.7  4.5 - 12.0 ug/dL Final    T3 Uptake 04/08/2019 25  24 - 39 % Final    Free Thyroxine Index (FTI) 04/08/2019 1.4  1.2 - 4.9 Final     Objective:     Vitals:    06/12/19 1038   BP: 129/74   Pulse: 78   Resp: 20   SpO2: 95%   Weight: 165 lb 1.6 oz (74.9 kg)   Height: 5' 5\" (1.651 m)     Wt Readings from Last 3 Encounters:   06/12/19 165 lb 1.6 oz (74.9 kg)   05/13/19 166 lb (75.3 kg)   04/24/19 168 lb (76.2 kg)     BP Readings from Last 3 Encounters:   06/12/19 129/74   05/13/19 138/62   04/24/19 120/67     Review of Systems   Constitutional: Positive for activity change, fatigue and fever. Negative for appetite change and chills. HENT: Positive for rhinorrhea, sore throat and trouble swallowing. Negative for congestion, dental problem, ear pain, hearing loss, mouth sores, postnasal drip, sinus pressure and sneezing.     Eyes: Negative for discharge, redness and visual disturbance. Respiratory: Positive for cough, chest tightness and shortness of breath. Negative for apnea and wheezing. Cardiovascular: Negative for chest pain and palpitations. Gastrointestinal: Negative for abdominal distention, abdominal pain, blood in stool, constipation, diarrhea, nausea and vomiting. Endocrine: Negative for polydipsia, polyphagia and polyuria. Genitourinary: Negative for flank pain, frequency and urgency. Musculoskeletal: Negative for arthralgias, gait problem, joint swelling and neck pain. Skin: Negative for color change, pallor, rash and wound. Allergic/Immunologic: Negative for environmental allergies and food allergies. Neurological: Negative for dizziness, tremors, weakness, light-headedness, numbness and headaches. Hematological: Negative for adenopathy. Psychiatric/Behavioral: Negative for agitation, confusion and sleep disturbance. The patient is not nervous/anxious. Physical Exam   Constitutional: He is oriented to person, place, and time and well-developed, well-nourished, and in no distress. Vital signs are normal. He appears to not be writhing in pain, not malnourished and not dehydrated. He appears healthy. He does not have a sickly appearance. No distress. Elderly, , male resident of [address] [place] [address]. Mild memory loss and recall deficiencies noted. He appears to be fragile today. HENT:   Head: Normocephalic and atraumatic. Right Ear: Tympanic membrane, external ear and ear canal normal. No middle ear effusion. Decreased hearing is noted. Left Ear: External ear and ear canal normal. Tympanic membrane is bulging. A middle ear effusion is present. Decreased hearing is noted. Nose: Mucosal edema and rhinorrhea present. Mouth/Throat: Uvula is midline. Mucous membranes are not pale, dry and not cyanotic. No oral lesions. No uvula swelling.  Posterior oropharyngeal edema and posterior oropharyngeal erythema present. Eyes: Pupils are equal, round, and reactive to light. Conjunctivae, EOM and lids are normal. Lids are everted and swept, no foreign bodies found. Neck: Trachea normal, normal range of motion and full passive range of motion without pain. Neck supple. No hepatojugular reflux and no JVD present. Carotid bruit is not present. No thyromegaly present. Cardiovascular: Normal rate, regular rhythm, S1 normal, S2 normal, normal heart sounds, intact distal pulses and normal pulses. Occasional extrasystoles are present. Exam reveals no gallop and no friction rub. No murmur heard. No extremity edema is present during today's exam.    Pulmonary/Chest: Effort normal. He has rhonchi in the left upper field and the left middle field. Abdominal: Soft. Normal appearance and bowel sounds are normal. There is no hepatosplenomegaly. There is no tenderness. There is no CVA tenderness. Genitourinary: Rectum normal.   Genitourinary Comments: Skin tag irritated on anus. Neurological: He is alert and oriented to person, place, and time. He has normal sensation, normal strength, normal reflexes and intact cranial nerves. He displays no weakness. He exhibits normal muscle tone. Gait normal. Coordination and gait normal. GCS score is 15. Skin: Skin is warm, dry and intact. No bruising and no rash noted. He is not diaphoretic. No cyanosis. No pallor. Nails show no clubbing. Psychiatric: Mood, memory, affect and judgment normal.   Baseline mood and affect unchanged from normal.    Nursing note and vitals reviewed. Disclaimer:   Mr. Rosibel Puente has been advised to call or return to our office if symptoms worsen/change/persist. We as a care team including the patient; discussed expected course/resolution/complications of diagnosis in detail today.  Medication risks/benefits/costs/interactions/alternatives discussed Rosibel Puente was given an after visit summary which includes diagnoses, current medications, & vitals. Fausto Case expressed understanding with the diagnosis and plan.

## 2019-06-15 DIAGNOSIS — E89.0 POSTOPERATIVE HYPOTHYROIDISM: ICD-10-CM

## 2019-06-15 DIAGNOSIS — E78.5 ELEVATED FASTING LIPID PROFILE: ICD-10-CM

## 2019-06-15 DIAGNOSIS — E78.2 MIXED HYPERLIPIDEMIA: ICD-10-CM

## 2019-06-15 DIAGNOSIS — E78.00 PURE HYPERCHOLESTEROLEMIA: ICD-10-CM

## 2019-06-19 ENCOUNTER — OFFICE VISIT (OUTPATIENT)
Dept: GERIATRIC MEDICINE | Age: 84
End: 2019-06-19

## 2019-06-19 VITALS
HEART RATE: 65 BPM | DIASTOLIC BLOOD PRESSURE: 76 MMHG | WEIGHT: 165 LBS | RESPIRATION RATE: 20 BRPM | SYSTOLIC BLOOD PRESSURE: 121 MMHG | OXYGEN SATURATION: 98 % | BODY MASS INDEX: 27.49 KG/M2 | HEIGHT: 65 IN

## 2019-06-19 DIAGNOSIS — B34.9 VIRAL SYNDROME: Primary | ICD-10-CM

## 2019-06-19 NOTE — PROGRESS NOTES
ADVISED PATIENT OF THE FOLLOWING HEALTH MAINTAINCE DUE  There are no preventive care reminders to display for this patient. Chief Complaint   Patient presents with    Cough     Pt here for follow up with cough. 1. Have you been to the ER, urgent care clinic since your last visit? Hospitalized since your last visit? No    2. Have you seen or consulted any other health care providers outside of the [address] [place] [address] since your last visit? Include any DEXA scan, mammography  or colon screening. No    3. Do you have an Advance Directive on file? yes    4. Do you have a DNR on file? DNR    Patient is accompanied by self I have received verbal consent from Mady Almazan to discuss any/all medical information while they are present in the room. Advance Care Planning 4/16/2019   Patient's Healthcare Decision Maker is: Named in scanned ACP document   Primary Decision Maker Name -   Primary Decision Maker Phone Number -   Primary Decision Maker Relationship to Patient -   Confirm Advance Directive Yes, on file   Does the patient have other document types Do Not Resuscitate         [facility] [id number] [place] [address] - [id number] [place] [address] [address]  [address] [facility] [address] [facility] [address]  Phone: [phone] Fax: [address] [facility], . [address] [facility]  [address]  [facility] [address]  Phone: [phone] Fax: [phone]    [id number] [address] [facility]  [address] [facility] [address] [facility] [address]  Phone: [phone] Fax: [phone]        Patient reminded during visit to bring all medication bottles, OTC medications to all appointments.

## 2019-06-19 NOTE — PROGRESS NOTES
Reason for Visit/HPI:   Avni Casey is a 80 y.o. male patient who presents today for : follow up on cough. Reports he took one day of antibiotic and experienced diarrhea, spoke with his PCP and d/c'ed the antibiotic. There were no other episodes of diarrhea. He continues to have a congested cough with clear mucous, previously was yellow. Denies any fever/chills/sore throat/sinus pressure/post nasal drip. He reports overall he feels much better. The only sx lingering around is the cough and that is much improved. He has Robitussin DM should he still need it. Discussed with him that post viral cough may last as long as 7 weeks for some patients. We reviewed signs/sx's for him to return to the clinic should he need to do so. Chief Complaint   Patient presents with    Cough     Pt here for follow up with cough. Follow Up:     Routine follow up w/ PCP Irving Quinteros  Diagnosis/Treatment Plan:      1. Acute rhinitis - resolved     2. Sore throat and laryngitis -resolved     3. Cough with congestion of paranasal sinus -resolved     4. Chest congestion - resolved     5. Recurrent sinus infections - resolved    6. Ear fullness, bilateral - resolved      Discontinued Care: The following treatment modalities have been discontinued by the provider today:   There are no discontinued medications. Subjective: Allergies   Allergen Reactions    Toprol Xl [Metoprolol Succinate] Diarrhea    Codeine Other (comments)    Sulfa (Sulfonamide Antibiotics) Hives     Prior to Admission medications    Medication Sig Start Date End Date Taking? Authorizing Provider   hydrocortisone (HYTONE) 2.5 % topical cream APPLY CREAM THREE TIMES DAILY TO ECZEMA ON FACE 5/21/19  Yes Provider, Historical   levothyroxine (SYNTHROID) 25 mcg tablet Take 1.5 tablets daily, then 2 tablets every Monday, Wednesday, Friday.   Indications: hypothyroidism 6/6/19  Yes Silvano Wilson NP   ezetimibe (ZETIA) 10 mg tablet Take 1 Tab by mouth every evening. Indications: excessive fat in the blood, combined high blood cholesterol and triglyceride level 4/23/19  Yes Robinson Tucker NP   simvastatin (ZOCOR) 40 mg tablet Take 1 Tab by mouth nightly. Indications: high amount of triglyceride in the blood, combined high blood cholesterol and triglyceride level 4/23/19  Yes Robinson Tucker NP   vit C,S-Cw-tltbs-lutein-zeaxan (PRESERVISION AREDS-2) 252-556-91-1 mg-unit-mg-mg cap capsule Take  by mouth. Yes Provider, Historical   carbamide peroxide (DEBROX) 6.5 % otic solution Administer 5 Drops into each ear two (2) times a day. Yes Provider, Historical   metroNIDAZOLE (METROGEL) 1 % topical gel Use a thin layer to affected areas after washing 2/8/19  Yes Robinson Tucker NP   Methylcellulose, with Sugar, (CITRUCEL, SUCROSE,) powd Use daily for bowel regimen. 2/8/19  Yes Robinson Tucker NP   biotin 10,000 mcg cap Take 2 Caps by mouth daily. 2/8/19  Yes Robinson Tucker NP   omeprazole (PRILOSEC) 40 mg capsule TAKE 1 CAPSULE BY MOUTH  DAILY FOR GASTROESOPHAGEAL  REFLUX DISEASE 2/4/19  Yes Robinson Tucker NP   finasteride (PROSCAR) 5 mg tablet TAKE 1 TABLET BY MOUTH  NIGHTLY FOR BENIGN  PROSTATIC HYPERPLASIA WITH  LOWER URINARY TRACT  SYMPTOMS 1/19/19  Yes Robinson Tucker NP   diazePAM (VALIUM) 5 mg tablet Take 1 Tab by mouth every six (6) hours as needed. Max Daily Amount: 20 mg. Indications: Muscle Spasm 6/12/18  Yes Robinson Tucker NP   acetaminophen (TYLENOL) 325 mg tablet Take 650 mg by mouth every four (4) hours as needed for Pain. Yes Provider, Historical   aspirin delayed-release 81 mg tablet Take 81 mg by mouth daily. Yes Provider, Historical   fluticasone (FLONASE SENSIMIST) 27.5 mcg/actuation nasal spray 2 Sprays by Both Nostrils route daily. 6/12/19   Robinson Tucker NP   amoxicillin-clavulanate (AUGMENTIN) 875-125 mg per tablet Take 1 Tab by mouth two (2) times a day for 10 days.  6/12/19 6/22/19  Thyra Locket, Linda Castillo NP   omega 3-dha-epa-fish oil (FISH OIL) 100-160-1,000 mg cap Take  by mouth. 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 ([facility] [address] 10/06/2016    Bilateral thumb pain 02/01/2017    CAD (coronary artery disease)     Cardiac murmur 74/40/9852    Folliculitis 67/97/4008    GERD (gastroesophageal reflux disease)     Hypercholesteremia     Hypothyroid     Left hip pain 02/01/2017    Lumbar radiculitis     Lumbar spondylitis (HCC)     Meniere disease     Mixed hyperlipidemia     Occlusion and stenosis of unspecified carotid artery     Osteoarthritis 02/19/2018    hands    Osteopenia of both hands 02/18/2018    Polyp of cecum 08/02/2017    5mm polyp in cecum, 8 mm polyp in sigmoid colon    Spinal enthesopathy of lumbar region ([facility] [address] 02/01/2017     Past Surgical History:   Procedure Laterality Date    CARDIAC SURG PROCEDURE UNLIST  04/2008    HX CATARACT REMOVAL Bilateral     HX CHOLECYSTECTOMY  1994    HX COLONOSCOPY  12/19/2012    tubular adenoma    HX COLONOSCOPY  08/02/2017    tubular adenomas    HX CORONARY ARTERY BYPASS GRAFT  1996    HX CORONARY ARTERY BYPASS GRAFT  1996    HX CORONARY STENT PLACEMENT      HX KNEE ARTHROSCOPY Left 2005    menisectomy    HX ROTATOR CUFF REPAIR Left     HX THYROIDECTOMY  1975    HX TONSIL AND ADENOIDECTOMY        Social History     Tobacco Use    Smoking status: Former Smoker    Smokeless tobacco: Never Used   Substance Use Topics    Alcohol use:  Yes     Alcohol/week: 4.2 oz     Types: 7 Glasses of wine per week    Drug use: No      Family History   Problem Relation Age of Onset    No Known Problems Mother     No Known Problems Father     No Known Problems Brother      Advance Care Planning 4/16/2019   Patient's Healthcare Decision Maker is: Named in scanned ACP document   Primary Decision Maker Name - Primary Decision Maker Phone Number -   Primary Decision Maker Relationship to Patient -   Confirm Advance Directive Yes, on file   Does the patient have other document types Do Not Resuscitate     Test Results:     Clinical Support on 05/30/2019   Component Date Value Ref Range Status    T4, Total 05/30/2019 6.5  4.5 - 12.0 ug/dL Final    T3 Uptake 05/30/2019 26  24 - 39 % Final    Free Thyroxine Index (FTI) 05/30/2019 1.7  1.2 - 4.9 Final   Office Visit on 04/24/2019   Component Date Value Ref Range Status    VALID INTERNAL CONTROL POC 04/24/2019 No   Final    Influenza A Ag POC 04/24/2019 Negative  Negative Pos/Neg Final    Influenza B Ag POC 04/24/2019 Negative  Negative Pos/Neg Final   Orders Only on 03/29/2019   Component Date Value Ref Range Status    Cholesterol, total 04/08/2019 213* 100 - 199 mg/dL Final    Triglyceride 04/08/2019 241* 0 - 149 mg/dL Final    HDL Cholesterol 04/08/2019 50  >39 mg/dL Final    VLDL, calculated 04/08/2019 48* 5 - 40 mg/dL Final    LDL, calculated 04/08/2019 115* 0 - 99 mg/dL Final    TSH 04/08/2019 5.450* 0.450 - 4.500 uIU/mL Final    T4, Total 04/08/2019 5.7  4.5 - 12.0 ug/dL Final    T3 Uptake 04/08/2019 25  24 - 39 % Final    Free Thyroxine Index (FTI) 04/08/2019 1.4  1.2 - 4.9 Final     Objective:     Vitals:    06/19/19 1118   BP: 121/76   Pulse: 65   Resp: 20   SpO2: 98%   Weight: 165 lb (74.8 kg)   Height: 5' 5\" (1.651 m)     Wt Readings from Last 3 Encounters:   06/19/19 165 lb (74.8 kg)   06/12/19 165 lb 1.6 oz (74.9 kg)   05/13/19 166 lb (75.3 kg)     BP Readings from Last 3 Encounters:   06/19/19 121/76   06/12/19 129/74   05/13/19 138/62     Review of Systems   Constitutional: Negative. HENT: Negative. Respiratory: Positive for cough. Cardiovascular: Negative. Gastrointestinal: Negative. Genitourinary: Negative. Musculoskeletal: Negative. Neurological: Negative. Psychiatric/Behavioral: Negative.       Physical Exam Constitutional: He is oriented to person, place, and time and well-developed, well-nourished, and in no distress. HENT:   Head: Normocephalic and atraumatic. Mouth/Throat: No oropharyngeal exudate. Moist mucous membranes   Eyes: Pupils are equal, round, and reactive to light. Neck: Normal range of motion. Neck supple. No thyromegaly present. Cardiovascular: Normal rate, regular rhythm, normal heart sounds and intact distal pulses. Pulmonary/Chest: Effort normal and breath sounds normal.   Abdominal: Soft. Bowel sounds are normal. He exhibits no distension. There is no tenderness. Musculoskeletal: Normal range of motion. He exhibits no edema. Neurological: He is alert and oriented to person, place, and time. Skin: Skin is warm and dry. Psychiatric: Affect and judgment normal.     Disclaimer:   Mr. Eric Mojica has been advised to call or return to our office if symptoms worsen/change/persist. We as a care team including the patient; discussed expected course/resolution/complications of diagnosis in detail today. Medication risks/benefits/costs/interactions/alternatives discussed Eric Mojica was given an after visit summary which includes diagnoses, current medications, & vitals. Eric Mojica expressed understanding with the diagnosis and plan.

## 2019-07-22 ENCOUNTER — OFFICE VISIT (OUTPATIENT)
Dept: GERIATRIC MEDICINE | Age: 84
End: 2019-07-22

## 2019-07-22 VITALS
HEART RATE: 63 BPM | BODY MASS INDEX: 27.49 KG/M2 | DIASTOLIC BLOOD PRESSURE: 64 MMHG | WEIGHT: 165 LBS | TEMPERATURE: 97.7 F | HEIGHT: 65 IN | SYSTOLIC BLOOD PRESSURE: 118 MMHG | OXYGEN SATURATION: 95 % | RESPIRATION RATE: 16 BRPM

## 2019-07-22 DIAGNOSIS — H90.6 MIXED CONDUCTIVE AND SENSORINEURAL HEARING LOSS OF BOTH EARS: ICD-10-CM

## 2019-07-22 DIAGNOSIS — I25.10 CORONARY ATHEROSCLEROSIS DUE TO LIPID RICH PLAQUE: ICD-10-CM

## 2019-07-22 DIAGNOSIS — H93.8X3 FULLNESS IN EAR, BILATERAL: ICD-10-CM

## 2019-07-22 DIAGNOSIS — R68.89 OTHER GENERAL SYMPTOMS AND SIGNS: ICD-10-CM

## 2019-07-22 DIAGNOSIS — H65.111 NON-RECURRENT ACUTE ALLERGIC OTITIS MEDIA OF RIGHT EAR: ICD-10-CM

## 2019-07-22 DIAGNOSIS — N42.9 DISORDER OF PROSTATE: ICD-10-CM

## 2019-07-22 DIAGNOSIS — I25.83 CORONARY ATHEROSCLEROSIS DUE TO LIPID RICH PLAQUE: ICD-10-CM

## 2019-07-22 DIAGNOSIS — E78.5 HYPERLIPIDEMIA, UNSPECIFIED HYPERLIPIDEMIA TYPE: ICD-10-CM

## 2019-07-22 DIAGNOSIS — R79.9 ABNORMAL FINDING OF BLOOD CHEMISTRY: ICD-10-CM

## 2019-07-22 DIAGNOSIS — E78.5 ELEVATED FASTING LIPID PROFILE: ICD-10-CM

## 2019-07-22 DIAGNOSIS — E89.0 POSTOPERATIVE HYPOTHYROIDISM: ICD-10-CM

## 2019-07-22 DIAGNOSIS — R53.83 MALAISE AND FATIGUE: ICD-10-CM

## 2019-07-22 DIAGNOSIS — H61.23 BILATERAL IMPACTED CERUMEN: Primary | ICD-10-CM

## 2019-07-22 DIAGNOSIS — E03.9 ACQUIRED HYPOTHYROIDISM: ICD-10-CM

## 2019-07-22 DIAGNOSIS — E55.9 VITAMIN D DEFICIENCY: ICD-10-CM

## 2019-07-22 DIAGNOSIS — R53.81 MALAISE AND FATIGUE: ICD-10-CM

## 2019-07-22 DIAGNOSIS — E78.2 MIXED HYPERLIPIDEMIA: ICD-10-CM

## 2019-07-22 RX ORDER — AMOXICILLIN 400 MG/5ML
28 POWDER, FOR SUSPENSION ORAL 2 TIMES DAILY
Qty: 183.4 ML | Refills: 0 | Status: SHIPPED | OUTPATIENT
Start: 2019-07-22 | End: 2019-07-29 | Stop reason: ALTCHOICE

## 2019-07-22 NOTE — PROGRESS NOTES
Reason for Visit   Ele Gill is a 80 y.o. male patient who presents today for :  Chief Complaint   Patient presents with    Wax in Ear     Pt here for bilateral ear wax cleaning, he saw his audiologist who told him to get his ears cleaned.  Skin Problem     Right hand pinky finger redness, pt reports scaly skin  and now just red. Follow Up: Follow-up and Dispositions    · Return in about 1 week (around 7/29/2019) for with the NP for follow up to your treatment plan. Diagnosis/Treatment Plan: We aided Mr. Eva Gomes in cleaning his hearing aids as well. He was using the debrox every 3 days instead of once weekly as well as flushing his own ears out with peroxide and water. He had a large amount of wet canal debris attached to the ear canal and blocking the canal. He also had a large amount of wax blocking his hearing aid funnels and sensors. We cleaned those for him and showed him how to do it himself. He is going to restart the Flonase that was previously ordered for him as his right ear has a mild infection present and his left had a small amount of fluid present but no infection. He continues to complain about his nose running. We will restart treatment and follow up in 1 week. ICD-10-CM ICD-9-CM    1. Bilateral impacted cerumen H61.23 380.4 carbamide peroxide (DEBROX) 6.5 % otic solution      REMOVE IMPACTED EAR WAX      REMOVAL IMPACTED CERUMEN IRRIGATION/LVG UNILAT   2. Mixed conductive and sensorineural hearing loss of both ears H90.6 389.22 carbamide peroxide (DEBROX) 6.5 % otic solution      REMOVE IMPACTED EAR WAX      REMOVAL IMPACTED CERUMEN IRRIGATION/LVG UNILAT   3. Non-recurrent acute allergic otitis media of right ear H65.111 381.04 carbamide peroxide (DEBROX) 6.5 % otic solution      REMOVE IMPACTED EAR WAX      REMOVAL IMPACTED CERUMEN IRRIGATION/LVG UNILAT      amoxicillin (AMOXIL) 400 mg/5 mL suspension   4.  Fullness in ear, bilateral H93.8X3 388.8 carbamide peroxide (DEBROX) 6.5 % otic solution      REMOVE IMPACTED EAR WAX      REMOVAL IMPACTED CERUMEN IRRIGATION/LVG UNILAT   5. Acquired hypothyroidism E03.9 244.9 CBC WITH AUTOMATED DIFF      METABOLIC PANEL, COMPREHENSIVE      COLLECTION VENOUS BLOOD,VENIPUNCTURE      HEMOGLOBIN A1C WITH EAG      LIPID PANEL      VITAMIN B12 & FOLATE      VITAMIN D, 25 HYDROXY      THYROID PANEL W/TSH      PSA W/ REFLX FREE PSA   6. Postoperative hypothyroidism E89.0 244.0 CBC WITH AUTOMATED DIFF      METABOLIC PANEL, COMPREHENSIVE      COLLECTION VENOUS BLOOD,VENIPUNCTURE      HEMOGLOBIN A1C WITH EAG      LIPID PANEL      VITAMIN B12 & FOLATE      VITAMIN D, 25 HYDROXY      THYROID PANEL W/TSH      PSA W/ REFLX FREE PSA   7. Malaise and fatigue R53.81 780.79 CBC WITH AUTOMATED DIFF    S11.02  METABOLIC PANEL, COMPREHENSIVE      COLLECTION VENOUS BLOOD,VENIPUNCTURE      HEMOGLOBIN A1C WITH EAG      LIPID PANEL      VITAMIN B12 & FOLATE      VITAMIN D, 25 HYDROXY      THYROID PANEL W/TSH      PSA W/ REFLX FREE PSA   8. Hyperlipidemia, unspecified hyperlipidemia type E78.5 272.4 CBC WITH AUTOMATED DIFF      METABOLIC PANEL, COMPREHENSIVE      COLLECTION VENOUS BLOOD,VENIPUNCTURE      HEMOGLOBIN A1C WITH EAG      LIPID PANEL      VITAMIN B12 & FOLATE      VITAMIN D, 25 HYDROXY      THYROID PANEL W/TSH      PSA W/ REFLX FREE PSA   9. Mixed hyperlipidemia E78.2 272.2 CBC WITH AUTOMATED DIFF      METABOLIC PANEL, COMPREHENSIVE      COLLECTION VENOUS BLOOD,VENIPUNCTURE      HEMOGLOBIN A1C WITH EAG      LIPID PANEL      VITAMIN B12 & FOLATE      VITAMIN D, 25 HYDROXY      THYROID PANEL W/TSH      PSA W/ REFLX FREE PSA   10. Coronary atherosclerosis due to lipid rich plaque I25.10 414.3 CBC WITH AUTOMATED DIFF    T56.92  METABOLIC PANEL, COMPREHENSIVE      COLLECTION VENOUS BLOOD,VENIPUNCTURE      HEMOGLOBIN A1C WITH EAG      LIPID PANEL      VITAMIN B12 & FOLATE      VITAMIN D, 25 HYDROXY      THYROID PANEL W/TSH      PSA W/ REFLX FREE PSA   11. Elevated fasting lipid profile E78.5 272.4 CBC WITH AUTOMATED DIFF      METABOLIC PANEL, COMPREHENSIVE      COLLECTION VENOUS BLOOD,VENIPUNCTURE      HEMOGLOBIN A1C WITH EAG      LIPID PANEL      VITAMIN B12 & FOLATE      VITAMIN D, 25 HYDROXY      THYROID PANEL W/TSH      PSA W/ REFLX FREE PSA   12. Abnormal finding of blood chemistry  R79.9 790.6 HEMOGLOBIN A1C WITH EAG   13. Other general symptoms and signs  R68.89 780.99 VITAMIN B12 & FOLATE   14. Vitamin D deficiency  E55.9 268.9 VITAMIN D, 25 HYDROXY   15. Disorder of prostate  N42.9 602.9 PSA W/ REFLX FREE PSA      Objective:     Vitals:    07/22/19 0900   BP: 118/64   Pulse: 63   Resp: 16   Temp: 97.7 °F (36.5 °C)   TempSrc: Oral   SpO2: 95%   Weight: 165 lb (74.8 kg)   Height: 5' 5\" (1.651 m)     Wt Readings from Last 3 Encounters:   07/22/19 165 lb (74.8 kg)   06/19/19 165 lb (74.8 kg)   06/12/19 165 lb 1.6 oz (74.9 kg)     BP Readings from Last 3 Encounters:   07/22/19 118/64   06/19/19 121/76   06/12/19 129/74     Review of Systems   Constitutional: Negative for activity change, appetite change, chills, fatigue and fever. HENT: Positive for congestion, ear pain, hearing loss, postnasal drip and tinnitus. Negative for dental problem, mouth sores, rhinorrhea, sinus pressure, sneezing, sore throat and trouble swallowing. Eyes: Negative for discharge, redness and visual disturbance. Respiratory: Negative for apnea, cough, chest tightness, shortness of breath and wheezing. Cardiovascular: Negative for chest pain and palpitations. Gastrointestinal: Negative for abdominal distention, abdominal pain, blood in stool, constipation, diarrhea, nausea and vomiting. Endocrine: Negative for polydipsia, polyphagia and polyuria. Genitourinary: Negative for flank pain, frequency and urgency. Musculoskeletal: Negative for arthralgias, gait problem, joint swelling and neck pain. Skin: Negative for color change, pallor, rash and wound. Allergic/Immunologic: Negative for environmental allergies and food allergies. Neurological: Negative for dizziness, tremors, weakness, light-headedness, numbness and headaches. Hematological: Negative for adenopathy. Psychiatric/Behavioral: Negative for agitation, confusion and sleep disturbance. The patient is not nervous/anxious. Physical Exam   Constitutional: He is oriented to person, place, and time and well-developed, well-nourished, and in no distress. Vital signs are normal. He appears to not be writhing in pain, not malnourished and not dehydrated. He appears healthy. He does not have a sickly appearance. No distress. HENT:   Head: Normocephalic and atraumatic. Right Ear: External ear normal. A foreign body is present. Tympanic membrane is bulging. A middle ear effusion is present. Decreased hearing is noted. Left Ear: External ear normal. A foreign body is present. A middle ear effusion is present. Decreased hearing is noted. Nose: Mucosal edema present. Mouth/Throat: Uvula is midline, oropharynx is clear and moist and mucous membranes are normal. Mucous membranes are not pale, not dry and not cyanotic. No oral lesions. No uvula swelling. No posterior oropharyngeal edema or posterior oropharyngeal erythema. Bilateral Ear Canal Irrigation Procedure:    Procedure discussed with patient including risks and benefits. Bridger Shipley has no further questions at this time. Supplies obtained. Ear irrigation tools obtained and bottle filled with 1:1 peroxide and warm water. Left ear canal irrigation using the soft flexible \"Elephant\" ear system. Hard impacted cerumen was removed with instrumentation. Right ear canal irrigation using the soft flexible \"Elephant\" ear system. Hard impacted cerumen was removed with instrumentation. Right ear was: 100% occluded with wet cerumen. Left ear was: 100% occluded with wet cerumen.     Following the procedure the Right TM was visualized and noted to be intact. The Left TM was visualized and noted to be intact. External canal is without abrasions or erythema. Orders were written to utilize Debrox in each ear once weekly to keep the cerumen from returning. Shannan Nolan tolerated the procedure well with no complications. Eyes: Pupils are equal, round, and reactive to light. EOM and lids are normal. Lids are everted and swept, no foreign bodies found. Right conjunctiva is injected. Right conjunctiva has a hemorrhage. Slit lamp exam:       The right eye shows no corneal abrasion, no corneal ulcer, no foreign body, no hyphema, no hypopyon, no fluorescein uptake and no anterior chamber bulge. Neck: Trachea normal, normal range of motion and full passive range of motion without pain. Neck supple. No hepatojugular reflux and no JVD present. Carotid bruit is not present. No thyromegaly present. Cardiovascular: Normal rate, regular rhythm, S1 normal, S2 normal, normal heart sounds, intact distal pulses and normal pulses. Occasional extrasystoles are present. Exam reveals no gallop and no friction rub. No murmur heard. No extremity edema is present during today's exam.    Pulmonary/Chest: Effort normal and breath sounds normal.   Abdominal: Soft. Normal appearance and bowel sounds are normal. There is no hepatosplenomegaly. There is no tenderness. There is no CVA tenderness. Neurological: He is alert and oriented to person, place, and time. He has normal sensation, normal strength, normal reflexes and intact cranial nerves. He displays no weakness. He exhibits normal muscle tone. Gait normal. Coordination and gait normal. GCS score is 15. Skin: Skin is warm, dry and intact. No bruising and no rash noted. He is not diaphoretic. No cyanosis. No pallor. Nails show no clubbing.    Psychiatric: Mood, memory, affect and judgment normal.   Baseline mood and affect unchanged from normal.    Nursing note and vitals reviewed. Discontinued Care: The following treatment modalities have been discontinued by the provider today:   Medications Discontinued During This Encounter   Medication Reason    omega 3-dha-epa-fish oil (FISH OIL) 100-160-1,000 mg cap Not A Current Medication    acetaminophen (TYLENOL) 325 mg tablet Not A Current Medication    carbamide peroxide (DEBROX) 6.5 % otic solution      Subjective: Allergies   Allergen Reactions    Toprol Xl [Metoprolol Succinate] Diarrhea    Codeine Other (comments)    Sulfa (Sulfonamide Antibiotics) Hives     Prior to Admission medications    Medication Sig Start Date End Date Taking? Authorizing Provider   carbamide peroxide (DEBROX) 6.5 % otic solution Administer 2 Drops into each ear every seven (7) days. For maintanence of cerumen impaction. 7/22/19  Yes Ron Blanc NP   amoxicillin (AMOXIL) 400 mg/5 mL suspension Take 13.1 mL by mouth two (2) times a day for 7 days. Indications: bacterial infection of middle ear 7/22/19 7/29/19 Yes Ron Blanc NP   hydrocortisone (HYTONE) 2.5 % topical cream APPLY CREAM THREE TIMES DAILY TO ECZEMA ON FACE 5/21/19  Yes Provider, Historical   levothyroxine (SYNTHROID) 25 mcg tablet Take 1.5 tablets daily, then 2 tablets every Monday, Wednesday, Friday. Indications: hypothyroidism 6/6/19  Yes Ron Blanc NP   ezetimibe (ZETIA) 10 mg tablet Take 1 Tab by mouth every evening. Indications: excessive fat in the blood, combined high blood cholesterol and triglyceride level 4/23/19  Yes Ron Blanc NP   simvastatin (ZOCOR) 40 mg tablet Take 1 Tab by mouth nightly. Indications: high amount of triglyceride in the blood, combined high blood cholesterol and triglyceride level 4/23/19  Yes Ron Blanc NP   vit C,H-Lg-jypcg-lutein-zeaxan (PRESERVISION AREDS-2) 878-750-56-1 mg-unit-mg-mg cap capsule Take  by mouth.    Yes Provider, Historical   metroNIDAZOLE (METROGEL) 1 % topical gel Use a thin layer to affected areas after washing 2/8/19  Yes Maria L Cruz NP   Methylcellulose, with Sugar, (CITRUCEL, SUCROSE,) powd Use daily for bowel regimen. 2/8/19  Yes Maria L Cruz NP   biotin 10,000 mcg cap Take 2 Caps by mouth daily. 2/8/19  Yes Maria L Cruz NP   omeprazole (PRILOSEC) 40 mg capsule TAKE 1 CAPSULE BY MOUTH  DAILY FOR GASTROESOPHAGEAL  REFLUX DISEASE 2/4/19  Yes Maria L Cruz NP   finasteride (PROSCAR) 5 mg tablet TAKE 1 TABLET BY MOUTH  NIGHTLY FOR BENIGN  PROSTATIC HYPERPLASIA WITH  LOWER URINARY TRACT  SYMPTOMS 1/19/19  Yes Maria L Cruz NP   diazePAM (VALIUM) 5 mg tablet Take 1 Tab by mouth every six (6) hours as needed. Max Daily Amount: 20 mg. Indications: Muscle Spasm 6/12/18  Yes Maria L Cruz NP   aspirin delayed-release 81 mg tablet Take 81 mg by mouth daily. Yes Provider, Historical   fluticasone (FLONASE SENSIMIST) 27.5 mcg/actuation nasal spray 2 Sprays by Both Nostrils route daily.  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 ([facility] [address] 10/06/2016    Bilateral thumb pain 02/01/2017    CAD (coronary artery disease)     Cardiac murmur 21/34/3554    Folliculitis 68/14/7221    GERD (gastroesophageal reflux disease)     Hypercholesteremia     Hypothyroid     Left hip pain 02/01/2017    Lumbar radiculitis     Lumbar spondylitis (HCC)     Meniere disease     Mixed hyperlipidemia     Occlusion and stenosis of unspecified carotid artery     Osteoarthritis 02/19/2018    hands    Osteopenia of both hands 02/18/2018    Polyp of cecum 08/02/2017    5mm polyp in cecum, 8 mm polyp in sigmoid colon    Spinal enthesopathy of lumbar region ([facility] [address] 02/01/2017     Past Surgical History:   Procedure Laterality Date    CARDIAC SURG PROCEDURE UNLIST  04/2008    HX CATARACT REMOVAL Bilateral     HX CHOLECYSTECTOMY  1994    HX COLONOSCOPY  12/19/2012    tubular adenoma    HX COLONOSCOPY  08/02/2017    tubular adenomas    HX CORONARY ARTERY BYPASS GRAFT  1996    HX CORONARY ARTERY BYPASS GRAFT  1996    HX CORONARY STENT PLACEMENT      HX KNEE ARTHROSCOPY Left 2005    menisectomy    HX ROTATOR CUFF REPAIR Left     HX THYROIDECTOMY  1975    HX TONSIL AND ADENOIDECTOMY        Social History     Tobacco Use    Smoking status: Former Smoker    Smokeless tobacco: Never Used   Substance Use Topics    Alcohol use: Yes     Alcohol/week: 7.0 standard drinks     Types: 7 Glasses of wine per week    Drug use: No      Family History   Problem Relation Age of Onset    No Known Problems Mother     No Known Problems Father     No Known Problems Brother      Advance Care Planning 4/16/2019   Patient's Healthcare Decision Maker is: Named in scanned ACP document   Primary Decision Maker Name -   Primary Decision Maker Phone Number -   Primary Decision Maker Relationship to Patient -   Confirm Advance Directive Yes, on file   Does the patient have other document types Do Not Resuscitate     Test Results:     Clinical Support on 05/30/2019   Component Date Value Ref Range Status    T4, Total 05/30/2019 6.5  4.5 - 12.0 ug/dL Final    T3 Uptake 05/30/2019 26  24 - 39 % Final    Free Thyroxine Index (FTI) 05/30/2019 1.7  1.2 - 4.9 Final   Office Visit on 04/24/2019   Component Date Value Ref Range Status    VALID INTERNAL CONTROL POC 04/24/2019 No   Final    Influenza A Ag POC 04/24/2019 Negative  Negative Pos/Neg Final    Influenza B Ag POC 04/24/2019 Negative  Negative Pos/Neg Final     Disclaimer:   Mr. Whitney Agrawal has been advised to call or return to our office if symptoms worsen/change/persist. We as a care team including the patient; discussed expected course/resolution/complications of diagnosis in detail today.      Medication risks/benefits/costs/interactions/alternatives discussed Whitney Agrawal was given an after visit summary which includes diagnoses, current medications, & vitals. Carlos Enrique Rossi expressed understanding with the diagnosis and plan.

## 2019-07-22 NOTE — PROGRESS NOTES
ADVISED PATIENT OF THE FOLLOWING HEALTH MAINTAINCE DUE  There are no preventive care reminders to display for this patient. Chief Complaint   Patient presents with    Wax in Ear     Pt here for bilateral ear wax cleaning, he saw his audiologist who told him to get his ears cleaned.  Skin Problem     Right hand pinky finger redness, pt reports scaly skin  and now just red. 1. Have you been to the ER, urgent care clinic since your last visit? Hospitalized since your last visit? No    2. Have you seen or consulted any other health care providers outside of the [address] [place] [address] since your last visit? Include any DEXA scan, mammography  or colon screening. No    3. Do you have an Advance Directive on file? yes    4. Do you have a DNR on file? DNR    Patient is accompanied by self I have received verbal consent from Bridger Shipley to discuss any/all medical information while they are present in the room. Advance Care Planning 4/16/2019   Patient's Healthcare Decision Maker is: Named in scanned ACP document   Primary Decision Maker Name -   Primary Decision Maker Phone Number -   Primary Decision Maker Relationship to Patient -   Confirm Advance Directive Yes, on file   Does the patient have other document types Do Not Resuscitate         [facility] [address] [place], [place] - [address] AT [facility] [address]  [address] [facility] [address] [facility] [address]  Phone: [phone] Fax: [address], . [address] [facility]  [address]  [facility] [address]  Phone: [phone] Fax: [phone]    [id number] [place] [address] [address]  [address] [facility] [address]  Phone: [phone] Fax: [phone]        Patient reminded during visit to bring all medication bottles, OTC medications to all appointments.

## 2019-07-22 NOTE — PATIENT INSTRUCTIONS
Back Pain: Care Instructions  Your Care Instructions    Back pain has many possible causes. It is often related to problems with muscles and ligaments of the back. It may also be related to problems with the nerves, discs, or bones of the back. Moving, lifting, standing, sitting, or sleeping in an awkward way can strain the back. Sometimes you don't notice the injury until later. Arthritis is another common cause of back pain. Although it may hurt a lot, back pain usually improves on its own within several weeks. Most people recover in 12 weeks or less. Using good home treatment and being careful not to stress your back can help you feel better sooner. Follow-up care is a key part of your treatment and safety. Be sure to make and go to all appointments, and call your doctor if you are having problems. It's also a good idea to know your test results and keep a list of the medicines you take. How can you care for yourself at home? · Sit or lie in positions that are most comfortable and reduce your pain. Try one of these positions when you lie down:  ? Lie on your back with your knees bent and supported by large pillows. ? Lie on the floor with your legs on the seat of a sofa or chair. ? Lie on your side with your knees and hips bent and a pillow between your legs. ? Lie on your stomach if it does not make pain worse. · Do not sit up in bed, and avoid soft couches and twisted positions. Bed rest can help relieve pain at first, but it delays healing. Avoid bed rest after the first day of back pain. · Change positions every 30 minutes. If you must sit for long periods of time, take breaks from sitting. Get up and walk around, or lie in a comfortable position. · Try using a heating pad on a low or medium setting for 15 to 20 minutes every 2 or 3 hours. Try a warm shower in place of one session with the heating pad. · You can also try an ice pack for 10 to 15 minutes every 2 to 3 hours.  Put a thin cloth between the ice pack and your skin. · Take pain medicines exactly as directed. ? If the doctor gave you a prescription medicine for pain, take it as prescribed. ? If you are not taking a prescription pain medicine, ask your doctor if you can take an over-the-counter medicine. · Take short walks several times a day. You can start with 5 to 10 minutes, 3 or 4 times a day, and work up to longer walks. Walk on level surfaces and avoid hills and stairs until your back is better. · Return to work and other activities as soon as you can. Continued rest without activity is usually not good for your back. · To prevent future back pain, do exercises to stretch and strengthen your back and stomach. Learn how to use good posture, safe lifting techniques, and proper body mechanics. When should you call for help? Call your doctor now or seek immediate medical care if:    · You have new or worsening numbness in your legs.     · You have new or worsening weakness in your legs. (This could make it hard to stand up.)     · You lose control of your bladder or bowels.    Watch closely for changes in your health, and be sure to contact your doctor if:    · You have a fever, lose weight, or don't feel well.     · You do not get better as expected. Where can you learn more? Go to http://sloan-george.info/. Enter C051 in the search box to learn more about \"Back Pain: Care Instructions. \"  Current as of: September 20, 2018  Content Version: 12.1  © 9410-7707 [employer]. Care instructions adapted under license by [employer] (which disclaims liability or warranty for this information). If you have questions about a medical condition or this instruction, always ask your healthcare professional. [place] [address] any warranty or liability for your use of this information.          Hearing Loss: Care Instructions  Your Care Instructions    Hearing loss is a sudden or slow decrease in how well you hear. It can range from mild to severe. Permanent hearing loss can occur with aging. It also can happen when you are exposed long-term to loud noise. Examples include listening to loud music, riding motorcycles, or being around other loud machines. Hearing loss can affect your work and home life. It can make you feel lonely or depressed. You may feel that you have lost your independence. But hearing aids and other devices can help you hear better and feel connected to others. Follow-up care is a key part of your treatment and safety. Be sure to make and go to all appointments, and call your doctor if you are having problems. It's also a good idea to know your test results and keep a list of the medicines you take. How can you care for yourself at home? · Avoid loud noises whenever possible. This helps keep your hearing from getting worse. · Always wear hearing protection around loud noises. · Wear a hearing aid as directed. See a person who can help you pick a hearing aid that fits you. · Have hearing tests as your doctor suggests. They can show whether your hearing has changed. Your hearing aid may need to be adjusted. · Use other devices as needed. These may include:  ? Telephone amplifiers and hearing aids that can connect to a television, stereo, radio, or microphone. ? Devices that use lights or vibrations. These alert you to the doorbell, a ringing telephone, or a baby monitor. ? Television closed-captioning. This shows the words at the bottom of the screen. Most new TVs can do this. ? TTY (text telephone). This lets you type messages back and forth on the telephone instead of talking or listening. These devices are also called TDD. When messages are typed on the keyboard, they are sent over the phone line to a receiving TTY. The message is shown on a monitor. · Use pagers, fax machines, and email if it is hard for you to communicate by telephone.   · Try to learn a listening technique called speech-reading. It is not lip-reading. You pay attention to people's gestures, expressions, posture, and tone of voice. These clues can help you understand what a person is saying. Face the person you are talking to, and have him or her face you. Make sure the lighting is good. You need to see the other person's face clearly. · Think about counseling if you need help to adjust to your hearing loss. When should you call for help? Watch closely for changes in your health, and be sure to contact your doctor if:    · You think your hearing is getting worse.     · You have new symptoms, such as dizziness or nausea. Where can you learn more? Go to http://sloan-george.info/. Enter F619 in the search box to learn more about \"Hearing Loss: Care Instructions. \"  Current as of: October 21, 2018  Content Version: 12.1  © 1309-6383 [employer]. Care instructions adapted under license by [employer] (which disclaims liability or warranty for this information). If you have questions about a medical condition or this instruction, always ask your healthcare professional. [place] [address] any warranty or liability for your use of this information.

## 2019-07-22 NOTE — PROGRESS NOTES
Ele Gill presents for lab draw ordered by Samson Patel RN MSN [facility]AG-NP    The following labs were drawn and sent to lab by Haven Rao LPN:    CBC, CMP, GTM3Y and lipid panel, PSA, Thyroid, Vit D, Vit B12 folate,     The following tubes were sent:    Lavender  ( 2) and Tiger (2)    Patient tolerated procedure well, blood obtained via venipuncture to left antecubital

## 2019-07-23 ENCOUNTER — TELEPHONE (OUTPATIENT)
Dept: GERIATRIC MEDICINE | Age: 84
End: 2019-07-23

## 2019-07-23 LAB
25(OH)D3+25(OH)D2 SERPL-MCNC: 50.6 NG/ML (ref 30–100)
ALBUMIN SERPL-MCNC: 4 G/DL (ref 3.5–4.7)
ALBUMIN/GLOB SERPL: 1.8 {RATIO} (ref 1.2–2.2)
ALP SERPL-CCNC: 64 IU/L (ref 39–117)
ALT SERPL-CCNC: 16 IU/L (ref 0–44)
AST SERPL-CCNC: 18 IU/L (ref 0–40)
BASOPHILS # BLD AUTO: 0 X10E3/UL (ref 0–0.2)
BASOPHILS NFR BLD AUTO: 1 %
BILIRUB SERPL-MCNC: 0.6 MG/DL (ref 0–1.2)
BUN SERPL-MCNC: 15 MG/DL (ref 8–27)
BUN/CREAT SERPL: 17 (ref 10–24)
CALCIUM SERPL-MCNC: 9 MG/DL (ref 8.6–10.2)
CHLORIDE SERPL-SCNC: 105 MMOL/L (ref 96–106)
CHOLEST SERPL-MCNC: 170 MG/DL (ref 100–199)
CO2 SERPL-SCNC: 20 MMOL/L (ref 20–29)
CREAT SERPL-MCNC: 0.9 MG/DL (ref 0.76–1.27)
EOSINOPHIL # BLD AUTO: 0.2 X10E3/UL (ref 0–0.4)
EOSINOPHIL NFR BLD AUTO: 5 %
ERYTHROCYTE [DISTWIDTH] IN BLOOD BY AUTOMATED COUNT: 14.5 % (ref 12.3–15.4)
EST. AVERAGE GLUCOSE BLD GHB EST-MCNC: 117 MG/DL
FOLATE SERPL-MCNC: 19.3 NG/ML
FT4I SERPL CALC-MCNC: 1.9 (ref 1.2–4.9)
GLOBULIN SER CALC-MCNC: 2.2 G/DL (ref 1.5–4.5)
GLUCOSE SERPL-MCNC: 131 MG/DL (ref 65–99)
HBA1C MFR BLD: 5.7 % (ref 4.8–5.6)
HCT VFR BLD AUTO: 45.3 % (ref 37.5–51)
HDLC SERPL-MCNC: 45 MG/DL
HGB BLD-MCNC: 15 G/DL (ref 13–17.7)
IMM GRANULOCYTES # BLD AUTO: 0 X10E3/UL (ref 0–0.1)
IMM GRANULOCYTES NFR BLD AUTO: 1 %
LDLC SERPL CALC-MCNC: 80 MG/DL (ref 0–99)
LYMPHOCYTES # BLD AUTO: 1.4 X10E3/UL (ref 0.7–3.1)
LYMPHOCYTES NFR BLD AUTO: 35 %
MCH RBC QN AUTO: 30.9 PG (ref 26.6–33)
MCHC RBC AUTO-ENTMCNC: 33.1 G/DL (ref 31.5–35.7)
MCV RBC AUTO: 93 FL (ref 79–97)
MONOCYTES # BLD AUTO: 0.3 X10E3/UL (ref 0.1–0.9)
MONOCYTES NFR BLD AUTO: 8 %
NEUTROPHILS # BLD AUTO: 2 X10E3/UL (ref 1.4–7)
NEUTROPHILS NFR BLD AUTO: 50 %
PLATELET # BLD AUTO: 166 X10E3/UL (ref 150–450)
POTASSIUM SERPL-SCNC: 4.1 MMOL/L (ref 3.5–5.2)
PROT SERPL-MCNC: 6.2 G/DL (ref 6–8.5)
PSA SERPL-MCNC: 2.5 NG/ML (ref 0–4)
RBC # BLD AUTO: 4.85 X10E6/UL (ref 4.14–5.8)
REFLEX CRITERIA: NORMAL
SODIUM SERPL-SCNC: 142 MMOL/L (ref 134–144)
T3RU NFR SERPL: 30 % (ref 24–39)
T4 SERPL-MCNC: 6.2 UG/DL (ref 4.5–12)
TRIGL SERPL-MCNC: 225 MG/DL (ref 0–149)
TSH SERPL DL<=0.005 MIU/L-ACNC: 4 UIU/ML (ref 0.45–4.5)
VIT B12 SERPL-MCNC: 738 PG/ML (ref 232–1245)
VLDLC SERPL CALC-MCNC: 45 MG/DL (ref 5–40)
WBC # BLD AUTO: 3.9 X10E3/UL (ref 3.4–10.8)

## 2019-07-23 NOTE — TELEPHONE ENCOUNTER
Pt came in, he states that he had taken the same ABt that was ordered yesterday back in April and he had diarrhea, I advised that I checked his chart and I did not see any charted that he called to let us know this. I did advised pt that he does not to take a probiotic daily for 30 days while taking any antibiotics. Pt was given written instructions. I advised him to call if he does have any changes in stools.

## 2019-07-29 ENCOUNTER — OFFICE VISIT (OUTPATIENT)
Dept: GERIATRIC MEDICINE | Age: 84
End: 2019-07-29

## 2019-07-29 VITALS
SYSTOLIC BLOOD PRESSURE: 125 MMHG | RESPIRATION RATE: 18 BRPM | OXYGEN SATURATION: 96 % | HEIGHT: 65 IN | DIASTOLIC BLOOD PRESSURE: 66 MMHG | WEIGHT: 165.5 LBS | BODY MASS INDEX: 27.57 KG/M2 | TEMPERATURE: 97.4 F | HEART RATE: 62 BPM

## 2019-07-29 DIAGNOSIS — N42.9 DISORDER OF PROSTATE: ICD-10-CM

## 2019-07-29 DIAGNOSIS — Z79.899 MEDICATION MANAGEMENT: ICD-10-CM

## 2019-07-29 DIAGNOSIS — E03.9 ACQUIRED HYPOTHYROIDISM: ICD-10-CM

## 2019-07-29 DIAGNOSIS — Z71.2 ENCOUNTER TO DISCUSS TEST RESULTS: ICD-10-CM

## 2019-07-29 DIAGNOSIS — E78.2 MIXED HYPERLIPIDEMIA: ICD-10-CM

## 2019-07-29 DIAGNOSIS — H90.6 MIXED CONDUCTIVE AND SENSORINEURAL HEARING LOSS OF BOTH EARS: Primary | Chronic | ICD-10-CM

## 2019-07-29 DIAGNOSIS — H65.111 NON-RECURRENT ACUTE ALLERGIC OTITIS MEDIA OF RIGHT EAR: ICD-10-CM

## 2019-07-29 NOTE — PROGRESS NOTES
Reviewed with patient during follow up 07/29/19  CBC- normal/stable. CMP- Stable, with mild peripheral elevated glucose at time of draw. HGA1C- stable. Lipid Panel- IMPROVED, continues with mild triglycerides. Continue with zetia & zocor. Unable to tolerate Lovaza due to burping. Vitamin D - stable. No change   Vitam B12 & Folate- stable. PSA- improved & stable. TSH Profile- IMPROVED with dosing Levo 25 mcg 1.5 tabs daily with 2 tabs on M, W, F.     Recheck routine labs in 3 months, orders are placed in pending.  David Chatman

## 2019-07-29 NOTE — PROGRESS NOTES
All health maintenance and other pertinent information has been reviewed in preparation for today's office visit. Patient presents in the office today for:    Chief Complaint   Patient presents with    Results    Thyroid Problem    Coronary Artery Disease     1. Have you been to the ER, urgent care clinic since your last visit? Hospitalized since your last visit? No    2. Have you seen or consulted any other health care providers outside of the [address] [place] [address] since your last visit? Include any pap smears or colon screening.  No

## 2019-07-29 NOTE — PROGRESS NOTES
Reason for Visit   Katarzyna Mas is a 80 y.o. male patient who presents today for :  Chief Complaint   Patient presents with    Results    Thyroid Problem    Coronary Artery Disease     Follow Up: Follow-up and Dispositions    · Return in about 3 months (around 10/29/2019) for with the Nurse, for requested monitoring care. Diagnosis/Treatment Plan:    Future orders placed for Oct 2019 for 3 month monitoring of labs due to chronic medical conditions. ICD-10-CM ICD-9-CM    1. Mixed conductive and sensorineural hearing loss of both ears H90.6 389.22 CBC WITH AUTOMATED DIFF      HEMOGLOBIN A1C WITH EAG      LIPASE      LIPID PANEL      METABOLIC PANEL, COMPREHENSIVE      TESTOSTERONE, FREE & TOTAL      THYROID PANEL W/TSH      UA/M W/RFLX CULTURE, ROUTINE      MICROALBUMIN, UR, RAND W/ MICROALB/CREAT RATIO     Pt reports he has not had good success with hearing aids and he continues to have issues. He is trying various pieces of equipment to help. 2. Non-recurrent acute allergic otitis media of right ear H65.111 381.04 CBC WITH AUTOMATED DIFF      HEMOGLOBIN A1C WITH EAG      LIPASE      LIPID PANEL      METABOLIC PANEL, COMPREHENSIVE      TESTOSTERONE, FREE & TOTAL      THYROID PANEL W/TSH      UA/M W/RFLX CULTURE, ROUTINE      MICROALBUMIN, UR, RAND W/ MICROALB/CREAT RATIO    Resolved without use of ABX. Small amout of clear fluid behind bilateral TM's but it is now clear. Canals are also  without excess debrox. 3. Acquired hypothyroidism E03.9 244.9 CBC WITH AUTOMATED DIFF      HEMOGLOBIN A1C WITH EAG      LIPASE      LIPID PANEL      METABOLIC PANEL, COMPREHENSIVE      TESTOSTERONE, FREE & TOTAL      THYROID PANEL W/TSH      UA/M W/RFLX CULTURE, ROUTINE      MICROALBUMIN, UR, RAND W/ MICROALB/CREAT RATIO    Stable, no change in therapy. Recheck in 3 months.     4. Mixed hyperlipidemia E78.2 272.2 CBC WITH AUTOMATED DIFF      HEMOGLOBIN A1C WITH EAG      LIPASE      LIPID PANEL METABOLIC PANEL, COMPREHENSIVE      TESTOSTERONE, FREE & TOTAL      THYROID PANEL W/TSH      UA/M W/RFLX CULTURE, ROUTINE      MICROALBUMIN, UR, RAND W/ MICROALB/CREAT RATIO    IMPROVED! Addition of Zetia & increase in zocor. 5. Disorder of prostate  N42.9 602.9 CBC WITH AUTOMATED DIFF      HEMOGLOBIN A1C WITH EAG      LIPASE      LIPID PANEL      METABOLIC PANEL, COMPREHENSIVE      TESTOSTERONE, FREE & TOTAL      THYROID PANEL W/TSH      UA/M W/RFLX CULTURE, ROUTINE      MICROALBUMIN, UR, RAND W/ MICROALB/CREAT RATIO    PSA is improved and stable. Proscar is working well. 6. Encounter to discuss test results Z71.2 V65.49 CBC WITH AUTOMATED DIFF      HEMOGLOBIN A1C WITH EAG      LIPASE      LIPID PANEL      METABOLIC PANEL, COMPREHENSIVE      TESTOSTERONE, FREE & TOTAL      THYROID PANEL W/TSH      UA/M W/RFLX CULTURE, ROUTINE      MICROALBUMIN, UR, RAND W/ MICROALB/CREAT RATIO    Reviewed recent labs. Excellent report. 7. Medication management Z79.899 V58.69 CBC WITH AUTOMATED DIFF      HEMOGLOBIN A1C WITH EAG      LIPASE      LIPID PANEL      METABOLIC PANEL, COMPREHENSIVE      TESTOSTERONE, FREE & TOTAL      THYROID PANEL W/TSH      UA/M W/RFLX CULTURE, ROUTINE      MICROALBUMIN, UR, RAND W/ MICROALB/CREAT RATIO    discussed medicaitons and not taking ABX due to diarrhea. Objective:     Vitals:    07/29/19 1049   BP: 125/66   Pulse: 62   Resp: 18   Temp: 97.4 °F (36.3 °C)   TempSrc: Oral   SpO2: 96%   Weight: 165 lb 8 oz (75.1 kg)   Height: 5' 5\" (1.651 m)     Wt Readings from Last 3 Encounters:   07/29/19 165 lb 8 oz (75.1 kg)   07/22/19 165 lb (74.8 kg)   06/19/19 165 lb (74.8 kg)     BP Readings from Last 3 Encounters:   07/29/19 125/66   07/22/19 118/64   06/19/19 121/76     Review of Systems   Constitutional: Negative for activity change, appetite change, chills, fatigue and fever. HENT: Positive for congestion, hearing loss, postnasal drip and tinnitus.  Negative for dental problem, ear pain, mouth sores, rhinorrhea, sinus pressure, sneezing, sore throat and trouble swallowing. Eyes: Negative for discharge, redness and visual disturbance. Respiratory: Negative for apnea, cough, chest tightness, shortness of breath and wheezing. Cardiovascular: Negative for chest pain and palpitations. Gastrointestinal: Negative for abdominal distention, abdominal pain, blood in stool, constipation, diarrhea, nausea and vomiting. Endocrine: Negative for polydipsia, polyphagia and polyuria. Genitourinary: Negative for flank pain, frequency and urgency. Musculoskeletal: Negative for arthralgias, gait problem, joint swelling and neck pain. Skin: Negative for color change, pallor, rash and wound. Allergic/Immunologic: Negative for environmental allergies and food allergies. Neurological: Negative for dizziness, tremors, weakness, light-headedness, numbness and headaches. Hematological: Negative for adenopathy. Psychiatric/Behavioral: Negative for agitation, confusion and sleep disturbance. The patient is not nervous/anxious. Physical Exam   Constitutional: He is oriented to person, place, and time and well-developed, well-nourished, and in no distress. Vital signs are normal. He appears to not be writhing in pain, not malnourished and not dehydrated. He appears healthy. He does not have a sickly appearance. No distress. 80 y.o. Frail, elderly  male. He appears younger than his stated age. He is A & O x 3. HENT:   Head: Normocephalic and atraumatic. Right Ear: External ear and ear canal normal. A middle ear effusion is present. Decreased hearing is noted. Left Ear: External ear and ear canal normal. A middle ear effusion is present. Decreased hearing is noted. Nose: Nose normal.   Mouth/Throat: Uvula is midline, oropharynx is clear and moist and mucous membranes are normal. Mucous membranes are not pale, not dry and not cyanotic. No oral lesions. No uvula swelling.  No posterior oropharyngeal edema or posterior oropharyngeal erythema. Canals are clearer since decreasing frequency of use with the debrox. No cerumen present on exam today. Right acute otitis media has resolved without abx. Eyes: Pupils are equal, round, and reactive to light. Conjunctivae, EOM and lids are normal. Lids are everted and swept, no foreign bodies found. Neck: Trachea normal, normal range of motion and full passive range of motion without pain. Neck supple. No hepatojugular reflux and no JVD present. Carotid bruit is not present. No thyromegaly present. Cardiovascular: Normal rate, regular rhythm, S1 normal, S2 normal, normal heart sounds, intact distal pulses and normal pulses. Occasional extrasystoles are present. Exam reveals no gallop and no friction rub. No murmur heard. No extremity edema is present during today's exam.    Pulmonary/Chest: Effort normal. He has wheezes in the right upper field, the right middle field and the right lower field. expiatory wheeze on exam but not symptomatic. Chronic allergies. Abdominal: Soft. Normal appearance and bowel sounds are normal. There is no hepatosplenomegaly. There is no tenderness. There is no CVA tenderness. Musculoskeletal:   Chronic generalized muscle weakness. Neurological: He is alert and oriented to person, place, and time. He has normal reflexes and intact cranial nerves. He displays weakness and atrophy. A sensory deficit is present. He exhibits normal muscle tone. Coordination and gait abnormal. GCS score is 15. Uses a cane for balance support. Skin: Skin is warm, dry and intact. Bruising noted. No rash noted. He is not diaphoretic. No cyanosis. No pallor. Nails show no clubbing. Psychiatric: Memory normal. His mood appears anxious. He exhibits disordered thought content. He has a flat affect. Baseline mood and affect unchanged from normal.    Nursing note and vitals reviewed. Discontinued Care:     The following treatment modalities have been discontinued by the provider today:   Medications Discontinued During This Encounter   Medication Reason    carbamide peroxide (DEBROX) 6.5 % otic solution Therapy Completed    amoxicillin (AMOXIL) 400 mg/5 mL suspension Therapy Completed    Methylcellulose, with Sugar, (CITRUCEL, SUCROSE,) powd Therapy Completed     Subjective: Allergies   Allergen Reactions    Toprol Xl [Metoprolol Succinate] Diarrhea    Codeine Other (comments)    Sulfa (Sulfonamide Antibiotics) Hives     Prior to Admission medications    Medication Sig Start Date End Date Taking? Authorizing Provider   fluticasone (FLONASE SENSIMIST) 27.5 mcg/actuation nasal spray 2 Sprays by Both Nostrils route daily. 6/12/19  Yes Germania Deluna NP   hydrocortisone (HYTONE) 2.5 % topical cream APPLY CREAM THREE TIMES DAILY TO ECZEMA ON FACE 5/21/19  Yes Provider, Historical   levothyroxine (SYNTHROID) 25 mcg tablet Take 1.5 tablets daily, then 2 tablets every Monday, Wednesday, Friday. Indications: hypothyroidism 6/6/19  Yes Germania Deluna NP   ezetimibe (ZETIA) 10 mg tablet Take 1 Tab by mouth every evening. Indications: excessive fat in the blood, combined high blood cholesterol and triglyceride level 4/23/19  Yes Germania Deluna NP   simvastatin (ZOCOR) 40 mg tablet Take 1 Tab by mouth nightly. Indications: high amount of triglyceride in the blood, combined high blood cholesterol and triglyceride level 4/23/19  Yes Germania Deluna NP   vit C,S-Ia-uemxj-lutein-zeaxan (PRESERVISION AREDS-2) 642-822-59-1 mg-unit-mg-mg cap capsule Take  by mouth. Yes Provider, Historical   metroNIDAZOLE (METROGEL) 1 % topical gel Use a thin layer to affected areas after washing 2/8/19  Yes Germania Deluna NP   biotin 10,000 mcg cap Take 2 Caps by mouth daily.  2/8/19  Yes Germania Deluna NP   omeprazole (PRILOSEC) 40 mg capsule TAKE 1 CAPSULE BY MOUTH  DAILY FOR GASTROESOPHAGEAL  REFLUX DISEASE 2/4/19 Yes Sujatha Paris NP   finasteride (PROSCAR) 5 mg tablet TAKE 1 TABLET BY MOUTH  NIGHTLY FOR BENIGN  PROSTATIC HYPERPLASIA WITH  LOWER URINARY TRACT  SYMPTOMS 1/19/19  Yes Sujatha Paris NP   diazePAM (VALIUM) 5 mg tablet Take 1 Tab by mouth every six (6) hours as needed. Max Daily Amount: 20 mg. Indications: Muscle Spasm 6/12/18  Yes Sujatha Paris NP   aspirin delayed-release 81 mg tablet Take 81 mg by mouth daily. 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 ([facility] [address] 10/06/2016    Bilateral thumb pain 02/01/2017    CAD (coronary artery disease)     Cardiac murmur 58/00/3077    Folliculitis 09/06/7641    GERD (gastroesophageal reflux disease)     Hypercholesteremia     Hypothyroid     Left hip pain 02/01/2017    Lumbar radiculitis     Lumbar spondylitis (HCC)     Meniere disease     Mixed hyperlipidemia     Occlusion and stenosis of unspecified carotid artery     Osteoarthritis 02/19/2018    hands    Osteopenia of both hands 02/18/2018    Polyp of cecum 08/02/2017    5mm polyp in cecum, 8 mm polyp in sigmoid colon    Spinal enthesopathy of lumbar region ([facility] [address] 02/01/2017     Past Surgical History:   Procedure Laterality Date    CARDIAC SURG PROCEDURE UNLIST  04/2008    HX CATARACT REMOVAL Bilateral     HX CHOLECYSTECTOMY  1994    HX COLONOSCOPY  12/19/2012    tubular adenoma    HX COLONOSCOPY  08/02/2017    tubular adenomas    HX CORONARY ARTERY BYPASS GRAFT  1996    HX CORONARY ARTERY BYPASS GRAFT  1996    HX CORONARY STENT PLACEMENT      HX KNEE ARTHROSCOPY Left 2005    menisectomy    HX ROTATOR CUFF REPAIR Left     HX THYROIDECTOMY  1975    HX TONSIL AND ADENOIDECTOMY        Social History     Tobacco Use    Smoking status: Former Smoker    Smokeless tobacco: Never Used   Substance Use Topics    Alcohol use:  Yes     Alcohol/week: 7.0 standard drinks     Types: 7 Glasses of wine per week    Drug use: No      Family History   Problem Relation Age of Onset    No Known Problems Mother     No Known Problems Father     No Known Problems Brother      Advance Care Planning 4/16/2019   Patient's Healthcare Decision Maker is: Named in scanned ACP document   Primary Decision Maker Name -   Primary Decision Maker Phone Number -   Primary Decision Maker Relationship to Patient -   Confirm Advance Directive Yes, on file   Does the patient have other document types Do Not Resuscitate     Test Results:     Office Visit on 07/22/2019   Component Date Value Ref Range Status    WBC 07/22/2019 3.9  3.4 - 10.8 x10E3/uL Final    RBC 07/22/2019 4.85  4.14 - 5.80 x10E6/uL Final    HGB 07/22/2019 15.0  13.0 - 17.7 g/dL Final    HCT 07/22/2019 45.3  37.5 - 51.0 % Final    MCV 07/22/2019 93  79 - 97 fL Final    MCH 07/22/2019 30.9  26.6 - 33.0 pg Final    MCHC 07/22/2019 33.1  31.5 - 35.7 g/dL Final    RDW 07/22/2019 14.5  12.3 - 15.4 % Final    PLATELET 62/33/7384 766  150 - 450 x10E3/uL Final    NEUTROPHILS 07/22/2019 50  Not Estab. % Final    Lymphocytes 07/22/2019 35  Not Estab. % Final    MONOCYTES 07/22/2019 8  Not Estab. % Final    EOSINOPHILS 07/22/2019 5  Not Estab. % Final    BASOPHILS 07/22/2019 1  Not Estab. % Final    ABS. NEUTROPHILS 07/22/2019 2.0  1.4 - 7.0 x10E3/uL Final    Abs Lymphocytes 07/22/2019 1.4  0.7 - 3.1 x10E3/uL Final    ABS. MONOCYTES 07/22/2019 0.3  0.1 - 0.9 x10E3/uL Final    ABS. EOSINOPHILS 07/22/2019 0.2  0.0 - 0.4 x10E3/uL Final    ABS. BASOPHILS 07/22/2019 0.0  0.0 - 0.2 x10E3/uL Final    IMMATURE GRANULOCYTES 07/22/2019 1  Not Estab. % Final    ABS. IMM. GRANS. 07/22/2019 0.0  0.0 - 0.1 x10E3/uL Final    Glucose 07/22/2019 131* 65 - 99 mg/dL Final    Comment: Specimen received in contact with cells. No visible hemolysis  present. However GLUC may be decreased and K increased.  Clinical  correlation indicated.  BUN 07/22/2019 15  8 - 27 mg/dL Final    Creatinine 07/22/2019 0.90  0.76 - 1.27 mg/dL Final    GFR est non-AA 07/22/2019 79  >59 mL/min/1.73 Final    GFR est AA 07/22/2019 91  >59 mL/min/1.73 Final    BUN/Creatinine ratio 07/22/2019 17  10 - 24 Final    Sodium 07/22/2019 142  134 - 144 mmol/L Final    Potassium 07/22/2019 4.1  3.5 - 5.2 mmol/L Final    Comment: Specimen received in contact with cells. No visible hemolysis  present. However GLUC may be decreased and K increased. Clinical  correlation indicated.  Chloride 07/22/2019 105  96 - 106 mmol/L Final    CO2 07/22/2019 20  20 - 29 mmol/L Final    Calcium 07/22/2019 9.0  8.6 - 10.2 mg/dL Final    Protein, total 07/22/2019 6.2  6.0 - 8.5 g/dL Final    Albumin 07/22/2019 4.0  3.5 - 4.7 g/dL Final    GLOBULIN, TOTAL 07/22/2019 2.2  1.5 - 4.5 g/dL Final    A-G Ratio 07/22/2019 1.8  1.2 - 2.2 Final    Bilirubin, total 07/22/2019 0.6  0.0 - 1.2 mg/dL Final    Alk. phosphatase 07/22/2019 64  39 - 117 IU/L Final    AST (SGOT) 07/22/2019 18  0 - 40 IU/L Final    ALT (SGPT) 07/22/2019 16  0 - 44 IU/L Final    Hemoglobin A1c 07/22/2019 5.7* 4.8 - 5.6 % Final    Comment:          Prediabetes: 5.7 - 6.4           Diabetes: >6.4           Glycemic control for adults with diabetes: <7.0      Estimated average glucose 07/22/2019 117  mg/dL Final    Cholesterol, total 07/22/2019 170  100 - 199 mg/dL Final    Triglyceride 07/22/2019 225* 0 - 149 mg/dL Final    HDL Cholesterol 07/22/2019 45  >39 mg/dL Final    VLDL, calculated 07/22/2019 45* 5 - 40 mg/dL Final    LDL, calculated 07/22/2019 80  0 - 99 mg/dL Final    Vitamin B12 07/22/2019 738  232 - 1,245 pg/mL Final    Folate 07/22/2019 19.3  >3.0 ng/mL Final    Comment: A serum folate concentration of less than 3.1 ng/mL is  considered to represent clinical deficiency.       VITAMIN D, 25-HYDROXY 07/22/2019 50.6  30.0 - 100.0 ng/mL Final    Comment: Vitamin D deficiency has been defined by the [address] [facility] practice guideline as a  level of serum 25-OH vitamin D less than 20 ng/mL (1,2). The Endocrine Society went on to further define vitamin D  insufficiency as a level between 21 and 29 ng/mL (2). 1. IOM ([place] of Medicine). 2010. Dietary reference     intakes for calcium and D. [facility]: The     [place]. 2. Beck MF, Chichi NC, Braeden HERNÁNDEZ, et al.     Evaluation, treatment, and prevention of vitamin D     deficiency: an Endocrine Society clinical practice     guideline. JCEM. 2011 Jul; 96(7):1911-30.  TSH 07/22/2019 4.000  0.450 - 4.500 uIU/mL Final    T4, Total 07/22/2019 6.2  4.5 - 12.0 ug/dL Final    T3 Uptake 07/22/2019 30  24 - 39 % Final    Free Thyroxine Index (FTI) 07/22/2019 1.9  1.2 - 4.9 Final    Prostate Specific Ag 07/22/2019 2.5  0.0 - 4.0 ng/mL Final    Comment: Roche ECLIA methodology. According to the American Urological Association, Serum PSA should  decrease and remain at undetectable levels after radical  prostatectomy. The AUA defines biochemical recurrence as an initial  PSA value 0.2 ng/mL or greater followed by a subsequent confirmatory  PSA value 0.2 ng/mL or greater. Values obtained with different assay methods or kits cannot be used  interchangeably. Results cannot be interpreted as absolute evidence  of the presence or absence of malignant disease.  Reflex Criteria 07/22/2019 Comment   Final    Comment: The percent free PSA is performed on a reflex basis only when the  total PSA is between 4.0 and 10.0 ng/mL.      Clinical Support on 05/30/2019   Component Date Value Ref Range Status    T4, Total 05/30/2019 6.5  4.5 - 12.0 ug/dL Final    T3 Uptake 05/30/2019 26  24 - 39 % Final    Free Thyroxine Index (FTI) 05/30/2019 1.7  1.2 - 4.9 Final     Disclaimer:   Mr. Kassy Gagnon has been advised to call or return to our office if symptoms worsen/change/persist. We as a care team including the patient; discussed expected course/resolution/complications of diagnosis in detail today. Medication risks/benefits/costs/interactions/alternatives discussed Bonifacio Chapin was given an after visit summary which includes diagnoses, current medications, & vitals. Bonifacio Chapin expressed understanding with the diagnosis and plan.

## 2019-07-29 NOTE — PATIENT INSTRUCTIONS

## 2019-08-26 DIAGNOSIS — R35.1 NOCTURIA: ICD-10-CM

## 2019-08-26 DIAGNOSIS — E78.2 MIXED HYPERLIPIDEMIA: ICD-10-CM

## 2019-08-26 RX ORDER — FINASTERIDE 5 MG/1
TABLET, FILM COATED ORAL
Qty: 90 TAB | Refills: 1 | Status: SHIPPED | OUTPATIENT
Start: 2019-08-26 | End: 2020-03-02 | Stop reason: SDUPTHER

## 2019-09-16 ENCOUNTER — OFFICE VISIT (OUTPATIENT)
Dept: GERIATRIC MEDICINE | Age: 84
End: 2019-09-16

## 2019-09-16 VITALS
RESPIRATION RATE: 18 BRPM | HEART RATE: 66 BPM | SYSTOLIC BLOOD PRESSURE: 120 MMHG | WEIGHT: 165.5 LBS | DIASTOLIC BLOOD PRESSURE: 74 MMHG | BODY MASS INDEX: 27.57 KG/M2 | HEIGHT: 65 IN | OXYGEN SATURATION: 98 %

## 2019-09-16 DIAGNOSIS — R20.2 NUMBNESS AND TINGLING OF LEFT LOWER EXTREMITY: ICD-10-CM

## 2019-09-16 DIAGNOSIS — R20.0 NUMBNESS AND TINGLING OF LEFT LOWER EXTREMITY: ICD-10-CM

## 2019-09-16 DIAGNOSIS — E03.9 ACQUIRED HYPOTHYROIDISM: ICD-10-CM

## 2019-09-16 DIAGNOSIS — M51.37 DDD (DEGENERATIVE DISC DISEASE), LUMBOSACRAL: ICD-10-CM

## 2019-09-16 DIAGNOSIS — M54.42 ACUTE BILATERAL LOW BACK PAIN WITH BILATERAL SCIATICA: Primary | ICD-10-CM

## 2019-09-16 DIAGNOSIS — E89.0 POSTOPERATIVE HYPOTHYROIDISM: ICD-10-CM

## 2019-09-16 DIAGNOSIS — M54.32 SCIATICA OF LEFT SIDE: ICD-10-CM

## 2019-09-16 DIAGNOSIS — M54.18 RADICULOPATHY OF SACROCOCCYGEAL REGION: ICD-10-CM

## 2019-09-16 DIAGNOSIS — M54.41 ACUTE BILATERAL LOW BACK PAIN WITH BILATERAL SCIATICA: Primary | ICD-10-CM

## 2019-09-16 DIAGNOSIS — Z76.0 MEDICATION REFILL: ICD-10-CM

## 2019-09-16 RX ORDER — LEVOTHYROXINE SODIUM 25 UG/1
TABLET ORAL
Qty: 90 TAB | Refills: 0 | Status: SHIPPED | COMMUNITY
Start: 2019-09-16 | End: 2019-11-04 | Stop reason: SDUPTHER

## 2019-09-16 RX ORDER — ACETAMINOPHEN 325 MG/1
TABLET ORAL
COMMUNITY

## 2019-09-16 NOTE — PROGRESS NOTES
Reason for Visit   Juan Francisco Stapleton is a 80 y.o. male patient who presents today for :  Chief Complaint   Patient presents with    Back Pain     Patient here for back and left leg pain that started a week ago. The pain comes and goes, he notices some tingling in his leg, He is not able to bend over. He also states it hurts when he is sitting     Treatment Plan / Follow Up: Follow-up and Dispositions    · Return in about 2 weeks (around 9/30/2019) for if symptoms fail to improve or worsen. Referral back to Dr. Argelia Anderson for another back/joint injection for the chronic issue of DDD. Diagnosis:        ICD-10-CM ICD-9-CM    1. Acute bilateral low back pain with bilateral sciatica M54.42 724.2     M54.41 724.3    2. DDD (degenerative disc disease), lumbosacral M51.37 722.52    3. Sciatica of left side M54.32 724.3    4. Radiculopathy of sacrococcygeal region M54.18 724.4    5. Numbness and tingling of left lower extremity R20.0 782.0     R20.2          Orders Placed This Encounter    Methylcellulose, with Sugar, (CITRUCEL, SUCROSE,) powd     Sig: Take  by mouth.  acetaminophen (TYLENOL) 325 mg tablet     Sig: Take  by mouth every four (4) hours as needed for Pain. Objective:     Vitals:    09/16/19 0904   BP: 120/74   Pulse: 66   Resp: 18   SpO2: 98%   Weight: 165 lb 8 oz (75.1 kg)   Height: 5' 5\" (1.651 m)     Wt Readings from Last 3 Encounters:   09/16/19 165 lb 8 oz (75.1 kg)   07/29/19 165 lb 8 oz (75.1 kg)   07/22/19 165 lb (74.8 kg)     BP Readings from Last 3 Encounters:   09/16/19 120/74   07/29/19 125/66   07/22/19 118/64     Review of Systems   Constitutional: Negative for activity change, appetite change, chills, fatigue and fever. HENT: Positive for hearing loss, postnasal drip and tinnitus. Negative for congestion, dental problem, ear pain, mouth sores, rhinorrhea, sinus pressure, sneezing, sore throat and trouble swallowing.     Eyes: Negative for discharge, redness and visual disturbance. Respiratory: Negative for apnea, cough, chest tightness, shortness of breath and wheezing. Cardiovascular: Negative for chest pain and palpitations. Gastrointestinal: Negative for abdominal distention, abdominal pain, blood in stool, constipation, diarrhea, nausea and vomiting. Endocrine: Negative for polydipsia, polyphagia and polyuria. Genitourinary: Negative for flank pain, frequency and urgency. Musculoskeletal: Positive for arthralgias, back pain, gait problem and myalgias. Negative for joint swelling and neck pain. Skin: Negative for color change, pallor, rash and wound. Allergic/Immunologic: Negative for environmental allergies and food allergies. Neurological: Negative for dizziness, tremors, weakness, light-headedness, numbness and headaches. Hematological: Negative for adenopathy. Psychiatric/Behavioral: Negative for agitation, confusion and sleep disturbance. The patient is not nervous/anxious. Physical Exam   Constitutional: He is oriented to person, place, and time and well-developed, well-nourished, and in no distress. Vital signs are normal. He appears to not be writhing in pain, not malnourished and not dehydrated. He appears healthy. He does not have a sickly appearance. No distress. 80 y.o. Frail, elderly  male. He appears younger than his stated age. He is A & O x 3. HENT:   Head: Normocephalic and atraumatic. Right Ear: External ear and ear canal normal. A middle ear effusion is present. Decreased hearing is noted. Left Ear: External ear and ear canal normal. A middle ear effusion is present. Decreased hearing is noted. Nose: Nose normal.   Mouth/Throat: Uvula is midline, oropharynx is clear and moist and mucous membranes are normal. Mucous membranes are not pale, not dry and not cyanotic. No oral lesions. No uvula swelling. No posterior oropharyngeal edema or posterior oropharyngeal erythema.    Canals are clearer since decreasing frequency of use with the debrox. No cerumen present on exam today. Right acute otitis media has resolved without abx. Eyes: Pupils are equal, round, and reactive to light. Conjunctivae, EOM and lids are normal. Lids are everted and swept, no foreign bodies found. Neck: Trachea normal, normal range of motion and full passive range of motion without pain. Neck supple. No hepatojugular reflux and no JVD present. Carotid bruit is not present. No thyromegaly present. Cardiovascular: Normal rate, regular rhythm, S1 normal, S2 normal, normal heart sounds, intact distal pulses and normal pulses. Occasional extrasystoles are present. Exam reveals no gallop and no friction rub. No murmur heard. No extremity edema is present during today's exam.    Pulmonary/Chest: Effort normal. He has wheezes in the right upper field, the right middle field and the right lower field. expiatory wheeze on exam but not symptomatic. Chronic allergies. Abdominal: Soft. Normal appearance and bowel sounds are normal. There is no hepatosplenomegaly. There is no tenderness. There is no CVA tenderness. Musculoskeletal:        Lumbar back: He exhibits decreased range of motion, tenderness, pain and spasm. Back:    Chronic generalized muscle weakness. Neurological: He is alert and oriented to person, place, and time. He has normal reflexes and intact cranial nerves. He displays weakness and atrophy. A sensory deficit is present. He exhibits normal muscle tone. Coordination and gait abnormal. GCS score is 15. Uses a cane for balance support. Skin: Skin is warm, dry and intact. No rash noted. He is not diaphoretic. No cyanosis. No pallor. Nails show no clubbing. Psychiatric: Memory normal. His mood appears anxious. He exhibits disordered thought content. He has a flat affect. Baseline mood and affect unchanged from normal.    Nursing note and vitals reviewed. Subjective:      Allergies   Allergen Reactions    Toprol Xl [Metoprolol Succinate] Diarrhea    Codeine Other (comments)    Sulfa (Sulfonamide Antibiotics) Hives     Prior to Admission medications    Medication Sig Start Date End Date Taking? Authorizing Provider   Methylcellulose, with Sugar, (CITRUCEL, SUCROSE,) powd Take  by mouth. Yes Provider, Historical   acetaminophen (TYLENOL) 325 mg tablet Take  by mouth every four (4) hours as needed for Pain. Yes Provider, Historical   finasteride (PROSCAR) 5 mg tablet TAKE 1 TABLET BY MOUTH  NIGHTLY FOR BENIGN  PROSTATIC HYPERPLASIA WITH  LOWER URINARY TRACT  SYMPTOMS 8/26/19  Yes Cali Cooney NP   hydrocortisone (HYTONE) 2.5 % topical cream APPLY CREAM THREE TIMES DAILY TO ECZEMA ON FACE 5/21/19  Yes Provider, Historical   levothyroxine (SYNTHROID) 25 mcg tablet Take 1.5 tablets daily, then 2 tablets every Monday, Wednesday, Friday. Indications: hypothyroidism 6/6/19  Yes Cali Cooney NP   ezetimibe (ZETIA) 10 mg tablet Take 1 Tab by mouth every evening. Indications: excessive fat in the blood, combined high blood cholesterol and triglyceride level 4/23/19  Yes Cali Cooney NP   simvastatin (ZOCOR) 40 mg tablet Take 1 Tab by mouth nightly. Indications: high amount of triglyceride in the blood, combined high blood cholesterol and triglyceride level 4/23/19  Yes Cali Cooney NP   vit C,L-Zb-gmvoq-lutein-zeaxan (PRESERVISION AREDS-2) 657-444-43-1 mg-unit-mg-mg cap capsule Take  by mouth. Yes Provider, Historical   metroNIDAZOLE (METROGEL) 1 % topical gel Use a thin layer to affected areas after washing 2/8/19  Yes Cali Cooney NP   biotin 10,000 mcg cap Take 2 Caps by mouth daily. 2/8/19  Yes Cali Cooney NP   omeprazole (PRILOSEC) 40 mg capsule TAKE 1 CAPSULE BY MOUTH  DAILY FOR GASTROESOPHAGEAL  REFLUX DISEASE 2/4/19  Yes Cali Cooney NP   diazePAM (VALIUM) 5 mg tablet Take 1 Tab by mouth every six (6) hours as needed. Max Daily Amount: 20 mg.  Indications: Muscle Spasm 6/12/18  Yes Clive Doty NP   aspirin delayed-release 81 mg tablet Take 81 mg by mouth daily. 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 ([facility] 10/06/2016    Bilateral thumb pain 02/01/2017    CAD (coronary artery disease)     Cardiac murmur 84/40/3196    Folliculitis 74/13/0307    GERD (gastroesophageal reflux disease)     Hypercholesteremia     Hypothyroid     Left hip pain 02/01/2017    Lumbar radiculitis     Lumbar spondylitis (HCC)     Meniere disease     Mixed hyperlipidemia     Occlusion and stenosis of unspecified carotid artery     Osteoarthritis 02/19/2018    hands    Osteopenia of both hands 02/18/2018    Polyp of cecum 08/02/2017    5mm polyp in cecum, 8 mm polyp in sigmoid colon    [facility] lumbar [facility] 02/01/2017     Past Surgical History:   Procedure Laterality Date    CARDIAC SURG PROCEDURE UNLIST  04/2008    HX CATARACT REMOVAL Bilateral     HX CHOLECYSTECTOMY  1994    HX COLONOSCOPY  12/19/2012    tubular adenoma    HX COLONOSCOPY  08/02/2017    tubular adenomas    HX CORONARY ARTERY BYPASS GRAFT  1996    HX CORONARY ARTERY BYPASS GRAFT  1996    HX CORONARY STENT PLACEMENT      HX KNEE ARTHROSCOPY Left 2005    menisectomy    HX ROTATOR CUFF REPAIR Left     HX THYROIDECTOMY  1975    HX TONSIL AND ADENOIDECTOMY        Social History     Tobacco Use    Smoking status: Former Smoker    Smokeless tobacco: Never Used   Substance Use Topics    Alcohol use:  Yes     Alcohol/week: 7.0 standard drinks     Types: 7 Glasses of wine per week    Drug use: Never      Family History   Problem Relation Age of Onset    No Known Problems Mother     No Known Problems Father     No Known Problems Brother      Functional Assessment:   ADL FUNCTIONALITY:  ADL Assessment 4/16/2019   Feeding yourself No Help Needed Getting from bed to chair No Help Needed   Getting dressed No Help Needed   Bathing or showering No Help Needed   Walk across the room (includes cane/walker) No Help Needed   Using the telphone No Help Needed   Taking your medications No Help Needed   Preparing meals No Help Needed   Managing money (expenses/bills) No Help Needed   Moderately strenuous housework (laundry) No Help Needed   Shopping for personal items (toiletries/medicines) No Help Needed   Shopping for groceries No Help Needed   Driving No Help Needed   Climbing a flight of stairs No Help Needed   Getting to places beyond walking distances No Help Needed     DEPRESSION SCREENING:   3 most recent PHQ Screens 9/16/2019   Little interest or pleasure in doing things Not at all   Feeling down, depressed, irritable, or hopeless Not at all   Total Score PHQ 2 0   Trouble falling or staying asleep, or sleeping too much -   Feeling tired or having little energy -   Poor appetite, weight loss, or overeating -   Feeling bad about yourself - or that you are a failure or have let yourself or your family down -   Trouble concentrating on things such as school, work, reading, or watching TV -   Moving or speaking so slowly that other people could have noticed; or the opposite being so fidgety that others notice -   Thoughts of being better off dead, or hurting yourself in some way -   PHQ 9 Score -   How difficult have these problems made it for you to do your work, take care of your home and get along with others -      [address] Exam 4/16/2019 4/13/2018   What is the Year 1 1   What is the Season 1 1   What is the Date 1 1   What is the Day 1 1   What is the Month 1 1   Where are we State 1 1   Where are we Country 1 1   Where are we [place] or [facility] 1 1   Where are we Floor 1 1   Name three objects, then ask the patient to say them 3 3   Serial sevens Subtract 7 from 100 in increments 2 2   Ask for the three objects repeated above 2 3   Name a pencil 1 1 Name a watch 1 1   Have the patient repeat this phrase \"No ifs, ands, or buts\" 1 1   Three stage command: Take the paper in your right hand 1 1   Fold the paper in half 1 1   Put the paper on the floor 1 1   Read and obey the following: CLOSE YOUR EYES 1 1   Have the patient write a sentence 1 1   Have the patient copy a figure 1 1   Mini Mental Score 26 27   Some recent data might be hidden     FALL RISK:   Fall Risk Assessment, last 12 mths 9/16/2019   Able to walk? Yes   Fall in past 12 months? No      ABUSE SCREEN:   Abuse Screening Questionnaire 4/16/2019   Do you ever feel afraid of your partner? N   Are you in a relationship with someone who physically or mentally threatens you? N   Is it safe for you to go home?  Y      Advance Care Planning 4/16/2019   Patient's Healthcare Decision Maker is: Named in scanned ACP document   Primary Decision Maker Name -   Primary Decision Maker Phone Number -   Primary Decision Maker Relationship to Patient -   Confirm Advance Directive Yes, on file   Does the patient have other document types Do Not Resuscitate     CHANGES IN TREATMENT:    The following treatment modalities have been discontinued by the provider today:   Medications Discontinued During This Encounter   Medication Reason    fluticasone (FLONASE SENSIMIST) 27.5 mcg/actuation nasal spray Non-Compliance     MOST RECENT LABORATORY RESULTS:      Office Visit on 07/22/2019   Component Date Value Ref Range Status    WBC 07/22/2019 3.9  3.4 - 10.8 x10E3/uL Final    RBC 07/22/2019 4.85  4.14 - 5.80 x10E6/uL Final    HGB 07/22/2019 15.0  13.0 - 17.7 g/dL Final    HCT 07/22/2019 45.3  37.5 - 51.0 % Final    MCV 07/22/2019 93  79 - 97 fL Final    MCH 07/22/2019 30.9  26.6 - 33.0 pg Final    MCHC 07/22/2019 33.1  31.5 - 35.7 g/dL Final    RDW 07/22/2019 14.5  12.3 - 15.4 % Final    PLATELET 24/53/4222 524  150 - 450 x10E3/uL Final    NEUTROPHILS 07/22/2019 50  Not Estab. % Final    Lymphocytes 07/22/2019 35 Not Estab. % Final    MONOCYTES 07/22/2019 8  Not Estab. % Final    EOSINOPHILS 07/22/2019 5  Not Estab. % Final    BASOPHILS 07/22/2019 1  Not Estab. % Final    ABS. NEUTROPHILS 07/22/2019 2.0  1.4 - 7.0 x10E3/uL Final    Abs Lymphocytes 07/22/2019 1.4  0.7 - 3.1 x10E3/uL Final    ABS. MONOCYTES 07/22/2019 0.3  0.1 - 0.9 x10E3/uL Final    ABS. EOSINOPHILS 07/22/2019 0.2  0.0 - 0.4 x10E3/uL Final    ABS. BASOPHILS 07/22/2019 0.0  0.0 - 0.2 x10E3/uL Final    IMMATURE GRANULOCYTES 07/22/2019 1  Not Estab. % Final    ABS. IMM. GRANS. 07/22/2019 0.0  0.0 - 0.1 x10E3/uL Final    Glucose 07/22/2019 131* 65 - 99 mg/dL Final    Comment: Specimen received in contact with cells. No visible hemolysis  present. However GLUC may be decreased and K increased. Clinical  correlation indicated.  BUN 07/22/2019 15  8 - 27 mg/dL Final    Creatinine 07/22/2019 0.90  0.76 - 1.27 mg/dL Final    GFR est non-AA 07/22/2019 79  >59 mL/min/1.73 Final    GFR est AA 07/22/2019 91  >59 mL/min/1.73 Final    BUN/Creatinine ratio 07/22/2019 17  10 - 24 Final    Sodium 07/22/2019 142  134 - 144 mmol/L Final    Potassium 07/22/2019 4.1  3.5 - 5.2 mmol/L Final    Comment: Specimen received in contact with cells. No visible hemolysis  present. However GLUC may be decreased and K increased. Clinical  correlation indicated.  Chloride 07/22/2019 105  96 - 106 mmol/L Final    CO2 07/22/2019 20  20 - 29 mmol/L Final    Calcium 07/22/2019 9.0  8.6 - 10.2 mg/dL Final    Protein, total 07/22/2019 6.2  6.0 - 8.5 g/dL Final    Albumin 07/22/2019 4.0  3.5 - 4.7 g/dL Final    GLOBULIN, TOTAL 07/22/2019 2.2  1.5 - 4.5 g/dL Final    A-G Ratio 07/22/2019 1.8  1.2 - 2.2 Final    Bilirubin, total 07/22/2019 0.6  0.0 - 1.2 mg/dL Final    Alk.  phosphatase 07/22/2019 64  39 - 117 IU/L Final    AST (SGOT) 07/22/2019 18  0 - 40 IU/L Final    ALT (SGPT) 07/22/2019 16  0 - 44 IU/L Final    Hemoglobin A1c 07/22/2019 5.7* 4.8 - 5.6 % Final Comment:          Prediabetes: 5.7 - 6.4           Diabetes: >6.4           Glycemic control for adults with diabetes: <7.0      Estimated average glucose 07/22/2019 117  mg/dL Final    Cholesterol, total 07/22/2019 170  100 - 199 mg/dL Final    Triglyceride 07/22/2019 225* 0 - 149 mg/dL Final    HDL Cholesterol 07/22/2019 45  >39 mg/dL Final    VLDL, calculated 07/22/2019 45* 5 - 40 mg/dL Final    LDL, calculated 07/22/2019 80  0 - 99 mg/dL Final    Vitamin B12 07/22/2019 738  232 - 1,245 pg/mL Final    Folate 07/22/2019 19.3  >3.0 ng/mL Final    Comment: A serum folate concentration of less than 3.1 ng/mL is  considered to represent clinical deficiency.  VITAMIN D, 25-HYDROXY 07/22/2019 50.6  30.0 - 100.0 ng/mL Final    Comment: Vitamin D deficiency has been defined by the [address] [place] [address] practice guideline as a  level of serum 25-OH vitamin D less than 20 ng/mL (1,2). The Endocrine Society went on to further define vitamin D  insufficiency as a level between 21 and 29 ng/mL (2). 1. IOM ([place] of [employer]). 2010. Dietary reference     intakes for calcium and D. [facility]: The     [employer]. 2. Beck MF, Chichi NC, Braeden HERNÁNDEZ, et al.     Evaluation, treatment, and prevention of vitamin D     deficiency: an Endocrine Society clinical practice     guideline. JCEM. 2011 Jul; 96(7):1911-30.  TSH 07/22/2019 4.000  0.450 - 4.500 uIU/mL Final    T4, Total 07/22/2019 6.2  4.5 - 12.0 ug/dL Final    T3 Uptake 07/22/2019 30  24 - 39 % Final    Free Thyroxine Index (FTI) 07/22/2019 1.9  1.2 - 4.9 Final    Prostate Specific Ag 07/22/2019 2.5  0.0 - 4.0 ng/mL Final    Comment: Roche ECLIA methodology. According to the American Urological Association, Serum PSA should  decrease and remain at undetectable levels after radical  prostatectomy.  The AUA defines biochemical recurrence as an initial  PSA value 0.2 ng/mL or greater followed by a subsequent confirmatory  PSA value 0.2 ng/mL or greater. Values obtained with different assay methods or kits cannot be used  interchangeably. Results cannot be interpreted as absolute evidence  of the presence or absence of malignant disease.  Reflex Criteria 07/22/2019 Comment   Final    Comment: The percent free PSA is performed on a reflex basis only when the  total PSA is between 4.0 and 10.0 ng/mL. Results for orders placed or performed in visit on 07/22/19   CBC WITH AUTOMATED DIFF   Result Value Ref Range    WBC 3.9 3.4 - 10.8 x10E3/uL    RBC 4.85 4.14 - 5.80 x10E6/uL    HGB 15.0 13.0 - 17.7 g/dL    HCT 45.3 37.5 - 51.0 %    MCV 93 79 - 97 fL    MCH 30.9 26.6 - 33.0 pg    MCHC 33.1 31.5 - 35.7 g/dL    RDW 14.5 12.3 - 15.4 %    PLATELET 830 380 - 992 x10E3/uL    NEUTROPHILS 50 Not Estab. %    Lymphocytes 35 Not Estab. %    MONOCYTES 8 Not Estab. %    EOSINOPHILS 5 Not Estab. %    BASOPHILS 1 Not Estab. %    ABS. NEUTROPHILS 2.0 1.4 - 7.0 x10E3/uL    Abs Lymphocytes 1.4 0.7 - 3.1 x10E3/uL    ABS. MONOCYTES 0.3 0.1 - 0.9 x10E3/uL    ABS. EOSINOPHILS 0.2 0.0 - 0.4 x10E3/uL    ABS. BASOPHILS 0.0 0.0 - 0.2 x10E3/uL    IMMATURE GRANULOCYTES 1 Not Estab. %    ABS. IMM. GRANS. 0.0 0.0 - 0.1 [id number]   METABOLIC PANEL, COMPREHENSIVE   Result Value Ref Range    Glucose 131 (H) 65 - 99 mg/dL    BUN 15 8 - 27 mg/dL    Creatinine 0.90 0.76 - 1.27 mg/dL    GFR est non-AA 79 >59 mL/min/1.73    GFR est AA 91 >59 mL/min/1.73    BUN/Creatinine ratio 17 10 - 24    Sodium 142 134 - 144 mmol/L    Potassium 4.1 3.5 - 5.2 mmol/L    Chloride 105 96 - 106 mmol/L    CO2 20 20 - 29 mmol/L    Calcium 9.0 8.6 - 10.2 mg/dL    Protein, total 6.2 6.0 - 8.5 g/dL    Albumin 4.0 3.5 - 4.7 g/dL    GLOBULIN, TOTAL 2.2 1.5 - 4.5 g/dL    A-G Ratio 1.8 1.2 - 2.2    Bilirubin, total 0.6 0.0 - 1.2 mg/dL    Alk.  phosphatase 64 39 - 117 IU/L    AST (SGOT) 18 0 - 40 IU/L    ALT (SGPT) 16 0 - 44 IU/L   HEMOGLOBIN A1C WITH EAG   Result Value Ref Range    Hemoglobin A1c 5.7 (H) 4.8 - 5.6 %    Estimated average glucose 117 mg/dL   LIPID PANEL   Result Value Ref Range    Cholesterol, total 170 100 - 199 mg/dL    Triglyceride 225 (H) 0 - 149 mg/dL    HDL Cholesterol 45 >39 mg/dL    VLDL, calculated 45 (H) 5 - 40 mg/dL    LDL, calculated 80 0 - 99 mg/dL   VITAMIN B12 & FOLATE   Result Value Ref Range    Vitamin B12 738 232 - 1,245 pg/mL    Folate 19.3 >3.0 ng/mL   VITAMIN D, 25 HYDROXY   Result Value Ref Range    VITAMIN D, 25-HYDROXY 50.6 30.0 - 100.0 ng/mL   THYROID PANEL W/TSH   Result Value Ref Range    TSH 4.000 0.450 - 4.500 uIU/mL    T4, Total 6.2 4.5 - 12.0 ug/dL    T3 Uptake 30 24 - 39 %    Free Thyroxine Index (FTI) 1.9 1.2 - 4.9   PSA W/ REFLX FREE PSA   Result Value Ref Range    Prostate Specific Ag 2.5 0.0 - 4.0 ng/mL    Reflex Criteria Comment       Disclaimer:   Mr. Lynette Polk has been advised to call or return to our office if symptoms worsen/change/persist. We as a care team including the patient; discussed expected course/resolution/complications of diagnosis in detail today. Medication risks/benefits/costs/interactions/alternatives discussed Lynette Polk was given an after visit summary which includes diagnoses, current medications, & vitals. Lynette Polk expressed understanding with the diagnosis and plan.

## 2019-09-16 NOTE — PATIENT INSTRUCTIONS
Back Pain: Care Instructions  Your Care Instructions    Back pain has many possible causes. It is often related to problems with muscles and ligaments of the back. It may also be related to problems with the nerves, discs, or bones of the back. Moving, lifting, standing, sitting, or sleeping in an awkward way can strain the back. Sometimes you don't notice the injury until later. Arthritis is another common cause of back pain. Although it may hurt a lot, back pain usually improves on its own within several weeks. Most people recover in 12 weeks or less. Using good home treatment and being careful not to stress your back can help you feel better sooner. Follow-up care is a key part of your treatment and safety. Be sure to make and go to all appointments, and call your doctor if you are having problems. It's also a good idea to know your test results and keep a list of the medicines you take. How can you care for yourself at home? · Sit or lie in positions that are most comfortable and reduce your pain. Try one of these positions when you lie down:  ? Lie on your back with your knees bent and supported by large pillows. ? Lie on the floor with your legs on the seat of a sofa or chair. ? Lie on your side with your knees and hips bent and a pillow between your legs. ? Lie on your stomach if it does not make pain worse. · Do not sit up in bed, and avoid soft couches and twisted positions. Bed rest can help relieve pain at first, but it delays healing. Avoid bed rest after the first day of back pain. · Change positions every 30 minutes. If you must sit for long periods of time, take breaks from sitting. Get up and walk around, or lie in a comfortable position. · Try using a heating pad on a low or medium setting for 15 to 20 minutes every 2 or 3 hours. Try a warm shower in place of one session with the heating pad. · You can also try an ice pack for 10 to 15 minutes every 2 to 3 hours.  Put a thin cloth between the ice pack and your skin. · Take pain medicines exactly as directed. ? If the doctor gave you a prescription medicine for pain, take it as prescribed. ? If you are not taking a prescription pain medicine, ask your doctor if you can take an over-the-counter medicine. · Take short walks several times a day. You can start with 5 to 10 minutes, 3 or 4 times a day, and work up to longer walks. Walk on level surfaces and avoid hills and stairs until your back is better. · Return to work and other activities as soon as you can. Continued rest without activity is usually not good for your back. · To prevent future back pain, do exercises to stretch and strengthen your back and stomach. Learn how to use good posture, safe lifting techniques, and proper body mechanics. When should you call for help? Call your doctor now or seek immediate medical care if:    · You have new or worsening numbness in your legs.     · You have new or worsening weakness in your legs. (This could make it hard to stand up.)     · You lose control of your bladder or bowels.    Watch closely for changes in your health, and be sure to contact your doctor if:    · You have a fever, lose weight, or don't feel well.     · You do not get better as expected. Where can you learn more? Go to http://sloan-george.info/. Enter H378 in the search box to learn more about \"Back Pain: Care Instructions. \"  Current as of: September 20, 2018  Content Version: 12.1  © 2098-3397 [employer]. Care instructions adapted under license by [employer] (which disclaims liability or warranty for this information). If you have questions about a medical condition or this instruction, always ask your healthcare professional. [place] [address] any warranty or liability for your use of this information. Learning About Degenerative Disc Disease  What is degenerative disc disease?     Degenerative disc disease is not really a disease. It's a term used to describe the normal changes in your spinal discs as you age. Spinal discs are small, spongy discs that separate the bones (vertebrae) that make up the spine. The discs act as shock absorbers for the spine, so it can flex, bend, and twist.  Degenerative disc disease can take place in one or more places along the spine. It most often occurs in the discs in the lower back and the neck. What causes it? As we age, our spinal discs break down, or degenerate. This breakdown causes the symptoms of degenerative disc disease in some people. When the discs break down, they can lose fluid and dry out, and their outer layers can have tiny cracks or tears. This leads to less padding and less space between the bones in the spine. The body reacts to this by making bony growths on the spine called bone spurs. These spurs can press on the spinal nerve roots or spinal cord. This can cause pain and can affect how well the nerves work. What are the symptoms? Many people with degenerative disc disease have no pain. But others have severe pain or other symptoms that limit their activities. Some of the most common symptoms are:  · Pain in the back or neck. Where the pain occurs depends on which discs are affected. · Pain that gets worse when you move, such as bending over, reaching up, or twisting. · Pain that may occur in the rear end (buttocks), arm, or leg if a nerve is pinched. · Numbness or tingling in your arm or leg. How is it diagnosed? A doctor can often diagnose degenerative disc disease while doing a physical exam. If your exam shows no signs of a serious condition, imaging tests (such as an X-ray) aren't likely to help your doctor find the cause of your symptoms. Sometimes degenerative disc disease is found when an X-ray is taken for another reason, such as an injury or other health problem.  But even if the doctor finds degenerative disc disease, that doesn't always mean that you will have symptoms. How is it treated? There are several things you can do at home to manage pain from this problem. · To relieve pain, use ice or heat (whichever feels better) on the affected area. ? Put ice or a cold pack on the area for 10 to 20 minutes at a time. Put a thin cloth between the ice and your skin. ? Put a warm water bottle, a heating pad set on low, or a warm cloth on your back. Put a thin cloth between the heating pad and your skin. Do not go to sleep with a heating pad on your skin. · Ask your doctor if you can take acetaminophen (such as Tylenol) or nonsteroidal anti-inflammatory drugs, such as ibuprofen or naproxen. Your doctor can prescribe stronger medicines if needed. Be safe with medicines. Read and follow all instructions on the label. · Get some exercise every day. Exercise is one of the best ways to help your back feel better and stay better. It's best to start each exercise slowly. You may notice a little soreness, and that's okay. But if an exercise makes your pain worse, stop doing it. Here are things you can try:  ? Walking. It's the simplest and maybe the best activity for your back. It gets your blood moving and helps your muscles stay strong. ? Exercises that gently stretch and strengthen your stomach, back, and leg muscles. The stronger those muscles are, the better they're able to protect your back. If you have constant or severe pain in your back or spine, you may need other treatments, such as physical therapy. In some cases, your doctor may suggest surgery. Follow-up care is a key part of your treatment and safety. Be sure to make and go to all appointments, and call your doctor if you are having problems. It's also a good idea to know your test results and keep a list of the medicines you take. Where can you learn more? Go to http://sloan-george.info/.   Enter Z240 in the search box to learn more about \"Learning About Degenerative Disc Disease. \"  Current as of: September 20, 2018  Content Version: 12.1  © 3353-8487 [employer]. Care instructions adapted under license by [employer] (which disclaims liability or warranty for this information). If you have questions about a medical condition or this instruction, always ask your healthcare professional. [place] [address] any warranty or liability for your use of this information.

## 2019-09-16 NOTE — PROGRESS NOTES
ADVISED PATIENT OF THE FOLLOWING HEALTH MAINTAINCE DUE  Health Maintenance Due   Topic Date Due    Influenza Age 5 to Adult  08/01/2019      Chief Complaint   Patient presents with    Back Pain     Patient here for back and left leg pain that started a week ago. The pain comes and goes, he notices some tingling in his leg, He is not able to bend over. He also states it hurts when he is sitting       1. Have you been to the ER, urgent care clinic since your last visit? Hospitalized since your last visit? No    2. Have you seen or consulted any other health care providers outside of the [address] [place] [address] since your last visit? Include any DEXA scan, mammography  or colon screening. No    3. Do you have an Advance Directive on file? yes    4. Do you have a DNR on file? DNR    Patient is accompanied by self I have received verbal consent from Shannan Nolan to discuss any/all medical information while they are present in the room. Advance Care Planning 4/16/2019   Patient's Healthcare Decision Maker is: Named in scanned ACP document   Primary Decision Maker Name -   Primary Decision Maker Phone Number -   Primary Decision Maker Relationship to Patient -   Confirm Advance Directive Yes, on file   Does the patient have other document types Do Not Resuscitate         [facility] 52 [address]  219 [facility] 600 [facility] [address]  Phone: [phone] Fax: 1200 [facility], . [address] [facility]  [address]  [facility] [address]  Phone: [phone] Fax: [phone]    [id number] [address] [place] [address] [address]  5000 [facility] 39 [facility] [address]  Phone: [phone] Fax: [phone]        Patient reminded during visit to bring all medication bottles, OTC medications to all appointments.

## 2019-10-08 ENCOUNTER — HOSPITAL ENCOUNTER (OUTPATIENT)
Dept: GENERAL RADIOLOGY | Age: 84
Discharge: HOME | End: 2019-10-08
Attending: SPECIALIST
Payer: MEDICARE

## 2019-10-08 DIAGNOSIS — M54.16 LUMBAR RADICULOPATHY: ICD-10-CM

## 2019-10-08 DIAGNOSIS — M54.12 CERVICAL RADICULITIS: ICD-10-CM

## 2019-10-08 DIAGNOSIS — M54.16 LUMBAR RADICULITIS: ICD-10-CM

## 2019-10-08 PROCEDURE — 72050 X-RAY EXAM NECK SPINE 4/5VWS: CPT

## 2019-10-22 ENCOUNTER — TELEPHONE (OUTPATIENT)
Dept: GERIATRIC MEDICINE | Age: 84
End: 2019-10-22

## 2019-10-22 ENCOUNTER — HOSPITAL ENCOUNTER (EMERGENCY)
Age: 84
Discharge: HOME | End: 2019-10-22
Attending: EMERGENCY MEDICINE
Payer: MEDICARE

## 2019-10-22 ENCOUNTER — OFFICE VISIT (OUTPATIENT)
Dept: GERIATRIC MEDICINE | Age: 84
End: 2019-10-22

## 2019-10-22 ENCOUNTER — HOSPITAL ENCOUNTER (OUTPATIENT)
Dept: GENERAL RADIOLOGY | Age: 84
Discharge: HOME | End: 2019-10-22
Payer: MEDICARE

## 2019-10-22 ENCOUNTER — APPOINTMENT (OUTPATIENT)
Dept: CT IMAGING | Age: 84
End: 2019-10-22
Attending: EMERGENCY MEDICINE
Payer: MEDICARE

## 2019-10-22 ENCOUNTER — HOSPITAL ENCOUNTER (OUTPATIENT)
Dept: LAB | Age: 84
Discharge: HOME | End: 2019-10-22
Payer: MEDICARE

## 2019-10-22 VITALS
BODY MASS INDEX: 27.7 KG/M2 | OXYGEN SATURATION: 98 % | WEIGHT: 166.45 LBS | DIASTOLIC BLOOD PRESSURE: 65 MMHG | HEART RATE: 60 BPM | RESPIRATION RATE: 16 BRPM | TEMPERATURE: 98.1 F | SYSTOLIC BLOOD PRESSURE: 141 MMHG

## 2019-10-22 VITALS
BODY MASS INDEX: 27.36 KG/M2 | OXYGEN SATURATION: 97 % | HEART RATE: 68 BPM | HEIGHT: 65 IN | WEIGHT: 164.2 LBS | SYSTOLIC BLOOD PRESSURE: 122 MMHG | RESPIRATION RATE: 16 BRPM | DIASTOLIC BLOOD PRESSURE: 60 MMHG

## 2019-10-22 DIAGNOSIS — E78.5 HYPERLIPIDEMIA, UNSPECIFIED HYPERLIPIDEMIA TYPE: ICD-10-CM

## 2019-10-22 DIAGNOSIS — R07.9 CHEST PAIN, UNSPECIFIED TYPE: Primary | ICD-10-CM

## 2019-10-22 DIAGNOSIS — Z88.9 H/O SEASONAL ALLERGIES: ICD-10-CM

## 2019-10-22 DIAGNOSIS — H93.8X3 FULLNESS IN EAR, BILATERAL: ICD-10-CM

## 2019-10-22 DIAGNOSIS — R07.81 RIB PAIN ON RIGHT SIDE: ICD-10-CM

## 2019-10-22 DIAGNOSIS — R07.89 ANTERIOR CHEST WALL PAIN: Primary | ICD-10-CM

## 2019-10-22 DIAGNOSIS — I25.83 CORONARY ATHEROSCLEROSIS DUE TO LIPID RICH PLAQUE: ICD-10-CM

## 2019-10-22 DIAGNOSIS — Z76.0 MEDICATION REFILL: ICD-10-CM

## 2019-10-22 DIAGNOSIS — R06.02 SOB (SHORTNESS OF BREATH): ICD-10-CM

## 2019-10-22 DIAGNOSIS — Z66 DNR (DO NOT RESUSCITATE): ICD-10-CM

## 2019-10-22 DIAGNOSIS — R07.1 CHEST PAIN VARYING WITH BREATHING: ICD-10-CM

## 2019-10-22 DIAGNOSIS — I25.10 CORONARY ATHEROSCLEROSIS DUE TO LIPID RICH PLAQUE: ICD-10-CM

## 2019-10-22 LAB
ANION GAP SERPL CALC-SCNC: 10 MMOL/L (ref 5–15)
BASOPHILS # BLD: 0.1 K/UL (ref 0–0.1)
BASOPHILS NFR BLD: 1 % (ref 0–1)
BUN SERPL-MCNC: 15 MG/DL (ref 6–20)
BUN/CREAT SERPL: 14 (ref 12–20)
CALCIUM SERPL-MCNC: 9.1 MG/DL (ref 8.5–10.1)
CHLORIDE SERPL-SCNC: 105 MMOL/L (ref 97–108)
CO2 SERPL-SCNC: 25 MMOL/L (ref 21–32)
COMMENT, HOLDF: NORMAL
CREAT SERPL-MCNC: 1.08 MG/DL (ref 0.7–1.3)
DIFFERENTIAL METHOD BLD: NORMAL
EOSINOPHIL # BLD: 0.1 K/UL (ref 0–0.4)
EOSINOPHIL NFR BLD: 2 % (ref 0–7)
ERYTHROCYTE [DISTWIDTH] IN BLOOD BY AUTOMATED COUNT: 13.3 % (ref 11.5–14.5)
GLUCOSE SERPL-MCNC: 97 MG/DL (ref 65–100)
HCT VFR BLD AUTO: 42.7 % (ref 36.6–50.3)
HGB BLD-MCNC: 14.7 G/DL (ref 12.1–17)
IMM GRANULOCYTES # BLD AUTO: 0 K/UL (ref 0–0.04)
IMM GRANULOCYTES NFR BLD AUTO: 0 % (ref 0–0.5)
LYMPHOCYTES # BLD: 1.6 K/UL (ref 0.8–3.5)
LYMPHOCYTES NFR BLD: 29 % (ref 12–49)
MCH RBC QN AUTO: 31.3 PG (ref 26–34)
MCHC RBC AUTO-ENTMCNC: 34.4 G/DL (ref 30–36.5)
MCV RBC AUTO: 90.9 FL (ref 80–99)
MONOCYTES # BLD: 0.6 K/UL (ref 0–1)
MONOCYTES NFR BLD: 12 % (ref 5–13)
NEUTS SEG # BLD: 3.1 K/UL (ref 1.8–8)
NEUTS SEG NFR BLD: 56 % (ref 32–75)
NRBC # BLD: 0 K/UL (ref 0–0.01)
NRBC BLD-RTO: 0 PER 100 WBC
PLATELET # BLD AUTO: 152 K/UL (ref 150–400)
PMV BLD AUTO: 10.2 FL (ref 8.9–12.9)
POTASSIUM SERPL-SCNC: 4 MMOL/L (ref 3.5–5.1)
RBC # BLD AUTO: 4.7 M/UL (ref 4.1–5.7)
SAMPLES BEING HELD,HOLD: NORMAL
SODIUM SERPL-SCNC: 140 MMOL/L (ref 136–145)
TROPONIN I SERPL-MCNC: <0.05 NG/ML
WBC # BLD AUTO: 5.6 K/UL (ref 4.1–11.1)

## 2019-10-22 PROCEDURE — 74011250636 HC RX REV CODE- 250/636: Performed by: EMERGENCY MEDICINE

## 2019-10-22 PROCEDURE — 71046 X-RAY EXAM CHEST 2 VIEWS: CPT

## 2019-10-22 PROCEDURE — 80048 BASIC METABOLIC PNL TOTAL CA: CPT

## 2019-10-22 PROCEDURE — 83690 ASSAY OF LIPASE: CPT

## 2019-10-22 PROCEDURE — 93005 ELECTROCARDIOGRAM TRACING: CPT

## 2019-10-22 PROCEDURE — 85025 COMPLETE CBC W/AUTO DIFF WBC: CPT

## 2019-10-22 PROCEDURE — 84484 ASSAY OF TROPONIN QUANT: CPT

## 2019-10-22 PROCEDURE — 99283 EMERGENCY DEPT VISIT LOW MDM: CPT

## 2019-10-22 PROCEDURE — 84403 ASSAY OF TOTAL TESTOSTERONE: CPT

## 2019-10-22 PROCEDURE — 80061 LIPID PANEL: CPT

## 2019-10-22 PROCEDURE — 80053 COMPREHEN METABOLIC PANEL: CPT

## 2019-10-22 PROCEDURE — 83036 HEMOGLOBIN GLYCOSYLATED A1C: CPT

## 2019-10-22 PROCEDURE — 74011636320 HC RX REV CODE- 636/320: Performed by: EMERGENCY MEDICINE

## 2019-10-22 PROCEDURE — 82043 UR ALBUMIN QUANTITATIVE: CPT

## 2019-10-22 PROCEDURE — 84443 ASSAY THYROID STIM HORMONE: CPT

## 2019-10-22 PROCEDURE — 36415 COLL VENOUS BLD VENIPUNCTURE: CPT

## 2019-10-22 PROCEDURE — 71275 CT ANGIOGRAPHY CHEST: CPT

## 2019-10-22 PROCEDURE — 81001 URINALYSIS AUTO W/SCOPE: CPT

## 2019-10-22 RX ORDER — EZETIMIBE 10 MG/1
10 TABLET ORAL EVERY EVENING
Qty: 90 TAB | Refills: 1 | Status: SHIPPED | OUTPATIENT
Start: 2019-10-22 | End: 2020-03-09 | Stop reason: SDUPTHER

## 2019-10-22 RX ORDER — SODIUM CHLORIDE 9 MG/ML
50 INJECTION, SOLUTION INTRAVENOUS ONCE
Status: COMPLETED | OUTPATIENT
Start: 2019-10-22 | End: 2019-10-22

## 2019-10-22 RX ORDER — SODIUM CHLORIDE 0.9 % (FLUSH) 0.9 %
10 SYRINGE (ML) INJECTION ONCE
Status: COMPLETED | OUTPATIENT
Start: 2019-10-22 | End: 2019-10-22

## 2019-10-22 RX ADMIN — SODIUM CHLORIDE 50 ML/HR: 900 INJECTION, SOLUTION INTRAVENOUS at 17:50

## 2019-10-22 RX ADMIN — IOPAMIDOL 80 ML: 755 INJECTION, SOLUTION INTRAVENOUS at 17:50

## 2019-10-22 RX ADMIN — Medication 10 ML: at 17:50

## 2019-10-22 NOTE — DISCHARGE INSTRUCTIONS
Your blood test and EKG was negative for a heart attack. Your CT scan did not show any fluid in your lungs and no blood clots. Pain could be a result of a muscle pull. Please follow up with your primary care doctor. Thank you.

## 2019-10-22 NOTE — ED PROVIDER NOTES
Patient is a 35-year-old male with history of CABG who presents with right-sided chest pain. Patient complaining of right-sided chest pain worse with palpation and deep inspiration that began 3 days ago. Reports he works out at [employer] 6 days a week. Does not recall any trauma. Denies any association with exertion, and no nausea, vomiting, diaphoresis, palpitations, shortness of breath. Seen at primary care doctor's office this afternoon an outpatient chest x-ray ordered. Call to return to the emergency department as questionable pleural effusion noted and concern for pulmonary embolism. Patient denies any shortness of breath, speaking full sentences during my evaluation.            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 ([address] 10/06/2016    Bilateral thumb pain 02/01/2017    CAD (coronary artery disease)     Cardiac murmur 10/06/2016    Cervical spondylolysis     Folliculitis 84/34/6943    GERD (gastroesophageal reflux disease)     Hypercholesteremia     Hypothyroid     Left hip pain 02/01/2017    Lumbar radiculitis     Lumbar radiculitis     Lumbar spondylitis (HCC)     Meniere disease     Mixed hyperlipidemia     Occlusion and stenosis of unspecified carotid artery     Osteoarthritis 02/19/2018    hands    Osteopenia of both hands 02/18/2018    Polyp of cecum 08/02/2017    5mm polyp in cecum, 8 mm polyp in sigmoid colon    Spinal enthesopathy of lumbar region ([facility] 02/01/2017       Past Surgical History:   Procedure Laterality Date    CARDIAC SURG PROCEDURE UNLIST  04/2008    HX CATARACT REMOVAL Bilateral     HX CHOLECYSTECTOMY  1994    HX COLONOSCOPY  12/19/2012    tubular adenoma    HX COLONOSCOPY  08/02/2017    tubular adenomas    HX CORONARY ARTERY BYPASS GRAFT  1996    HX CORONARY ARTERY BYPASS GRAFT  1996    HX CORONARY STENT PLACEMENT      HX KNEE ARTHROSCOPY Left 2005    menisectomy    HX ROTATOR CUFF REPAIR Left     HX THYROIDECTOMY  1975    HX TONSIL AND ADENOIDECTOMY           Family History:   Problem Relation Age of Onset    No Known Problems Mother     No Known Problems Father     No Known Problems Brother        Social History     Socioeconomic History    Marital status: SINGLE     Spouse name: Not on file    Number of children: Not on file    Years of education: Not on file    Highest education level: Not on file   Occupational History    Not on file   Social Needs    Financial resource strain: Not on file    Food insecurity:     Worry: Not on file     Inability: Not on file    Transportation needs:     Medical: Not on file     Non-medical: Not on file   Tobacco Use    Smoking status: Former Smoker    Smokeless tobacco: Never Used   Substance and Sexual Activity    Alcohol use: Yes     Alcohol/week: 7.0 standard drinks     Types: 7 Glasses of wine per week    Drug use: Never    Sexual activity: Not Currently     Partners: Female   Lifestyle    Physical activity:     Days per week: Not on file     Minutes per session: Not on file    Stress: Not on file   Relationships    Social connections:     Talks on phone: Not on file     Gets together: Not on file     Attends Latter day service: Not on file     Active member of club or organization: Not on file     Attends meetings of clubs or organizations: Not on file     Relationship status: Not on file    Intimate partner violence:     Fear of current or ex partner: Not on file     Emotionally abused: Not on file     Physically abused: Not on file     Forced sexual activity: Not on file   Other Topics Concern    Not on file   Social History Narrative    Not on file         ALLERGIES: Toprol xl [metoprolol succinate]; Codeine; and Sulfa (sulfonamide antibiotics)    Review of Systems   Constitutional: Negative for chills and fever. HENT: Negative for drooling and nosebleeds.     Eyes: Negative for pain and itching. Respiratory: Negative for choking and stridor. Cardiovascular: Positive for chest pain. Negative for palpitations and leg swelling. Gastrointestinal: Negative for abdominal pain and rectal pain. Endocrine: Negative for heat intolerance and polyphagia. Genitourinary: Negative for enuresis and genital sores. Musculoskeletal: Negative for arthralgias and joint swelling. Skin: Negative for color change. Allergic/Immunologic: Negative for immunocompromised state. Neurological: Negative for tremors and speech difficulty. Hematological: Negative for adenopathy. Psychiatric/Behavioral: Negative for dysphoric mood and sleep disturbance. Vitals:    10/22/19 1654 10/22/19 1659 10/22/19 1900   BP:  133/62 141/65   Pulse:  64 60   Resp:  18 16   Temp:  98.1 °F (36.7 °C)    SpO2:  95% 98%   Weight: 75.5 kg (166 lb 7.2 oz)              Physical Exam   Constitutional: He appears well-developed and well-nourished. HENT:   Head: Normocephalic. Nose: Nose normal.   Eyes: Conjunctivae and EOM are normal.   Neck: Normal range of motion. Neck supple. Cardiovascular: Regular rhythm and normal heart sounds. Pulmonary/Chest: Effort normal. No respiratory distress. He exhibits tenderness. No rash overlying right chest. No underlying crepitus. TTP overlying right breast.    Abdominal: Soft. He exhibits no distension. Musculoskeletal: Normal range of motion. He exhibits no deformity. Neurological: He is alert. Coordination normal.   Skin: Skin is warm and dry. Psychiatric: He has a normal mood and affect. His behavior is normal.   Nursing note and vitals reviewed. MDM  Number of Diagnoses or Management Options  Chest pain, unspecified type:   Diagnosis management comments: Work-up negative for ACS, PE, pleural effusion. Exam consistent with musculoskeletal pain. Patient is stable for discharge with close PMD follow-up.     ED Course as of Oct 22 1946   Tue Oct 22, 2019 6820 ED EKG interpretation:  Rhythm: normal sinus rhythm; and regular . Rate (approx.): 54; Axis: normal; ST/T wave: normal; No evidence of acute coronary ischemia. [AL]      ED Course User Index  [AL] Marcella Cueva MD       Procedures    Patient's results have been reviewed with them. Patient and/or family have verbally conveyed their understanding and agreement of the patient's signs, symptoms, diagnosis, treatment and prognosis and additionally agree to follow up as recommended or return to the Emergency Room should their condition change prior to follow-up. Discharge instructions have also been provided to the patient with some educational information regarding their diagnosis as well a list of reasons why they would want to return to the ER prior to their follow-up appointment should their condition change.

## 2019-10-22 NOTE — TELEPHONE ENCOUNTER
Dr Lita Neff called from [facility]. Patients CXR results show pleural effiusion and atelectasis.  recommends Chest Ct related to possible pulmonary embolism. I have notified NP with theses results. She has advised Patient be notified and request that patient go to the ER.

## 2019-10-22 NOTE — PROGRESS NOTES
ADVISED PATIENT OF THE FOLLOWING HEALTH MAINTAINCE DUE  There are no preventive care reminders to display for this patient. Chief Complaint   Patient presents with    Wax in Ear     Patient having diffuctly with hearing.  Breast pain     Patient having right side breast pain 9/10 pain, hurts when breathing and when touching. He is exercising in the gym daily, He reports a knot. 1. Have you been to the ER, urgent care clinic since your last visit? Hospitalized since your last visit? No    2. Have you seen or consulted any other health care providers outside of the [address] [place] [address] since your last visit? Include any DEXA scan, mammography  or colon screening.  for back pain,received injections. 3. Do you have an Advance Directive on file? yes    4. Do you have a DNR on file? DNR    Patient is accompanied by self I have received verbal consent from Ankita Hernandez to discuss any/all medical information while they are present in the room. Advance Care Planning 4/16/2019   Patient's Healthcare Decision Maker is: Named in scanned ACP document   Primary Decision Maker Name -   Primary Decision Maker Phone Number -   Primary Decision Maker Relationship to Patient -   Confirm Advance Directive Yes, on file   Does the patient have other document types Do Not Resuscitate         [facility] 52 [address]  219 [facility] 600 [facility] [address]  Phone: [phone] Fax: 1200 [facility], . [address] [facility] [place]  [address]  [facility] [address]  Phone: [phone] Fax: [phone]    [id number] [address] [place] [address] Mason Janiya [facility]  5000 [address] [address]  Phone: [phone] Fax: [phone]        Patient reminded during visit to bring all medication bottles, OTC medications to all appointments.      Ankita Hernandez presents for lab draw ordered by Yadira Ruggiero RN MSN ACNPC-AG-NP    The following labs were drawn and sent to lab by Dank Adler LPN:    CBC, Lipid Profile, CMP, HgA1C and Thyroid panel, Testosterone, Lipase    The following tubes were sent:    Lavender  ( 1) and Tiger (2)    Patient tolerated procedure well, blood obtained via venipuncture to left antecubital     Urine collected for UA and Micro

## 2019-10-22 NOTE — PATIENT INSTRUCTIONS
Allergies: Care Instructions  Your Care Instructions    Allergies occur when your body's defense system (immune system) overreacts to certain substances. The immune system treats a harmless substance as if it were a harmful germ or virus. Many things can cause this overreaction, including pollens, medicine, food, dust, animal dander, and mold. Allergies can be mild or severe. Mild allergies can be managed with home treatment. But medicine may be needed to prevent problems. Managing your allergies is an important part of staying healthy. Your doctor may suggest that you have allergy testing to help find out what is causing your allergies. When you know what things trigger your symptoms, you can avoid them. This can prevent allergy symptoms and other health problems. For severe allergies that cause reactions that affect your whole body (anaphylactic reactions), your doctor may prescribe a shot of epinephrine to carry with you in case you have a severe reaction. Learn how to give yourself the shot and keep it with you at all times. Make sure it is not . Follow-up care is a key part of your treatment and safety. Be sure to make and go to all appointments, and call your doctor if you are having problems. It's also a good idea to know your test results and keep a list of the medicines you take. How can you care for yourself at home? · If you have been told by your doctor that dust or dust mites are causing your allergy, decrease the dust around your bed:  ? Wash sheets, pillowcases, and other bedding in hot water every week. ? Use dust-proof covers for pillows, duvets, and mattresses. Avoid plastic covers because they tear easily and do not \"breathe. \" Wash as instructed on the label. ? Do not use any blankets and pillows that you do not need. ? Use blankets that you can wash in your washing machine. ? Consider removing drapes and carpets, which attract and hold dust, from your bedroom.   · If you are allergic to house dust and mites, do not use home humidifiers. Your doctor can suggest ways you can control dust and mites. · Look for signs of cockroaches. Cockroaches cause allergic reactions. Use cockroach baits to get rid of them. Then, clean your home well. Cockroaches like areas where grocery bags, newspapers, empty bottles, or cardboard boxes are stored. Do not keep these inside your home, and keep trash and food containers sealed. Seal off any spots where cockroaches might enter your home. · If you are allergic to mold, get rid of furniture, rugs, and drapes that smell musty. Check for mold in the bathroom. · If you are allergic to outdoor pollen or mold spores, use air-conditioning. Change or clean all filters every month. Keep windows closed. · If you are allergic to pollen, stay inside when pollen counts are high. Use a vacuum  with a HEPA filter or a double-thickness filter at least two times each week. · Stay inside when air pollution is bad. Avoid paint fumes, perfumes, and other strong odors. · Avoid conditions that make your allergies worse. Stay away from smoke. Do not smoke or let anyone else smoke in your house. Do not use fireplaces or wood-burning stoves. · If you are allergic to your pets, change the air filter in your furnace every month. Use high-efficiency filters. · If you are allergic to pet dander, keep pets outside or out of your bedroom. Old carpet and cloth furniture can hold a lot of animal dander. You may need to replace them. When should you call for help? Give an epinephrine shot if:    · You think you are having a severe allergic reaction.     · You have symptoms in more than one body area, such as mild nausea and an itchy mouth.    After giving an epinephrine shot call 911, even if you feel better.   Call 911 if:    · You have symptoms of a severe allergic reaction. These may include:  ? Sudden raised, red areas (hives) all over your body. ?  Swelling of the throat, mouth, lips, or tongue. ? Trouble breathing. ? Passing out (losing consciousness). Or you may feel very lightheaded or suddenly feel weak, confused, or restless.     · You have been given an epinephrine shot, even if you feel better.    Call your doctor now or seek immediate medical care if:    · You have symptoms of an allergic reaction, such as:  ? A rash or hives (raised, red areas on the skin). ? Itching. ? Swelling. ? Belly pain, nausea, or vomiting.    Watch closely for changes in your health, and be sure to contact your doctor if:    · You do not get better as expected. Where can you learn more? Go to http://sloan-george.info/. Enter J949 in the search box to learn more about \"Allergies: Care Instructions. \"  Current as of: April 7, 2019  Content Version: 12.2  © 6869-5182 [employer]. Care instructions adapted under license by [employer] (which disclaims liability or warranty for this information). If you have questions about a medical condition or this instruction, always ask your healthcare professional. [address] any warranty or liability for your use of this information.

## 2019-10-22 NOTE — ED TRIAGE NOTES
TRIAGE NOTE: Pt arrives for a CTA of the chest.  He had a chest xray earlier today outpatient, MD called and told him to be evaluated for PE. PT reports pain on right side when he takes a deep breath, reports mild sob.

## 2019-10-22 NOTE — PROGRESS NOTES
Reason for Visit   Naz Franco is a 80 y.o. male patient who presents today for :  Chief Complaint   Patient presents with    Wax in Ear     Patient having diffuctly with hearing.  Breast pain     Patient having right side breast pain 9/10 pain, hurts when breathing and when touching. He is exercising in the gym daily, He reports a knot. Treatment Plan / Follow Up: Follow-up and Dispositions    · Return in about 1 day (around 10/23/2019) for in office to review imaging or labs results. Pt. c/o non-radiating chest pain on right side when taking deep breaths started approximately 3 days ago, no nodules or lumps palpated, no bruising noted at site, X-ray ordered, patient also works out in [employer] 6 days a week. Negative coronary concerns work up. We will await the result of the xray but discussed and educated with use of incentive spirometer for possible pleurisy or atelectasis. Discussion concerning increased cerumen as he is an excessive cerumen maker, not needed today as there is an unobstructed view of TMs bilaterally but fullness noted. Seasonal allergies confirmed and Flonase reordered. Is ordered return demonstration in office. Advised to return to clinic if symptoms do not improve over the next few days. Advised to take OTC pain medication for possible muscle soreness r/t pain in right side of chest.  Diagnosis:        ICD-10-CM ICD-9-CM    1. Anterior chest wall pain R07.89 786.52 XR CHEST PA LAT      COLLECTION VENOUS BLOOD,VENIPUNCTURE      INCENTIVE SPIROMETRY   2. Rib pain on right side R07.81 786.50 XR CHEST PA LAT      COLLECTION VENOUS BLOOD,VENIPUNCTURE      INCENTIVE SPIROMETRY   3. Chest pain varying with breathing R07.1 786.50 XR CHEST PA LAT      COLLECTION VENOUS BLOOD,VENIPUNCTURE      INCENTIVE SPIROMETRY   4.  Fullness in ear, bilateral H93.8X3 388.8 COLLECTION VENOUS BLOOD,VENIPUNCTURE      fluticasone (FLONASE SENSIMIST) 27.5 mcg/actuation nasal spray DISCONTINUED: fluticasone (FLONASE SENSIMIST) 27.5 mcg/actuation nasal spray   5. H/O seasonal allergies Z88.9 V15.09 COLLECTION VENOUS BLOOD,VENIPUNCTURE      fluticasone (FLONASE SENSIMIST) 27.5 mcg/actuation nasal spray      DISCONTINUED: fluticasone (FLONASE SENSIMIST) 27.5 mcg/actuation nasal spray   6. SOB (shortness of breath) R06.02 786.05 COLLECTION VENOUS BLOOD,VENIPUNCTURE      fluticasone (FLONASE SENSIMIST) 27.5 mcg/actuation nasal spray   7. Coronary atherosclerosis due to lipid rich plaque I25.10 414.3 ezetimibe (ZETIA) 10 mg tablet    I25.83  COLLECTION VENOUS BLOOD,VENIPUNCTURE      DO NOT RESUSCITATE   8. Hyperlipidemia, unspecified hyperlipidemia type E78.5 272.4 ezetimibe (ZETIA) 10 mg tablet      COLLECTION VENOUS BLOOD,VENIPUNCTURE   9. Medication refill Z76.0 V68.1 ezetimibe (ZETIA) 10 mg tablet      COLLECTION VENOUS BLOOD,VENIPUNCTURE   10. DNR (do not resuscitate) Z66 V49.86 COLLECTION VENOUS BLOOD,VENIPUNCTURE      DO NOT RESUSCITATE        Orders Placed This Encounter    XR CHEST PA LAT     Standing Status:   Future     Number of Occurrences:   1     Standing Expiration Date:   2020     Order Specific Question:   Reason for Exam     Answer:   SOB and rib pain    INCENTIVE SPIROMETRY     Pt. with c/o chest tightness and seasonal allergies    DO NOT RESUSCITATE    COLLECTION VENOUS BLOOD,VENIPUNCTURE    ezetimibe (ZETIA) 10 mg tablet     Sig: Take 1 Tab by mouth every evening. Indications: excessive fat in the blood, high cholesterol and high triglycerides     Dispense:  90 Tab     Refill:  1    DISCONTD: fluticasone (FLONASE SENSIMIST) 27.5 mcg/actuation nasal spray     Si Sprays by Both Nostrils route daily. Indications: inflammation of the nose due to an allergy     Dispense:  10 g     Refill:  0    fluticasone (FLONASE SENSIMIST) 27.5 mcg/actuation nasal spray     Si Sprays by Both Nostrils route daily.  Indications: inflammation of the nose due to an allergy Dispense:  10 g     Refill:  0      Objective:     Vitals:    10/22/19 1005   BP: 122/60   Pulse: 68   Resp: 16   SpO2: 97%   Weight: 164 lb 3.2 oz (74.5 kg)   Height: 5' 5\" (1.651 m)     Wt Readings from Last 3 Encounters:   10/22/19 166 lb 7.2 oz (75.5 kg)   10/22/19 164 lb 3.2 oz (74.5 kg)   09/16/19 165 lb 8 oz (75.1 kg)     BP Readings from Last 3 Encounters:   10/22/19 141/65   10/22/19 122/60   09/16/19 120/74     Review of Systems   Constitutional: Negative for activity change, appetite change, chills, fatigue and fever. HENT: Positive for hearing loss, postnasal drip and tinnitus. Negative for congestion, dental problem, ear pain, mouth sores, rhinorrhea, sinus pressure, sneezing, sore throat and trouble swallowing. Eyes: Negative for discharge, redness and visual disturbance. Respiratory: Negative for apnea, cough, chest tightness, shortness of breath and wheezing. Cardiovascular: Negative for chest pain and palpitations. Gastrointestinal: Negative for abdominal distention, abdominal pain, blood in stool, constipation, diarrhea, nausea and vomiting. Endocrine: Negative for polydipsia, polyphagia and polyuria. Genitourinary: Negative for flank pain, frequency and urgency. Musculoskeletal: Positive for arthralgias, back pain, gait problem and myalgias. Negative for joint swelling and neck pain. Skin: Negative for color change, pallor, rash and wound. Allergic/Immunologic: Negative for environmental allergies and food allergies. Neurological: Negative for dizziness, tremors, weakness, light-headedness, numbness and headaches. Hematological: Negative for adenopathy. Psychiatric/Behavioral: Negative for agitation, confusion and sleep disturbance. The patient is not nervous/anxious.       Physical Examination: General appearance - alert, well appearing, and in no distress  Mental status - alert, oriented to person, place, and time  Eyes - pupils equal and reactive, extraocular eye movements intact  Ears - right TM red, dull, bulging, left TM red, dull, bulging  Nose - normal and patent, no erythema, discharge or polyps and clear rhinorrhea  Mouth - mucous membranes moist, pharynx normal without lesions  Neck - supple, no significant adenopathy  Lymphatics - no palpable lymphadenopathy, no hepatosplenomegaly  Chest - clear to auscultation, no wheezes, rales or rhonchi, symmetric air entry  Heart - normal rate, regular rhythm, normal S1, S2, no murmurs, rubs, clicks or gallops  Abdomen - soft, nontender, nondistended, no masses or organomegaly   Male - no penile lesions or discharge, no testicular masses or tenderness, no hernias  Rectal - negative without mass, lesions or tenderness  Back exam - full range of motion, no tenderness, palpable spasm or pain on motion  Neurological - alert, oriented, normal speech, no focal findings or movement disorder noted  Musculoskeletal - no joint tenderness, deformity or swelling  Extremities - peripheral pulses normal, no pedal edema, no clubbing or cyanosis  Skin - normal coloration and turgor, no rashes, no suspicious skin lesions noted  Subjective: Allergies   Allergen Reactions    Toprol Xl [Metoprolol Succinate] Diarrhea    Codeine Other (comments)    Sulfa (Sulfonamide Antibiotics) Hives     Prior to Admission medications    Medication Sig Start Date End Date Taking? Authorizing Provider   ezetimibe (ZETIA) 10 mg tablet Take 1 Tab by mouth every evening. Indications: excessive fat in the blood, high cholesterol and high triglycerides 10/22/19  Yes Bernardo Vaughn NP   fluticasone (FLONASE SENSIMIST) 27.5 mcg/actuation nasal spray 2 Sprays by Both Nostrils route daily. Indications: inflammation of the nose due to an allergy 10/22/19  Yes Bernardo Vaughn NP   Methylcellulose, with Sugar, (CITRUCEL, SUCROSE,) powd Take  by mouth.     Provider, Historical   acetaminophen (TYLENOL) 325 mg tablet Take  by mouth every four (4) hours as needed for Pain. Provider, Historical   levothyroxine (SYNTHROID) 25 mcg tablet Take 1.5 tablets daily, then 2 tablets every Monday, Wednesday, Friday. Indications: hypothyroidism 9/16/19   Jagdish Melendez NP   finasteride (PROSCAR) 5 mg tablet TAKE 1 TABLET BY MOUTH  NIGHTLY FOR BENIGN  PROSTATIC HYPERPLASIA WITH  LOWER URINARY TRACT  SYMPTOMS 8/26/19   Jagdish Melendez NP   hydrocortisone (HYTONE) 2.5 % topical cream APPLY CREAM THREE TIMES DAILY TO ECZEMA ON FACE 5/21/19   Provider, Historical   simvastatin (ZOCOR) 40 mg tablet Take 1 Tab by mouth nightly. Indications: high amount of triglyceride in the blood, combined high blood cholesterol and triglyceride level 4/23/19   Jagdish Melendez NP   vit C,C-Zm-pkovv-lutein-zeaxan (PRESERVISION AREDS-2) [phone] mg-unit-mg-mg cap capsule Take  by mouth. Provider, Historical   metroNIDAZOLE (METROGEL) 1 % topical gel Use a thin layer to affected areas after washing 2/8/19   Jagdish Melendez NP   biotin 10,000 mcg cap Take 2 Caps by mouth daily. 2/8/19   Jagdish Melendez NP   omeprazole (PRILOSEC) 40 mg capsule TAKE 1 CAPSULE BY MOUTH  DAILY FOR GASTROESOPHAGEAL  REFLUX DISEASE 2/4/19   Jagdish Melendez NP   diazePAM (VALIUM) 5 mg tablet Take 1 Tab by mouth every six (6) hours as needed. Max Daily Amount: 20 mg. Indications: Muscle Spasm 6/12/18   Jagdish Melendez NP   aspirin delayed-release 81 mg tablet Take 81 mg by mouth daily.     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 ([facility] [address] 10/06/2016    Bilateral thumb pain 02/01/2017    CAD (coronary artery disease)     Cardiac murmur 10/06/2016    Cervical spondylolysis     Folliculitis 22/62/5817    GERD (gastroesophageal reflux disease)     Hypercholesteremia     Hypothyroid     Left hip pain 02/01/2017    Lumbar radiculitis     Lumbar radiculitis     Lumbar spondylitis (HCC)     Meniere disease     Mixed hyperlipidemia     Occlusion and stenosis of unspecified carotid artery     Osteoarthritis 02/19/2018    hands    Osteopenia of both hands 02/18/2018    Polyp of cecum 08/02/2017    5mm polyp in cecum, 8 mm polyp in sigmoid colon    Spinal enthesopathy of lumbar region ([address] 02/01/2017     Past Surgical History:   Procedure Laterality Date    CARDIAC SURG PROCEDURE UNLIST  04/2008    HX CATARACT REMOVAL Bilateral     HX CHOLECYSTECTOMY  1994    HX COLONOSCOPY  12/19/2012    tubular adenoma    HX COLONOSCOPY  08/02/2017    tubular adenomas    HX CORONARY ARTERY BYPASS GRAFT  1996    HX CORONARY ARTERY BYPASS GRAFT  1996    HX CORONARY STENT PLACEMENT      HX KNEE ARTHROSCOPY Left 2005    menisectomy    HX ROTATOR CUFF REPAIR Left     HX THYROIDECTOMY  1975    HX TONSIL AND ADENOIDECTOMY        Social History     Tobacco Use    Smoking status: Former Smoker    Smokeless tobacco: Never Used   Substance Use Topics    Alcohol use:  Yes     Alcohol/week: 7.0 standard drinks     Types: 7 Glasses of wine per week    Drug use: Never      Family History   Problem Relation Age of Onset    No Known Problems Mother     No Known Problems Father     No Known Problems Brother      Functional Assessment:   ADL FUNCTIONALITY:  ADL Assessment 4/16/2019   Feeding yourself No Help Needed   Getting from bed to chair No Help Needed   Getting dressed No Help Needed   Bathing or showering No Help Needed   Walk across the room (includes cane/walker) No Help Needed   Using the telphone No Help Needed   Taking your medications No Help Needed   Preparing meals No Help Needed   Managing money (expenses/bills) No Help Needed   Moderately strenuous housework (laundry) No Help Needed   Shopping for personal items (toiletries/medicines) No Help Needed   Shopping for groceries No Help Needed   Driving No Help Needed   Climbing a flight of stairs No Help Needed   Getting to places beyond walking distances No Help Needed     DEPRESSION SCREENING:   3 most recent PHQ Screens 10/22/2019   Little interest or pleasure in doing things Not at all   Feeling down, depressed, irritable, or hopeless Not at all   Total Score PHQ 2 0   Trouble falling or staying asleep, or sleeping too much -   Feeling tired or having little energy -   Poor appetite, weight loss, or overeating -   Feeling bad about yourself - or that you are a failure or have let yourself or your family down -   Trouble concentrating on things such as school, work, reading, or watching TV -   Moving or speaking so slowly that other people could have noticed; or the opposite being so fidgety that others notice -   Thoughts of being better off dead, or hurting yourself in some way -   PHQ 9 Score -   How difficult have these problems made it for you to do your work, take care of your home and get along with others -      [address] Exam 4/16/2019 4/13/2018   What is the Year 1 1   What is the Season 1 1   What is the Date 1 1   What is the Day 1 1   What is the Month 1 1   Where are we State 1 1   Where are we Country 1 1   Where are we [address] [place] [address] or [facility] 1 1   Where are we Floor 1 1   Name three objects, then ask the patient to say them 3 3   Serial sevens Subtract 7 from 100 in increments 2 2   Ask for the three objects repeated above 2 3   Name a pencil 1 1   Name a watch 1 1   Have the patient repeat this phrase \"No ifs, ands, or buts\" 1 1   Three stage command: Take the paper in your right hand 1 1   Fold the paper in half 1 1   Put the paper on the floor 1 1   Read and obey the following: CLOSE YOUR EYES 1 1   Have the patient write a sentence 1 1   Have the patient copy a figure 1 1   Mini Mental Score 26 27   Some recent data might be hidden     FALL RISK:   Fall Risk Assessment, last 12 mths 10/22/2019   Able to walk? Yes   Fall in past 12 months?  No      ABUSE SCREEN:   Abuse Screening Questionnaire 4/16/2019   Do you ever feel afraid of your partner? N   Are you in a relationship with someone who physically or mentally threatens you? N   Is it safe for you to go home? Y      Advance Care Planning 4/16/2019   Patient's Healthcare Decision Maker is: Named in scanned ACP document   Primary Decision Maker Name -   Primary Decision Maker Phone Number -   Primary Decision Maker Relationship to Patient -   Confirm Advance Directive Yes, on file   Does the patient have other document types Do Not Resuscitate     CHANGES IN TREATMENT:    The following treatment modalities have been discontinued by the provider today:   Medications Discontinued During This Encounter   Medication Reason    ezetimibe (ZETIA) 10 mg tablet Reorder    fluticasone (FLONASE SENSIMIST) 27.5 mcg/actuation nasal spray      MOST RECENT LABORATORY RESULTS:      Admission on 10/22/2019, Discharged on 10/22/2019   Component Date Value Ref Range Status    SAMPLES BEING HELD 10/22/2019 1RED 1BLUE 1LAV 1GRN   Final    COMMENT 10/22/2019 Add-on orders for these samples will be processed based on acceptable specimen integrity and analyte stability, which may vary by analyte. Final    WBC 10/22/2019 5.6  4.1 - 11.1 K/uL Final    RBC 10/22/2019 4.70  4. 10 - 5.70 M/uL Final    HGB 10/22/2019 14.7  12.1 - 17.0 g/dL Final    HCT 10/22/2019 42.7  36.6 - 50.3 % Final    MCV 10/22/2019 90.9  80.0 - 99.0 FL Final    MCH 10/22/2019 31.3  26.0 - 34.0 PG Final    MCHC 10/22/2019 34.4  30.0 - 36.5 g/dL Final    RDW 10/22/2019 13.3  11.5 - 14.5 % Final    PLATELET 60/32/1475 149  150 - 400 K/uL Final    MPV 10/22/2019 10.2  8.9 - 12.9 FL Final    NRBC 10/22/2019 0.0  0  WBC Final    ABSOLUTE NRBC 10/22/2019 0.00  0.00 - 0.01 K/uL Final    NEUTROPHILS 10/22/2019 56  32 - 75 % Final    LYMPHOCYTES 10/22/2019 29  12 - 49 % Final    MONOCYTES 10/22/2019 12  5 - 13 % Final    EOSINOPHILS 10/22/2019 2  0 - 7 % Final    BASOPHILS 10/22/2019 1  0 - 1 % Final    IMMATURE GRANULOCYTES 10/22/2019 0  0.0 - 0.5 % Final    ABS. NEUTROPHILS 10/22/2019 3.1  1.8 - 8.0 K/UL Final    ABS. LYMPHOCYTES 10/22/2019 1.6  0.8 - 3.5 K/UL Final    ABS. MONOCYTES 10/22/2019 0.6  0.0 - 1.0 K/UL Final    ABS. EOSINOPHILS 10/22/2019 0.1  0.0 - 0.4 K/UL Final    ABS. BASOPHILS 10/22/2019 0.1  0.0 - 0.1 K/UL Final    ABS. IMM. GRANS. 10/22/2019 0.0  0.00 - 0.04 K/UL Final    DF 10/22/2019 AUTOMATED    Final    Sodium 10/22/2019 140  136 - 145 mmol/L Final    Potassium 10/22/2019 4.0  3.5 - 5.1 mmol/L Final    Chloride 10/22/2019 105  97 - 108 mmol/L Final    CO2 10/22/2019 25  21 - 32 mmol/L Final    Anion gap 10/22/2019 10  5 - 15 mmol/L Final    Glucose 10/22/2019 97  65 - 100 mg/dL Final    BUN 10/22/2019 15  6 - 20 MG/DL Final    Creatinine 10/22/2019 1.08  0.70 - 1.30 MG/DL Final    BUN/Creatinine ratio 10/22/2019 14  12 - 20   Final    GFR est AA 10/22/2019 >60  >60 ml/min/1.73m2 Final    GFR est non-AA 10/22/2019 >60  >60 ml/min/1.73m2 Final    Comment: Estimated GFR is calculated using the IDMS-traceable Modification of Diet in Renal Disease (MDRD) Study equation, reported for both  Americans (GFRAA) and non- Americans (GFRNA), and normalized to 1.73m2 body surface area. The physician must decide which value applies to the patient. The MDRD study equation should only be used in individuals age 25 or older. It has not been validated for the following: pregnant women, patients with serious comorbid conditions, or on certain medications, or persons with extremes of body size, muscle mass, or nutritional status.       Calcium 10/22/2019 9.1  8.5 - 10.1 MG/DL Final    Troponin-I, Qt. 10/22/2019 <0.05  <0.05 ng/mL Final    Comment: The presence of detectable troponin above the reference range indicates myocardial injury which may be due to ischemia, myocarditis, trauma, etc.  Clinical correlation is necessary to establish the significance of this finding. Sequential testing is recommended to determine if the typical rise and fall of cTnI is demonstrated. Note:  Cardiac troponin I has a relatively long half life and may be present well after the CK MB has returned to baseline. The reference range is based on the 99th percentile of the referent population.  Ventricular Rate 10/22/2019 54  BPM Final    Atrial Rate 10/22/2019 54  BPM Final    P-R Interval 10/22/2019 178  ms Final    QRS Duration 10/22/2019 78  ms Final    Q-T Interval 10/22/2019 390  ms Final    QTC Calculation (Bezet) 10/22/2019 369  ms Final    Calculated P Axis 10/22/2019 39  degrees Final    Calculated R Axis 10/22/2019 24  degrees Final    Calculated T Axis 10/22/2019 54  degrees Final    Diagnosis 10/22/2019    Final                    Value:Sinus bradycardia  When compared with ECG of 15-FEB-2017 17:16,  No significant change was found  Confirmed by Jason Rubio M.D., Keshia Jamison ([id number]) on 10/23/2019 3:03:31 AM       Results for orders placed or performed in visit on 07/22/19   CBC WITH AUTOMATED DIFF   Result Value Ref Range    WBC 3.9 3.4 - 10.8 x10E3/uL    RBC 4.85 4.14 - 5.80 x10E6/uL    HGB 15.0 13.0 - 17.7 g/dL    HCT 45.3 37.5 - 51.0 %    MCV 93 79 - 97 fL    MCH 30.9 26.6 - 33.0 pg    MCHC 33.1 31.5 - 35.7 g/dL    RDW 14.5 12.3 - 15.4 %    PLATELET 761 000 - 424 x10E3/uL    NEUTROPHILS 50 Not Estab. %    Lymphocytes 35 Not Estab. %    MONOCYTES 8 Not Estab. %    EOSINOPHILS 5 Not Estab. %    BASOPHILS 1 Not Estab. %    ABS. NEUTROPHILS 2.0 1.4 - 7.0 x10E3/uL    Abs Lymphocytes 1.4 0.7 - 3.1 x10E3/uL    ABS. MONOCYTES 0.3 0.1 - 0.9 x10E3/uL    ABS. EOSINOPHILS 0.2 0.0 - 0.4 x10E3/uL    ABS. BASOPHILS 0.0 0.0 - 0.2 x10E3/uL    IMMATURE GRANULOCYTES 1 Not Estab. %    ABS. IMM.  GRANS. 0.0 0.0 - 0.1 V83M6/DL   METABOLIC PANEL, COMPREHENSIVE   Result Value Ref Range    Glucose 131 (H) 65 - 99 mg/dL    BUN 15 8 - 27 mg/dL    Creatinine 0.90 0.76 - 1.27 mg/dL    GFR est non-AA 79 >59 mL/min/1.73    GFR est AA 91 >59 mL/min/1.73    BUN/Creatinine ratio 17 10 - 24    Sodium 142 134 - 144 mmol/L    Potassium 4.1 3.5 - 5.2 mmol/L    Chloride 105 96 - 106 mmol/L    CO2 20 20 - 29 mmol/L    Calcium 9.0 8.6 - 10.2 mg/dL    Protein, total 6.2 6.0 - 8.5 g/dL    Albumin 4.0 3.5 - 4.7 g/dL    GLOBULIN, TOTAL 2.2 1.5 - 4.5 g/dL    A-G Ratio 1.8 1.2 - 2.2    Bilirubin, total 0.6 0.0 - 1.2 mg/dL    Alk. phosphatase 64 39 - 117 IU/L    AST (SGOT) 18 0 - 40 IU/L    ALT (SGPT) 16 0 - 44 IU/L   HEMOGLOBIN A1C WITH EAG   Result Value Ref Range    Hemoglobin A1c 5.7 (H) 4.8 - 5.6 %    Estimated average glucose 117 mg/dL   LIPID PANEL   Result Value Ref Range    Cholesterol, total 170 100 - 199 mg/dL    Triglyceride 225 (H) 0 - 149 mg/dL    HDL Cholesterol 45 >39 mg/dL    VLDL, calculated 45 (H) 5 - 40 mg/dL    LDL, calculated 80 0 - 99 mg/dL   VITAMIN B12 & FOLATE   Result Value Ref Range    Vitamin B12 738 232 - 1,245 pg/mL    Folate 19.3 >3.0 ng/mL   VITAMIN D, 25 HYDROXY   Result Value Ref Range    VITAMIN D, 25-HYDROXY 50.6 30.0 - 100.0 ng/mL   THYROID PANEL W/TSH   Result Value Ref Range    TSH 4.000 0.450 - 4.500 uIU/mL    T4, Total 6.2 4.5 - 12.0 ug/dL    T3 Uptake 30 24 - 39 %    Free Thyroxine Index (FTI) 1.9 1.2 - 4.9   PSA W/ REFLX FREE PSA   Result Value Ref Range    Prostate Specific Ag 2.5 0.0 - 4.0 ng/mL    Reflex Criteria Comment       Disclaimer:   Mr. Jerad Rivera has been advised to call or return to our office if symptoms worsen/change/persist. We as a care team including the patient; discussed expected course/resolution/complications of diagnosis in detail today. Medication risks/benefits/costs/interactions/alternatives discussed Jerad Rivera was given an after visit summary which includes diagnoses, current medications, & vitals. Marsankur Rivera expressed understanding with the diagnosis and plan.

## 2019-10-23 LAB
ALBUMIN SERPL-MCNC: 4.1 G/DL (ref 3.5–4.7)
ALBUMIN/CREAT UR: <2.3 MG/G CREAT (ref 0–30)
ALBUMIN/GLOB SERPL: 2.3 {RATIO} (ref 1.2–2.2)
ALP SERPL-CCNC: 68 IU/L (ref 39–117)
ALT SERPL-CCNC: 16 IU/L (ref 0–44)
AST SERPL-CCNC: 15 IU/L (ref 0–40)
ATRIAL RATE: 54 BPM
BASOPHILS # BLD AUTO: 0.1 X10E3/UL (ref 0–0.2)
BASOPHILS NFR BLD AUTO: 1 %
BILIRUB SERPL-MCNC: 0.8 MG/DL (ref 0–1.2)
BUN SERPL-MCNC: 15 MG/DL (ref 8–27)
BUN/CREAT SERPL: 13 (ref 10–24)
CALCIUM SERPL-MCNC: 9.2 MG/DL (ref 8.6–10.2)
CALCULATED P AXIS, ECG09: 39 DEGREES
CALCULATED R AXIS, ECG10: 24 DEGREES
CALCULATED T AXIS, ECG11: 54 DEGREES
CHLORIDE SERPL-SCNC: 104 MMOL/L (ref 96–106)
CHOLEST SERPL-MCNC: 175 MG/DL (ref 100–199)
CO2 SERPL-SCNC: 22 MMOL/L (ref 20–29)
CREAT SERPL-MCNC: 1.16 MG/DL (ref 0.76–1.27)
CREAT UR-MCNC: 129.8 MG/DL
DIAGNOSIS, 93000: NORMAL
EOSINOPHIL # BLD AUTO: 0.1 X10E3/UL (ref 0–0.4)
EOSINOPHIL NFR BLD AUTO: 2 %
ERYTHROCYTE [DISTWIDTH] IN BLOOD BY AUTOMATED COUNT: 13.6 % (ref 12.3–15.4)
EST. AVERAGE GLUCOSE BLD GHB EST-MCNC: 117 MG/DL
FT4I SERPL CALC-MCNC: 2 (ref 1.2–4.9)
GLOBULIN SER CALC-MCNC: 1.8 G/DL (ref 1.5–4.5)
GLUCOSE SERPL-MCNC: 137 MG/DL (ref 65–99)
HBA1C MFR BLD: 5.7 % (ref 4.8–5.6)
HCT VFR BLD AUTO: 43.8 % (ref 37.5–51)
HDLC SERPL-MCNC: 51 MG/DL
HGB BLD-MCNC: 14.9 G/DL (ref 13–17.7)
IMM GRANULOCYTES # BLD AUTO: 0 X10E3/UL (ref 0–0.1)
IMM GRANULOCYTES NFR BLD AUTO: 0 %
LDLC SERPL CALC-MCNC: 78 MG/DL (ref 0–99)
LIPASE SERPL-CCNC: 38 U/L (ref 13–78)
LYMPHOCYTES # BLD AUTO: 1.4 X10E3/UL (ref 0.7–3.1)
LYMPHOCYTES NFR BLD AUTO: 25 %
MCH RBC QN AUTO: 31.2 PG (ref 26.6–33)
MCHC RBC AUTO-ENTMCNC: 34 G/DL (ref 31.5–35.7)
MCV RBC AUTO: 92 FL (ref 79–97)
MICROALBUMIN UR-MCNC: <3 UG/ML
MONOCYTES # BLD AUTO: 0.5 X10E3/UL (ref 0.1–0.9)
MONOCYTES NFR BLD AUTO: 9 %
NEUTROPHILS # BLD AUTO: 3.4 X10E3/UL (ref 1.4–7)
NEUTROPHILS NFR BLD AUTO: 63 %
P-R INTERVAL, ECG05: 178 MS
PLATELET # BLD AUTO: 145 X10E3/UL (ref 150–450)
POTASSIUM SERPL-SCNC: 4.1 MMOL/L (ref 3.5–5.2)
PROT SERPL-MCNC: 5.9 G/DL (ref 6–8.5)
Q-T INTERVAL, ECG07: 390 MS
QRS DURATION, ECG06: 78 MS
QTC CALCULATION (BEZET), ECG08: 369 MS
RBC # BLD AUTO: 4.78 X10E6/UL (ref 4.14–5.8)
SODIUM SERPL-SCNC: 141 MMOL/L (ref 134–144)
T3RU NFR SERPL: 28 % (ref 24–39)
T4 SERPL-MCNC: 7.2 UG/DL (ref 4.5–12)
TESTOST FREE SERPL-MCNC: 11 PG/ML (ref 6.6–18.1)
TESTOST SERPL-MCNC: 318 NG/DL (ref 264–916)
TRIGL SERPL-MCNC: 229 MG/DL (ref 0–149)
TSH SERPL DL<=0.005 MIU/L-ACNC: 3.24 UIU/ML (ref 0.45–4.5)
VENTRICULAR RATE, ECG03: 54 BPM
VLDLC SERPL CALC-MCNC: 46 MG/DL (ref 5–40)
WBC # BLD AUTO: 5.5 X10E3/UL (ref 3.4–10.8)

## 2019-10-23 NOTE — TELEPHONE ENCOUNTER
I was able to contact patient and discussed with him going to the ER for possible PE.  Patient agreed to going to the hospital.

## 2019-10-24 LAB
APPEARANCE UR: CLEAR
BACTERIA #/AREA URNS HPF: NORMAL /[HPF]
BILIRUB UR QL STRIP: NEGATIVE
CASTS URNS QL MICRO: NORMAL /LPF
COLOR UR: YELLOW
EPI CELLS #/AREA URNS HPF: NORMAL /HPF (ref 0–10)
GLUCOSE UR QL: NEGATIVE
HGB UR QL STRIP: NEGATIVE
KETONES UR QL STRIP: NEGATIVE
LEUKOCYTE ESTERASE UR QL STRIP: NEGATIVE
MICRO URNS: NORMAL
MICRO URNS: NORMAL
MUCOUS THREADS URNS QL MICRO: PRESENT
NITRITE UR QL STRIP: NEGATIVE
PH UR STRIP: 5.5 [PH] (ref 5–7.5)
PROT UR QL STRIP: NEGATIVE
RBC #/AREA URNS HPF: NORMAL /HPF (ref 0–2)
SP GR UR: 1.02 (ref 1–1.03)
SPECIMEN STATUS REPORT, ROLRST: NORMAL
URINALYSIS REFLEX, 377202: NORMAL
UROBILINOGEN UR STRIP-MCNC: 0.2 MG/DL (ref 0.2–1)
WBC #/AREA URNS HPF: NORMAL /HPF (ref 0–5)

## 2019-10-25 ENCOUNTER — OFFICE VISIT (OUTPATIENT)
Dept: GERIATRIC MEDICINE | Age: 84
End: 2019-10-25

## 2019-10-25 VITALS
HEIGHT: 65 IN | SYSTOLIC BLOOD PRESSURE: 132 MMHG | WEIGHT: 164 LBS | HEART RATE: 63 BPM | OXYGEN SATURATION: 97 % | RESPIRATION RATE: 16 BRPM | DIASTOLIC BLOOD PRESSURE: 64 MMHG | BODY MASS INDEX: 27.32 KG/M2

## 2019-10-25 DIAGNOSIS — R73.03 PREDIABETES: ICD-10-CM

## 2019-10-25 DIAGNOSIS — M54.32 SCIATICA OF LEFT SIDE: ICD-10-CM

## 2019-10-25 DIAGNOSIS — M54.18 RADICULOPATHY OF SACROCOCCYGEAL REGION: ICD-10-CM

## 2019-10-25 DIAGNOSIS — I25.83 CORONARY ATHEROSCLEROSIS DUE TO LIPID RICH PLAQUE: ICD-10-CM

## 2019-10-25 DIAGNOSIS — R07.81 RIB PAIN ON RIGHT SIDE: ICD-10-CM

## 2019-10-25 DIAGNOSIS — E55.9 VITAMIN D DEFICIENCY, UNSPECIFIED: ICD-10-CM

## 2019-10-25 DIAGNOSIS — R07.1 CHEST PAIN VARYING WITH BREATHING: ICD-10-CM

## 2019-10-25 DIAGNOSIS — R09.1 PLEURITIS: ICD-10-CM

## 2019-10-25 DIAGNOSIS — R07.89 ANTERIOR CHEST WALL PAIN: Primary | ICD-10-CM

## 2019-10-25 DIAGNOSIS — E78.2 MIXED HYPERLIPIDEMIA: ICD-10-CM

## 2019-10-25 DIAGNOSIS — M51.37 DDD (DEGENERATIVE DISC DISEASE), LUMBOSACRAL: ICD-10-CM

## 2019-10-25 DIAGNOSIS — I25.10 CORONARY ATHEROSCLEROSIS DUE TO LIPID RICH PLAQUE: ICD-10-CM

## 2019-10-25 NOTE — PROGRESS NOTES
Reason for Visit   Watson Marina is a 80 y.o. male patient who presents today for :  Chief Complaint   Patient presents with    Results     Patient being seen for follow up with CXR and CT results. Treatment Plan / Follow Up: Follow-up and Dispositions    · Return in about 3 months (around 1/25/2020) for for routine lab draw to monitor chronic disease. Reviewed ER visit findings and recent lab results. No change in plan of care. Discussed the possibilities of pulled muscle vs pleurisy as a diagnosis. He reports since refraining from upper body conditioning in the weight room the pain has subsided. He went back to the gym yesterday and the pain returned. Patient advised to refrain from the upper body conditioning and inquire with  to other exercises he could do that does not work his chest muscles. Discussed labs in depth. Stable 3 month chronic lab monitoring stable at this time. CT Results (most recent):  Results from Hospital Encounter encounter on 10/22/19   CTA CHEST W OR W WO CONT    Narrative CLINICAL HISTORY: Chest pain    INDICATION: Chest pain, dyspnea    COMPARISON: None    TECHNIQUE: CT of the chest with  IV contrast , Isovue-370 is performed. Axial  images from the thoracic inlet to the level of the upper abdomen are obtained. Manual post-processing of the images and coronal reformatting is also performed. CT dose reduction was achieved through use of a standardized protocol tailored  for this examination and automatic exposure control for dose modulation. Multiplanar reformatted imaging was performed. Sagittal and coronal reformatting. 3-D Postprocessing of imaging was performed. 3-D MIP reconstructed images were obtained in the coronal plane. FINDINGS:   There is no pulmonary embolism. There is no aortic aneurysm or dissection. There is a degree of motion artifact which limits evaluation. There is coronary  artery disease. There is cardiomegaly.  Hepatic steatosis. Postcholecystectomy. Small hiatal hernia. There is no pleural or pericardial effusion. There is no  mediastinal, axillary or hilar lymphadenopathy. The aorta is normal in course  and caliber. The proximal pulmonary arteries are without evidence of filling  defects. No lytic or blastic lesions are identified. The remainder of the upper  abdomen visualized is unremarkable. Impression IMPRESSION:   There is no pulmonary embolism. There is no aortic aneurysm or dissection. Cardiomegaly and coronary artery disease. XR Results (most recent):  Results from Hospital Encounter encounter on 10/22/19   XR CHEST PA LAT    Narrative Exam:  2 view chest    Indication: Shortness of breath and rib pain, pleural pain    COMPARISON: None    PA and lateral views demonstrate normal heart size. Patient status post median  sternotomy and revascularization procedure. There is a small right pleural effusion blunting the lateral and right posterior  costophrenic angle. There is minimal associated atelectasis. Although these  findings are nonspecific these findings could relate to pulmonary embolus. Exact  etiology is uncertain. This case was discussed with Jocelyn gardner or. Left lung is clear. No pneumonia. No pneumothorax. Visualized osseous structures  are unremarkable. Impression IMPRESSION:  1. There is a small right pleural effusion mild atelectasis at the right lung  base. Exact etiology is uncertain as these findings are nonspecific. . These  findings can be seen in the presence of pulmonary emboli          Diagnosis:        ICD-10-CM ICD-9-CM    1. Anterior chest wall pain R07.89 786.52 CBC W/O DIFF      LIPID PANEL      NMR LIPOPROFILE      METABOLIC PANEL, COMPREHENSIVE      PSA W/ REFLX FREE PSA      THYROID PANEL W/TSH      VITAMIN D, 25 HYDROXY      HEMOGLOBIN A1C WITH EAG   2.  Rib pain on right side R07.81 786.50 CBC W/O DIFF      LIPID PANEL      NMR LIPOPROFILE      METABOLIC PANEL, COMPREHENSIVE      PSA W/ REFLX FREE PSA      THYROID PANEL W/TSH      VITAMIN D, 25 HYDROXY      HEMOGLOBIN A1C WITH EAG   3. Chest pain varying with breathing R07.1 786.50 CBC W/O DIFF      LIPID PANEL      NMR LIPOPROFILE      METABOLIC PANEL, COMPREHENSIVE      PSA W/ REFLX FREE PSA      THYROID PANEL W/TSH      VITAMIN D, 25 HYDROXY      HEMOGLOBIN A1C WITH EAG   4. Pleuritis R09.1 511.0 CBC W/O DIFF      LIPID PANEL      NMR LIPOPROFILE      METABOLIC PANEL, COMPREHENSIVE      PSA W/ REFLX FREE PSA      THYROID PANEL W/TSH      VITAMIN D, 25 HYDROXY      HEMOGLOBIN A1C WITH EAG   5. Coronary atherosclerosis due to lipid rich plaque I25.10 414.3 CBC W/O DIFF    I25.83  LIPID PANEL      NMR LIPOPROFILE      METABOLIC PANEL, COMPREHENSIVE      PSA W/ REFLX FREE PSA      THYROID PANEL W/TSH      VITAMIN D, 25 HYDROXY      HEMOGLOBIN A1C WITH EAG   6. Radiculopathy of sacrococcygeal region M54.18 724.4 CBC W/O DIFF      LIPID PANEL      NMR LIPOPROFILE      METABOLIC PANEL, COMPREHENSIVE      PSA W/ REFLX FREE PSA      THYROID PANEL W/TSH      VITAMIN D, 25 HYDROXY      HEMOGLOBIN A1C WITH EAG   7. Sciatica of left side M54.32 724.3 CBC W/O DIFF      LIPID PANEL      NMR LIPOPROFILE      METABOLIC PANEL, COMPREHENSIVE      PSA W/ REFLX FREE PSA      THYROID PANEL W/TSH      VITAMIN D, 25 HYDROXY      HEMOGLOBIN A1C WITH EAG   8. DDD (degenerative disc disease), lumbosacral M51.37 722.52 CBC W/O DIFF      LIPID PANEL      NMR LIPOPROFILE      METABOLIC PANEL, COMPREHENSIVE      PSA W/ REFLX FREE PSA      THYROID PANEL W/TSH      VITAMIN D, 25 HYDROXY      HEMOGLOBIN A1C WITH EAG   9. Mixed hyperlipidemia E78.2 272.2 CBC W/O DIFF      LIPID PANEL      NMR LIPOPROFILE      METABOLIC PANEL, COMPREHENSIVE      PSA W/ REFLX FREE PSA      THYROID PANEL W/TSH      VITAMIN D, 25 HYDROXY      HEMOGLOBIN A1C WITH EAG   10. Vitamin D deficiency, unspecified  E55.9 268.9 VITAMIN D, 25 HYDROXY   11.  Prediabetes  R73.03 790.29 HEMOGLOBIN A1C WITH EAG        Orders Placed This Encounter    CBC W/O DIFF     Standing Status:   Future     Standing Expiration Date:   3/1/2020    LIPID PANEL     Standing Status:   Future     Standing Expiration Date:   3/1/2020    NMR LIPOPROFILE     Standing Status:   Future     Standing Expiration Date:   8/3/8184    METABOLIC PANEL, COMPREHENSIVE     Standing Status:   Future     Standing Expiration Date:   3/1/2020    PSA W/ REFLX FREE PSA     Standing Status:   Future     Standing Expiration Date:   3/1/2020    THYROID PANEL W/TSH     Free thyroxine index; T3 uptake (THBR); thyroid-stimulating hormone (TSH); thyroxine (T4)     Standing Status:   Future     Standing Expiration Date:   3/1/2020    VITAMIN D, 25 HYDROXY     Standing Status:   Future     Standing Expiration Date:   3/1/2020    HEMOGLOBIN A1C WITH EAG     Standing Status:   Future     Standing Expiration Date:   3/1/2020      Objective:     Vitals:    10/25/19 0909   BP: 132/64   Pulse: 63   Resp: 16   SpO2: 97%   Weight: 164 lb (74.4 kg)   Height: 5' 5\" (1.651 m)     Wt Readings from Last 3 Encounters:   10/25/19 164 lb (74.4 kg)   10/22/19 166 lb 7.2 oz (75.5 kg)   10/22/19 164 lb 3.2 oz (74.5 kg)     BP Readings from Last 3 Encounters:   10/25/19 132/64   10/22/19 141/65   10/22/19 122/60     Review of Systems   Constitutional: Negative for activity change, appetite change, chills, fatigue and fever. HENT: Positive for hearing loss, postnasal drip and tinnitus. Negative for congestion, dental problem, ear pain, mouth sores, rhinorrhea, sinus pressure, sneezing, sore throat and trouble swallowing. Eyes: Negative for discharge, redness and visual disturbance. Respiratory: Positive for chest tightness. Negative for apnea, cough, shortness of breath and wheezing. Cardiovascular: Positive for chest pain. Negative for palpitations.    Gastrointestinal: Negative for abdominal distention, abdominal pain, blood in stool, constipation, diarrhea, nausea and vomiting. Endocrine: Negative for polydipsia, polyphagia and polyuria. Genitourinary: Negative for flank pain, frequency and urgency. Musculoskeletal: Positive for arthralgias, back pain, gait problem and myalgias. Negative for joint swelling and neck pain. Skin: Negative for color change, pallor, rash and wound. Allergic/Immunologic: Negative for environmental allergies and food allergies. Neurological: Negative for dizziness, tremors, weakness, light-headedness, numbness and headaches. Hematological: Negative for adenopathy. Psychiatric/Behavioral: Negative for agitation, confusion and sleep disturbance. The patient is not nervous/anxious. Physical Exam   Constitutional: He is oriented to person, place, and time and well-developed, well-nourished, and in no distress. Vital signs are normal. He appears to not be writhing in pain, not malnourished and not dehydrated. He appears healthy. He does not have a sickly appearance. No distress. 80 y.o. Frail, elderly  male. He appears younger than his stated age. He is A & O x 3. HENT:   Head: Normocephalic and atraumatic. Right Ear: External ear and ear canal normal. A middle ear effusion is present. Decreased hearing is noted. Left Ear: External ear and ear canal normal. A middle ear effusion is present. Decreased hearing is noted. Nose: Nose normal.   Mouth/Throat: Uvula is midline, oropharynx is clear and moist and mucous membranes are normal. Mucous membranes are not pale, not dry and not cyanotic. No oral lesions. No uvula swelling. No posterior oropharyngeal edema or posterior oropharyngeal erythema. Canals are clearer since decreasing frequency of use with the debrox. No cerumen present on exam today. Right acute otitis media has resolved without abx. Eyes: Pupils are equal, round, and reactive to light.  Conjunctivae, EOM and lids are normal. Lids are everted and swept, no foreign bodies found. Neck: Trachea normal, normal range of motion and full passive range of motion without pain. Neck supple. No hepatojugular reflux and no JVD present. Carotid bruit is not present. No thyromegaly present. Cardiovascular: Normal rate, regular rhythm, S1 normal, S2 normal, normal heart sounds, intact distal pulses and normal pulses. Occasional extrasystoles are present. Exam reveals no gallop and no friction rub. No murmur heard. No extremity edema is present during today's exam.    Pulmonary/Chest: Effort normal and breath sounds normal. Chest wall is dull to percussion. He exhibits tenderness and bony tenderness. Pain is now 2/10 with deep breathing. Abdominal: Soft. Normal appearance and bowel sounds are normal. There is no hepatosplenomegaly. There is no tenderness. There is no CVA tenderness. Musculoskeletal:        Lumbar back: He exhibits decreased range of motion, tenderness, pain and spasm. Back:    Chronic generalized muscle weakness. Neurological: He is alert and oriented to person, place, and time. He has normal reflexes and intact cranial nerves. He displays weakness and atrophy. A sensory deficit is present. He exhibits normal muscle tone. Coordination and gait abnormal. GCS score is 15. Uses a cane for balance support. Skin: Skin is warm, dry and intact. No rash noted. He is not diaphoretic. No cyanosis. No pallor. Nails show no clubbing. Psychiatric: Memory normal. His mood appears anxious. He exhibits disordered thought content. He has a flat affect. Baseline mood and affect unchanged from normal.    Nursing note and vitals reviewed. Subjective: Allergies   Allergen Reactions    Toprol Xl [Metoprolol Succinate] Diarrhea    Codeine Other (comments)    Sulfa (Sulfonamide Antibiotics) Hives     Prior to Admission medications    Medication Sig Start Date End Date Taking?  Authorizing Provider   ezetimibe (ZETIA) 10 mg tablet Take 1 Tab by mouth every evening. Indications: excessive fat in the blood, high cholesterol and high triglycerides 10/22/19  Yes Leander Rosas NP   fluticasone (FLONASE SENSIMIST) 27.5 mcg/actuation nasal spray 2 Sprays by Both Nostrils route daily. Indications: inflammation of the nose due to an allergy 10/22/19  Yes Leander Rosas NP   Methylcellulose, with Sugar, (CITRUCEL, SUCROSE,) powd Take  by mouth. Yes Provider, Historical   acetaminophen (TYLENOL) 325 mg tablet Take  by mouth every four (4) hours as needed for Pain. Yes Provider, Historical   levothyroxine (SYNTHROID) 25 mcg tablet Take 1.5 tablets daily, then 2 tablets every Monday, Wednesday, Friday. Indications: hypothyroidism 9/16/19  Yes Leander Rosas NP   finasteride (PROSCAR) 5 mg tablet TAKE 1 TABLET BY MOUTH  NIGHTLY FOR BENIGN  PROSTATIC HYPERPLASIA WITH  LOWER URINARY TRACT  SYMPTOMS 8/26/19  Yes Leander Rosas NP   hydrocortisone (HYTONE) 2.5 % topical cream APPLY CREAM THREE TIMES DAILY TO ECZEMA ON FACE 5/21/19  Yes Provider, Historical   simvastatin (ZOCOR) 40 mg tablet Take 1 Tab by mouth nightly. Indications: high amount of triglyceride in the blood, combined high blood cholesterol and triglyceride level 4/23/19  Yes Leander Rosas NP   vit C,L-Aa-kongf-lutein-zeaxan (PRESERVISION AREDS-2) [phone] mg-unit-mg-mg cap capsule Take  by mouth. Yes Provider, Historical   metroNIDAZOLE (METROGEL) 1 % topical gel Use a thin layer to affected areas after washing 2/8/19  Yes Leander Rosas NP   biotin 10,000 mcg cap Take 2 Caps by mouth daily. 2/8/19  Yes Leander Rosas NP   omeprazole (PRILOSEC) 40 mg capsule TAKE 1 CAPSULE BY MOUTH  DAILY FOR GASTROESOPHAGEAL  REFLUX DISEASE 2/4/19  Yes Leander Rosas NP   diazePAM (VALIUM) 5 mg tablet Take 1 Tab by mouth every six (6) hours as needed. Max Daily Amount: 20 mg.  Indications: Muscle Spasm 6/12/18  Yes Leander Rosas NP   aspirin delayed-release 81 mg tablet Take 81 mg by mouth daily. 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 ([facility] [address] 10/06/2016    Bilateral thumb pain 02/01/2017    CAD (coronary artery disease)     Cardiac murmur 10/06/2016    Cervical spondylolysis     Folliculitis 09/29/5026    GERD (gastroesophageal reflux disease)     Hypercholesteremia     Hypothyroid     Left hip pain 02/01/2017    Lumbar radiculitis     Lumbar radiculitis     Lumbar spondylitis (HCC)     Meniere disease     Mixed hyperlipidemia     Occlusion and stenosis of unspecified carotid artery     Osteoarthritis 02/19/2018    hands    Osteopenia of both hands 02/18/2018    Polyp of cecum 08/02/2017    5mm polyp in cecum, 8 mm polyp in sigmoid colon    [facility] 02/01/2017     Past Surgical History:   Procedure Laterality Date    CARDIAC SURG PROCEDURE UNLIST  04/2008    HX CATARACT REMOVAL Bilateral     HX CHOLECYSTECTOMY  1994    HX COLONOSCOPY  12/19/2012    tubular adenoma    HX COLONOSCOPY  08/02/2017    tubular adenomas    HX CORONARY ARTERY BYPASS GRAFT  1996    HX CORONARY ARTERY BYPASS GRAFT  1996    HX CORONARY STENT PLACEMENT      HX KNEE ARTHROSCOPY Left 2005    menisectomy    HX ROTATOR CUFF REPAIR Left     HX THYROIDECTOMY  1975    HX TONSIL AND ADENOIDECTOMY        Social History     Tobacco Use    Smoking status: Former Smoker    Smokeless tobacco: Never Used   Substance Use Topics    Alcohol use:  Yes     Alcohol/week: 7.0 standard drinks     Types: 7 Glasses of wine per week    Drug use: Never      Family History   Problem Relation Age of Onset    No Known Problems Mother     No Known Problems Father     No Known Problems Brother      Functional Assessment:   ADL FUNCTIONALITY:  ADL Assessment 4/16/2019   Feeding yourself No Help Needed   Getting from bed to chair No Help Needed   Getting dressed No Help Needed   Bathing or showering No Help Needed   Walk across the room (includes cane/walker) No Help Needed   Using the telphone No Help Needed   Taking your medications No Help Needed   Preparing meals No Help Needed   Managing money (expenses/bills) No Help Needed   Moderately strenuous housework (laundry) No Help Needed   Shopping for personal items (toiletries/medicines) No Help Needed   Shopping for groceries No Help Needed   Driving No Help Needed   Climbing a flight of stairs No Help Needed   Getting to places beyond walking distances No Help Needed     DEPRESSION SCREENING:   3 most recent PHQ Screens 10/22/2019   Little interest or pleasure in doing things Not at all   Feeling down, depressed, irritable, or hopeless Not at all   Total Score PHQ 2 0   Trouble falling or staying asleep, or sleeping too much -   Feeling tired or having little energy -   Poor appetite, weight loss, or overeating -   Feeling bad about yourself - or that you are a failure or have let yourself or your family down -   Trouble concentrating on things such as school, work, reading, or watching TV -   Moving or speaking so slowly that other people could have noticed; or the opposite being so fidgety that others notice -   Thoughts of being better off dead, or hurting yourself in some way -   PHQ 9 Score -   How difficult have these problems made it for you to do your work, take care of your home and get along with others -      [address] Exam 4/16/2019 4/13/2018   What is the Year 1 1   What is the Season 1 1   What is the Date 1 1   What is the Day 1 1   What is the Month 1 1   Where are we State 1 1   Where are we Country 1 1   Where are we [place] [facility] 1 1   Where are we Floor 1 1   Name three objects, then ask the patient to say them 3 3   Serial sevens Subtract 7 from 100 in increments 2 2   Ask for the three objects repeated above 2 3   Name a pencil 1 1   Name a watch 1 1   Have the patient repeat this phrase \"No ifs, ands, or buts\" 1 1   Three stage command: Take the paper in your right hand 1 1   Fold the paper in half 1 1   Put the paper on the floor 1 1   Read and obey the following: CLOSE YOUR EYES 1 1   Have the patient write a sentence 1 1   Have the patient copy a figure 1 1   Mini Mental Score 26 27   Some recent data might be hidden     FALL RISK:   Fall Risk Assessment, last 12 mths 10/22/2019   Able to walk? Yes   Fall in past 12 months? No      ABUSE SCREEN:   Abuse Screening Questionnaire 4/16/2019   Do you ever feel afraid of your partner? N   Are you in a relationship with someone who physically or mentally threatens you? N   Is it safe for you to go home? Y      Advance Care Planning 4/16/2019   Patient's Healthcare Decision Maker is: Named in scanned ACP document   Primary Decision Maker Name -   Primary Decision Maker Phone Number -   Primary Decision Maker Relationship to Patient -   Confirm Advance Directive Yes, on file   Does the patient have other document types Do Not Resuscitate     CHANGES IN TREATMENT:    The following treatment modalities have been discontinued by the provider today:   There are no discontinued medications. MOST RECENT LABORATORY RESULTS:      Admission on 10/22/2019, Discharged on 10/22/2019   Component Date Value Ref Range Status    SAMPLES BEING HELD 10/22/2019 1RED 1BLUE 1LAV 1GRN   Final    COMMENT 10/22/2019 Add-on orders for these samples will be processed based on acceptable specimen integrity and analyte stability, which may vary by analyte. Final    WBC 10/22/2019 5.6  4.1 - 11.1 K/uL Final    RBC 10/22/2019 4.70  4. 10 - 5.70 M/uL Final    HGB 10/22/2019 14.7  12.1 - 17.0 g/dL Final    HCT 10/22/2019 42.7  36.6 - 50.3 % Final    MCV 10/22/2019 90.9  80.0 - 99.0 FL Final    MCH 10/22/2019 31.3  26.0 - 34.0 PG Final    MCHC 10/22/2019 34.4  30.0 - 36.5 g/dL Final    RDW 10/22/2019 13.3  11.5 - 14.5 % Final    PLATELET 10/22/2019 152  150 - 400 K/uL Final    MPV 10/22/2019 10.2  8.9 - 12.9 FL Final    NRBC 10/22/2019 0.0  0  WBC Final    ABSOLUTE NRBC 10/22/2019 0.00  0.00 - 0.01 K/uL Final    NEUTROPHILS 10/22/2019 56  32 - 75 % Final    LYMPHOCYTES 10/22/2019 29  12 - 49 % Final    MONOCYTES 10/22/2019 12  5 - 13 % Final    EOSINOPHILS 10/22/2019 2  0 - 7 % Final    BASOPHILS 10/22/2019 1  0 - 1 % Final    IMMATURE GRANULOCYTES 10/22/2019 0  0.0 - 0.5 % Final    ABS. NEUTROPHILS 10/22/2019 3.1  1.8 - 8.0 K/UL Final    ABS. LYMPHOCYTES 10/22/2019 1.6  0.8 - 3.5 K/UL Final    ABS. MONOCYTES 10/22/2019 0.6  0.0 - 1.0 K/UL Final    ABS. EOSINOPHILS 10/22/2019 0.1  0.0 - 0.4 K/UL Final    ABS. BASOPHILS 10/22/2019 0.1  0.0 - 0.1 K/UL Final    ABS. IMM. GRANS. 10/22/2019 0.0  0.00 - 0.04 K/UL Final    DF 10/22/2019 AUTOMATED    Final    Sodium 10/22/2019 140  136 - 145 mmol/L Final    Potassium 10/22/2019 4.0  3.5 - 5.1 mmol/L Final    Chloride 10/22/2019 105  97 - 108 mmol/L Final    CO2 10/22/2019 25  21 - 32 mmol/L Final    Anion gap 10/22/2019 10  5 - 15 mmol/L Final    Glucose 10/22/2019 97  65 - 100 mg/dL Final    BUN 10/22/2019 15  6 - 20 MG/DL Final    Creatinine 10/22/2019 1.08  0.70 - 1.30 MG/DL Final    BUN/Creatinine ratio 10/22/2019 14  12 - 20   Final    GFR est AA 10/22/2019 >60  >60 ml/min/1.73m2 Final    GFR est non-AA 10/22/2019 >60  >60 ml/min/1.73m2 Final    Comment: Estimated GFR is calculated using the IDMS-traceable Modification of Diet in Renal Disease (MDRD) Study equation, reported for both  Americans (GFRAA) and non- Americans (GFRNA), and normalized to 1.73m2 body surface area. The physician must decide which value applies to the patient. The MDRD study equation should only be used in individuals age 25 or older.  It has not been validated for the following: pregnant women, patients with serious comorbid conditions, or on certain medications, or persons with extremes of body size, muscle mass, or nutritional status.  Calcium 10/22/2019 9.1  8.5 - 10.1 MG/DL Final    Troponin-I, Qt. 10/22/2019 <0.05  <0.05 ng/mL Final    Comment: The presence of detectable troponin above the reference range indicates myocardial injury which may be due to ischemia, myocarditis, trauma, etc.  Clinical correlation is necessary to establish the significance of this finding. Sequential testing is recommended to determine if the typical rise and fall of cTnI is demonstrated. Note:  Cardiac troponin I has a relatively long half life and may be present well after the CK MB has returned to baseline. The reference range is based on the 99th percentile of the referent population.  Ventricular Rate 10/22/2019 54  BPM Final    Atrial Rate 10/22/2019 54  BPM Final    P-R Interval 10/22/2019 178  ms Final    QRS Duration 10/22/2019 78  ms Final    Q-T Interval 10/22/2019 390  ms Final    QTC Calculation (Bezet) 10/22/2019 369  ms Final    Calculated P Axis 10/22/2019 39  degrees Final    Calculated R Axis 10/22/2019 24  degrees Final    Calculated T Axis 10/22/2019 54  degrees Final    Diagnosis 10/22/2019    Final                    Value:Sinus bradycardia  When compared with ECG of 15-FEB-2017 17:16,  No significant change was found  Confirmed by Jason Rubio M.D., Keshia Jamison ([id number]) on 10/23/2019 3:03:31 AM       Results for orders placed or performed during the hospital encounter of 10/22/19   SAMPLES BEING HELD   Result Value Ref Range    SAMPLES BEING HELD 1RED 1BLUE 1LAV 1GRN     COMMENT        Add-on orders for these samples will be processed based on acceptable specimen integrity and analyte stability, which may vary by analyte. CBC WITH AUTOMATED DIFF   Result Value Ref Range    WBC 5.6 4.1 - 11.1 K/uL    RBC 4.70 4. 10 - 5.70 M/uL    HGB 14.7 12.1 - 17.0 g/dL    HCT 42.7 36.6 - 50.3 %    MCV 90.9 80.0 - 99.0 FL    MCH 31.3 26.0 - 34.0 PG    MCHC 34.4 30.0 - 36.5 g/dL RDW 13.3 11.5 - 14.5 %    PLATELET 659 988 - 627 K/uL    MPV 10.2 8.9 - 12.9 FL    NRBC 0.0 0  WBC    ABSOLUTE NRBC 0.00 0.00 - 0.01 K/uL    NEUTROPHILS 56 32 - 75 %    LYMPHOCYTES 29 12 - 49 %    MONOCYTES 12 5 - 13 %    EOSINOPHILS 2 0 - 7 %    BASOPHILS 1 0 - 1 %    IMMATURE GRANULOCYTES 0 0.0 - 0.5 %    ABS. NEUTROPHILS 3.1 1.8 - 8.0 K/UL    ABS. LYMPHOCYTES 1.6 0.8 - 3.5 K/UL    ABS. MONOCYTES 0.6 0.0 - 1.0 K/UL    ABS. EOSINOPHILS 0.1 0.0 - 0.4 K/UL    ABS. BASOPHILS 0.1 0.0 - 0.1 K/UL    ABS. IMM. GRANS. 0.0 0.00 - 0.04 K/UL    DF AUTOMATED     METABOLIC PANEL, BASIC   Result Value Ref Range    Sodium 140 136 - 145 mmol/L    Potassium 4.0 3.5 - 5.1 mmol/L    Chloride 105 97 - 108 mmol/L    CO2 25 21 - 32 mmol/L    Anion gap 10 5 - 15 mmol/L    Glucose 97 65 - 100 mg/dL    BUN 15 6 - 20 MG/DL    Creatinine 1.08 0.70 - 1.30 MG/DL    BUN/Creatinine ratio 14 12 - 20      GFR est AA >60 >60 ml/min/1.73m2    GFR est non-AA >60 >60 ml/min/1.73m2    Calcium 9.1 8.5 - 10.1 MG/DL   TROPONIN I   Result Value Ref Range    Troponin-I, Qt. <0.05 <0.05 ng/mL   EKG, 12 LEAD, INITIAL   Result Value Ref Range    Ventricular Rate 54 BPM    Atrial Rate 54 BPM    P-R Interval 178 ms    QRS Duration 78 ms    Q-T Interval 390 ms    QTC Calculation (Bezet) 369 ms    Calculated P Axis 39 degrees    Calculated R Axis 24 degrees    Calculated T Axis 54 degrees    Diagnosis       Sinus bradycardia  When compared with ECG of 15-FEB-2017 17:16,  No significant change was found  Confirmed by Laura Hull M.D., Susanne Garcia ([id number]) on 10/23/2019 3:03:31 AM        Disclaimer:   Mr. Flavio Buchanan has been advised to call or return to our office if symptoms worsen/change/persist. We as a care team including the patient; discussed expected course/resolution/complications of diagnosis in detail today.      Medication risks/benefits/costs/interactions/alternatives discussed Flavio Buchanan was given an after visit summary which includes diagnoses, current medications, & vitals. Luna Harrington expressed understanding with the diagnosis and plan.

## 2019-10-25 NOTE — PATIENT INSTRUCTIONS
Musculoskeletal Chest Pain: Care Instructions Your Care Instructions Chest pain is not always a sign that something is wrong with your heart or that you have another serious problem. The doctor thinks your chest pain is caused by strained muscles or ligaments, inflamed chest cartilage, or another problem in your chest, rather than by your heart. You may need more tests to find the cause of your chest pain. Follow-up care is a key part of your treatment and safety. Be sure to make and go to all appointments, and call your doctor if you are having problems. It's also a good idea to know your test results and keep a list of the medicines you take. How can you care for yourself at home? · Take pain medicines exactly as directed. ? If the doctor gave you a prescription medicine for pain, take it as prescribed. ? If you are not taking a prescription pain medicine, ask your doctor if you can take an over-the-counter medicine. · Rest and protect the sore area. · Stop, change, or take a break from any activity that may be causing your pain or soreness. · Put ice or a cold pack on the sore area for 10 to 20 minutes at a time. Try to do this every 1 to 2 hours for the next 3 days (when you are awake) or until the swelling goes down. Put a thin cloth between the ice and your skin. · After 2 or 3 days, apply a heating pad set on low or a warm cloth to the area that hurts. Some doctors suggest that you go back and forth between hot and cold. · Do not wrap or tape your ribs for support. This may cause you to take smaller breaths, which could increase your risk of lung problems. · Mentholated creams such as Bengay or Icy Hot may soothe sore muscles. Follow the instructions on the package. · Follow your doctor's instructions for exercising. · Gentle stretching and massage may help you get better faster.  Stretch slowly to the point just before pain begins, and hold the stretch for at least 15 to 30 seconds. Do this 3 or 4 times a day. Stretch just after you have applied heat. · As your pain gets better, slowly return to your normal activities. Any increased pain may be a sign that you need to rest a while longer. When should you call for help? Call 911 anytime you think you may need emergency care. For example, call if: 
  · You have chest pain or pressure. This may occur with: ? Sweating. ? Shortness of breath. ? Nausea or vomiting. ? Pain that spreads from the chest to the neck, jaw, or one or both shoulders or arms. ? Dizziness or lightheadedness. ? A fast or uneven pulse. After calling 911, chew 1 adult-strength aspirin. Wait for an ambulance. Do not try to drive yourself.  
  · You have sudden chest pain and shortness of breath, or you cough up blood.  
 Call your doctor now or seek immediate medical care if: 
  · You have any trouble breathing.  
  · Your chest pain gets worse.  
  · Your chest pain occurs consistently with exercise and is relieved by rest.  
 Watch closely for changes in your health, and be sure to contact your doctor if: 
  · Your chest pain does not get better after 1 week. Where can you learn more? Go to http://sloan-george.info/. Enter V293 in the search box to learn more about \"Musculoskeletal Chest Pain: Care Instructions. \" Current as of: June 26, 2019 Content Version: 12.2 © 9581-9296 [employer]. Care instructions adapted under license by [employer] (which disclaims liability or warranty for this information). If you have questions about a medical condition or this instruction, always ask your healthcare professional. [place] [address] any warranty or liability for your use of this information.

## 2019-10-25 NOTE — PROGRESS NOTES
ADVISED PATIENT OF THE FOLLOWING HEALTH MAINTAINCE DUE  There are no preventive care reminders to display for this patient. Chief Complaint   Patient presents with    Results     Patient being seen for follow up with CXR and CT results. 1. Have you been to the ER, urgent care clinic since your last visit? Hospitalized since your last visit? Yes [facility], for CT    2. Have you seen or consulted any other health care providers outside of the [address] [place] [address] since your last visit? Include any DEXA scan, mammography  or colon screening. No    3. Do you have an Advance Directive on file? yes    4. Do you have a DNR on file? DNR    Patient is accompanied by self I have received verbal consent from Socorro Ape to discuss any/all medical information while they are present in the room. Advance Care Planning 4/16/2019   Patient's Healthcare Decision Maker is: Named in scanned ACP document   Primary Decision Maker Name -   Primary Decision Maker Phone Number -   Primary Decision Maker Relationship to Patient -   Confirm Advance Directive Yes, on file   Does the patient have other document types Do Not Resuscitate         [facility] [address]  [address] [address]  Phone: [phone] Fax: [facility], . [address] [facility]  [address]  [facility] [address]  Phone: [phone] Fax: [phone]    [employer] [id number] [address] [facility] [place] [address] [address] [address]  Phone: [phone] Fax: [phone]        Patient reminded during visit to bring all medication bottles, OTC medications to all appointments.

## 2019-10-25 NOTE — ACP (ADVANCE CARE PLANNING)
Advance Care Planning 4/16/2019   Patient's Healthcare Decision Maker is: Named in scanned ACP document   Primary Decision Maker Name -   Primary Decision Maker Phone Number -   Primary Decision Maker Relationship to Patient -   Confirm Advance Directive Yes, on file   Does the patient have other document types Do Not Resuscitate

## 2019-10-27 DIAGNOSIS — Z71.2 ENCOUNTER TO DISCUSS TEST RESULTS: ICD-10-CM

## 2019-10-27 DIAGNOSIS — Z79.899 MEDICATION MANAGEMENT: ICD-10-CM

## 2019-10-27 DIAGNOSIS — H90.6 MIXED CONDUCTIVE AND SENSORINEURAL HEARING LOSS OF BOTH EARS: Chronic | ICD-10-CM

## 2019-10-27 DIAGNOSIS — E03.9 ACQUIRED HYPOTHYROIDISM: ICD-10-CM

## 2019-10-27 DIAGNOSIS — E78.2 MIXED HYPERLIPIDEMIA: ICD-10-CM

## 2019-10-27 DIAGNOSIS — H65.111 NON-RECURRENT ACUTE ALLERGIC OTITIS MEDIA OF RIGHT EAR: ICD-10-CM

## 2019-10-27 DIAGNOSIS — N42.9 DISORDER OF PROSTATE: ICD-10-CM

## 2019-10-30 DIAGNOSIS — I25.83 CORONARY ATHEROSCLEROSIS DUE TO LIPID RICH PLAQUE: ICD-10-CM

## 2019-10-30 DIAGNOSIS — I25.10 CORONARY ATHEROSCLEROSIS DUE TO LIPID RICH PLAQUE: ICD-10-CM

## 2019-10-30 DIAGNOSIS — E78.5 HYPERLIPIDEMIA, UNSPECIFIED HYPERLIPIDEMIA TYPE: ICD-10-CM

## 2019-10-30 RX ORDER — SIMVASTATIN 40 MG/1
TABLET, FILM COATED ORAL
Qty: 90 TAB | Refills: 1 | Status: SHIPPED | OUTPATIENT
Start: 2019-10-30 | End: 2020-04-20 | Stop reason: SDUPTHER

## 2019-11-02 DIAGNOSIS — K21.00 GASTROESOPHAGEAL REFLUX DISEASE WITH ESOPHAGITIS: ICD-10-CM

## 2019-11-02 RX ORDER — OMEPRAZOLE 40 MG/1
CAPSULE, DELAYED RELEASE ORAL
Qty: 90 CAP | Refills: 2 | Status: SHIPPED | OUTPATIENT
Start: 2019-11-02

## 2019-11-04 DIAGNOSIS — E03.9 ACQUIRED HYPOTHYROIDISM: ICD-10-CM

## 2019-11-04 DIAGNOSIS — E89.0 POSTOPERATIVE HYPOTHYROIDISM: ICD-10-CM

## 2019-11-04 DIAGNOSIS — Z76.0 MEDICATION REFILL: ICD-10-CM

## 2019-11-04 RX ORDER — LEVOTHYROXINE SODIUM 25 UG/1
TABLET ORAL
Qty: 90 TAB | Refills: 0 | Status: SHIPPED | OUTPATIENT
Start: 2019-11-04 | End: 2019-11-27 | Stop reason: SDUPTHER

## 2019-11-27 DIAGNOSIS — Z76.0 MEDICATION REFILL: ICD-10-CM

## 2019-11-27 DIAGNOSIS — E03.9 ACQUIRED HYPOTHYROIDISM: ICD-10-CM

## 2019-11-27 DIAGNOSIS — E89.0 POSTOPERATIVE HYPOTHYROIDISM: ICD-10-CM

## 2019-11-27 RX ORDER — LEVOTHYROXINE SODIUM 25 UG/1
TABLET ORAL
Qty: 90 TAB | Refills: 0 | Status: SHIPPED | OUTPATIENT
Start: 2019-11-27 | End: 2020-01-06 | Stop reason: SDUPTHER

## 2019-12-31 ENCOUNTER — OFFICE VISIT (OUTPATIENT)
Dept: GERIATRIC MEDICINE | Age: 84
End: 2019-12-31

## 2019-12-31 VITALS
HEART RATE: 66 BPM | TEMPERATURE: 97.6 F | DIASTOLIC BLOOD PRESSURE: 66 MMHG | OXYGEN SATURATION: 98 % | SYSTOLIC BLOOD PRESSURE: 128 MMHG | WEIGHT: 162.6 LBS | BODY MASS INDEX: 27.09 KG/M2 | HEIGHT: 65 IN | RESPIRATION RATE: 20 BRPM

## 2019-12-31 DIAGNOSIS — R35.1 NOCTURIA: ICD-10-CM

## 2019-12-31 DIAGNOSIS — E78.5 HYPERLIPIDEMIA, UNSPECIFIED HYPERLIPIDEMIA TYPE: ICD-10-CM

## 2019-12-31 DIAGNOSIS — K21.00 GASTROESOPHAGEAL REFLUX DISEASE WITH ESOPHAGITIS: ICD-10-CM

## 2019-12-31 DIAGNOSIS — R09.81 COUGH WITH CONGESTION OF PARANASAL SINUS: Primary | ICD-10-CM

## 2019-12-31 DIAGNOSIS — I25.83 CORONARY ATHEROSCLEROSIS DUE TO LIPID RICH PLAQUE: ICD-10-CM

## 2019-12-31 DIAGNOSIS — I25.10 CORONARY ATHEROSCLEROSIS DUE TO LIPID RICH PLAQUE: ICD-10-CM

## 2019-12-31 DIAGNOSIS — E03.9 ACQUIRED HYPOTHYROIDISM: ICD-10-CM

## 2019-12-31 DIAGNOSIS — R19.7 DIARRHEA, UNSPECIFIED TYPE: ICD-10-CM

## 2019-12-31 DIAGNOSIS — R05.8 COUGH WITH CONGESTION OF PARANASAL SINUS: Primary | ICD-10-CM

## 2019-12-31 DIAGNOSIS — J34.89 RHINORRHEA: ICD-10-CM

## 2019-12-31 RX ORDER — DIAZEPAM 5 MG/1
5 TABLET ORAL
Qty: 30 TAB | Refills: 2 | Status: CANCELLED | OUTPATIENT
Start: 2019-12-31

## 2019-12-31 NOTE — PATIENT INSTRUCTIONS
Diarrhea: Care Instructions  Your Care Instructions    Diarrhea is loose, watery stools (bowel movements). The exact cause is often hard to find. Sometimes diarrhea is your body's way of getting rid of what caused an upset stomach. Viruses, food poisoning, and many medicines can cause diarrhea. Some people get diarrhea in response to emotional stress, anxiety, or certain foods. Almost everyone has diarrhea now and then. It usually isn't serious, and your stools will return to normal soon. The important thing to do is replace the fluids you have lost, so you can prevent dehydration. The doctor has checked you carefully, but problems can develop later. If you notice any problems or new symptoms, get medical treatment right away. Follow-up care is a key part of your treatment and safety. Be sure to make and go to all appointments, and call your doctor if you are having problems. It's also a good idea to know your test results and keep a list of the medicines you take. How can you care for yourself at home? · Watch for signs of dehydration, which means your body has lost too much water. Dehydration is a serious condition and should be treated right away. Signs of dehydration are:  ? Increasing thirst and dry eyes and mouth. ? Feeling faint or lightheaded. ? A smaller amount of urine than normal.  · To prevent dehydration, drink plenty of fluids. Choose water and other caffeine-free clear liquids until you feel better. If you have kidney, heart, or liver disease and have to limit fluids, talk with your doctor before you increase the amount of fluids you drink. · Begin eating small amounts of mild foods the next day, if you feel like it. ? Try yogurt that has live cultures of Lactobacillus. (Check the label.)  ? Avoid spicy foods, fruits, alcohol, and caffeine until 48 hours after all symptoms are gone. ? Avoid chewing gum that contains sorbitol. ?  Avoid dairy products (except for yogurt with Lactobacillus) while you have diarrhea and for 3 days after symptoms are gone. · The doctor may recommend that you take over-the-counter medicine, such as loperamide (Imodium), if you still have diarrhea after 6 hours. Read and follow all instructions on the label. Do not use this medicine if you have bloody diarrhea, a high fever, or other signs of serious illness. Call your doctor if you think you are having a problem with your medicine. When should you call for help? Call 911 anytime you think you may need emergency care. For example, call if:    · You passed out (lost consciousness).     · Your stools are maroon or very bloody.    Call your doctor now or seek immediate medical care if:    · You are dizzy or lightheaded, or you feel like you may faint.     · Your stools are black and look like tar, or they have streaks of blood.     · You have new or worse belly pain.     · You have symptoms of dehydration, such as:  ? Dry eyes and a dry mouth. ? Passing only a little dark urine. ? Feeling thirstier than usual.     · You have a new or higher fever.    Watch closely for changes in your health, and be sure to contact your doctor if:    · Your diarrhea is getting worse.     · You see pus in the diarrhea.     · You are not getting better after 2 days (48 hours). Where can you learn more? Go to http://sloan-george.info/. Enter Y960 in the search box to learn more about \"Diarrhea: Care Instructions. \"  Current as of: June 26, 2019  Content Version: 12.2  © 4885-5212 [employer]. Care instructions adapted under license by [employer] (which disclaims liability or warranty for this information). If you have questions about a medical condition or this instruction, always ask your healthcare professional. [address] any warranty or liability for your use of this information.          Angina: Care Instructions  Your Care Instructions    You have a problem called angina. Angina happens when there is not enough blood flow to your heart muscle. Angina is a sign of coronary artery disease (CAD). CAD occurs when blood vessels that supply the heart become narrowed. Having CAD increases your risk of a heart attack. Chest pain or pressure is the most common symptom of angina. But some people have other symptoms, like:  · Pain, pressure, or a strange feeling in the back, neck, jaw, or upper belly, or in one or both shoulders or arms. · Shortness of breath. · Nausea or vomiting. · Lightheadedness or sudden weakness. · Fast or irregular heartbeat. Women are somewhat more likely than men to have angina symptoms like shortness of breath, nausea, and back or jaw pain. Angina can be dangerous. That's why it is important to pay attention to your symptoms. Know what is typical for you, learn how to control your symptoms, and understand when you need to get treatment. A change in your usual pattern of symptoms is an emergency. It may mean that you are having a heart attack. The doctor has checked you carefully, but problems can develop later. If you notice any problems or new symptoms, get medical treatment right away. Follow-up care is a key part of your treatment and safety. Be sure to make and go to all appointments, and call your doctor if you are having problems. It's also a good idea to know your test results and keep a list of the medicines you take. How can you care for yourself at home? Medicines    · If your doctor has given you nitroglycerin for angina symptoms, keep it with you at all times. If you have symptoms, sit down and rest, and take the first dose of nitroglycerin as directed. If your symptoms get worse or are not getting better within 5 minutes, call 911 right away. Stay on the phone.  The emergency  will give you further instructions.     · If your doctor advises it, take 1 low-dose aspirin a day to prevent heart attack.     · Be safe with medicines. Take your medicines exactly as prescribed. Call your doctor if you think you are having a problem with your medicine. You will get more details on the specific medicines your doctor prescribes.    Lifestyle changes    · Do not smoke. If you need help quitting, talk to your doctor about stop-smoking programs and medicines. These can increase your chances of quitting for good.     · Eat a heart-healthy diet that is low in saturated fat and salt, and is high in fiber. Talk to your doctor or a dietitian about healthy eating.     · Stay at a healthy weight. Or lose weight if you need to. Activity    · Talk to your doctor about a level of activity that is safe for you.     · If an activity causes angina symptoms, stop and rest.   When should you call for help? Call 911 anytime you think you may need emergency care. For example, call if:    · You passed out (lost consciousness).     · You have symptoms of a heart attack. These may include:  ? Chest pain or pressure, or a strange feeling in the chest.  ? Sweating. ? Shortness of breath. ? Nausea or vomiting. ? Pain, pressure, or a strange feeling in the back, neck, jaw, or upper belly or in one or both shoulders or arms. ? Lightheadedness or sudden weakness. ? A fast or irregular heartbeat. After you call 911, the  may tell you to chew 1 adult-strength or 2 to 4 low-dose aspirin. Wait for an ambulance. Do not try to drive yourself.     · You have angina symptoms that do not go away with rest or are not getting better within 5 minutes after you take a dose of nitroglycerin.    Call your doctor now or seek immediate medical care if:    · You are having angina symptoms more often than usual, or they are different or worse than usual.     · You feel dizzy or lightheaded, or you feel like you may faint.    Watch closely for changes in your health, and be sure to contact your doctor if you have any problems. Where can you learn more?   Go to http://sloan-george.info/. Enter H129 in the search box to learn more about \"Angina: Care Instructions. \"  Current as of: April 9, 2019  Content Version: 12.2  © 0511-9681 [employer]. Care instructions adapted under license by [employer] (which disclaims liability or warranty for this information). If you have questions about a medical condition or this instruction, always ask your healthcare professional. [address] any warranty or liability for your use of this information. Anxiety Disorder: Care Instructions  Your Care Instructions    Anxiety is a normal reaction to stress. Difficult situations can cause you to have symptoms such as sweaty palms and a nervous feeling. In an anxiety disorder, the symptoms are far more severe. Constant worry, muscle tension, trouble sleeping, nausea and diarrhea, and other symptoms can make normal daily activities difficult or impossible. These symptoms may occur for no reason, and they can affect your work, school, or social life. Medicines, counseling, and self-care can all help. Follow-up care is a key part of your treatment and safety. Be sure to make and go to all appointments, and call your doctor if you are having problems. It's also a good idea to know your test results and keep a list of the medicines you take. How can you care for yourself at home? · Take medicines exactly as directed. Call your doctor if you think you are having a problem with your medicine. · Go to your counseling sessions and follow-up appointments. · Recognize and accept your anxiety. Then, when you are in a situation that makes you anxious, say to yourself, \"This is not an emergency. I feel uncomfortable, but I am not in danger. I can keep going even if I feel anxious. \"  · Be kind to your body:  ? Relieve tension with exercise or a massage. ? Get enough rest.  ? Avoid alcohol, caffeine, nicotine, and illegal drugs. They can increase your anxiety level and cause sleep problems. ? Learn and do relaxation techniques. See below for more about these techniques. · Engage your mind. Get out and do something you enjoy. Go to a funny movie, or take a walk or hike. Plan your day. Having too much or too little to do can make you anxious. · Keep a record of your symptoms. Discuss your fears with a good friend or family member, or join a support group for people with similar problems. Talking to others sometimes relieves stress. · Get involved in social groups, or volunteer to help others. Being alone sometimes makes things seem worse than they are. · Get at least 30 minutes of exercise on most days of the week to relieve stress. Walking is a good choice. You also may want to do other activities, such as running, swimming, cycling, or playing tennis or team sports. Relaxation techniques  Do relaxation exercises 10 to 20 minutes a day. You can play soothing, relaxing music while you do them, if you wish. · Tell others in your house that you are going to do your relaxation exercises. Ask them not to disturb you. · Find a comfortable place, away from all distractions and noise. · Lie down on your back, or sit with your back straight. · Focus on your breathing. Make it slow and steady. · Breathe in through your nose. Breathe out through either your nose or mouth. · Breathe deeply, filling up the area between your navel and your rib cage. Breathe so that your belly goes up and down. · Do not hold your breath. · Breathe like this for 5 to 10 minutes. Notice the feeling of calmness throughout your whole body. As you continue to breathe slowly and deeply, relax by doing the following for another 5 to 10 minutes:  · Tighten and relax each muscle group in your body. You can begin at your toes and work your way up to your head. · Imagine your muscle groups relaxing and becoming heavy. · Empty your mind of all thoughts.   · Let yourself relax more and more deeply. · Become aware of the state of calmness that surrounds you. · When your relaxation time is over, you can bring yourself back to alertness by moving your fingers and toes and then your hands and feet and then stretching and moving your entire body. Sometimes people fall asleep during relaxation, but they usually wake up shortly afterward. · Always give yourself time to return to full alertness before you drive a car or do anything that might cause an accident if you are not fully alert. Never play a relaxation tape while you drive a car. When should you call for help? Call 911 anytime you think you may need emergency care. For example, call if:    · You feel you cannot stop from hurting yourself or someone else.   Claribel Sánchez the numbers for these national suicide hotlines: 0-162-785-TALK (5-356.425.2082) and 5-845-PVJYTFP (5-933.493.8858). If you or someone you know talks about suicide or feeling hopeless, get help right away.   Watch closely for changes in your health, and be sure to contact your doctor if:    · You have anxiety or fear that affects your life.     · You have symptoms of anxiety that are new or different from those you had before. Where can you learn more? Go to http://sloan-george.info/. Enter P754 in the search box to learn more about \"Anxiety Disorder: Care Instructions. \"  Current as of: May 28, 2019  Content Version: 12.2  © 2448-8962 [employer]. Care instructions adapted under license by [employer] (which disclaims liability or warranty for this information). If you have questions about a medical condition or this instruction, always ask your healthcare professional. [place] [address] any warranty or liability for your use of this information. Viral Respiratory Infection: Care Instructions  Your Care Instructions    Viruses are very small organisms.  They grow in number after they enter your body. There are many types that cause different illnesses, such as colds and the mumps. The symptoms of a viral respiratory infection often start quickly. They include a fever, sore throat, and runny nose. You may also just not feel well. Or you may not want to eat much. Most viral respiratory infections are not serious. They usually get better with time and self-care. Antibiotics are not used to treat a viral infection. That's because antibiotics will not help cure a viral illness. In some cases, antiviral medicine can help your body fight a serious viral infection. Follow-up care is a key part of your treatment and safety. Be sure to make and go to all appointments, and call your doctor if you are having problems. It's also a good idea to know your test results and keep a list of the medicines you take. How can you care for yourself at home? · Rest as much as possible until you feel better. · Be safe with medicines. Take your medicine exactly as prescribed. Call your doctor if you think you are having a problem with your medicine. You will get more details on the specific medicine your doctor prescribes. · Take an over-the-counter pain medicine, such as acetaminophen (Tylenol), ibuprofen (Advil, Motrin), or naproxen (Aleve), as needed for pain and fever. Read and follow all instructions on the label. Do not give aspirin to anyone younger than 20. It has been linked to Reye syndrome, a serious illness. · Drink plenty of fluids, enough so that your urine is light yellow or clear like water. Hot fluids, such as tea or soup, may help relieve congestion in your nose and throat. If you have kidney, heart, or liver disease and have to limit fluids, talk with your doctor before you increase the amount of fluids you drink. · Try to clear mucus from your lungs by breathing deeply and coughing. · Gargle with warm salt water once an hour. This can help reduce swelling and throat pain.  Use 1 teaspoon of salt mixed in 1 cup of warm water. · Do not smoke or allow others to smoke around you. If you need help quitting, talk to your doctor about stop-smoking programs and medicines. These can increase your chances of quitting for good. To avoid spreading the virus  · Cough or sneeze into a tissue. Then throw the tissue away. · If you don't have a tissue, use your hand to cover your cough or sneeze. Then clean your hand. You can also cough into your sleeve. · Wash your hands often. Use soap and warm water. Wash for 15 to 20 seconds each time. · If you don't have soap and water near you, you can clean your hands with alcohol wipes or gel. When should you call for help? Call your doctor now or seek immediate medical care if:    · You have a new or higher fever.     · Your fever lasts more than 48 hours.     · You have trouble breathing.     · You have a fever with a stiff neck or a severe headache.     · You are sensitive to light.     · You feel very sleepy or confused.    Watch closely for changes in your health, and be sure to contact your doctor if:    · You do not get better as expected. Where can you learn more? Go to http://sloan-george.info/. Enter E011 in the search box to learn more about \"Viral Respiratory Infection: Care Instructions. \"  Current as of: June 9, 2019  Content Version: 12.2  © 5411-8129 [employer]. Care instructions adapted under license by [employer] (which disclaims liability or warranty for this information). If you have questions about a medical condition or this instruction, always ask your healthcare professional. [place] [address] any warranty or liability for your use of this information.

## 2019-12-31 NOTE — PROGRESS NOTES
HISTORY OF PRESENT ILLNESS  Aracelis Raines is a 80 y.o. male. HPI  Patient reports last Friday notice coughing and runny nose cold symptom. Reports also had 2 loose stools yesterday and 1 today semi soft brown denies any stomach pain reports decrease in appetite. Denies any nausea or emesis. Reports difficulty sleeping at night due to coughing. Denies any chest pain, palpitation, sob or wheezing. Review of Systems   Constitutional: Negative for chills, diaphoresis, fever, malaise/fatigue and weight loss. HENT: Positive for sore throat. Negative for congestion, ear discharge, ear pain, hearing loss, nosebleeds, sinus pain and tinnitus. Eyes: Negative for blurred vision, double vision, photophobia, pain, discharge and redness. Respiratory: Positive for cough. Negative for hemoptysis, sputum production, shortness of breath, wheezing and stridor. Cardiovascular: Negative for chest pain, palpitations, orthopnea, claudication, leg swelling and PND. Gastrointestinal: Negative for abdominal pain, blood in stool, constipation, diarrhea, heartburn, melena, nausea and vomiting. Genitourinary: Negative for dysuria, flank pain, frequency, hematuria and urgency. Musculoskeletal: Negative for back pain, falls, joint pain, myalgias and neck pain. Skin: Negative for itching and rash. Neurological: Negative for dizziness, tingling, tremors, sensory change, speech change, focal weakness, seizures, loss of consciousness, weakness and headaches. Endo/Heme/Allergies: Negative for environmental allergies and polydipsia. Does not bruise/bleed easily. Psychiatric/Behavioral: Negative for depression, hallucinations, memory loss, substance abuse and suicidal ideas. The patient has insomnia. The patient is not nervous/anxious. Reports feeling down due to cold symptoms       Physical Exam  Constitutional:       General: He is not in acute distress. Appearance: Normal appearance. He is normal weight.  He is not ill-appearing, toxic-appearing or diaphoretic. HENT:      Head: Normocephalic and atraumatic. Right Ear: Tympanic membrane, ear canal and external ear normal.      Left Ear: Tympanic membrane, ear canal and external ear normal.      Nose: Congestion and rhinorrhea present. Mouth/Throat:      Mouth: Mucous membranes are moist.      Pharynx: No oropharyngeal exudate or posterior oropharyngeal erythema. Eyes:      General: No scleral icterus. Right eye: No discharge. Left eye: No discharge. Extraocular Movements: Extraocular movements intact. Conjunctiva/sclera: Conjunctivae normal.      Pupils: Pupils are equal, round, and reactive to light. Neck:      Musculoskeletal: No neck rigidity or muscular tenderness. Vascular: No carotid bruit. Cardiovascular:      Rate and Rhythm: Normal rate and regular rhythm. Pulses: Normal pulses. Heart sounds: Normal heart sounds. No murmur. No friction rub. No gallop. Pulmonary:      Effort: Pulmonary effort is normal. No respiratory distress. Breath sounds: Normal breath sounds. No stridor. No wheezing, rhonchi or rales. Chest:      Chest wall: No tenderness. Abdominal:      General: Abdomen is flat. Bowel sounds are normal. There is no distension. Palpations: Abdomen is soft. There is no mass. Tenderness: There is no tenderness. There is no left CVA tenderness, guarding or rebound. Hernia: No hernia is present. Musculoskeletal:         General: No swelling, tenderness, deformity or signs of injury. Right lower leg: No edema. Left lower leg: No edema. Lymphadenopathy:      Cervical: No cervical adenopathy. Skin:     Capillary Refill: Capillary refill takes less than 2 seconds. Coloration: Skin is not jaundiced or pale. Findings: No bruising, erythema, lesion or rash. Neurological:      General: No focal deficit present.       Mental Status: He is alert and oriented to person, place, and time. Cranial Nerves: No cranial nerve deficit. Sensory: No sensory deficit. Motor: No weakness. Coordination: Coordination normal.      Gait: Gait normal.      Deep Tendon Reflexes: Reflexes normal.   Psychiatric:         Mood and Affect: Mood normal.         Behavior: Behavior normal.         Thought Content: Thought content normal.         Judgment: Judgment normal.         ASSESSMENT and PLAN    ICD-10-CM ICD-9-CM    1. Cough with congestion of paranasal sinus R05 786.2     R09.81 478.19    2. Nocturia R35.1 788.43    3. Rhinorrhea J34.89 478.19    4. Gastroesophageal reflux disease with esophagitis K21.0 530.11    5. Coronary atherosclerosis due to lipid rich plaque I25.10 414.3     I25.83     6. Acquired hypothyroidism E03.9 244.9    7. Hyperlipidemia, unspecified hyperlipidemia type E78.5 272.4    plan  1. Cough/congestion-will start mucinex , patient reports will  mucinex from over the counter  And will take cepacol for sore throat. 2. Nocturia- patient reports difficulty sleeping at night due to cold symptoms on valium for anxiety  3. Rhinorrhea- will start flonase 50 mcg 2 sprays in each nares daily x 1 week. Patient reports have flonase at home will use.   Diarrhea-will monitor patient had 1 semi loose stool today educated to call and report if loose stools continues, encouraged to drink fluids   Will continue all other treatment plans and patient to follow up in 1 week

## 2019-12-31 NOTE — PROGRESS NOTES
ADVISED PATIENT OF THE FOLLOWING HEALTH MAINTAINCE DUE  There are no preventive care reminders to display for this patient. Chief Complaint   Patient presents with    Cough     Patient being seen for cold symptoms, cough, runny nose, sneezing, he reports diarrhea and fever. No fever now. He reports symptoms started last Friday. 1. Have you been to the ER, urgent care clinic since your last visit? Hospitalized since your last visit? No    2. Have you seen or consulted any other health care providers outside of the [address] [place] [address] since your last visit? Include any DEXA scan, mammography  or colon screening. No    3. Do you have an Advance Directive on file? yes    4. Do you have a DNR on file? DNR    Patient is accompanied by self I have received verbal consent from Ramsey Brenner to discuss any/all medical information while they are present in the room. Advance Care Planning 4/16/2019   Patient's Healthcare Decision Maker is: Named in scanned ACP document   Primary Decision Maker Name -   Primary Decision Maker Phone Number -   Primary Decision Maker Relationship to Patient -   Confirm Advance Directive Yes, on file   Does the patient have other document types Do Not Resuscitate         [facility] [address]  219 [facility] 600 [facility] [address]  Phone: [phone] Fax: 1200 [facility], . [address] [facility]  [address]  [facility] [address]  Phone: [phone] Fax: [phone]    [id number] [address] [place] [address] [address]  [facility] [address] [address]  Phone: [phone] Fax: [phone]        Patient reminded during visit to bring all medication bottles, OTC medications to all appointments.

## 2020-01-06 ENCOUNTER — OFFICE VISIT (OUTPATIENT)
Dept: GERIATRIC MEDICINE | Age: 85
End: 2020-01-06

## 2020-01-06 VITALS
BODY MASS INDEX: 26.99 KG/M2 | OXYGEN SATURATION: 95 % | HEIGHT: 65 IN | SYSTOLIC BLOOD PRESSURE: 130 MMHG | RESPIRATION RATE: 20 BRPM | TEMPERATURE: 97.4 F | HEART RATE: 62 BPM | WEIGHT: 162 LBS | DIASTOLIC BLOOD PRESSURE: 62 MMHG

## 2020-01-06 DIAGNOSIS — R05.9 COUGH IN ADULT PATIENT: Primary | ICD-10-CM

## 2020-01-06 DIAGNOSIS — J00 NASOPHARYNGITIS: ICD-10-CM

## 2020-01-06 DIAGNOSIS — E89.0 POSTOPERATIVE HYPOTHYROIDISM: ICD-10-CM

## 2020-01-06 DIAGNOSIS — Z76.0 MEDICATION REFILL: ICD-10-CM

## 2020-01-06 DIAGNOSIS — E03.9 ACQUIRED HYPOTHYROIDISM: ICD-10-CM

## 2020-01-06 RX ORDER — LEVOTHYROXINE SODIUM 25 UG/1
TABLET ORAL
Qty: 90 TAB | Refills: 1 | Status: SHIPPED | COMMUNITY
Start: 2020-01-06 | End: 2020-02-12 | Stop reason: SDUPTHER

## 2020-01-06 RX ORDER — BENZONATATE 200 MG/1
200 CAPSULE ORAL
Qty: 30 CAP | Refills: 0 | Status: SHIPPED | OUTPATIENT
Start: 2020-01-06 | End: 2020-01-13

## 2020-01-06 NOTE — PROGRESS NOTES
ADVISED PATIENT OF THE FOLLOWING HEALTH MAINTAINCE DUE  There are no preventive care reminders to display for this patient. Chief Complaint   Patient presents with    Cough     Patient being seen for non productive cough, nasal congestion. He was seen last week and is here for follow up. He reports not feeling any better. He started taking [facility] brand of Severe cold and flu. 1. Have you been to the ER, urgent care clinic since your last visit? Hospitalized since your last visit? No    2. Have you seen or consulted any other health care providers outside of the [address] [place] [address] since your last visit? Include any DEXA scan, mammography  or colon screening. No    3. Do you have an Advance Directive on file? yes    4. Do you have a DNR on file? DNR    Patient is accompanied by self I have received verbal consent from Jaja Welsh to discuss any/all medical information while they are present in the room. Advance Care Planning 4/16/2019   Patient's Healthcare Decision Maker is: Named in scanned ACP document   Primary Decision Maker Name -   Primary Decision Maker Phone Number -   Primary Decision Maker Relationship to Patient -   Confirm Advance Directive Yes, on file   Does the patient have other document types Do Not Resuscitate         Alec Mcmillan 950 [facility], [address] [place] [address]  219 [facility] 600 [address] [facility] [address]  Phone: [phone] Fax: 1200 [facility], . [address] [facility]  [address]  [facility] [address]  Phone: [phone] Fax: [phone]    [id number] [address] [place] [address]  [address] [facility] [address]  Phone: [phone] Fax: [phone]        Patient reminded during visit to bring all medication bottles, OTC medications to all appointments.

## 2020-01-06 NOTE — PROGRESS NOTES
Reason for Visit   Augusto Kocher is a 80 y.o. male patient who presents today for :  Chief Complaint   Patient presents with    Cough     Patient being seen for non productive cough, nasal congestion. He was seen last week and is here for follow up. He reports not feeling any better. He started taking [facility] brand of Severe cold and flu. Seen last week for cold sx's. Presents today with ongoing cough, sore throat and nasal congestion. Pt denies any other sx's at this time. No fevers/chills. No other complaints. Needs refill on thyroid medication today. Pt completed Mucinex. Has not used flonase. Has used OTC mecication for flu/cold sx's. Endorses moderate improvement.     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 ([address] 10/06/2016    Bilateral thumb pain 02/01/2017    CAD (coronary artery disease)     Cardiac murmur 10/06/2016    Cervical spondylolysis     Folliculitis 63/52/2231    GERD (gastroesophageal reflux disease)     Hypercholesteremia     Hypothyroid     Left hip pain 02/01/2017    Lumbar radiculitis     Lumbar radiculitis     Lumbar spondylitis (HCC)     Meniere disease     Mixed hyperlipidemia     Occlusion and stenosis of unspecified carotid artery     Osteoarthritis 02/19/2018    hands    Osteopenia of both hands 02/18/2018    Polyp of cecum 08/02/2017    5mm polyp in cecum, 8 mm polyp in sigmoid colon    Spinal enthesopathy of lumbar region ([address] 02/01/2017     Past Surgical History:   Procedure Laterality Date    CARDIAC SURG PROCEDURE UNLIST  04/2008    HX CATARACT REMOVAL Bilateral     HX CHOLECYSTECTOMY  1994    HX COLONOSCOPY  12/19/2012    tubular adenoma    HX COLONOSCOPY  08/02/2017    tubular adenomas    HX CORONARY ARTERY BYPASS GRAFT  1996    HX CORONARY ARTERY BYPASS GRAFT  1996    HX CORONARY STENT PLACEMENT      HX KNEE ARTHROSCOPY Left 2005    menisectomy    HX ROTATOR CUFF REPAIR Left     HX THYROIDECTOMY  1975    HX TONSIL AND ADENOIDECTOMY        Social History     Tobacco Use    Smoking status: Former Smoker    Smokeless tobacco: Never Used   Substance Use Topics    Alcohol use: Yes     Alcohol/week: 7.0 standard drinks     Types: 7 Glasses of wine per week    Drug use: Never      Family History   Problem Relation Age of Onset    No Known Problems Mother     No Known Problems Father     No Known Problems Brother       Prior to Admission medications    Medication Sig Start Date End Date Taking? Authorizing Provider   benzonatate (TESSALON) 200 mg capsule Take 1 Cap by mouth three (3) times daily as needed for Cough for up to 7 days. 1/6/20 1/13/20 Yes Kimmie Morales NP   levothyroxine (SYNTHROID) 25 mcg tablet TAKE 2 TABLETS BY MOUTH ON  MONDAY, WEDNESDAY, AND  FRIDAY AND 1 AND 1/2  TABLETS ALL OTHER DAYS FOR  HYPOTHYROIDISM 11/27/19  Yes Inga Mae NP   omeprazole (PRILOSEC) 40 mg capsule TAKE 1 CAPSULE BY MOUTH  DAILY FOR GASTROESOPHAGEAL  REFLUX DISEASE 11/2/19  Yes Inga Mae NP   simvastatin (ZOCOR) 40 mg tablet TAKE 1 TABLET BY MOUTH  NIGHTLY. INDICATION: HIGH  BLOOD CHOLESTEROL AND  TRIGLYCERIDE LEVEL 10/30/19  Yes Inga Mae NP   ezetimibe (ZETIA) 10 mg tablet Take 1 Tab by mouth every evening. Indications: excessive fat in the blood, high cholesterol and high triglycerides 10/22/19  Yes Inga Mae NP   fluticasone (FLONASE SENSIMIST) 27.5 mcg/actuation nasal spray 2 Sprays by Both Nostrils route daily. Indications: inflammation of the nose due to an allergy 10/22/19  Yes Inga Mae NP   Methylcellulose, with Sugar, (CITRUCEL, SUCROSE,) powd Take  by mouth. Yes Provider, Historical   acetaminophen (TYLENOL) 325 mg tablet Take  by mouth every four (4) hours as needed for Pain.    Yes Provider, Historical   finasteride (PROSCAR) 5 mg tablet TAKE 1 TABLET BY MOUTH  NIGHTLY FOR BENIGN  PROSTATIC HYPERPLASIA WITH  LOWER URINARY TRACT  SYMPTOMS 8/26/19  Yes Stephanie Emerson NP   hydrocortisone (HYTONE) 2.5 % topical cream APPLY CREAM THREE TIMES DAILY TO ECZEMA ON FACE 5/21/19  Yes Provider, Historical   vit C,I-To-vluhl-lutein-zeaxan (PRESERVISION AREDS-2) [id number] mg-unit-mg-mg cap capsule Take  by mouth. Yes Provider, Historical   metroNIDAZOLE (METROGEL) 1 % topical gel Use a thin layer to affected areas after washing 2/8/19  Yes Stephanie Emerson NP   biotin 10,000 mcg cap Take 2 Caps by mouth daily. 2/8/19  Yes Stephanie Emerson NP   diazePAM (VALIUM) 5 mg tablet Take 1 Tab by mouth every six (6) hours as needed. Max Daily Amount: 20 mg. Indications: Muscle Spasm 6/12/18  Yes Setphanie Emerson NP   aspirin delayed-release 81 mg tablet Take 81 mg by mouth daily.    Yes Provider, Historical     Allergies   Allergen Reactions    Toprol Xl [Metoprolol Succinate] Diarrhea    Codeine Other (comments)    Sulfa (Sulfonamide Antibiotics) Hives       Functional Assessment:   ADL FUNCTIONALITY:  ADL Assessment 4/16/2019   Feeding yourself No Help Needed   Getting from bed to chair No Help Needed   Getting dressed No Help Needed   Bathing or showering No Help Needed   Walk across the room (includes cane/walker) No Help Needed   Using the telphone No Help Needed   Taking your medications No Help Needed   Preparing meals No Help Needed   Managing money (expenses/bills) No Help Needed   Moderately strenuous housework (laundry) No Help Needed   Shopping for personal items (toiletries/medicines) No Help Needed   Shopping for groceries No Help Needed   Driving No Help Needed   Climbing a flight of stairs No Help Needed   Getting to places beyond walking distances No Help Needed     DEPRESSION SCREENING:   3 most recent PHQ Screens 1/6/2020   Little interest or pleasure in doing things Several days   Feeling down, depressed, irritable, or hopeless Several days   Total Score PHQ 2 2   Trouble falling or staying asleep, or sleeping too much -   Feeling tired or having little energy -   Poor appetite, weight loss, or overeating -   Feeling bad about yourself - or that you are a failure or have let yourself or your family down -   Trouble concentrating on things such as school, work, reading, or watching TV -   Moving or speaking so slowly that other people could have noticed; or the opposite being so fidgety that others notice -   Thoughts of being better off dead, or hurting yourself in some way -   PHQ 9 Score -   How difficult have these problems made it for you to do your work, take care of your home and get along with others -      [address] [facility] [address] Exam 4/16/2019 4/13/2018   What is the Year 1 1   What is the Season 1 1   What is the Date 1 1   What is the Day 1 1   What is the Month 1 1   Where are we State 1 1   Where are we Country 1 1   Where are we [address] [place] [address] or [facility] 1 1   Where are we Floor 1 1   Name three objects, then ask the patient to say them 3 3   Serial sevens Subtract 7 from 100 in increments 2 2   Ask for the three objects repeated above 2 3   Name a pencil 1 1   Name a watch 1 1   Have the patient repeat this phrase \"No ifs, ands, or buts\" 1 1   Three stage command: Take the paper in your right hand 1 1   Fold the paper in half 1 1   Put the paper on the floor 1 1   Read and obey the following: CLOSE YOUR EYES 1 1   Have the patient write a sentence 1 1   Have the patient copy a figure 1 1   Mini Mental Score 26 27   Some recent data might be hidden     FALL RISK:   Fall Risk Assessment, last 12 mths 1/6/2020   Able to walk? Yes   Fall in past 12 months? No      ABUSE SCREEN:   Abuse Screening Questionnaire 4/16/2019   Do you ever feel afraid of your partner? N   Are you in a relationship with someone who physically or mentally threatens you? N   Is it safe for you to go home?  Y      Advance Care Planning 4/16/2019   Patient's Healthcare Decision Maker is: Named in scanned ACP document   Primary Decision Maker Name -   Primary Decision Maker Phone Number -   Primary Decision Maker Relationship to Patient -   Confirm Advance Directive Yes, on file   Does the patient have other document types Do Not Resuscitate     CHANGES IN TREATMENT:    The following treatment modalities have been discontinued by the provider today:   There are no discontinued medications. MOST RECENT LABORATORY RESULTS:      Orders Only on 10/27/2019   Component Date Value Ref Range Status    WBC 10/22/2019 5.5  3.4 - 10.8 x10E3/uL Final    RBC 10/22/2019 4.78  4.14 - 5.80 x10E6/uL Final    HGB 10/22/2019 14.9  13.0 - 17.7 g/dL Final    HCT 10/22/2019 43.8  37.5 - 51.0 % Final    MCV 10/22/2019 92  79 - 97 fL Final    MCH 10/22/2019 31.2  26.6 - 33.0 pg Final    MCHC 10/22/2019 34.0  31.5 - 35.7 g/dL Final    RDW 10/22/2019 13.6  12.3 - 15.4 % Final    PLATELET 52/63/9509 636* 150 - 450 x10E3/uL Final    NEUTROPHILS 10/22/2019 63  Not Estab. % Final    Lymphocytes 10/22/2019 25  Not Estab. % Final    MONOCYTES 10/22/2019 9  Not Estab. % Final    EOSINOPHILS 10/22/2019 2  Not Estab. % Final    BASOPHILS 10/22/2019 1  Not Estab. % Final    ABS. NEUTROPHILS 10/22/2019 3.4  1.4 - 7.0 x10E3/uL Final    Abs Lymphocytes 10/22/2019 1.4  0.7 - 3.1 x10E3/uL Final    ABS. MONOCYTES 10/22/2019 0.5  0.1 - 0.9 x10E3/uL Final    ABS. EOSINOPHILS 10/22/2019 0.1  0.0 - 0.4 x10E3/uL Final    ABS. BASOPHILS 10/22/2019 0.1  0.0 - 0.2 x10E3/uL Final    IMMATURE GRANULOCYTES 10/22/2019 0  Not Estab. % Final    ABS. IMM. GRANS.  10/22/2019 0.0  0.0 - 0.1 x10E3/uL Final    Hemoglobin A1c 10/22/2019 5.7* 4.8 - 5.6 % Final    Comment:          Prediabetes: 5.7 - 6.4           Diabetes: >6.4           Glycemic control for adults with diabetes: <7.0      Estimated average glucose 10/22/2019 117  mg/dL Final    Lipase 10/22/2019 38  13 - 78 U/L Final    Cholesterol, total 10/22/2019 175  100 - 199 mg/dL Final  Triglyceride 10/22/2019 229* 0 - 149 mg/dL Final    HDL Cholesterol 10/22/2019 51  >39 mg/dL Final    VLDL, calculated 10/22/2019 46* 5 - 40 mg/dL Final    LDL, calculated 10/22/2019 78  0 - 99 mg/dL Final    Glucose 10/22/2019 137* 65 - 99 mg/dL Final    BUN 10/22/2019 15  8 - 27 mg/dL Final    Creatinine 10/22/2019 1.16  0.76 - 1.27 mg/dL Final    GFR est non-AA 10/22/2019 58* >59 mL/min/1.73 Final    GFR est AA 10/22/2019 66  >59 mL/min/1.73 Final    BUN/Creatinine ratio 10/22/2019 13  10 - 24 Final    Sodium 10/22/2019 141  134 - 144 mmol/L Final    Potassium 10/22/2019 4.1  3.5 - 5.2 mmol/L Final    Chloride 10/22/2019 104  96 - 106 mmol/L Final    CO2 10/22/2019 22  20 - 29 mmol/L Final    Calcium 10/22/2019 9.2  8.6 - 10.2 mg/dL Final    Protein, total 10/22/2019 5.9* 6.0 - 8.5 g/dL Final    Albumin 10/22/2019 4.1  3.5 - 4.7 g/dL Final    GLOBULIN, TOTAL 10/22/2019 1.8  1.5 - 4.5 g/dL Final    A-G Ratio 10/22/2019 2.3* 1.2 - 2.2 Final    Bilirubin, total 10/22/2019 0.8  0.0 - 1.2 mg/dL Final    Alk. phosphatase 10/22/2019 68  39 - 117 IU/L Final    AST (SGOT) 10/22/2019 15  0 - 40 IU/L Final    ALT (SGPT) 10/22/2019 16  0 - 44 IU/L Final    Testosterone 10/22/2019 318  264 - 916 ng/dL Final    Comment: Adult male reference interval is based on a population of  healthy nonobese males (BMI <30) between 23and 44years old. [address] [place] [address], [address] [id number]. PMID: [id number].       Free testosterone (Direct) 10/22/2019 11.0  6.6 - 18.1 pg/mL Final    TSH 10/22/2019 3.240  0.450 - 4.500 uIU/mL Final    T4, Total 10/22/2019 7.2  4.5 - 12.0 ug/dL Final    T3 Uptake 10/22/2019 28  24 - 39 % Final    Free Thyroxine Index (FTI) 10/22/2019 2.0  1.2 - 4.9 Final    Creatinine, urine 10/22/2019 129.8  Not Estab. mg/dL Final    Microalbumin, urine 10/22/2019 <3.0  Not Estab. ug/mL Final    Microalb/Creat ratio (ug/mg creat.) 10/22/2019 <2.3  0.0 - 30.0 mg/g creat Final    Comment: Normal:                0.0 -  30.0                       Albuminuria:          31.0 - 300.0                       Clinical albuminuria:       >300.0      Specific Gravity 10/22/2019 1.019  1.005 - 1.030 Final    pH (UA) 10/22/2019 5.5  5.0 - 7.5 Final    Color 10/22/2019 Yellow  Yellow Final    Appearance 10/22/2019 Clear  Clear Final    Leukocyte Esterase 10/22/2019 Negative  Negative Final    Protein 10/22/2019 Negative  Negative/Trace Final    Glucose 10/22/2019 Negative  Negative Final    Ketone 10/22/2019 Negative  Negative Final    Blood 10/22/2019 Negative  Negative Final    Bilirubin 10/22/2019 Negative  Negative Final    Urobilinogen 10/22/2019 0.2  0.2 - 1.0 mg/dL Final    Nitrites 10/22/2019 Negative  Negative Final    Microscopic Examination 10/22/2019 Comment   Final    Microscopic follows if indicated.  Microscopic exam 10/22/2019 See additional order   Final    Microscopic was indicated and was performed.  URINALYSIS REFLEX 10/22/2019 Comment   Final    This specimen will not reflex to a Urine Culture.  WBC 10/22/2019 0-5  0 - 5 /hpf Final    RBC 10/22/2019 0-2  0 - 2 /hpf Final    Epithelial cells 10/22/2019 None seen  0 - 10 /hpf Final    Casts 10/22/2019 None seen  None seen /lpf Final    Mucus 10/22/2019 Present  Not Estab. Final    Bacteria 10/22/2019 None seen  None seen/Few Final    SPECIMEN STATUS REPORT 10/22/2019 COMMENT   Final    Comment: Written Authorization  Written Authorization  Written Authorization Received. Authorization received from per request 10-  Logged by Ratna Walker     Admission on 10/22/2019, Discharged on 10/22/2019   Component Date Value Ref Range Status    SAMPLES BEING HELD 10/22/2019 1RED 1BLUE 1LAV 1GRN   Final    COMMENT 10/22/2019 Add-on orders for these samples will be processed based on acceptable specimen integrity and analyte stability, which may vary by analyte.     Final    WBC 10/22/2019 5.6  4.1 - 11.1 K/uL Final    RBC 10/22/2019 4.70  4. 10 - 5.70 M/uL Final    HGB 10/22/2019 14.7  12.1 - 17.0 g/dL Final    HCT 10/22/2019 42.7  36.6 - 50.3 % Final    MCV 10/22/2019 90.9  80.0 - 99.0 FL Final    MCH 10/22/2019 31.3  26.0 - 34.0 PG Final    MCHC 10/22/2019 34.4  30.0 - 36.5 g/dL Final    RDW 10/22/2019 13.3  11.5 - 14.5 % Final    PLATELET 29/36/6203 961  150 - 400 K/uL Final    MPV 10/22/2019 10.2  8.9 - 12.9 FL Final    NRBC 10/22/2019 0.0  0  WBC Final    ABSOLUTE NRBC 10/22/2019 0.00  0.00 - 0.01 K/uL Final    NEUTROPHILS 10/22/2019 56  32 - 75 % Final    LYMPHOCYTES 10/22/2019 29  12 - 49 % Final    MONOCYTES 10/22/2019 12  5 - 13 % Final    EOSINOPHILS 10/22/2019 2  0 - 7 % Final    BASOPHILS 10/22/2019 1  0 - 1 % Final    IMMATURE GRANULOCYTES 10/22/2019 0  0.0 - 0.5 % Final    ABS. NEUTROPHILS 10/22/2019 3.1  1.8 - 8.0 K/UL Final    ABS. LYMPHOCYTES 10/22/2019 1.6  0.8 - 3.5 K/UL Final    ABS. MONOCYTES 10/22/2019 0.6  0.0 - 1.0 K/UL Final    ABS. EOSINOPHILS 10/22/2019 0.1  0.0 - 0.4 K/UL Final    ABS. BASOPHILS 10/22/2019 0.1  0.0 - 0.1 K/UL Final    ABS. IMM. GRANS.  10/22/2019 0.0  0.00 - 0.04 K/UL Final    DF 10/22/2019 AUTOMATED    Final    Sodium 10/22/2019 140  136 - 145 mmol/L Final    Potassium 10/22/2019 4.0  3.5 - 5.1 mmol/L Final    Chloride 10/22/2019 105  97 - 108 mmol/L Final    CO2 10/22/2019 25  21 - 32 mmol/L Final    Anion gap 10/22/2019 10  5 - 15 mmol/L Final    Glucose 10/22/2019 97  65 - 100 mg/dL Final    BUN 10/22/2019 15  6 - 20 MG/DL Final    Creatinine 10/22/2019 1.08  0.70 - 1.30 MG/DL Final    BUN/Creatinine ratio 10/22/2019 14  12 - 20   Final    GFR est AA 10/22/2019 >60  >60 ml/min/1.73m2 Final    GFR est non-AA 10/22/2019 >60  >60 ml/min/1.73m2 Final    Comment: Estimated GFR is calculated using the IDMS-traceable Modification of Diet in Renal Disease (MDRD) Study equation, reported for both  Americans (GFRAA) and non- Americans (GFRNA), and normalized to 1.73m2 body surface area. The physician must decide which value applies to the patient. The MDRD study equation should only be used in individuals age 25 or older. It has not been validated for the following: pregnant women, patients with serious comorbid conditions, or on certain medications, or persons with extremes of body size, muscle mass, or nutritional status.  Calcium 10/22/2019 9.1  8.5 - 10.1 MG/DL Final    Troponin-I, Qt. 10/22/2019 <0.05  <0.05 ng/mL Final    Comment: The presence of detectable troponin above the reference range indicates myocardial injury which may be due to ischemia, myocarditis, trauma, etc.  Clinical correlation is necessary to establish the significance of this finding. Sequential testing is recommended to determine if the typical rise and fall of cTnI is demonstrated. Note:  Cardiac troponin I has a relatively long half life and may be present well after the CK MB has returned to baseline. The reference range is based on the 99th percentile of the referent population.       Ventricular Rate 10/22/2019 54  BPM Final    Atrial Rate 10/22/2019 54  BPM Final    P-R Interval 10/22/2019 178  ms Final    QRS Duration 10/22/2019 78  ms Final    Q-T Interval 10/22/2019 390  ms Final    QTC Calculation (Bezet) 10/22/2019 369  ms Final    Calculated P Axis 10/22/2019 39  degrees Final    Calculated R Axis 10/22/2019 24  degrees Final    Calculated T Axis 10/22/2019 54  degrees Final    Diagnosis 10/22/2019    Final                    Value:Sinus bradycardia  When compared with ECG of 15-FEB-2017 17:16,  No significant change was found  Confirmed by Pavel Perez M.D., Hipolito Dubose ([id number]) on 10/23/2019 3:03:31 AM       Results for orders placed or performed in visit on 10/27/19   CBC WITH AUTOMATED DIFF   Result Value Ref Range    WBC 5.5 3.4 - 10.8 x10E3/uL    RBC 4.78 4.14 - 5.80 x10E6/uL    HGB 14.9 13.0 - 17.7 g/dL    HCT 43.8 37.5 - 51.0 %    MCV 92 79 - 97 fL    MCH 31.2 26.6 - 33.0 pg    MCHC 34.0 31.5 - 35.7 g/dL    RDW 13.6 12.3 - 15.4 %    PLATELET 442 (L) 409 - 450 x10E3/uL    NEUTROPHILS 63 Not Estab. %    Lymphocytes 25 Not Estab. %    MONOCYTES 9 Not Estab. %    EOSINOPHILS 2 Not Estab. %    BASOPHILS 1 Not Estab. %    ABS. NEUTROPHILS 3.4 1.4 - 7.0 x10E3/uL    Abs Lymphocytes 1.4 0.7 - 3.1 x10E3/uL    ABS. MONOCYTES 0.5 0.1 - 0.9 x10E3/uL    ABS. EOSINOPHILS 0.1 0.0 - 0.4 x10E3/uL    ABS. BASOPHILS 0.1 0.0 - 0.2 x10E3/uL    IMMATURE GRANULOCYTES 0 Not Estab. %    ABS. IMM. GRANS. 0.0 0.0 - 0.1 x10E3/uL   HEMOGLOBIN A1C WITH EAG   Result Value Ref Range    Hemoglobin A1c 5.7 (H) 4.8 - 5.6 %    Estimated average glucose 117 mg/dL   LIPASE   Result Value Ref Range    Lipase 38 13 - 78 U/L   LIPID PANEL   Result Value Ref Range    Cholesterol, total 175 100 - 199 mg/dL    Triglyceride 229 (H) 0 - 149 mg/dL    HDL Cholesterol 51 >39 mg/dL    VLDL, calculated 46 (H) 5 - 40 mg/dL    LDL, calculated 78 0 - 99 mg/dL   METABOLIC PANEL, COMPREHENSIVE   Result Value Ref Range    Glucose 137 (H) 65 - 99 mg/dL    BUN 15 8 - 27 mg/dL    Creatinine 1.16 0.76 - 1.27 mg/dL    GFR est non-AA 58 (L) >59 mL/min/1.73    GFR est AA 66 >59 mL/min/1.73    BUN/Creatinine ratio 13 10 - 24    Sodium 141 134 - 144 mmol/L    Potassium 4.1 3.5 - 5.2 mmol/L    Chloride 104 96 - 106 mmol/L    CO2 22 20 - 29 mmol/L    Calcium 9.2 8.6 - 10.2 mg/dL    Protein, total 5.9 (L) 6.0 - 8.5 g/dL    Albumin 4.1 3.5 - 4.7 g/dL    GLOBULIN, TOTAL 1.8 1.5 - 4.5 g/dL    A-G Ratio 2.3 (H) 1.2 - 2.2    Bilirubin, total 0.8 0.0 - 1.2 mg/dL    Alk.  phosphatase 68 39 - 117 IU/L    AST (SGOT) 15 0 - 40 IU/L    ALT (SGPT) 16 0 - 44 IU/L   TESTOSTERONE, FREE & TOTAL   Result Value Ref Range    Testosterone 318 264 - 916 ng/dL    Free testosterone (Direct) 11.0 6.6 - 18.1 pg/mL   THYROID PANEL W/TSH   Result Value Ref Range    TSH 3.240 0.450 - 4.500 uIU/mL    T4, Total 7.2 4.5 - 12.0 ug/dL    T3 Uptake 28 24 - 39 %    Free Thyroxine Index (FTI) 2.0 1.2 - 4.9   MICROALBUMIN, UR, RAND W/ MICROALB/CREAT RATIO   Result Value Ref Range    Creatinine, urine 129.8 Not Estab. mg/dL    Microalbumin, urine <3.0 Not Estab. ug/mL    Microalb/Creat ratio (ug/mg creat.) <2.3 0.0 - 30.0 mg/g creat   UA WITH REFLEX MICRO AND CULTURE   Result Value Ref Range    Specific Gravity 1.019 1.005 - 1.030    pH (UA) 5.5 5.0 - 7.5    Color Yellow Yellow    Appearance Clear Clear    Leukocyte Esterase Negative Negative    Protein Negative Negative/Trace    Glucose Negative Negative    Ketone Negative Negative    Blood Negative Negative    Bilirubin Negative Negative    Urobilinogen 0.2 0.2 - 1.0 mg/dL    Nitrites Negative Negative    Microscopic Examination Comment     Microscopic exam See additional order     URINALYSIS REFLEX Comment    MICROSCOPIC EXAMINATION   Result Value Ref Range    WBC 0-5 0 - 5 /hpf    RBC 0-2 0 - 2 /hpf    Epithelial cells None seen 0 - 10 /hpf    Casts None seen None seen /lpf    Mucus Present Not Estab. Bacteria None seen None seen/Few   SPECIMEN STATUS REPORT   Result Value Ref Range    SPECIMEN STATUS REPORT COMMENT       XR Results (most recent):  Results from Hospital Encounter encounter on 10/22/19   XR CHEST PA LAT    Narrative Exam:  2 view chest    Indication: Shortness of breath and rib pain, pleural pain    COMPARISON: None    PA and lateral views demonstrate normal heart size. Patient status post median  sternotomy and revascularization procedure. There is a small right pleural effusion blunting the lateral and right posterior  costophrenic angle. There is minimal associated atelectasis. Although these  findings are nonspecific these findings could relate to pulmonary embolus. Exact  etiology is uncertain. This case was discussed with [place] [facility] nurse or. Left lung is clear. No pneumonia. No pneumothorax. Visualized osseous structures  are unremarkable. Impression IMPRESSION:  1. There is a small right pleural effusion mild atelectasis at the right lung  base. Exact etiology is uncertain as these findings are nonspecific. . These  findings can be seen in the presence of pulmonary emboli      Objective:     Vitals:    01/06/20 0912   BP: 130/62   Pulse: 62   Resp: 20   Temp: 97.4 °F (36.3 °C)   TempSrc: Oral   SpO2: 95%   Weight: 162 lb (73.5 kg)   Height: 5' 5\" (1.651 m)     Wt Readings from Last 3 Encounters:   01/06/20 162 lb (73.5 kg)   12/31/19 162 lb 9.6 oz (73.8 kg)   10/25/19 164 lb (74.4 kg)     BP Readings from Last 3 Encounters:   01/06/20 130/62   12/31/19 128/66   10/25/19 132/64     Review of Systems   HENT: Positive for congestion, rhinorrhea, sneezing and sore throat. Negative for ear discharge, ear pain, facial swelling, mouth sores and nosebleeds. Eyes: Negative. Respiratory: Positive for cough and wheezing. Negative for shortness of breath and stridor. Cardiovascular: Negative. Gastrointestinal: Negative. Musculoskeletal: Negative. Neurological: Negative. Psychiatric/Behavioral: Negative. Physical Exam  Constitutional:       Appearance: Normal appearance. HENT:      Head: Normocephalic and atraumatic. Nose: Nose normal.      Mouth/Throat:      Mouth: Mucous membranes are dry. Pharynx: Oropharynx is clear. No oropharyngeal exudate or posterior oropharyngeal erythema. Neck:      Musculoskeletal: Normal range of motion. Cardiovascular:      Rate and Rhythm: Normal rate and regular rhythm. Pulses: Normal pulses. Heart sounds: Normal heart sounds. Pulmonary:      Effort: Pulmonary effort is normal.      Breath sounds: Normal breath sounds. Abdominal:      General: Abdomen is flat. Bowel sounds are normal.      Palpations: Abdomen is soft. Musculoskeletal: Normal range of motion. Skin:     General: Skin is warm and dry. Neurological:      General: No focal deficit present.       Mental Status: He is alert and oriented to person, place, and time. Psychiatric:         Mood and Affect: Mood normal.         Behavior: Behavior normal.        Diagnosis:    1. Cough and nasopharyngitis: Take flonase daily which he has at home. Tessalon perls for cough. Viral cough can last up to 6 weeks. Discussed at length with patient. Encouraged to hydrate. Disclaimer:   Mr. Patience Shabazz has been advised to call or return to our office if symptoms worsen/change/persist. We as a care team including the patient; discussed expected course/resolution/complications of diagnosis in detail today. Medication risks/benefits/costs/interactions/alternatives discussed Patience Shabazz was given an after visit summary which includes diagnoses, current medications, & vitals. Patience Shabazz expressed understanding with the diagnosis and plan.

## 2020-01-13 ENCOUNTER — OFFICE VISIT (OUTPATIENT)
Dept: GERIATRIC MEDICINE | Age: 85
End: 2020-01-13

## 2020-01-13 VITALS
TEMPERATURE: 97.8 F | SYSTOLIC BLOOD PRESSURE: 128 MMHG | BODY MASS INDEX: 26.92 KG/M2 | RESPIRATION RATE: 18 BRPM | WEIGHT: 161.6 LBS | DIASTOLIC BLOOD PRESSURE: 64 MMHG | HEIGHT: 65 IN | OXYGEN SATURATION: 97 % | HEART RATE: 63 BPM

## 2020-01-13 DIAGNOSIS — H61.23 BILATERAL IMPACTED CERUMEN: Primary | ICD-10-CM

## 2020-01-13 NOTE — PROGRESS NOTES
Reason for Visit   Talisha Ramesh is a 80 y.o. male patient who presents today for : fullness in bilateral ears. Denies discharge, itchiness or ringing in ears. Reports his ears feel full. Reports he really likes his ears checked routinely and cleaned out. Educated pt on routine ear cleaning. Chief Complaint   Patient presents with    Wax in Ear     Patient here for ear was removal. He is still having some cough and nasal congestion. I asked him if he is using debrox and he states sometimes as well as still not using nasal spray as ordered.       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 ([facility] [address] 10/06/2016    Bilateral thumb pain 02/01/2017    CAD (coronary artery disease)     Cardiac murmur 10/06/2016    Cervical spondylolysis     Folliculitis 54/90/0189    GERD (gastroesophageal reflux disease)     Hypercholesteremia     Hypothyroid     Left hip pain 02/01/2017    Lumbar radiculitis     Lumbar radiculitis     Lumbar spondylitis (HCC)     Meniere disease     Mixed hyperlipidemia     Occlusion and stenosis of unspecified carotid artery     Osteoarthritis 02/19/2018    hands    Osteopenia of both hands 02/18/2018    Polyp of cecum 08/02/2017    5mm polyp in cecum, 8 mm polyp in sigmoid colon    Spinal enthesopathy of lumbar region ([facility] [address] 02/01/2017     Past Surgical History:   Procedure Laterality Date    CARDIAC SURG PROCEDURE UNLIST  04/2008    HX CATARACT REMOVAL Bilateral     HX CHOLECYSTECTOMY  1994    HX COLONOSCOPY  12/19/2012    tubular adenoma    HX COLONOSCOPY  08/02/2017    tubular adenomas    HX CORONARY ARTERY BYPASS GRAFT  1996    HX CORONARY ARTERY BYPASS GRAFT  1996    HX CORONARY STENT PLACEMENT      HX KNEE ARTHROSCOPY Left 2005    menisectomy    HX ROTATOR CUFF REPAIR Left     HX THYROIDECTOMY  1975    HX TONSIL AND ADENOIDECTOMY Social History     Tobacco Use    Smoking status: Former Smoker    Smokeless tobacco: Never Used   Substance Use Topics    Alcohol use: Yes     Alcohol/week: 7.0 standard drinks     Types: 7 Glasses of wine per week    Drug use: Never      Family History   Problem Relation Age of Onset    No Known Problems Mother     No Known Problems Father     No Known Problems Brother       Prior to Admission medications    Medication Sig Start Date End Date Taking? Authorizing Provider   carbamide peroxide (DEBROX) 6.5 % otic solution Administer 5 Drops into each ear two (2) times a day. 1/13/20  Yes Braydon Morales NP   benzonatate (TESSALON) 200 mg capsule Take 1 Cap by mouth three (3) times daily as needed for Cough for up to 7 days. 1/6/20 1/13/20 Yes Braydon Morales NP   levothyroxine (SYNTHROID) 25 mcg tablet TAKE 2 TABLETS BY MOUTH ON  MONDAY, WEDNESDAY, AND  FRIDAY AND 1 AND 1/2  TABLETS ALL OTHER DAYS FOR  HYPOTHYROIDISM 1/6/20  Yes Braydon Morales NP   omeprazole (PRILOSEC) 40 mg capsule TAKE 1 CAPSULE BY MOUTH  DAILY FOR GASTROESOPHAGEAL  REFLUX DISEASE 11/2/19  Yes Bala Moody NP   simvastatin (ZOCOR) 40 mg tablet TAKE 1 TABLET BY MOUTH  NIGHTLY. INDICATION: HIGH  BLOOD CHOLESTEROL AND  TRIGLYCERIDE LEVEL 10/30/19  Yes Bala Moody NP   ezetimibe (ZETIA) 10 mg tablet Take 1 Tab by mouth every evening. Indications: excessive fat in the blood, high cholesterol and high triglycerides 10/22/19  Yes Bala Moody NP   fluticasone (FLONASE SENSIMIST) 27.5 mcg/actuation nasal spray 2 Sprays by Both Nostrils route daily. Indications: inflammation of the nose due to an allergy 10/22/19  Yes Bala Moody NP   Methylcellulose, with Sugar, (CITRUCEL, SUCROSE,) powd Take  by mouth. Yes Provider, Historical   acetaminophen (TYLENOL) 325 mg tablet Take  by mouth every four (4) hours as needed for Pain.    Yes Provider, Historical   finasteride (PROSCAR) 5 mg tablet TAKE 1 TABLET BY MOUTH NIGHTLY FOR BENIGN  PROSTATIC HYPERPLASIA WITH  LOWER URINARY TRACT  SYMPTOMS 8/26/19  Yes Stephanie Emerson NP   hydrocortisone (HYTONE) 2.5 % topical cream APPLY CREAM THREE TIMES DAILY TO ECZEMA ON FACE 5/21/19  Yes Provider, Historical   vit C,L-Ve-ozlak-lutein-zeaxan (PRESERVISION AREDS-2) [id number] mg-unit-mg-mg cap capsule Take  by mouth. Yes Provider, Historical   metroNIDAZOLE (METROGEL) 1 % topical gel Use a thin layer to affected areas after washing 2/8/19  Yes Stephanie Emerson NP   biotin 10,000 mcg cap Take 2 Caps by mouth daily. 2/8/19  Yes Stephanie Emerson NP   diazePAM (VALIUM) 5 mg tablet Take 1 Tab by mouth every six (6) hours as needed. Max Daily Amount: 20 mg. Indications: Muscle Spasm 6/12/18  Yes Stephanie Emerson NP   aspirin delayed-release 81 mg tablet Take 81 mg by mouth daily.    Yes Provider, Historical     Allergies   Allergen Reactions    Toprol Xl [Metoprolol Succinate] Diarrhea    Codeine Other (comments)    Sulfa (Sulfonamide Antibiotics) Hives       Functional Assessment:   ADL FUNCTIONALITY:  ADL Assessment 4/16/2019   Feeding yourself No Help Needed   Getting from bed to chair No Help Needed   Getting dressed No Help Needed   Bathing or showering No Help Needed   Walk across the room (includes cane/walker) No Help Needed   Using the telphone No Help Needed   Taking your medications No Help Needed   Preparing meals No Help Needed   Managing money (expenses/bills) No Help Needed   Moderately strenuous housework (laundry) No Help Needed   Shopping for personal items (toiletries/medicines) No Help Needed   Shopping for groceries No Help Needed   Driving No Help Needed   Climbing a flight of stairs No Help Needed   Getting to places beyond walking distances No Help Needed     DEPRESSION SCREENING:   3 most recent PHQ Screens 1/13/2020   Little interest or pleasure in doing things Several days   Feeling down, depressed, irritable, or hopeless Several days   Total Score PHQ 2 2   Trouble falling or staying asleep, or sleeping too much -   Feeling tired or having little energy -   Poor appetite, weight loss, or overeating -   Feeling bad about yourself - or that you are a failure or have let yourself or your family down -   Trouble concentrating on things such as school, work, reading, or watching TV -   Moving or speaking so slowly that other people could have noticed; or the opposite being so fidgety that others notice -   Thoughts of being better off dead, or hurting yourself in some way -   PHQ 9 Score -   How difficult have these problems made it for you to do your work, take care of your home and get along with others -      [address] Exam 4/16/2019 4/13/2018   What is the Year 1 1   What is the Season 1 1   What is the Date 1 1   What is the Day 1 1   What is the Month 1 1   Where are we State 1 1   Where are we Country 1 1   Where are we [address] [place] [address] or [facility] 1 1   Where are we Floor 1 1   Name three objects, then ask the patient to say them 3 3   Serial sevens Subtract 7 from 100 in increments 2 2   Ask for the three objects repeated above 2 3   Name a pencil 1 1   Name a watch 1 1   Have the patient repeat this phrase \"No ifs, ands, or buts\" 1 1   Three stage command: Take the paper in your right hand 1 1   Fold the paper in half 1 1   Put the paper on the floor 1 1   Read and obey the following: CLOSE YOUR EYES 1 1   Have the patient write a sentence 1 1   Have the patient copy a figure 1 1   Mini Mental Score 26 27   Some recent data might be hidden     FALL RISK:   Fall Risk Assessment, last 12 mths 1/6/2020   Able to walk? Yes   Fall in past 12 months? No      ABUSE SCREEN:   Abuse Screening Questionnaire 4/16/2019   Do you ever feel afraid of your partner? N   Are you in a relationship with someone who physically or mentally threatens you? N   Is it safe for you to go home?  Y      Advance Care Planning 4/16/2019   Patient's Healthcare Decision Maker is: Named in scanned ACP document   Primary Decision Maker Name -   Primary Decision Maker Phone Number -   Primary Decision Maker Relationship to Patient -   Confirm Advance Directive Yes, on file   Does the patient have other document types Do Not Resuscitate     CHANGES IN TREATMENT:    The following treatment modalities have been discontinued by the provider today:   There are no discontinued medications. MOST RECENT LABORATORY RESULTS:      Orders Only on 10/27/2019   Component Date Value Ref Range Status    WBC 10/22/2019 5.5  3.4 - 10.8 x10E3/uL Final    RBC 10/22/2019 4.78  4.14 - 5.80 x10E6/uL Final    HGB 10/22/2019 14.9  13.0 - 17.7 g/dL Final    HCT 10/22/2019 43.8  37.5 - 51.0 % Final    MCV 10/22/2019 92  79 - 97 fL Final    MCH 10/22/2019 31.2  26.6 - 33.0 pg Final    MCHC 10/22/2019 34.0  31.5 - 35.7 g/dL Final    RDW 10/22/2019 13.6  12.3 - 15.4 % Final    PLATELET 78/86/1009 761* 150 - 450 x10E3/uL Final    NEUTROPHILS 10/22/2019 63  Not Estab. % Final    Lymphocytes 10/22/2019 25  Not Estab. % Final    MONOCYTES 10/22/2019 9  Not Estab. % Final    EOSINOPHILS 10/22/2019 2  Not Estab. % Final    BASOPHILS 10/22/2019 1  Not Estab. % Final    ABS. NEUTROPHILS 10/22/2019 3.4  1.4 - 7.0 x10E3/uL Final    Abs Lymphocytes 10/22/2019 1.4  0.7 - 3.1 x10E3/uL Final    ABS. MONOCYTES 10/22/2019 0.5  0.1 - 0.9 x10E3/uL Final    ABS. EOSINOPHILS 10/22/2019 0.1  0.0 - 0.4 x10E3/uL Final    ABS. BASOPHILS 10/22/2019 0.1  0.0 - 0.2 x10E3/uL Final    IMMATURE GRANULOCYTES 10/22/2019 0  Not Estab. % Final    ABS. IMM. GRANS.  10/22/2019 0.0  0.0 - 0.1 x10E3/uL Final    Hemoglobin A1c 10/22/2019 5.7* 4.8 - 5.6 % Final    Comment:          Prediabetes: 5.7 - 6.4           Diabetes: >6.4           Glycemic control for adults with diabetes: <7.0      Estimated average glucose 10/22/2019 117  mg/dL Final    Lipase 10/22/2019 38  13 - 78 U/L Final    Cholesterol, total 10/22/2019 175  100 - 199 mg/dL Final  Triglyceride 10/22/2019 229* 0 - 149 mg/dL Final    HDL Cholesterol 10/22/2019 51  >39 mg/dL Final    VLDL, calculated 10/22/2019 46* 5 - 40 mg/dL Final    LDL, calculated 10/22/2019 78  0 - 99 mg/dL Final    Glucose 10/22/2019 137* 65 - 99 mg/dL Final    BUN 10/22/2019 15  8 - 27 mg/dL Final    Creatinine 10/22/2019 1.16  0.76 - 1.27 mg/dL Final    GFR est non-AA 10/22/2019 58* >59 mL/min/1.73 Final    GFR est AA 10/22/2019 66  >59 mL/min/1.73 Final    BUN/Creatinine ratio 10/22/2019 13  10 - 24 Final    Sodium 10/22/2019 141  134 - 144 mmol/L Final    Potassium 10/22/2019 4.1  3.5 - 5.2 mmol/L Final    Chloride 10/22/2019 104  96 - 106 mmol/L Final    CO2 10/22/2019 22  20 - 29 mmol/L Final    Calcium 10/22/2019 9.2  8.6 - 10.2 mg/dL Final    Protein, total 10/22/2019 5.9* 6.0 - 8.5 g/dL Final    Albumin 10/22/2019 4.1  3.5 - 4.7 g/dL Final    GLOBULIN, TOTAL 10/22/2019 1.8  1.5 - 4.5 g/dL Final    A-G Ratio 10/22/2019 2.3* 1.2 - 2.2 Final    Bilirubin, total 10/22/2019 0.8  0.0 - 1.2 mg/dL Final    Alk. phosphatase 10/22/2019 68  39 - 117 IU/L Final    AST (SGOT) 10/22/2019 15  0 - 40 IU/L Final    ALT (SGPT) 10/22/2019 16  0 - 44 IU/L Final    Testosterone 10/22/2019 318  264 - 916 ng/dL Final    Comment: Adult male reference interval is based on a population of  healthy nonobese males (BMI <30) between 23and 44years old. [address] [place] [address], [address] [id number]. PMID: [id number].       Free testosterone (Direct) 10/22/2019 11.0  6.6 - 18.1 pg/mL Final    TSH 10/22/2019 3.240  0.450 - 4.500 uIU/mL Final    T4, Total 10/22/2019 7.2  4.5 - 12.0 ug/dL Final    T3 Uptake 10/22/2019 28  24 - 39 % Final    Free Thyroxine Index (FTI) 10/22/2019 2.0  1.2 - 4.9 Final    Creatinine, urine 10/22/2019 129.8  Not Estab. mg/dL Final    Microalbumin, urine 10/22/2019 <3.0  Not Estab. ug/mL Final    Microalb/Creat ratio (ug/mg creat.) 10/22/2019 <2.3  0.0 - 30.0 mg/g creat Final    Comment: Normal:                0.0 -  30.0                       Albuminuria:          31.0 - 300.0                       Clinical albuminuria:       >300.0      Specific Gravity 10/22/2019 1.019  1.005 - 1.030 Final    pH (UA) 10/22/2019 5.5  5.0 - 7.5 Final    Color 10/22/2019 Yellow  Yellow Final    Appearance 10/22/2019 Clear  Clear Final    Leukocyte Esterase 10/22/2019 Negative  Negative Final    Protein 10/22/2019 Negative  Negative/Trace Final    Glucose 10/22/2019 Negative  Negative Final    Ketone 10/22/2019 Negative  Negative Final    Blood 10/22/2019 Negative  Negative Final    Bilirubin 10/22/2019 Negative  Negative Final    Urobilinogen 10/22/2019 0.2  0.2 - 1.0 mg/dL Final    Nitrites 10/22/2019 Negative  Negative Final    Microscopic Examination 10/22/2019 Comment   Final    Microscopic follows if indicated.  Microscopic exam 10/22/2019 See additional order   Final    Microscopic was indicated and was performed.  URINALYSIS REFLEX 10/22/2019 Comment   Final    This specimen will not reflex to a Urine Culture.  WBC 10/22/2019 0-5  0 - 5 /hpf Final    RBC 10/22/2019 0-2  0 - 2 /hpf Final    Epithelial cells 10/22/2019 None seen  0 - 10 /hpf Final    Casts 10/22/2019 None seen  None seen /lpf Final    Mucus 10/22/2019 Present  Not Estab. Final    Bacteria 10/22/2019 None seen  None seen/Few Final    SPECIMEN STATUS REPORT 10/22/2019 COMMENT   Final    Comment: Written Authorization  Written Authorization  Written Authorization Received. Authorization received from per request 10-  Logged by Sherren Snowball     Admission on 10/22/2019, Discharged on 10/22/2019   Component Date Value Ref Range Status    SAMPLES BEING HELD 10/22/2019 1RED 1BLUE 1LAV 1GRN   Final    COMMENT 10/22/2019 Add-on orders for these samples will be processed based on acceptable specimen integrity and analyte stability, which may vary by analyte.     Final    WBC 10/22/2019 5.6  4.1 - 11.1 K/uL Final    RBC 10/22/2019 4.70  4. 10 - 5.70 M/uL Final    HGB 10/22/2019 14.7  12.1 - 17.0 g/dL Final    HCT 10/22/2019 42.7  36.6 - 50.3 % Final    MCV 10/22/2019 90.9  80.0 - 99.0 FL Final    MCH 10/22/2019 31.3  26.0 - 34.0 PG Final    MCHC 10/22/2019 34.4  30.0 - 36.5 g/dL Final    RDW 10/22/2019 13.3  11.5 - 14.5 % Final    PLATELET 57/43/8260 403  150 - 400 K/uL Final    MPV 10/22/2019 10.2  8.9 - 12.9 FL Final    NRBC 10/22/2019 0.0  0  WBC Final    ABSOLUTE NRBC 10/22/2019 0.00  0.00 - 0.01 K/uL Final    NEUTROPHILS 10/22/2019 56  32 - 75 % Final    LYMPHOCYTES 10/22/2019 29  12 - 49 % Final    MONOCYTES 10/22/2019 12  5 - 13 % Final    EOSINOPHILS 10/22/2019 2  0 - 7 % Final    BASOPHILS 10/22/2019 1  0 - 1 % Final    IMMATURE GRANULOCYTES 10/22/2019 0  0.0 - 0.5 % Final    ABS. NEUTROPHILS 10/22/2019 3.1  1.8 - 8.0 K/UL Final    ABS. LYMPHOCYTES 10/22/2019 1.6  0.8 - 3.5 K/UL Final    ABS. MONOCYTES 10/22/2019 0.6  0.0 - 1.0 K/UL Final    ABS. EOSINOPHILS 10/22/2019 0.1  0.0 - 0.4 K/UL Final    ABS. BASOPHILS 10/22/2019 0.1  0.0 - 0.1 K/UL Final    ABS. IMM. GRANS.  10/22/2019 0.0  0.00 - 0.04 K/UL Final    DF 10/22/2019 AUTOMATED    Final    Sodium 10/22/2019 140  136 - 145 mmol/L Final    Potassium 10/22/2019 4.0  3.5 - 5.1 mmol/L Final    Chloride 10/22/2019 105  97 - 108 mmol/L Final    CO2 10/22/2019 25  21 - 32 mmol/L Final    Anion gap 10/22/2019 10  5 - 15 mmol/L Final    Glucose 10/22/2019 97  65 - 100 mg/dL Final    BUN 10/22/2019 15  6 - 20 MG/DL Final    Creatinine 10/22/2019 1.08  0.70 - 1.30 MG/DL Final    BUN/Creatinine ratio 10/22/2019 14  12 - 20   Final    GFR est AA 10/22/2019 >60  >60 ml/min/1.73m2 Final    GFR est non-AA 10/22/2019 >60  >60 ml/min/1.73m2 Final    Comment: Estimated GFR is calculated using the IDMS-traceable Modification of Diet in Renal Disease (MDRD) Study equation, reported for both  Americans (GFRAA) and non- Americans (GFRNA), and normalized to 1.73m2 body surface area. The physician must decide which value applies to the patient. The MDRD study equation should only be used in individuals age 25 or older. It has not been validated for the following: pregnant women, patients with serious comorbid conditions, or on certain medications, or persons with extremes of body size, muscle mass, or nutritional status.  Calcium 10/22/2019 9.1  8.5 - 10.1 MG/DL Final    Troponin-I, Qt. 10/22/2019 <0.05  <0.05 ng/mL Final    Comment: The presence of detectable troponin above the reference range indicates myocardial injury which may be due to ischemia, myocarditis, trauma, etc.  Clinical correlation is necessary to establish the significance of this finding. Sequential testing is recommended to determine if the typical rise and fall of cTnI is demonstrated. Note:  Cardiac troponin I has a relatively long half life and may be present well after the CK MB has returned to baseline. The reference range is based on the 99th percentile of the referent population.       Ventricular Rate 10/22/2019 54  BPM Final    Atrial Rate 10/22/2019 54  BPM Final    P-R Interval 10/22/2019 178  ms Final    QRS Duration 10/22/2019 78  ms Final    Q-T Interval 10/22/2019 390  ms Final    QTC Calculation (Bezet) 10/22/2019 369  ms Final    Calculated P Axis 10/22/2019 39  degrees Final    Calculated R Axis 10/22/2019 24  degrees Final    Calculated T Axis 10/22/2019 54  degrees Final    Diagnosis 10/22/2019    Final                    Value:Sinus bradycardia  When compared with ECG of 15-FEB-2017 17:16,  No significant change was found  Confirmed by Darling Caputo M.D., John Whelan ([id number]) on 10/23/2019 3:03:31 AM       Results for orders placed or performed in visit on 10/27/19   CBC WITH AUTOMATED DIFF   Result Value Ref Range    WBC 5.5 3.4 - 10.8 x10E3/uL    RBC 4.78 4.14 - 5.80 x10E6/uL    HGB 14.9 13.0 - 17.7 g/dL    HCT 43.8 37.5 - 51.0 %    MCV 92 79 - 97 fL    MCH 31.2 26.6 - 33.0 pg    MCHC 34.0 31.5 - 35.7 g/dL    RDW 13.6 12.3 - 15.4 %    PLATELET 301 (L) 979 - 450 x10E3/uL    NEUTROPHILS 63 Not Estab. %    Lymphocytes 25 Not Estab. %    MONOCYTES 9 Not Estab. %    EOSINOPHILS 2 Not Estab. %    BASOPHILS 1 Not Estab. %    ABS. NEUTROPHILS 3.4 1.4 - 7.0 x10E3/uL    Abs Lymphocytes 1.4 0.7 - 3.1 x10E3/uL    ABS. MONOCYTES 0.5 0.1 - 0.9 x10E3/uL    ABS. EOSINOPHILS 0.1 0.0 - 0.4 x10E3/uL    ABS. BASOPHILS 0.1 0.0 - 0.2 x10E3/uL    IMMATURE GRANULOCYTES 0 Not Estab. %    ABS. IMM. GRANS. 0.0 0.0 - 0.1 x10E3/uL   HEMOGLOBIN A1C WITH EAG   Result Value Ref Range    Hemoglobin A1c 5.7 (H) 4.8 - 5.6 %    Estimated average glucose 117 mg/dL   LIPASE   Result Value Ref Range    Lipase 38 13 - 78 U/L   LIPID PANEL   Result Value Ref Range    Cholesterol, total 175 100 - 199 mg/dL    Triglyceride 229 (H) 0 - 149 mg/dL    HDL Cholesterol 51 >39 mg/dL    VLDL, calculated 46 (H) 5 - 40 mg/dL    LDL, calculated 78 0 - 99 mg/dL   METABOLIC PANEL, COMPREHENSIVE   Result Value Ref Range    Glucose 137 (H) 65 - 99 mg/dL    BUN 15 8 - 27 mg/dL    Creatinine 1.16 0.76 - 1.27 mg/dL    GFR est non-AA 58 (L) >59 mL/min/1.73    GFR est AA 66 >59 mL/min/1.73    BUN/Creatinine ratio 13 10 - 24    Sodium 141 134 - 144 mmol/L    Potassium 4.1 3.5 - 5.2 mmol/L    Chloride 104 96 - 106 mmol/L    CO2 22 20 - 29 mmol/L    Calcium 9.2 8.6 - 10.2 mg/dL    Protein, total 5.9 (L) 6.0 - 8.5 g/dL    Albumin 4.1 3.5 - 4.7 g/dL    GLOBULIN, TOTAL 1.8 1.5 - 4.5 g/dL    A-G Ratio 2.3 (H) 1.2 - 2.2    Bilirubin, total 0.8 0.0 - 1.2 mg/dL    Alk.  phosphatase 68 39 - 117 IU/L    AST (SGOT) 15 0 - 40 IU/L    ALT (SGPT) 16 0 - 44 IU/L   TESTOSTERONE, FREE & TOTAL   Result Value Ref Range    Testosterone 318 264 - 916 ng/dL    Free testosterone (Direct) 11.0 6.6 - 18.1 pg/mL   THYROID PANEL W/TSH   Result Value Ref Range    TSH 3.240 0.450 - 4.500 uIU/mL    T4, Total 7.2 4.5 - 12.0 ug/dL    T3 Uptake 28 24 - 39 %    Free Thyroxine Index (FTI) 2.0 1.2 - 4.9   MICROALBUMIN, UR, RAND W/ MICROALB/CREAT RATIO   Result Value Ref Range    Creatinine, urine 129.8 Not Estab. mg/dL    Microalbumin, urine <3.0 Not Estab. ug/mL    Microalb/Creat ratio (ug/mg creat.) <2.3 0.0 - 30.0 mg/g creat   UA WITH REFLEX MICRO AND CULTURE   Result Value Ref Range    Specific Gravity 1.019 1.005 - 1.030    pH (UA) 5.5 5.0 - 7.5    Color Yellow Yellow    Appearance Clear Clear    Leukocyte Esterase Negative Negative    Protein Negative Negative/Trace    Glucose Negative Negative    Ketone Negative Negative    Blood Negative Negative    Bilirubin Negative Negative    Urobilinogen 0.2 0.2 - 1.0 mg/dL    Nitrites Negative Negative    Microscopic Examination Comment     Microscopic exam See additional order     URINALYSIS REFLEX Comment    MICROSCOPIC EXAMINATION   Result Value Ref Range    WBC 0-5 0 - 5 /hpf    RBC 0-2 0 - 2 /hpf    Epithelial cells None seen 0 - 10 /hpf    Casts None seen None seen /lpf    Mucus Present Not Estab. Bacteria None seen None seen/Few   SPECIMEN STATUS REPORT   Result Value Ref Range    SPECIMEN STATUS REPORT COMMENT       XR Results (most recent):  Results from Hospital Encounter encounter on 10/22/19   XR CHEST PA LAT    Narrative Exam:  2 view chest    Indication: Shortness of breath and rib pain, pleural pain    COMPARISON: None    PA and lateral views demonstrate normal heart size. Patient status post median  sternotomy and revascularization procedure. There is a small right pleural effusion blunting the lateral and right posterior  costophrenic angle. There is minimal associated atelectasis. Although these  findings are nonspecific these findings could relate to pulmonary embolus. Exact  etiology is uncertain. This case was discussed with Dionne gardner or. Left lung is clear. No pneumonia. No pneumothorax. Visualized osseous structures  are unremarkable. Impression IMPRESSION:  1. There is a small right pleural effusion mild atelectasis at the right lung  base. Exact etiology is uncertain as these findings are nonspecific. . These  findings can be seen in the presence of pulmonary emboli      Objective:     Vitals:    01/13/20 0919   BP: 128/64   Pulse: 63   Resp: 18   Temp: 97.8 °F (36.6 °C)   TempSrc: Oral   SpO2: 97%   Weight: 161 lb 9.6 oz (73.3 kg)   Height: 5' 5\" (1.651 m)     Wt Readings from Last 3 Encounters:   01/13/20 161 lb 9.6 oz (73.3 kg)   01/06/20 162 lb (73.5 kg)   12/31/19 162 lb 9.6 oz (73.8 kg)     BP Readings from Last 3 Encounters:   01/13/20 128/64   01/06/20 130/62   12/31/19 128/66     Review of Systems   Constitutional: Negative. HENT: Positive for hearing loss. Negative for ear discharge, ear pain and tinnitus. + ear fullness   Eyes: Negative. Respiratory: Positive for cough. Cardiovascular: Negative. Gastrointestinal: Negative. Neurological: Negative. Psychiatric/Behavioral: Negative. Physical Exam  Constitutional:       Appearance: Normal appearance. HENT:      Head: Normocephalic and atraumatic. Right Ear: Tympanic membrane and external ear normal.      Left Ear: Tympanic membrane and external ear normal.      Mouth/Throat:      Mouth: Mucous membranes are moist.      Pharynx: Oropharynx is clear. Neck:      Musculoskeletal: Normal range of motion and neck supple. Cardiovascular:      Rate and Rhythm: Normal rate and regular rhythm. Pulses: Normal pulses. Heart sounds: Normal heart sounds. Pulmonary:      Effort: Pulmonary effort is normal.      Breath sounds: Normal breath sounds. Abdominal:      General: Bowel sounds are normal.      Palpations: Abdomen is soft. Neurological:      General: No focal deficit present. Mental Status: He is alert and oriented to person, place, and time.    Psychiatric:         Mood and Affect: Mood normal.         Behavior: Behavior normal. Diagnosis:    Ear wax Removal: /Cerumen impacts visualization of middle ear. Pt does have significant wax in bilateral ears. L>R. Some wax removed using curette. Remainder is extremely hard and dry. Will provide the patient with debrox drop x 4 days. Disclaimer:   Mr. Vanna Long has been advised to call or return to our office if symptoms worsen/change/persist. We as a care team including the patient; discussed expected course/resolution/complications of diagnosis in detail today. Medication risks/benefits/costs/interactions/alternatives discussed Vanna Long was given an after visit summary which includes diagnoses, current medications, & vitals. Vanna Long expressed understanding with the diagnosis and plan.

## 2020-01-13 NOTE — PROGRESS NOTES
ADVISED PATIENT OF THE FOLLOWING HEALTH MAINTAINCE DUE  There are no preventive care reminders to display for this patient. Chief Complaint   Patient presents with    Wax in Ear     Patient here for ear was removal. He is still having some cough and nasal congestion. I asked him if he is using debrox and he states sometimes as well as still not using nasal spray as ordered. 1. Have you been to the ER, urgent care clinic since your last visit? Hospitalized since your last visit? No    2. Have you seen or consulted any other health care providers outside of the [address] [place] [address] since your last visit? Include any DEXA scan, mammography  or colon screening. No    3. Do you have an Advance Directive on file? yes    4. Do you have a DNR on file? DNR    Patient is accompanied by self I have received verbal consent from Vandana Lomas to discuss any/all medical information while they are present in the room. Advance Care Planning 4/16/2019   Patient's Healthcare Decision Maker is: Named in scanned ACP document   Primary Decision Maker Name -   Primary Decision Maker Phone Number -   Primary Decision Maker Relationship to Patient -   Confirm Advance Directive Yes, on file   Does the patient have other document types Do Not Resuscitate         [facility] [place] [address]  [address] [facility] 600 [facility] [address]  Phone: [phone] Fax: 1200 [facility], . [address] [facility]  [address]  [facility] [address]  Phone: [phone] Fax: [phone]    [id number] [address] [place] [address] [address]  [address] [facility] [address]  Phone: [phone] Fax: [phone]        Patient reminded during visit to bring all medication bottles, OTC medications to all appointments.

## 2020-01-23 DIAGNOSIS — I25.10 CORONARY ATHEROSCLEROSIS DUE TO LIPID RICH PLAQUE: ICD-10-CM

## 2020-01-23 DIAGNOSIS — I25.83 CORONARY ATHEROSCLEROSIS DUE TO LIPID RICH PLAQUE: ICD-10-CM

## 2020-01-23 DIAGNOSIS — R07.81 RIB PAIN ON RIGHT SIDE: ICD-10-CM

## 2020-01-23 DIAGNOSIS — M51.37 DDD (DEGENERATIVE DISC DISEASE), LUMBOSACRAL: ICD-10-CM

## 2020-01-23 DIAGNOSIS — E78.2 MIXED HYPERLIPIDEMIA: ICD-10-CM

## 2020-01-23 DIAGNOSIS — R07.89 ANTERIOR CHEST WALL PAIN: ICD-10-CM

## 2020-01-23 DIAGNOSIS — R73.03 PREDIABETES: ICD-10-CM

## 2020-01-23 DIAGNOSIS — R07.1 CHEST PAIN VARYING WITH BREATHING: ICD-10-CM

## 2020-01-23 DIAGNOSIS — M54.32 SCIATICA OF LEFT SIDE: ICD-10-CM

## 2020-01-23 DIAGNOSIS — M54.18 RADICULOPATHY OF SACROCOCCYGEAL REGION: ICD-10-CM

## 2020-01-23 DIAGNOSIS — E55.9 VITAMIN D DEFICIENCY, UNSPECIFIED: ICD-10-CM

## 2020-01-23 DIAGNOSIS — R09.1 PLEURITIS: ICD-10-CM

## 2020-02-10 ENCOUNTER — TELEPHONE (OUTPATIENT)
Dept: GERIATRIC MEDICINE | Age: 85
End: 2020-02-10

## 2020-02-10 NOTE — TELEPHONE ENCOUNTER
Roseanne Naila Arndt stopped by requesting for a referral to artoritis specialist. Please call him and let him know if appointment is needed.  Tnx.

## 2020-02-12 ENCOUNTER — TELEPHONE (OUTPATIENT)
Dept: GERIATRIC MEDICINE | Age: 85
End: 2020-02-12

## 2020-02-12 DIAGNOSIS — Z76.0 MEDICATION REFILL: ICD-10-CM

## 2020-02-12 DIAGNOSIS — E03.9 ACQUIRED HYPOTHYROIDISM: ICD-10-CM

## 2020-02-12 DIAGNOSIS — E89.0 POSTOPERATIVE HYPOTHYROIDISM: ICD-10-CM

## 2020-02-12 RX ORDER — LEVOTHYROXINE SODIUM 25 UG/1
TABLET ORAL
Qty: 90 TAB | Refills: 1 | Status: SHIPPED | OUTPATIENT
Start: 2020-02-12 | End: 2020-05-11 | Stop reason: SDUPTHER

## 2020-02-18 ENCOUNTER — OFFICE VISIT (OUTPATIENT)
Dept: GERIATRIC MEDICINE | Age: 85
End: 2020-02-18

## 2020-02-18 VITALS
DIASTOLIC BLOOD PRESSURE: 62 MMHG | SYSTOLIC BLOOD PRESSURE: 128 MMHG | OXYGEN SATURATION: 97 % | HEIGHT: 65 IN | BODY MASS INDEX: 27.29 KG/M2 | RESPIRATION RATE: 16 BRPM | WEIGHT: 163.8 LBS | HEART RATE: 66 BPM

## 2020-02-18 DIAGNOSIS — I25.83 CORONARY ATHEROSCLEROSIS DUE TO LIPID RICH PLAQUE: ICD-10-CM

## 2020-02-18 DIAGNOSIS — I25.10 CORONARY ARTERY DISEASE INVOLVING NATIVE CORONARY ARTERY OF NATIVE HEART WITHOUT ANGINA PECTORIS: Chronic | ICD-10-CM

## 2020-02-18 DIAGNOSIS — N13.8 BENIGN PROSTATIC HYPERPLASIA WITH URINARY OBSTRUCTION: ICD-10-CM

## 2020-02-18 DIAGNOSIS — R73.03 PREDIABETES: ICD-10-CM

## 2020-02-18 DIAGNOSIS — I25.10 CORONARY ATHEROSCLEROSIS DUE TO LIPID RICH PLAQUE: ICD-10-CM

## 2020-02-18 DIAGNOSIS — M54.40 LOW BACK PAIN WITH SCIATICA, SCIATICA LATERALITY UNSPECIFIED, UNSPECIFIED BACK PAIN LATERALITY, UNSPECIFIED CHRONICITY: ICD-10-CM

## 2020-02-18 DIAGNOSIS — E55.9 VITAMIN D DEFICIENCY, UNSPECIFIED: ICD-10-CM

## 2020-02-18 DIAGNOSIS — E78.2 MIXED HYPERLIPIDEMIA: ICD-10-CM

## 2020-02-18 DIAGNOSIS — R68.89 OTHER GENERAL SYMPTOMS AND SIGNS: ICD-10-CM

## 2020-02-18 DIAGNOSIS — E89.0 POSTOPERATIVE HYPOTHYROIDISM: Primary | Chronic | ICD-10-CM

## 2020-02-18 DIAGNOSIS — N40.1 BENIGN PROSTATIC HYPERPLASIA WITH URINARY OBSTRUCTION: ICD-10-CM

## 2020-02-18 DIAGNOSIS — M17.9 OSTEOARTHRITIS OF KNEE, UNSPECIFIED LATERALITY, UNSPECIFIED OSTEOARTHRITIS TYPE: ICD-10-CM

## 2020-02-18 DIAGNOSIS — R35.1 NOCTURIA: ICD-10-CM

## 2020-02-18 DIAGNOSIS — M19.90 ARTHRITIS: ICD-10-CM

## 2020-02-18 DIAGNOSIS — E78.5 HYPERLIPIDEMIA, UNSPECIFIED HYPERLIPIDEMIA TYPE: ICD-10-CM

## 2020-02-18 DIAGNOSIS — E03.9 ACQUIRED HYPOTHYROIDISM: ICD-10-CM

## 2020-02-18 DIAGNOSIS — F41.9 ANXIETY: ICD-10-CM

## 2020-02-18 RX ORDER — DICLOFENAC SODIUM 10 MG/G
4 GEL TOPICAL 3 TIMES DAILY
Qty: 30 G | Refills: 2 | Status: SHIPPED | OUTPATIENT
Start: 2020-02-18 | End: 2020-02-18

## 2020-02-18 RX ORDER — DIAZEPAM 5 MG/1
5 TABLET ORAL
Qty: 30 TAB | Refills: 0 | Status: SHIPPED | COMMUNITY
Start: 2020-02-18

## 2020-02-18 RX ORDER — IBUPROFEN 200 MG
200 TABLET ORAL
Qty: 30 TAB | Refills: 2
Start: 2020-02-18

## 2020-02-18 RX ORDER — DICLOFENAC SODIUM 10 MG/G
2 GEL TOPICAL 3 TIMES DAILY
Qty: 120 G | Refills: 2 | Status: SHIPPED | COMMUNITY
Start: 2020-02-18

## 2020-02-18 RX ORDER — TAMSULOSIN HYDROCHLORIDE 0.4 MG/1
0.4 CAPSULE ORAL DAILY
Qty: 90 CAP | Refills: 1 | Status: SHIPPED | OUTPATIENT
Start: 2020-02-18

## 2020-02-18 NOTE — PROGRESS NOTES
HISTORY OF PRESENT ILLNESS  Maureen Medellin is a 80 y.o. male. HPI   Mr. Lazarus Moe is a pleasant 80year old  male seen today requesting refill for diazepam 5mg which he takes at bedtime for anxiety. He reports being on the medication for 15 years and it helps to alleviate worring about things that would otherwise keep him up at night. He denies seeing a psychologist or psychologist and doesn't feel it's necessary at this time. His depression screening today was negative 0/2. He also complains of bilateral hand stiffness, clicking and joint pain in his fingers and hips. Joint discomfort has been ongoing for a few years intermittently throughout the day. Discomfort is worse in the morning, but improves throughout the day. Patient denies tingling, radiation or numbness to extremities. He is not taking anything at this time and would like to try something now as this is beginning to interfere with activity and quality of life. He uses a cane to ambulate outside and on uneven surfaces. He denies recent falls. Patient reports a decrease in urine flow, urgencies and dribbling. He has a history of BPH and currently taking finasteride 5mg at night. He reports urinating 5-6 times per day without dysuria or hematuria. Patient's last lab work up was October 22, 2019, will order labs today, Lipid panel, CMP, CBC, Hemoglobin A1C, Thyroid panel, Vitamin B12, UA /C &S with micro. Patient is here alone, ambulating without assistance. His mood is calm and appropriate. Review of Systems   Constitutional: Negative. Negative for chills, diaphoresis, fever, malaise/fatigue and weight loss. HENT: Negative. Negative for congestion, ear discharge, ear pain, hearing loss, nosebleeds, sinus pain, sore throat and tinnitus. Reports wearing bilateral hearing aids. Eyes: Negative. Negative for blurred vision, double vision, photophobia, pain, discharge and redness. Reports AMD in both eyes. Saw eye doctor 3 months ago. Stable. Respiratory: Negative. Negative for cough, hemoptysis, sputum production, shortness of breath, wheezing and stridor. Cardiovascular: Negative. Negative for chest pain, palpitations, orthopnea, claudication, leg swelling and PND. Gastrointestinal: Negative. Negative for abdominal pain, blood in stool, constipation, diarrhea, heartburn, melena, nausea and vomiting. Last bowel movement this morning. Genitourinary: Negative. Negative for dysuria, flank pain, frequency, hematuria and urgency. Patient has a history of BPH. Reports slow urine stream, dribbling and urgency. Reports urinating 5-6 times a day. He is taking Finasteride as prescribed. Musculoskeletal: Positive for joint pain. Negative for back pain, falls, myalgias and neck pain. Reports discomfort in his hips and fingers. States he uses a cane to ambulate while outside or on uneven surfaces. Denies falls. Skin: Negative. Negative for itching and rash. Neurological: Negative. Negative for dizziness, tingling, tremors, sensory change, speech change, focal weakness, seizures, loss of consciousness, weakness and headaches. Endo/Heme/Allergies: Negative. Negative for environmental allergies and polydipsia. Does not bruise/bleed easily. Psychiatric/Behavioral: Negative. Negative for depression, hallucinations, memory loss, substance abuse and suicidal ideas. The patient is not nervous/anxious and does not have insomnia. Physical Exam  Vitals signs and nursing note reviewed. Constitutional:       General: He is not in acute distress. Appearance: He is normal weight. He is not ill-appearing, toxic-appearing or diaphoretic. HENT:      Head: Normocephalic and atraumatic. Right Ear: Tympanic membrane, ear canal and external ear normal. There is no impacted cerumen. Left Ear: Tympanic membrane, ear canal and external ear normal. There is no impacted cerumen.       Nose: Nose normal. No congestion or rhinorrhea. Mouth/Throat:      Mouth: Mucous membranes are moist.      Pharynx: Oropharynx is clear. No oropharyngeal exudate or posterior oropharyngeal erythema. Eyes:      General: No scleral icterus. Right eye: No discharge. Left eye: No discharge. Extraocular Movements: Extraocular movements intact. Conjunctiva/sclera: Conjunctivae normal.      Pupils: Pupils are equal, round, and reactive to light. Neck:      Musculoskeletal: No neck rigidity or muscular tenderness. Vascular: No carotid bruit. Cardiovascular:      Rate and Rhythm: Normal rate and regular rhythm. Pulses: Normal pulses. Heart sounds: Normal heart sounds. No murmur. No friction rub. No gallop. Pulmonary:      Effort: Pulmonary effort is normal. No respiratory distress. Breath sounds: Normal breath sounds. No stridor. No wheezing, rhonchi or rales. Chest:      Chest wall: No tenderness. Abdominal:      General: Abdomen is flat. Bowel sounds are normal. There is no distension. Palpations: Abdomen is soft. There is no mass. Tenderness: There is no abdominal tenderness. There is no right CVA tenderness, left CVA tenderness, guarding or rebound. Hernia: No hernia is present. Genitourinary:     Comments: Not assessed. Musculoskeletal: Normal range of motion. General: No swelling, tenderness, deformity or signs of injury. Right lower leg: No edema. Left lower leg: No edema. Lymphadenopathy:      Cervical: No cervical adenopathy. Skin:     General: Skin is warm and dry. Capillary Refill: Capillary refill takes 2 to 3 seconds. Coloration: Skin is not jaundiced or pale. Findings: No bruising, erythema, lesion or rash. Neurological:      General: No focal deficit present. Mental Status: He is alert and oriented to person, place, and time. Cranial Nerves: No cranial nerve deficit. Sensory: No sensory deficit. Motor: No weakness. Coordination: Coordination normal.      Gait: Gait normal.      Deep Tendon Reflexes: Reflexes normal.   Psychiatric:         Mood and Affect: Mood normal.         Behavior: Behavior normal.         Thought Content: Thought content normal.         Judgment: Judgment normal.         ASSESSMENT and PLAN    ICD-10-CM ICD-9-CM    1. Postoperative hypothyroidism E89.0 244.0 COLLECTION VENOUS BLOOD,VENIPUNCTURE      THYROID PANEL W/TSH   2. Anxiety F41.9 300.00 diazePAM (VALIUM) 5 mg tablet      COLLECTION VENOUS BLOOD,VENIPUNCTURE   3. Arthritis M19.90 716.90    4. Benign prostatic hyperplasia with urinary obstruction N40.1 600.01 tamsulosin (FLOMAX) 0.4 mg capsule    N13.8 599.69 COLLECTION VENOUS BLOOD,VENIPUNCTURE      MICROALBUMIN, UR, RAND W/ MICROALB/CREAT RATIO      PSA W/ REFLX FREE PSA      UA WITH REFLEX MICRO AND CULTURE   5. Nocturia R35.1 788.43 tamsulosin (FLOMAX) 0.4 mg capsule      COLLECTION VENOUS BLOOD,VENIPUNCTURE      MICROALBUMIN, UR, RAND W/ MICROALB/CREAT RATIO      PSA W/ REFLX FREE PSA      UA WITH REFLEX MICRO AND CULTURE   6. Hyperlipidemia, unspecified hyperlipidemia type E78.5 272.4 COLLECTION VENOUS BLOOD,VENIPUNCTURE      LIPID PANEL   7. Low back pain with sciatica, sciatica laterality unspecified, unspecified back pain laterality, unspecified chronicity M54.40 724.3 COLLECTION VENOUS BLOOD,VENIPUNCTURE      diclofenac (VOLTAREN) 1 % gel   8. Osteoarthritis of knee, unspecified laterality, unspecified osteoarthritis type M17.10 715.36 ibuprofen (MOTRIN) 200 mg tablet      COLLECTION VENOUS BLOOD,VENIPUNCTURE      diclofenac (VOLTAREN) 1 % gel   9. Vitamin D deficiency, unspecified  E55.9 268.9 COLLECTION VENOUS BLOOD,VENIPUNCTURE      VITAMIN B12 & FOLATE      VITAMIN D, 25 HYDROXY   10. Prediabetes  R73.03 790.29 COLLECTION VENOUS BLOOD,VENIPUNCTURE      HEMOGLOBIN A1C WITH EAG      VITAMIN B12 & FOLATE   11.  Coronary artery disease involving native coronary artery of native heart without angina pectoris I25.10 414.01    12. Acquired hypothyroidism E03.9 244.9 THYROID PANEL W/TSH   13. Coronary atherosclerosis due to lipid rich plaque I25.10 414.3 CBC WITH AUTOMATED DIFF    H17.16  METABOLIC PANEL, COMPREHENSIVE   14. Mixed hyperlipidemia E78.2 272.2 LIPID PANEL   15. Other general symptoms and signs  R68.89 780.99 VITAMIN B12 & FOLATE     PLAN  1. Anxiety. Continue taking diazepam (Valium) 5mg po every 6 hours prn. Refill sent to [facility] on [address]   2. Arthritis. Start taking otc Ibuprofen 200mg BIDprn and diclofenac (Voltaren) 1% gel, apply  4g topically TID prn for discomfort. Discussed PT and rheumtogology referral, patient states he'd prefer to start with Ibuprofen for now and reconsider PT and rheumotology should symptoms worsen. 3. BPH. Start tamsulosin (Flomax) 0.4mg po daily. Continue taking finesteride 5mg daily. 4. Hypothyroidism. Continue taking levothyroxine 25mcg. 5. Hyperlipidemia. Continue taking simvastatin 40mg. 6. Labs ordered. Follow up in 4 weeks to review labs or sooner for worsening symptoms.

## 2020-02-18 NOTE — PATIENT INSTRUCTIONS
Arthritis: Care Instructions  Your Care Instructions  Arthritis, also called osteoarthritis, is a breakdown of the cartilage that cushions your joints. When the cartilage wears down, your bones rub against each other. This causes pain and stiffness. Many people have some arthritis as they age. Arthritis most often affects the joints of the spine, hands, hips, knees, or feet. You can take simple measures to protect your joints, ease your pain, and help you stay active. Follow-up care is a key part of your treatment and safety. Be sure to make and go to all appointments, and call your doctor if you are having problems. It's also a good idea to know your test results and keep a list of the medicines you take. How can you care for yourself at home? · Stay at a healthy weight. Being overweight puts extra strain on your joints. · Talk to your doctor or physical therapist about exercises that will help ease joint pain. ? Stretch. You may enjoy gentle forms of yoga to help keep your joints and muscles flexible. ? Walk instead of jog. Other types of exercise that are less stressful on the joints include riding a bicycle, swimming, kimberly chi, or water exercise. ? Lift weights. Strong muscles help reduce stress on your joints. Stronger thigh muscles, for example, take some of the stress off of the knees and hips. Learn the right way to lift weights so you do not make joint pain worse. · Take your medicines exactly as prescribed. Call your doctor if you think you are having a problem with your medicine. · Take pain medicines exactly as directed. ? If the doctor gave you a prescription medicine for pain, take it as prescribed. ? If you are not taking a prescription pain medicine, ask your doctor if you can take an over-the-counter medicine. · Use a cane, crutch, walker, or another device if you need help to get around. These can help rest your joints.  You also can use other things to make life easier, such as a higher toilet seat and padded handles on kitchen utensils. · Do not sit in low chairs, which can make it hard to get up. · Put heat or cold on your sore joints as needed. Use whichever helps you most. You also can take turns with hot and cold packs. ? Apply heat 2 or 3 times a day for 20 to 30 minutes--using a heating pad, hot shower, or hot pack--to relieve pain and stiffness. ? Put ice or a cold pack on your sore joint for 10 to 20 minutes at a time. Put a thin cloth between the ice and your skin. When should you call for help? Call your doctor now or seek immediate medical care if:    · You have sudden swelling, warmth, or pain in any joint.     · You have joint pain and a fever or rash.     · You have such bad pain that you cannot use a joint.    Watch closely for changes in your health, and be sure to contact your doctor if:    · You have mild joint symptoms that continue even with more than 6 weeks of care at home.     · You have stomach pain or other problems with your medicine. Where can you learn more? Go to http://sloanGOVECSgeorge.info/. Enter N628 in the search box to learn more about \"Arthritis: Care Instructions. \"  Current as of: April 1, 2019  Content Version: 12.2  © 9812-5356 [employer]. Care instructions adapted under license by [employer] (which disclaims liability or warranty for this information). If you have questions about a medical condition or this instruction, always ask your healthcare professional. [place] [address] any warranty or liability for your use of this information. Anxiety Disorder: Care Instructions  Your Care Instructions    Anxiety is a normal reaction to stress. Difficult situations can cause you to have symptoms such as sweaty palms and a nervous feeling. In an anxiety disorder, the symptoms are far more severe.  Constant worry, muscle tension, trouble sleeping, nausea and diarrhea, and other symptoms can make normal daily activities difficult or impossible. These symptoms may occur for no reason, and they can affect your work, school, or social life. Medicines, counseling, and self-care can all help. Follow-up care is a key part of your treatment and safety. Be sure to make and go to all appointments, and call your doctor if you are having problems. It's also a good idea to know your test results and keep a list of the medicines you take. How can you care for yourself at home? · Take medicines exactly as directed. Call your doctor if you think you are having a problem with your medicine. · Go to your counseling sessions and follow-up appointments. · Recognize and accept your anxiety. Then, when you are in a situation that makes you anxious, say to yourself, \"This is not an emergency. I feel uncomfortable, but I am not in danger. I can keep going even if I feel anxious. \"  · Be kind to your body:  ? Relieve tension with exercise or a massage. ? Get enough rest.  ? Avoid alcohol, caffeine, nicotine, and illegal drugs. They can increase your anxiety level and cause sleep problems. ? Learn and do relaxation techniques. See below for more about these techniques. · Engage your mind. Get out and do something you enjoy. Go to a funny movie, or take a walk or hike. Plan your day. Having too much or too little to do can make you anxious. · Keep a record of your symptoms. Discuss your fears with a good friend or family member, or join a support group for people with similar problems. Talking to others sometimes relieves stress. · Get involved in social groups, or volunteer to help others. Being alone sometimes makes things seem worse than they are. · Get at least 30 minutes of exercise on most days of the week to relieve stress. Walking is a good choice. You also may want to do other activities, such as running, swimming, cycling, or playing tennis or team sports.   Relaxation techniques  Do relaxation exercises 10 to 20 minutes a day. You can play soothing, relaxing music while you do them, if you wish. · Tell others in your house that you are going to do your relaxation exercises. Ask them not to disturb you. · Find a comfortable place, away from all distractions and noise. · Lie down on your back, or sit with your back straight. · Focus on your breathing. Make it slow and steady. · Breathe in through your nose. Breathe out through either your nose or mouth. · Breathe deeply, filling up the area between your navel and your rib cage. Breathe so that your belly goes up and down. · Do not hold your breath. · Breathe like this for 5 to 10 minutes. Notice the feeling of calmness throughout your whole body. As you continue to breathe slowly and deeply, relax by doing the following for another 5 to 10 minutes:  · Tighten and relax each muscle group in your body. You can begin at your toes and work your way up to your head. · Imagine your muscle groups relaxing and becoming heavy. · Empty your mind of all thoughts. · Let yourself relax more and more deeply. · Become aware of the state of calmness that surrounds you. · When your relaxation time is over, you can bring yourself back to alertness by moving your fingers and toes and then your hands and feet and then stretching and moving your entire body. Sometimes people fall asleep during relaxation, but they usually wake up shortly afterward. · Always give yourself time to return to full alertness before you drive a car or do anything that might cause an accident if you are not fully alert. Never play a relaxation tape while you drive a car. When should you call for help? Call 911 anytime you think you may need emergency care. For example, call if:    · You feel you cannot stop from hurting yourself or someone else.   Sandra Sinha the numbers for these national suicide hotlines: 8-814-020-TALK (1-723.468.4265) and 1-182-AHPLZKI (0-376.556.8946).  If you or someone you know talks about suicide or feeling hopeless, get help right away.   Watch closely for changes in your health, and be sure to contact your doctor if:    · You have anxiety or fear that affects your life.     · You have symptoms of anxiety that are new or different from those you had before. Where can you learn more? Go to http://sloan-george.info/. Enter P754 in the search box to learn more about \"Anxiety Disorder: Care Instructions. \"  Current as of: May 28, 2019  Content Version: 12.2  © 8193-3305 [employer]. Care instructions adapted under license by [employer] (which disclaims liability or warranty for this information). If you have questions about a medical condition or this instruction, always ask your healthcare professional. [address] any warranty or liability for your use of this information. Benign Prostatic Hyperplasia: Care Instructions  Your Care Instructions    Benign prostatic hyperplasia, or BPH, is an enlarged prostate gland. The prostate is a small gland that makes some of the fluid in semen. Prostate enlargement happens to almost all men as they age. It is usually not serious. BPH does not cause prostate cancer. As the prostate gets bigger, it may partly block the flow of urine. You may have a hard time getting a urine stream started or completely stopped. BPH can cause dribbling. You may have a weak urine stream, or you may have to urinate more often than you used to, especially at night. Most men find these problems easy to manage. You do not need treatment unless your symptoms bother you a lot or you have other problems, such as bladder infections or stones. In these cases, medicines may help. Surgery is not needed unless the urine flow is blocked or the symptoms do not get better with medicine. Follow-up care is a key part of your treatment and safety.  Be sure to make and go to all appointments, and call your doctor if you are having problems. It's also a good idea to know your test results and keep a list of the medicines you take. How can you care for yourself at home? · Take plenty of time to urinate. Try to relax. · Try \"double voiding. \" Urinate as much you can, relax for a few moments, and then try to urinate again. · Sit on the toilet to urinate. · Read or think of other things while you are waiting. · Turn on a faucet, or try to picture running water. Some men find that this helps get their urine flowing. · If dribbling is a problem, wash your penis daily to avoid skin irritation and infection. · Avoid caffeine and alcohol. These drinks will increase how often you need to urinate. Spread your fluid intake throughout the day. If the urge to urinate often wakes you at night, limit your fluid intake in the evening. Urinate right before you go to bed. · Many over-the-counter cold and allergy medicines can make the symptoms of BPH worse. Avoid antihistamines, decongestants, and allergy pills, if you can. Read the warnings on the package. · If you take any prescription medicines, especially tranquilizers or antidepressants, ask your doctor or pharmacist whether they can cause urination problems. There may be other medicines you can use that do not cause urinary problems. · Be safe with medicines. Take your medicines exactly as prescribed. Call your doctor if you think you are having a problem with your medicine. When should you call for help? Call your doctor now or seek immediate medical care if:    · You cannot urinate at all.     · You have symptoms of a urinary infection. For example:  ? You have blood or pus in your urine. ? You have pain in your back just below your rib cage. This is called flank pain. ? You have a fever, chills, or body aches. ? It hurts to urinate. ?  You have groin or belly pain.    Watch closely for changes in your health, and be sure to contact your doctor if:    · It hurts when you ejaculate.     · Your urinary problems get a lot worse or bother you a lot. Where can you learn more? Go to http://sloan-george.info/. Enter N912 in the search box to learn more about \"Benign Prostatic Hyperplasia: Care Instructions. \"  Current as of: May 28, 2019  Content Version: 12.2  © 2550-5403 [employer]. Care instructions adapted under license by [employer] (which disclaims liability or warranty for this information). If you have questions about a medical condition or this instruction, always ask your healthcare professional. [address] any warranty or liability for your use of this information. Hypothyroidism: Care Instructions  Your Care Instructions    You have hypothyroidism, which means that your body is not making enough thyroid hormone. This hormone helps your body use energy. If your thyroid level is low, you may feel tired, be constipated, have an increase in your blood pressure, or have dry skin or memory problems. You may also get cold easily, even when it is warm. Women with low thyroid levels may have heavy menstrual periods. A blood test to find your thyroid-stimulating hormone (TSH) level is used to check for hypothyroidism. A high TSH level may mean that you have low thyroid. When your body is not making enough thyroid hormone, TSH levels rise in an effort to make the body produce more. The treatment for hypothyroidism is to take thyroid hormone pills. You should start to feel better in 1 to 2 weeks. But it can take several months to see changes in the TSH level. You will need regular visits with your doctor to make sure you have the right dose of medicine. Most people need treatment for the rest of their lives. You will need to see your doctor regularly to have blood tests and to make sure you are doing well. Follow-up care is a key part of your treatment and safety.  Be sure to make and go to all appointments, and call your doctor if you are having problems. It's also a good idea to know your test results and keep a list of the medicines you take. How can you care for yourself at home? · Take your thyroid hormone medicine exactly as prescribed. Call your doctor if you think you are having a problem with your medicine. Most people do not have side effects if they take the right amount of medicine regularly. ? Take the medicine 30 minutes before breakfast, and do not take it with calcium, vitamins, or iron. ? Do not take extra doses of your thyroid medicine. It will not help you get better any faster, and it may cause side effects. ? If you forget to take a dose, do NOT take a double dose of medicine. Take your usual dose the next day. · Tell your doctor about all prescription, herbal, or over-the-counter products you take. · Take care of yourself. Eat a healthy diet, get enough sleep, and get regular exercise. When should you call for help? Call 911 anytime you think you may need emergency care. For example, call if:    · You passed out (lost consciousness).     · You have severe trouble breathing.     · You have a very slow heartbeat (less than 60 beats a minute).     · You have a low body temperature (95°F or below).    Call your doctor now or seek immediate medical care if:    · You feel tired, sluggish, or weak.     · You have trouble remembering things or concentrating.     · You do not begin to feel better 2 weeks after starting your medicine.    Watch closely for changes in your health, and be sure to contact your doctor if you have any problems. Where can you learn more? Go to http://sloan-george.info/. Enter N766 in the search box to learn more about \"Hypothyroidism: Care Instructions. \"  Current as of: November 6, 2018  Content Version: 12.2  © 3218-7060 [employer].  Care instructions adapted under license by [employer] (which disclaims liability or warranty for this information). If you have questions about a medical condition or this instruction, always ask your healthcare professional. [address] any warranty or liability for your use of this information. High Cholesterol: Care Instructions  Your Care Instructions    Cholesterol is a type of fat in your blood. It is needed for many body functions, such as making new cells. Cholesterol is made by your body. It also comes from food you eat. High cholesterol means that you have too much of the fat in your blood. This raises your risk of a heart attack and stroke. LDL and HDL are part of your total cholesterol. LDL is the \"bad\" cholesterol. High LDL can raise your risk for heart disease, heart attack, and stroke. HDL is the \"good\" cholesterol. It helps clear bad cholesterol from the body. High HDL is linked with a lower risk of heart disease, heart attack, and stroke. Your cholesterol levels help your doctor find out your risk for having a heart attack or stroke. You and your doctor can talk about whether you need to lower your risk and what treatment is best for you. A heart-healthy lifestyle along with medicines can help lower your cholesterol and your risk. The way you choose to lower your risk will depend on how high your risk is for heart attack and stroke. It will also depend on how you feel about taking medicines. Follow-up care is a key part of your treatment and safety. Be sure to make and go to all appointments, and call your doctor if you are having problems. It's also a good idea to know your test results and keep a list of the medicines you take. How can you care for yourself at home? · Eat a variety of foods every day. Good choices include fruits, vegetables, whole grains (like oatmeal), dried beans and peas, nuts and seeds, soy products (like tofu), and fat-free or low-fat dairy products.   · Replace butter, margarine, and hydrogenated or partially hydrogenated oils with olive and canola oils. (Canola oil margarine without trans fat is fine.)  · Replace red meat with fish, poultry, and soy protein (like tofu). · Limit processed and packaged foods like chips, crackers, and cookies. · Bake, broil, or steam foods. Don't mckinney them. · Be physically active. Get at least 30 minutes of exercise on most days of the week. Walking is a good choice. You also may want to do other activities, such as running, swimming, cycling, or playing tennis or team sports. · Stay at a healthy weight or lose weight by making the changes in eating and physical activity listed above. Losing just a small amount of weight, even 5 to 10 pounds, can reduce your risk for having a heart attack or stroke. · Do not smoke. When should you call for help? Watch closely for changes in your health, and be sure to contact your doctor if:    · You need help making lifestyle changes.     · You have questions about your medicine. Where can you learn more? Go to http://sloan-george.info/. Enter O962 in the search box to learn more about \"High Cholesterol: Care Instructions. \"  Current as of: April 9, 2019  Content Version: 12.2  © 9531-2402 [employer]. Care instructions adapted under license by [employer] (which disclaims liability or warranty for this information). If you have questions about a medical condition or this instruction, always ask your healthcare professional. [address] any warranty or liability for your use of this information.

## 2020-02-18 NOTE — LETTER
2/18/2020 11:22 AM 
 
Mr. Tam Bailey 
[address] [facility] [address]  [facility] [phone] New orders for today's visit with Sylwia Hoffman 02/18/2020 1. Ibuprofen 200 mg , take 1 tablet by mouth twice daily as needed for pain. Buy this over the counter. 2. Flomax 0.4 mg, take 1 capsule by mouth at night for urination. Sent to [facility]. 3. Refill of Diazepam sent to [facility]  order pharmacy. 4.Diclofenac 1 % topical gel apply to hand threes times a day for pain, sent to [facility] order pharmacy 5. Follow up next Tuesday the 25th of February.   
 
Sincerely, 
 
 
Gracelyn Collet, NP

## 2020-02-18 NOTE — PROGRESS NOTES
ADVISED PATIENT OF THE FOLLOWING HEALTH MAINTAINCE DUE  There are no preventive care reminders to display for this patient. Chief Complaint   Patient presents with    Medication Refill     Patient is here to discuss refill of Valium that he takes as needed for sleep. 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 1. Have you been to the ER, urgent care clinic since your last visit? Hospitalized since your last visit? No    2. Have you seen or consulted any other health care providers outside of the [address] [place] [address] since your last visit? Include any DEXA scan, mammography  or colon screening. No    3. Do you have an Advance Directive on file? yes    4. Do you have a DNR on file? DNR    Patient is accompanied by self I have received verbal consent from Zeke Barraza to discuss any/all medical information while they are present in the room. Advance Care Planning 4/16/2019   Patient's Healthcare Decision Maker is: Named in scanned ACP document   Primary Decision Maker Name -   Primary Decision Maker Phone Number -   Primary Decision Maker Relationship to Patient -   Confirm Advance Directive Yes, on file   Does the patient have other document types Do Not Resuscitate         [address]  [address] [facility] [address] [facility] [address]  Phone: [phone] Fax: [phone] [facility], . [address] [facility]  [address]  [place] [address]  Phone: [phone] Fax: [phone]    [id number] [place] [address] [address] [facility] [address]  Phone: [phone] Fax: [phone]        Patient reminded during visit to bring all medication bottles, OTC medications to all appointments.        Zeke Barraza presents for lab draw ordered by Janusz Encarnacion RN MSN ACNPC-AG-NP    The following labs were drawn and sent to lab by Cyndie Swanson LPN:    CBC, Lipid Profile, CMP, HgA1C and Thyroid panel,PSA, Vit b12 folate, Vitamin D,     The following tubes were sent:    Lavender  ( 2) and Tiger(2)    Patient tolerated procedure well, blood obtained via venipuncture to left antecubital     Urine collected for UA and Micro

## 2020-02-19 LAB
25(OH)D3+25(OH)D2 SERPL-MCNC: 40.8 NG/ML (ref 30–100)
ALBUMIN SERPL-MCNC: 3.9 G/DL (ref 3.6–4.6)
ALBUMIN/CREAT UR: <2 MG/G CREAT (ref 0–29)
ALBUMIN/GLOB SERPL: 2.3 {RATIO} (ref 1.2–2.2)
ALP SERPL-CCNC: 63 IU/L (ref 39–117)
ALT SERPL-CCNC: 17 IU/L (ref 0–44)
APPEARANCE UR: CLEAR
AST SERPL-CCNC: 15 IU/L (ref 0–40)
BACTERIA #/AREA URNS HPF: NORMAL /[HPF]
BASOPHILS # BLD AUTO: 0 X10E3/UL (ref 0–0.2)
BASOPHILS NFR BLD AUTO: 1 %
BILIRUB SERPL-MCNC: 0.6 MG/DL (ref 0–1.2)
BILIRUB UR QL STRIP: NEGATIVE
BUN SERPL-MCNC: 12 MG/DL (ref 8–27)
BUN/CREAT SERPL: 11 (ref 10–24)
CALCIUM SERPL-MCNC: 8.8 MG/DL (ref 8.6–10.2)
CASTS URNS QL MICRO: NORMAL /LPF
CHLORIDE SERPL-SCNC: 107 MMOL/L (ref 96–106)
CHOLEST SERPL-MCNC: 155 MG/DL (ref 100–199)
CO2 SERPL-SCNC: 20 MMOL/L (ref 20–29)
COLOR UR: YELLOW
CREAT SERPL-MCNC: 1.09 MG/DL (ref 0.76–1.27)
CREAT UR-MCNC: 149.5 MG/DL
EOSINOPHIL # BLD AUTO: 0.2 X10E3/UL (ref 0–0.4)
EOSINOPHIL NFR BLD AUTO: 5 %
EPI CELLS #/AREA URNS HPF: NORMAL /HPF (ref 0–10)
ERYTHROCYTE [DISTWIDTH] IN BLOOD BY AUTOMATED COUNT: 13.3 % (ref 11.6–15.4)
EST. AVERAGE GLUCOSE BLD GHB EST-MCNC: 117 MG/DL
FOLATE SERPL-MCNC: 17.2 NG/ML
FT4I SERPL CALC-MCNC: 1.8 (ref 1.2–4.9)
GLOBULIN SER CALC-MCNC: 1.7 G/DL (ref 1.5–4.5)
GLUCOSE SERPL-MCNC: 130 MG/DL (ref 65–99)
GLUCOSE UR QL: NEGATIVE
HBA1C MFR BLD: 5.7 % (ref 4.8–5.6)
HCT VFR BLD AUTO: 42.4 % (ref 37.5–51)
HDLC SERPL-MCNC: 51 MG/DL
HGB BLD-MCNC: 14.5 G/DL (ref 13–17.7)
HGB UR QL STRIP: NEGATIVE
IMM GRANULOCYTES # BLD AUTO: 0 X10E3/UL (ref 0–0.1)
IMM GRANULOCYTES NFR BLD AUTO: 0 %
KETONES UR QL STRIP: NEGATIVE
LDLC SERPL CALC-MCNC: 69 MG/DL (ref 0–99)
LEUKOCYTE ESTERASE UR QL STRIP: NEGATIVE
LYMPHOCYTES # BLD AUTO: 1.3 X10E3/UL (ref 0.7–3.1)
LYMPHOCYTES NFR BLD AUTO: 35 %
MCH RBC QN AUTO: 31.7 PG (ref 26.6–33)
MCHC RBC AUTO-ENTMCNC: 34.2 G/DL (ref 31.5–35.7)
MCV RBC AUTO: 93 FL (ref 79–97)
MICRO URNS: NORMAL
MICRO URNS: NORMAL
MICROALBUMIN UR-MCNC: <3 UG/ML
MONOCYTES # BLD AUTO: 0.3 X10E3/UL (ref 0.1–0.9)
MONOCYTES NFR BLD AUTO: 9 %
MUCOUS THREADS URNS QL MICRO: PRESENT
NEUTROPHILS # BLD AUTO: 1.9 X10E3/UL (ref 1.4–7)
NEUTROPHILS NFR BLD AUTO: 50 %
NITRITE UR QL STRIP: NEGATIVE
PH UR STRIP: 6 [PH] (ref 5–7.5)
PLATELET # BLD AUTO: 136 X10E3/UL (ref 150–450)
POTASSIUM SERPL-SCNC: 4.3 MMOL/L (ref 3.5–5.2)
PROT SERPL-MCNC: 5.6 G/DL (ref 6–8.5)
PROT UR QL STRIP: NEGATIVE
PSA SERPL-MCNC: 3 NG/ML (ref 0–4)
RBC # BLD AUTO: 4.58 X10E6/UL (ref 4.14–5.8)
RBC #/AREA URNS HPF: NORMAL /HPF (ref 0–2)
REFLEX CRITERIA: NORMAL
SODIUM SERPL-SCNC: 141 MMOL/L (ref 134–144)
SP GR UR: 1.02 (ref 1–1.03)
T3RU NFR SERPL: 28 % (ref 24–39)
T4 SERPL-MCNC: 6.6 UG/DL (ref 4.5–12)
TRIGL SERPL-MCNC: 175 MG/DL (ref 0–149)
TSH SERPL DL<=0.005 MIU/L-ACNC: 2.85 UIU/ML (ref 0.45–4.5)
URINALYSIS REFLEX, 377202: NORMAL
UROBILINOGEN UR STRIP-MCNC: 0.2 MG/DL (ref 0.2–1)
VIT B12 SERPL-MCNC: 745 PG/ML (ref 232–1245)
VLDLC SERPL CALC-MCNC: 35 MG/DL (ref 5–40)
WBC # BLD AUTO: 3.7 X10E3/UL (ref 3.4–10.8)
WBC #/AREA URNS HPF: NORMAL /HPF (ref 0–5)

## 2020-02-25 ENCOUNTER — OFFICE VISIT (OUTPATIENT)
Dept: GERIATRIC MEDICINE | Age: 85
End: 2020-02-25

## 2020-02-25 VITALS
RESPIRATION RATE: 16 BRPM | TEMPERATURE: 97.6 F | HEART RATE: 74 BPM | HEIGHT: 65 IN | SYSTOLIC BLOOD PRESSURE: 124 MMHG | DIASTOLIC BLOOD PRESSURE: 72 MMHG | BODY MASS INDEX: 27.47 KG/M2 | WEIGHT: 164.9 LBS | OXYGEN SATURATION: 97 %

## 2020-02-25 DIAGNOSIS — N40.1 BENIGN PROSTATIC HYPERPLASIA WITH URINARY OBSTRUCTION: Primary | ICD-10-CM

## 2020-02-25 DIAGNOSIS — R73.03 PREDIABETES: ICD-10-CM

## 2020-02-25 DIAGNOSIS — E03.9 ACQUIRED HYPOTHYROIDISM: ICD-10-CM

## 2020-02-25 DIAGNOSIS — M19.90 ARTHRITIS: ICD-10-CM

## 2020-02-25 DIAGNOSIS — R51.9 NONINTRACTABLE HEADACHE, UNSPECIFIED CHRONICITY PATTERN, UNSPECIFIED HEADACHE TYPE: ICD-10-CM

## 2020-02-25 DIAGNOSIS — R35.1 NOCTURIA: ICD-10-CM

## 2020-02-25 DIAGNOSIS — R89.9 ABNORMAL LABORATORY TEST RESULT: ICD-10-CM

## 2020-02-25 DIAGNOSIS — N13.8 BENIGN PROSTATIC HYPERPLASIA WITH URINARY OBSTRUCTION: Primary | ICD-10-CM

## 2020-02-25 DIAGNOSIS — E78.5 HYPERLIPIDEMIA, UNSPECIFIED HYPERLIPIDEMIA TYPE: ICD-10-CM

## 2020-02-25 NOTE — PROGRESS NOTES
HISTORY OF PRESENT ILLNESS  Tia Hein is a 80 y.o. male. HPI    Mr. Rafaela Urrutia is a pleasant 80year old  male seen today to follow up on lab drawn o 2/18/20. Lab results show elevation in glucose 130 and A1C 5.7%, which is which is stable since 10/22/19 labs with Hemoglobin A1c at 5.7%. Patient continues to monitor diet. Total cholesterol 155, Triglycerides 175, which has improved from 229 on 10/22/19, HDL 51 and LDL 69. Patient continues taking simvastatin 40 mg daily. T4 remains stable at 6.6, TSH at 2.850. Patient continues taking levothyroxine 25 mcg daily. Vitamin D is 40.8  CMP remains stable, BUN 12 and creatinine 1.09, GFR non-AA 62, sodium 141, potassium 4.3, calcium 8.8, albumin 3.9, AST 15 and ALT 17. Vitamin B12 & folate remains stable at 745 & 17.2. CBC show slight decrease in platelets 089 from 193 on 10/22/19,  HGB 14.5 and HCT 42.4 are stable. UA is negative. He has a history of BPH and reports improvement with urinary symptoms of urgency and frequency since starting Flomax 0.4mg daily last Wednesday. He continues to take finasteride 5mg daily. Patient states he  has not received Voltaren gel or has taken Ibuprofen for arthritic pain. He reports 3/10 hand discomfort today. Patient reports headaches associated with history of neck pain which he's receiving therapy. States the headaches have been intermittent for several months now, dull, lasting a few hours at a time. He denies blurry vision, dizziness, history of migraines, aura or difficulty functioning. He states they usually resolve with rest and denies taking medication for it. Patient is here alone, ambulating without assistance. His mood is calm and appropriate. Review of Systems   Constitutional: Negative. Negative for chills, diaphoresis, fever, malaise/fatigue and weight loss. HENT: Positive for hearing loss. Negative for congestion, ear pain, nosebleeds, sinus pain, sore throat and tinnitus. Wears hearing aids. Eyes: Negative. Negative for blurred vision, double vision, photophobia, pain, discharge and redness. Respiratory: Negative. Negative for cough, hemoptysis, sputum production, shortness of breath, wheezing and stridor. Cardiovascular: Negative. Negative for chest pain, palpitations, orthopnea, claudication, leg swelling and PND. Gastrointestinal: Negative. Negative for abdominal pain, blood in stool, constipation, diarrhea, heartburn, melena, nausea and vomiting. Last bowel movement 2/24/20   Genitourinary: Negative. Negative for dysuria, flank pain, frequency, hematuria and urgency. Reports improvement with urgency and frequency since starting Flomax last week. Musculoskeletal: Negative. Negative for back pain, falls, joint pain, myalgias and neck pain. Reports right hand discomfort due to arthritis. History of neck pain, currently receiving therapy. Skin: Negative. Negative for itching and rash. Neurological: Negative. Negative for dizziness, tingling, tremors, sensory change, speech change, focal weakness, seizures, loss of consciousness, weakness and headaches. Endo/Heme/Allergies: Negative. Negative for environmental allergies and polydipsia. Does not bruise/bleed easily. Psychiatric/Behavioral: Negative. Negative for depression, hallucinations, substance abuse and suicidal ideas. The patient is not nervous/anxious and does not have insomnia. Physical Exam  Vitals signs and nursing note reviewed. Constitutional:       General: He is not in acute distress. Appearance: Normal appearance. He is normal weight. He is not ill-appearing, toxic-appearing or diaphoretic. HENT:      Head: Normocephalic and atraumatic. Right Ear: Tympanic membrane, ear canal and external ear normal. There is no impacted cerumen. Left Ear: Tympanic membrane, ear canal and external ear normal. There is no impacted cerumen.       Nose: Nose normal. No congestion or rhinorrhea. Mouth/Throat:      Mouth: Mucous membranes are moist.      Pharynx: Oropharynx is clear. No oropharyngeal exudate or posterior oropharyngeal erythema. Eyes:      General: No scleral icterus. Right eye: No discharge. Left eye: No discharge. Extraocular Movements: Extraocular movements intact. Conjunctiva/sclera: Conjunctivae normal.      Pupils: Pupils are equal, round, and reactive to light. Neck:      Musculoskeletal: Normal range of motion and neck supple. No neck rigidity or muscular tenderness. Vascular: No carotid bruit. Cardiovascular:      Rate and Rhythm: Normal rate and regular rhythm. Pulses: Normal pulses. Heart sounds: Normal heart sounds. No murmur. No friction rub. No gallop. Pulmonary:      Effort: Pulmonary effort is normal. No respiratory distress. Breath sounds: Normal breath sounds. No stridor. No wheezing, rhonchi or rales. Chest:      Chest wall: No tenderness. Abdominal:      General: Abdomen is flat. Bowel sounds are normal. There is no distension. Palpations: Abdomen is soft. There is no mass. Tenderness: There is no abdominal tenderness. There is no right CVA tenderness, left CVA tenderness, guarding or rebound. Hernia: No hernia is present. Genitourinary:     Comments: Not assessed. Musculoskeletal: Normal range of motion. General: No swelling, tenderness, deformity or signs of injury. Right lower leg: No edema. Left lower leg: No edema. Lymphadenopathy:      Cervical: No cervical adenopathy. Skin:     General: Skin is warm and dry. Capillary Refill: Capillary refill takes 2 to 3 seconds. Coloration: Skin is not jaundiced or pale. Findings: No bruising, erythema, lesion or rash. Neurological:      General: No focal deficit present. Mental Status: He is alert and oriented to person, place, and time. Cranial Nerves: No cranial nerve deficit. Sensory: No sensory deficit. Motor: No weakness. Coordination: Coordination normal.      Gait: Gait normal.      Deep Tendon Reflexes: Reflexes normal.   Psychiatric:         Mood and Affect: Mood normal.         Behavior: Behavior normal.         Thought Content: Thought content normal.         Judgment: Judgment normal.         ASSESSMENT and PLAN    ICD-10-CM ICD-9-CM    1. Benign prostatic hyperplasia with urinary obstruction N40.1 600.01     N13.8 599.69    2. Abnormal laboratory test result R89.9 796.4    3. Prediabetes  R73.03 790.29    4. Hyperlipidemia, unspecified hyperlipidemia type E78.5 272.4    5. Acquired hypothyroidism E03.9 244.9    6. Nocturia R35.1 788.43    7. Arthritis M19.90 716.90     PLAN  1. BPH. Patient continue with current treatment plan of finasteride 5 mg and Flowmax 0.4mg daily. Will obtain a PSA in 3 months. 2. Labs. Patient will return to clinic in 3 months for follow up labs. Patient will continue with current plan and treatment. 3. Prediabetes. Diabetic education provided, patient verbalizes understanding and watching carb intake. Will obtain Hemoglobin A1c in 3 monts. 4. Hyperlipidemia. Patient will continue to take simvastatin 40 mg daily. Will obtain lipid panel in 3 months. 5. Hypothyroidism. Patient will continue to take levothyroxine 25 mcg daily. Will obtain a TSH level in 3 months. 6. Nocturia. Continue to take finasteride 5mg and Flowmax 0.4mg daily. 7. Arthritis. Apply diclofenac 1% gel to affected area TID prn. Patient expected to receive by mail order today. Take Ibuprofen 200 mg BID prn.  8. Headaches. Take Ibuprofen 200 mg BID prn for pain. Apply diclofenac 1% gel to affected area TID prn.  9. Patient to return in 3 months for follow up and labs or prn for symptom management.

## 2020-02-25 NOTE — PROGRESS NOTES
ADVISED PATIENT OF THE FOLLOWING HEALTH MAINTAINCE DUE  There are no preventive care reminders to display for this patient. Chief Complaint   Patient presents with    Urinary Hesitancy     Patient reports Flomax is helping with urination    Hand Pain     hand discomfort is still there. he is not taking the ibp.  Results     review lab results       1. Have you been to the ER, urgent care clinic since your last visit? Hospitalized since your last visit? No    2. Have you seen or consulted any other health care providers outside of the [address] [place] [address] since your last visit? Include any DEXA scan, mammography  or colon screening. No    3. Do you have an Advance Directive on file? yes    4. Do you have a DNR on file? DNR    Patient is accompanied by self I have received verbal consent from Uzma Martinez to discuss any/all medical information while they are present in the room.       Advance Care Planning 4/16/2019   Patient's Healthcare Decision Maker is: Named in scanned ACP document   Primary Decision Maker Name -   Primary Decision Maker Phone Number -   Primary Decision Maker Relationship to Patient -   Confirm Advance Directive Yes, on file   Does the patient have other document types Do Not Resuscitate         [facility] [address]  [address] [facility] [address] [facility] [address]  Phone: [phone] Fax: [address] [facility], . [address] [facility]  [address]  [facility] [address]  Phone: [phone] Fax: [phone]    [employer] [id number] [address] [facility] [address]  [address] [facility] [address]  Phone: [phone] Fax: [phone]    [facility] [address] [place] [address] [id number] [place] [address]  Phone: [phone] Fax: [phone]        Patient reminded during visit to bring all medication bottles, OTC medications to all appointments.

## 2020-02-25 NOTE — PATIENT INSTRUCTIONS
Hyperlipidemia: After Your Visit  Your Care Instructions  Hyperlipidemia is too much fat in your blood. The body has several kinds of fat, including cholesterol and triglycerides. Your body needs fat for many things, such as making new cells. But too much fat in your blood increases your chances of having a heart attack or stroke. You may be able to lower your cholesterol and triglycerides with a heart-healthy diet, exercise, and if needed, medicine. Your doctor may want you to try lifestyle changes first to see whether they lower the fat in your blood. You may need to take medicine if lifestyle changes do not lower the fat in your blood enough. Follow-up care is a key part of your treatment and safety. Be sure to make and go to all appointments, and call your doctor if you are having problems. Its also a good idea to know your test results and keep a list of the medicines you take. How can you care for yourself at home? Take your medicines  · Take your medicines exactly as prescribed. Call your doctor if you think you are having a problem with your medicine. · If you take medicine to lower your cholesterol, go to follow-up visits. You will need to have blood tests. · Do not take large doses of niacin, which is a B vitamin, while taking medicine called statins. It may increase the chance of muscle pain and liver problems. · Talk to your doctor about avoiding grapefruit juice if you are taking statins. Grapefruit juice can raise the level of this medicine in your blood. This could increase side effects. Eat more fruits, vegetables, and fiber  · Fruits and vegetables have lots of nutrients that help protect against heart disease, and they have little--if any--fat. Try to eat at least five servings a day. Dark green, deep orange, or yellow fruits and vegetables are healthy choices. · Keep carrots, celery, and other veggies handy for snacks.  Buy fruit that is in season and store it where you can see it so that you will be tempted to eat it. Cook dishes that have a lot of veggies in them, such as stir-fries and soups. · Foods high in fiber may reduce your cholesterol and provide important vitamins and minerals. High-fiber foods include whole-grain cereals and breads, oatmeal, beans, brown rice, citrus fruits, and apples. · Buy whole-grain breads and cereals instead of white bread and pastries. Limit saturated fat  · Read food labels and try to avoid saturated fat and trans fat. They increase your risk of heart disease. · Use olive or canola oil when you cook. Try cholesterol-lowering spreads, such as Benecol or Take Control. · Bake, broil, grill, or steam foods instead of frying them. · Limit the amount of high-fat meats you eat, including hot dogs and sausages. Cut out all visible fat when you prepare meat. · Eat fish, skinless poultry, and soy products such as tofu instead of high-fat meats. Soybeans may be especially good for your heart. Eat at least two servings of fish a week. Certain fish, such as salmon, contain omega-3 fatty acids, which may help reduce your risk of heart attack. · Choose low-fat or fat-free milk and dairy products. Get exercise, limit alcohol, and quit smoking  · Get more exercise. Work with your doctor to set up an exercise program. Even if you can do only a small amount, exercise will help you get stronger, have more energy, and manage your weight and your stress. Walking is an easy way to get exercise. Gradually increase the amount you walk every day. Aim for at least 30 minutes on most days of the week. You also may want to swim, bike, or do other activities. · Limit alcohol to no more than 2 drinks a day for men and 1 drink a day for women. · Do not smoke. If you need help quitting, talk to your doctor about stop-smoking programs and medicines. These can increase your chances of quitting for good. When should you call for help?   Call 911 anytime you think you may need emergency care. For example, call if:  · You have symptoms of a heart attack. These may include:  ¨ Chest pain or pressure, or a strange feeling in the chest.  ¨ Sweating. ¨ Shortness of breath. ¨ Nausea or vomiting. ¨ Pain, pressure, or a strange feeling in the back, neck, jaw, or upper belly or in one or both shoulders or arms. ¨ Lightheadedness or sudden weakness. ¨ A fast or irregular heartbeat. After you call 911, the  may tell you to chew 1 adult-strength or 2 to 4 low-dose aspirin. Wait for an ambulance. Do not try to drive yourself. · You have signs of a stroke. These may include:  ¨ Sudden numbness, paralysis, or weakness in your face, arm, or leg, especially on only one side of your body. ¨ New problems with walking or balance. ¨ Sudden vision changes. ¨ Drooling or slurred speech. ¨ New problems speaking or understanding simple statements, or feeling confused. ¨ A sudden, severe headache that is different from past headaches. · You passed out (lost consciousness). Call your doctor now or seek immediate medical care if:  · You have muscle pain or weakness. Watch closely for changes in your health, and be sure to contact your doctor if:  · You are very tired. · You have an upset stomach, gas, constipation, or belly pain or cramps. Where can you learn more? Go to [employer].be  Enter C406 in the search box to learn more about \"Hyperlipidemia: After Your Visit. \"   © 0300-9963 Healthwise, Incorporated. Care instructions adapted under license by [employer] (which disclaims liability or warranty for this information). This care instruction is for use with your licensed healthcare professional. If you have questions about a medical condition or this instruction, always ask your healthcare professional. [place] [address] any warranty or liability for your use of this information.   Content Version: 3.2.513353; Last Revised: October 13, 2011                 Arthritis: Care Instructions  Your Care Instructions  Arthritis, also called osteoarthritis, is a breakdown of the cartilage that cushions your joints. When the cartilage wears down, your bones rub against each other. This causes pain and stiffness. Many people have some arthritis as they age. Arthritis most often affects the joints of the spine, hands, hips, knees, or feet. You can take simple measures to protect your joints, ease your pain, and help you stay active. Follow-up care is a key part of your treatment and safety. Be sure to make and go to all appointments, and call your doctor if you are having problems. It's also a good idea to know your test results and keep a list of the medicines you take. How can you care for yourself at home? · Stay at a healthy weight. Being overweight puts extra strain on your joints. · Talk to your doctor or physical therapist about exercises that will help ease joint pain. ? Stretch. You may enjoy gentle forms of yoga to help keep your joints and muscles flexible. ? Walk instead of jog. Other types of exercise that are less stressful on the joints include riding a bicycle, swimming, kimberly chi, or water exercise. ? Lift weights. Strong muscles help reduce stress on your joints. Stronger thigh muscles, for example, take some of the stress off of the knees and hips. Learn the right way to lift weights so you do not make joint pain worse. · Take your medicines exactly as prescribed. Call your doctor if you think you are having a problem with your medicine. · Take pain medicines exactly as directed. ? If the doctor gave you a prescription medicine for pain, take it as prescribed. ? If you are not taking a prescription pain medicine, ask your doctor if you can take an over-the-counter medicine. · Use a cane, crutch, walker, or another device if you need help to get around. These can help rest your joints.  You also can use other things to make life easier, such as a higher toilet seat and padded handles on kitchen utensils. · Do not sit in low chairs, which can make it hard to get up. · Put heat or cold on your sore joints as needed. Use whichever helps you most. You also can take turns with hot and cold packs. ? Apply heat 2 or 3 times a day for 20 to 30 minutes--using a heating pad, hot shower, or hot pack--to relieve pain and stiffness. ? Put ice or a cold pack on your sore joint for 10 to 20 minutes at a time. Put a thin cloth between the ice and your skin. When should you call for help? Call your doctor now or seek immediate medical care if:    · You have sudden swelling, warmth, or pain in any joint.     · You have joint pain and a fever or rash.     · You have such bad pain that you cannot use a joint.    Watch closely for changes in your health, and be sure to contact your doctor if:    · You have mild joint symptoms that continue even with more than 6 weeks of care at home.     · You have stomach pain or other problems with your medicine. Where can you learn more? Go to http://sloan-george.info/. Enter P223 in the search box to learn more about \"Arthritis: Care Instructions. \"  Current as of: April 1, 2019  Content Version: 12.2  © 0708-2890 [employer]. Care instructions adapted under license by [employer] (which disclaims liability or warranty for this information). If you have questions about a medical condition or this instruction, always ask your healthcare professional. [place] [address] any warranty or liability for your use of this information. Benign Prostatic Hyperplasia: Care Instructions  Your Care Instructions    Benign prostatic hyperplasia, or BPH, is an enlarged prostate gland. The prostate is a small gland that makes some of the fluid in semen. Prostate enlargement happens to almost all men as they age. It is usually not serious.  BPH does not cause prostate cancer. As the prostate gets bigger, it may partly block the flow of urine. You may have a hard time getting a urine stream started or completely stopped. BPH can cause dribbling. You may have a weak urine stream, or you may have to urinate more often than you used to, especially at night. Most men find these problems easy to manage. You do not need treatment unless your symptoms bother you a lot or you have other problems, such as bladder infections or stones. In these cases, medicines may help. Surgery is not needed unless the urine flow is blocked or the symptoms do not get better with medicine. Follow-up care is a key part of your treatment and safety. Be sure to make and go to all appointments, and call your doctor if you are having problems. It's also a good idea to know your test results and keep a list of the medicines you take. How can you care for yourself at home? · Take plenty of time to urinate. Try to relax. · Try \"double voiding. \" Urinate as much you can, relax for a few moments, and then try to urinate again. · Sit on the toilet to urinate. · Read or think of other things while you are waiting. · Turn on a faucet, or try to picture running water. Some men find that this helps get their urine flowing. · If dribbling is a problem, wash your penis daily to avoid skin irritation and infection. · Avoid caffeine and alcohol. These drinks will increase how often you need to urinate. Spread your fluid intake throughout the day. If the urge to urinate often wakes you at night, limit your fluid intake in the evening. Urinate right before you go to bed. · Many over-the-counter cold and allergy medicines can make the symptoms of BPH worse. Avoid antihistamines, decongestants, and allergy pills, if you can. Read the warnings on the package. · If you take any prescription medicines, especially tranquilizers or antidepressants, ask your doctor or pharmacist whether they can cause urination problems. There may be other medicines you can use that do not cause urinary problems. · Be safe with medicines. Take your medicines exactly as prescribed. Call your doctor if you think you are having a problem with your medicine. When should you call for help? Call your doctor now or seek immediate medical care if:    · You cannot urinate at all.     · You have symptoms of a urinary infection. For example:  ? You have blood or pus in your urine. ? You have pain in your back just below your rib cage. This is called flank pain. ? You have a fever, chills, or body aches. ? It hurts to urinate. ? You have groin or belly pain.    Watch closely for changes in your health, and be sure to contact your doctor if:    · It hurts when you ejaculate.     · Your urinary problems get a lot worse or bother you a lot. Where can you learn more? Go to http://sloan-george.info/. Enter D039 in the search box to learn more about \"Benign Prostatic Hyperplasia: Care Instructions. \"  Current as of: May 28, 2019  Content Version: 12.2  © 2888-3319 [employer]. Care instructions adapted under license by [employer] (which disclaims liability or warranty for this information). If you have questions about a medical condition or this instruction, always ask your healthcare professional. [address] any warranty or liability for your use of this information. Hypothyroidism: Care Instructions  Your Care Instructions    You have hypothyroidism, which means that your body is not making enough thyroid hormone. This hormone helps your body use energy. If your thyroid level is low, you may feel tired, be constipated, have an increase in your blood pressure, or have dry skin or memory problems. You may also get cold easily, even when it is warm. Women with low thyroid levels may have heavy menstrual periods.   A blood test to find your thyroid-stimulating hormone (TSH) level is used to check for hypothyroidism. A high TSH level may mean that you have low thyroid. When your body is not making enough thyroid hormone, TSH levels rise in an effort to make the body produce more. The treatment for hypothyroidism is to take thyroid hormone pills. You should start to feel better in 1 to 2 weeks. But it can take several months to see changes in the TSH level. You will need regular visits with your doctor to make sure you have the right dose of medicine. Most people need treatment for the rest of their lives. You will need to see your doctor regularly to have blood tests and to make sure you are doing well. Follow-up care is a key part of your treatment and safety. Be sure to make and go to all appointments, and call your doctor if you are having problems. It's also a good idea to know your test results and keep a list of the medicines you take. How can you care for yourself at home? · Take your thyroid hormone medicine exactly as prescribed. Call your doctor if you think you are having a problem with your medicine. Most people do not have side effects if they take the right amount of medicine regularly. ? Take the medicine 30 minutes before breakfast, and do not take it with calcium, vitamins, or iron. ? Do not take extra doses of your thyroid medicine. It will not help you get better any faster, and it may cause side effects. ? If you forget to take a dose, do NOT take a double dose of medicine. Take your usual dose the next day. · Tell your doctor about all prescription, herbal, or over-the-counter products you take. · Take care of yourself. Eat a healthy diet, get enough sleep, and get regular exercise. When should you call for help? Call 911 anytime you think you may need emergency care.  For example, call if:    · You passed out (lost consciousness).     · You have severe trouble breathing.     · You have a very slow heartbeat (less than 60 beats a minute).     · You have a low body temperature (95°F or below).    Call your doctor now or seek immediate medical care if:    · You feel tired, sluggish, or weak.     · You have trouble remembering things or concentrating.     · You do not begin to feel better 2 weeks after starting your medicine.    Watch closely for changes in your health, and be sure to contact your doctor if you have any problems. Where can you learn more? Go to http://sloan-george.info/. Enter F733 in the search box to learn more about \"Hypothyroidism: Care Instructions. \"  Current as of: November 6, 2018  Content Version: 12.2  © 5916-8537 [employer]. Care instructions adapted under license by [employer] (which disclaims liability or warranty for this information). If you have questions about a medical condition or this instruction, always ask your healthcare professional. [address] any warranty or liability for your use of this information. Prediabetes: Care Instructions  Your Care Instructions       Headache: Care Instructions  Your Care Instructions    Headaches have many possible causes. Most headaches aren't a sign of a more serious problem, and they will get better on their own. Home treatment may help you feel better faster. The doctor has checked you carefully, but problems can develop later. If you notice any problems or new symptoms, get medical treatment right away. Follow-up care is a key part of your treatment and safety. Be sure to make and go to all appointments, and call your doctor if you are having problems. It's also a good idea to know your test results and keep a list of the medicines you take. How can you care for yourself at home? · Do not drive if you have taken a prescription pain medicine. · Rest in a quiet, dark room until your headache is gone. Close your eyes and try to relax or go to sleep. Don't watch TV or read.   · Put a cold, moist cloth or cold pack on the painful area for 10 to 20 minutes at a time. Put a thin cloth between the cold pack and your skin. · Use a warm, moist towel or a heating pad set on low to relax tight shoulder and neck muscles. · Have someone gently massage your neck and shoulders. · Take pain medicines exactly as directed. ? If the doctor gave you a prescription medicine for pain, take it as prescribed. ? If you are not taking a prescription pain medicine, ask your doctor if you can take an over-the-counter medicine. · Be careful not to take pain medicine more often than the instructions allow, because you may get worse or more frequent headaches when the medicine wears off. · Do not ignore new symptoms that occur with a headache, such as a fever, weakness or numbness, vision changes, or confusion. These may be signs of a more serious problem. To prevent headaches  · Keep a headache diary so you can figure out what triggers your headaches. Avoiding triggers may help you prevent headaches. Record when each headache began, how long it lasted, and what the pain was like (throbbing, aching, stabbing, or dull). Write down any other symptoms you had with the headache, such as nausea, flashing lights or dark spots, or sensitivity to bright light or loud noise. Note if the headache occurred near your period. List anything that might have triggered the headache, such as certain foods (chocolate, cheese, wine) or odors, smoke, bright light, stress, or lack of sleep. · Find healthy ways to deal with stress. Headaches are most common during or right after stressful times. Take time to relax before and after you do something that has caused a headache in the past.  · Try to keep your muscles relaxed by keeping good posture. Check your jaw, face, neck, and shoulder muscles for tension, and try relaxing them. When sitting at a desk, change positions often, and stretch for 30 seconds each hour. · Get plenty of sleep and exercise.   · Eat regularly and well. Long periods without food can trigger a headache. · Treat yourself to a massage. Some people find that regular massages are very helpful in relieving tension. · Limit caffeine by not drinking too much coffee, tea, or soda. But don't quit caffeine suddenly, because that can also give you headaches. · Reduce eyestrain from computers by blinking frequently and looking away from the computer screen every so often. Make sure you have proper eyewear and that your monitor is set up properly, about an arm's length away. · Seek help if you have depression or anxiety. Your headaches may be linked to these conditions. Treatment can both prevent headaches and help with symptoms of anxiety or depression. When should you call for help? Call 911 anytime you think you may need emergency care. For example, call if:    · You have signs of a stroke. These may include:  ? Sudden numbness, paralysis, or weakness in your face, arm, or leg, especially on only one side of your body. ? Sudden vision changes. ? Sudden trouble speaking. ? Sudden confusion or trouble understanding simple statements. ? Sudden problems with walking or balance. ? A sudden, severe headache that is different from past headaches.    Call your doctor now or seek immediate medical care if:    · You have a new or worse headache.     · Your headache gets much worse. Where can you learn more? Go to http://sloan-george.info/. Enter M271 in the search box to learn more about \"Headache: Care Instructions. \"  Current as of: March 28, 2019  Content Version: 12.2  © 2908-9884 Healthwise, Incorporated. Care instructions adapted under license by [employer] (which disclaims liability or warranty for this information). If you have questions about a medical condition or this instruction, always ask your healthcare professional. [address] any warranty or liability for your use of this information. Learning About a Pain Diary  What is a pain diary? When you have pain, you may not be able to sleep. You may have trouble thinking. Pain can make it hard for you to do your day-to-day activities, such as go to work. A pain diary is a written record that helps you keep track of when you have pain, how bad it is, and whether your treatment is helping. Keeping a diary gives you clues about your pain--when it happens, what causes it, and what makes it better or worse. How do you use a pain diary? Use the pain diary to write down the time and date of your pain episodes. The diary helps you identify the type of pain you have by using a pain scale. The pain scale starts at 0 and ends at 10. In this scale, 0 is no pain, and 10 is the worst pain you've ever known. For example, if you have a \"2\" on the scale, your pain may be minor with sharp pain now and then, but it doesn't impact your ability to do things. If you have an \"8\" on the scale, you may have very strong pain that makes it hard to do anything. The diary also helps you track:  · Any pain medicine you take and how much. · Side effects of your pain medicine. · Anything you did, ate, or drank that might have made the pain better. · Anything you did, ate, or drank that might have made the pain worse. What are the benefits of using a pain diary? Used over time, a pain diary can help you and your doctor understand the kinds of things that make your pain better or worse. Here are some things to think about while you are using your pain diary:  · Where is the pain located? Is it throbbing, sharp, tingling, shooting, or burning? Is it constant, or does it come and go? · Does the pain change at different times of day? When? · Does the pain get worse before or after meals? · Does the pain get better or worse with activity? What kind of activity? · Does the pain keep you from falling asleep at night? Does pain wake you up in the night?   How long do you keep a pain diary? Your doctor will have you track your pain using the diary for a week or more. Then the two of you will meet to talk about what you have written in the diary. Your doctor may adjust pain medicine or suggest other treatments to try. After these changes are made, you may use the pain diary for another week, or longer if your doctor suggests, to track your pain and whether the new treatment is helping. The diary is a tool that can help you and your doctor find out what works best to manage pain. You will use it as long as you both find it helpful. Where can you learn more? Go to http://sloan-george.info/. Enter B146 in the search box to learn more about \"Learning About a Pain Diary. \"  Current as of: March 28, 2019  Content Version: 12.2  © 2912-3275 [email or web address]. Care instructions adapted under license by [employer] (which disclaims liability or warranty for this information). If you have questions about a medical condition or this instruction, always ask your healthcare professional. [place] [id number] any warranty or liability for your use of this information. Prediabetes is a warning sign that you are at risk for getting type 2 diabetes. It means that your blood sugar is higher than it should be. The food you eat turns into sugar, which your body uses for energy. Normally, an organ called the pancreas makes insulin, which allows the sugar in your blood to get into your body's cells. But when your body can't use insulin the right way, the sugar doesn't move into cells. It stays in your blood instead. This is called insulin resistance. The buildup of sugar in the blood causes prediabetes. The good news is that lifestyle changes may help you get your blood sugar back to normal and help you avoid or delay diabetes. Follow-up care is a key part of your treatment and safety.  Be sure to make and go to all appointments, and call your doctor if you are having problems. It's also a good idea to know your test results and keep a list of the medicines you take. How can you care for yourself at home? · Watch your weight. A healthy weight helps your body use insulin properly. · Limit the amount of calories, sweets, and unhealthy fat you eat. Ask your doctor if you should see a dietitian. A registered dietitian can help you create meal plans that fit your lifestyle. · Get at least 30 minutes of exercise on most days of the week. Exercise helps control your blood sugar. It also helps you maintain a healthy weight. Walking is a good choice. You also may want to do other activities, such as running, swimming, cycling, or playing tennis or team sports. · Do not smoke. Smoking can make prediabetes worse. If you need help quitting, talk to your doctor about stop-smoking programs and medicines. These can increase your chances of quitting for good. · If your doctor prescribed medicines, take them exactly as prescribed. Call your doctor if you think you are having a problem with your medicine. You will get more details on the specific medicines your doctor prescribes. When should you call for help? Watch closely for changes in your health, and be sure to contact your doctor if:    · You have any symptoms of diabetes. These may include:  ? Being thirsty more often. ? Urinating more. ? Being hungrier. ? Losing weight. ? Being very tired. ? Having blurry vision.     · You have a wound that will not heal.     · You have an infection that will not go away.     · You have problems with your blood pressure.     · You want more information about diabetes and how you can keep from getting it. Where can you learn more? Go to http://sloan-george.info/. Enter I222 in the search box to learn more about \"Prediabetes: Care Instructions. \"  Current as of: April 16, 2019  Content Version: 12.2  © 8299-3381 [employer].  Care instructions adapted under license by [employer] (which disclaims liability or warranty for this information). If you have questions about a medical condition or this instruction, always ask your healthcare professional. [address] any warranty or liability for your use of this information.

## 2020-03-02 ENCOUNTER — TELEPHONE (OUTPATIENT)
Dept: GERIATRIC MEDICINE | Age: 85
End: 2020-03-02

## 2020-03-02 DIAGNOSIS — R35.1 NOCTURIA: ICD-10-CM

## 2020-03-02 DIAGNOSIS — E78.2 MIXED HYPERLIPIDEMIA: ICD-10-CM

## 2020-03-02 RX ORDER — FINASTERIDE 5 MG/1
TABLET, FILM COATED ORAL
Qty: 90 TAB | Refills: 1 | Status: SHIPPED | OUTPATIENT
Start: 2020-03-02

## 2020-03-02 NOTE — TELEPHONE ENCOUNTER
Pt walked into  clinic needing a prescription refilled called finasteride 5 mg tablet. Pt wants it to be coming from [facility]. Pt would like a call once this is completed.

## 2020-03-09 DIAGNOSIS — Z76.0 MEDICATION REFILL: ICD-10-CM

## 2020-03-09 DIAGNOSIS — I25.83 CORONARY ATHEROSCLEROSIS DUE TO LIPID RICH PLAQUE: ICD-10-CM

## 2020-03-09 DIAGNOSIS — E78.5 HYPERLIPIDEMIA, UNSPECIFIED HYPERLIPIDEMIA TYPE: ICD-10-CM

## 2020-03-09 DIAGNOSIS — I25.10 CORONARY ATHEROSCLEROSIS DUE TO LIPID RICH PLAQUE: ICD-10-CM

## 2020-03-09 RX ORDER — EZETIMIBE 10 MG/1
10 TABLET ORAL EVERY EVENING
Qty: 90 TAB | Refills: 2 | Status: SHIPPED | COMMUNITY
Start: 2020-03-09 | End: 2020-04-13 | Stop reason: SDUPTHER

## 2020-04-13 DIAGNOSIS — I25.83 CORONARY ATHEROSCLEROSIS DUE TO LIPID RICH PLAQUE: ICD-10-CM

## 2020-04-13 DIAGNOSIS — E78.5 HYPERLIPIDEMIA, UNSPECIFIED HYPERLIPIDEMIA TYPE: ICD-10-CM

## 2020-04-13 DIAGNOSIS — I25.10 CORONARY ATHEROSCLEROSIS DUE TO LIPID RICH PLAQUE: ICD-10-CM

## 2020-04-13 DIAGNOSIS — Z76.0 MEDICATION REFILL: ICD-10-CM

## 2020-04-13 RX ORDER — EZETIMIBE 10 MG/1
10 TABLET ORAL EVERY EVENING
Qty: 90 TAB | Refills: 2 | Status: SHIPPED | OUTPATIENT
Start: 2020-04-13

## 2020-04-13 NOTE — TELEPHONE ENCOUNTER
Patient called for medication refill. He request RX be sent to mail order. Medication refill completed.

## 2020-04-20 DIAGNOSIS — E78.5 HYPERLIPIDEMIA, UNSPECIFIED HYPERLIPIDEMIA TYPE: ICD-10-CM

## 2020-04-20 DIAGNOSIS — I25.83 CORONARY ATHEROSCLEROSIS DUE TO LIPID RICH PLAQUE: ICD-10-CM

## 2020-04-20 DIAGNOSIS — I25.10 CORONARY ATHEROSCLEROSIS DUE TO LIPID RICH PLAQUE: ICD-10-CM

## 2020-04-20 RX ORDER — SIMVASTATIN 40 MG/1
TABLET, FILM COATED ORAL
Qty: 90 TAB | Refills: 1 | Status: SHIPPED | COMMUNITY
Start: 2020-04-20

## 2020-05-03 DIAGNOSIS — E03.9 ACQUIRED HYPOTHYROIDISM: ICD-10-CM

## 2020-05-03 DIAGNOSIS — Z76.0 MEDICATION REFILL: ICD-10-CM

## 2020-05-03 DIAGNOSIS — E89.0 POSTOPERATIVE HYPOTHYROIDISM: ICD-10-CM

## 2020-05-06 RX ORDER — LEVOTHYROXINE SODIUM 25 UG/1
TABLET ORAL
Qty: 156 TAB | OUTPATIENT
Start: 2020-05-06

## 2020-05-11 DIAGNOSIS — Z76.0 MEDICATION REFILL: ICD-10-CM

## 2020-05-11 DIAGNOSIS — E89.0 POSTOPERATIVE HYPOTHYROIDISM: ICD-10-CM

## 2020-05-11 DIAGNOSIS — E03.9 ACQUIRED HYPOTHYROIDISM: ICD-10-CM

## 2020-05-11 RX ORDER — LEVOTHYROXINE SODIUM 25 UG/1
TABLET ORAL
Qty: 90 TAB | Refills: 1 | Status: SHIPPED | OUTPATIENT
Start: 2020-05-11

## 2020-05-22 ENCOUNTER — HOSPITAL ENCOUNTER (OUTPATIENT)
Dept: ULTRASOUND IMAGING | Age: 85
Discharge: HOME | End: 2020-05-22
Attending: FAMILY MEDICINE
Payer: MEDICARE

## 2020-05-22 DIAGNOSIS — M79.662 BILATERAL CALF PAIN: ICD-10-CM

## 2020-05-22 DIAGNOSIS — M79.661 BILATERAL CALF PAIN: ICD-10-CM

## 2020-05-22 PROCEDURE — 93970 EXTREMITY STUDY: CPT

## 2020-05-25 DIAGNOSIS — E03.9 ACQUIRED HYPOTHYROIDISM: ICD-10-CM

## 2020-05-25 DIAGNOSIS — R51.9 NONINTRACTABLE HEADACHE, UNSPECIFIED CHRONICITY PATTERN, UNSPECIFIED HEADACHE TYPE: ICD-10-CM

## 2020-05-25 DIAGNOSIS — M19.90 ARTHRITIS: ICD-10-CM

## 2020-05-25 DIAGNOSIS — R35.1 NOCTURIA: ICD-10-CM

## 2020-05-25 DIAGNOSIS — R89.9 ABNORMAL LABORATORY TEST RESULT: ICD-10-CM

## 2020-05-25 DIAGNOSIS — E78.5 HYPERLIPIDEMIA, UNSPECIFIED HYPERLIPIDEMIA TYPE: ICD-10-CM

## 2020-05-25 DIAGNOSIS — R73.03 PREDIABETES: ICD-10-CM

## 2020-05-25 DIAGNOSIS — N40.1 BENIGN PROSTATIC HYPERPLASIA WITH URINARY OBSTRUCTION: ICD-10-CM

## 2020-05-25 DIAGNOSIS — N13.8 BENIGN PROSTATIC HYPERPLASIA WITH URINARY OBSTRUCTION: ICD-10-CM

## 2020-11-02 NOTE — PATIENT INSTRUCTIONS
High Cholesterol: Care Instructions Your Care Instructions Cholesterol is a type of fat in your blood. It is needed for many body functions, such as making new cells. Cholesterol is made by your body. It also comes from food you eat. High cholesterol means that you have too much of the fat in your blood. This raises your risk of a heart attack and stroke. LDL and HDL are part of your total cholesterol. LDL is the \"bad\" cholesterol. High LDL can raise your risk for heart disease, heart attack, and stroke. HDL is the \"good\" cholesterol. It helps clear bad cholesterol from the body. High HDL is linked with a lower risk of heart disease, heart attack, and stroke. Your cholesterol levels help your doctor find out your risk for having a heart attack or stroke. You and your doctor can talk about whether you need to lower your risk and what treatment is best for you. A heart-healthy lifestyle along with medicines can help lower your cholesterol and your risk. The way you choose to lower your risk will depend on how high your risk is for heart attack and stroke. It will also depend on how you feel about taking medicines. Follow-up care is a key part of your treatment and safety. Be sure to make and go to all appointments, and call your doctor if you are having problems. It's also a good idea to know your test results and keep a list of the medicines you take. How can you care for yourself at home? · Eat a variety of foods every day. Good choices include fruits, vegetables, whole grains (like oatmeal), dried beans and peas, nuts and seeds, soy products (like tofu), and fat-free or low-fat dairy products. · Replace butter, margarine, and hydrogenated or partially hydrogenated oils with olive and canola oils. (Canola oil margarine without trans fat is fine.) · Replace red meat with fish, poultry, and soy protein (like tofu). · Limit processed and packaged foods like chips, crackers, and cookies. · Bake, broil, or steam foods. Don't mckinney them. · Be physically active. Get at least 30 minutes of exercise on most days of the week. Walking is a good choice. You also may want to do other activities, such as running, swimming, cycling, or playing tennis or team sports. · Stay at a healthy weight or lose weight by making the changes in eating and physical activity listed above. Losing just a small amount of weight, even 5 to 10 pounds, can reduce your risk for having a heart attack or stroke. · Do not smoke. When should you call for help? Watch closely for changes in your health, and be sure to contact your doctor if: 
  · You need help making lifestyle changes.  
  · You have questions about your medicine. Where can you learn more? Go to http://[email or web address]-[facility]orge.info/. Enter K792 in the search box to learn more about \"High Cholesterol: Care Instructions. \" Current as of: July 22, 2018 Content Version: 11.9 © 0200-1856 [employer]. Care instructions adapted under license by [employer] (which disclaims liability or warranty for this information). If you have questions about a medical condition or this instruction, always ask your healthcare professional. [address] any warranty or liability for your use of this information. Hypothyroidism: Care Instructions Your Care Instructions You have hypothyroidism, which means that your body is not making enough thyroid hormone. This hormone helps your body use energy. If your thyroid level is low, you may feel tired, be constipated, have an increase in your blood pressure, or have dry skin or memory problems. You may also get cold easily, even when it is warm. Women with low thyroid levels may have heavy menstrual periods. A blood test to find your thyroid-stimulating hormone (TSH) level is used to check for hypothyroidism.  A high TSH level may mean that you have low thyroid. When your body is not making enough thyroid hormone, TSH levels rise in an effort to make the body produce more. The treatment for hypothyroidism is to take thyroid hormone pills. You should start to feel better in 1 to 2 weeks. But it can take several months to see changes in the TSH level. You will need regular visits with your doctor to make sure you have the right dose of medicine. Most people need treatment for the rest of their lives. You will need to see your doctor regularly to have blood tests and to make sure you are doing well. Follow-up care is a key part of your treatment and safety. Be sure to make and go to all appointments, and call your doctor if you are having problems. It's also a good idea to know your test results and keep a list of the medicines you take. How can you care for yourself at home? · Take your thyroid hormone medicine exactly as prescribed. Call your doctor if you think you are having a problem with your medicine. Most people do not have side effects if they take the right amount of medicine regularly. ? Take the medicine 30 minutes before breakfast, and do not take it with calcium, vitamins, or iron. ? Do not take extra doses of your thyroid medicine. It will not help you get better any faster, and it may cause side effects. ? If you forget to take a dose, do NOT take a double dose of medicine. Take your usual dose the next day. · Tell your doctor about all prescription, herbal, or over-the-counter products you take. · Take care of yourself. Eat a healthy diet, get enough sleep, and get regular exercise. When should you call for help? Call 911 anytime you think you may need emergency care. For example, call if: 
  · You passed out (lost consciousness).  
  · You have severe trouble breathing.  
  · You have a very slow heartbeat (less than 60 beats a minute).  
  · You have a low body temperature (95°F or below).  Call your doctor now or seek immediate medical care if: 
  · You feel tired, sluggish, or weak.  
  · You have trouble remembering things or concentrating.  
  · You do not begin to feel better 2 weeks after starting your medicine.  
 Watch closely for changes in your health, and be sure to contact your doctor if you have any problems. Where can you learn more? Go to http://sloan-george.info/. Enter G369 in the search box to learn more about \"Hypothyroidism: Care Instructions. \" Current as of: March 14, 2018 Content Version: 11.9 © 8296-1693 [employer]. Care instructions adapted under license by [employer] (which disclaims liability or warranty for this information). If you have questions about a medical condition or this instruction, always ask your healthcare professional. [address] any warranty or liability for your use of this information. Patient informed

## 2021-08-03 PROBLEM — I25.10 CORONARY ATHEROSCLEROSIS: Status: RESOLVED | Noted: 2018-06-12 | Resolved: 2021-08-03

## 2022-03-18 PROBLEM — R09.1 PLEURITIS: Status: ACTIVE | Noted: 2019-10-25

## 2022-03-18 PROBLEM — N13.8 BENIGN PROSTATIC HYPERPLASIA WITH URINARY OBSTRUCTION: Status: ACTIVE | Noted: 2018-06-12

## 2022-03-18 PROBLEM — K21.9 GASTROESOPHAGEAL REFLUX DISEASE: Status: ACTIVE | Noted: 2017-02-16

## 2022-03-18 PROBLEM — H91.92 HEARING LOSS OF LEFT EAR: Status: ACTIVE | Noted: 2018-03-30

## 2022-03-18 PROBLEM — N40.1 BENIGN PROSTATIC HYPERPLASIA WITH URINARY OBSTRUCTION: Status: ACTIVE | Noted: 2018-06-12

## 2022-03-18 PROBLEM — M54.18: Status: ACTIVE | Noted: 2019-10-25

## 2022-03-19 PROBLEM — R07.1 CHEST PAIN VARYING WITH BREATHING: Status: ACTIVE | Noted: 2019-10-25

## 2022-03-19 PROBLEM — H90.5 CENTRAL HEARING LOSS: Status: ACTIVE | Noted: 2018-06-12

## 2022-03-19 PROBLEM — D12.6 TUBULAR ADENOMA OF COLON: Status: ACTIVE | Noted: 2018-04-13

## 2022-03-19 PROBLEM — M25.569 ARTHRALGIA OF LOWER LEG: Status: ACTIVE | Noted: 2018-06-12

## 2022-03-19 PROBLEM — M54.32 SCIATICA OF LEFT SIDE: Status: ACTIVE | Noted: 2019-10-25

## 2022-03-19 PROBLEM — E55.9 VITAMIN D DEFICIENCY, UNSPECIFIED: Status: ACTIVE | Noted: 2019-10-25

## 2022-03-19 PROBLEM — M51.379 DDD (DEGENERATIVE DISC DISEASE), LUMBOSACRAL: Status: ACTIVE | Noted: 2019-10-25

## 2022-03-19 PROBLEM — F41.9 ANXIETY: Status: ACTIVE | Noted: 2018-06-12

## 2022-03-19 PROBLEM — H81.09 MÉNIÈRE'S DISEASE: Status: ACTIVE | Noted: 2018-06-12

## 2022-03-19 PROBLEM — R73.03 PREDIABETES: Status: ACTIVE | Noted: 2019-10-25

## 2022-03-19 PROBLEM — E89.0 POSTOPERATIVE HYPOTHYROIDISM: Status: ACTIVE | Noted: 2018-06-12

## 2022-03-19 PROBLEM — I25.10 CORONARY ARTERY DISEASE INVOLVING NATIVE CORONARY ARTERY OF NATIVE HEART WITHOUT ANGINA PECTORIS: Status: ACTIVE | Noted: 2017-02-16

## 2022-03-19 PROBLEM — R07.89 ANTERIOR CHEST WALL PAIN: Status: ACTIVE | Noted: 2019-10-25

## 2022-03-19 PROBLEM — M54.40 LOW BACK PAIN WITH SCIATICA: Status: ACTIVE | Noted: 2017-02-16

## 2022-03-20 PROBLEM — M16.0 PRIMARY OSTEOARTHRITIS OF BOTH HIPS: Status: ACTIVE | Noted: 2017-02-16

## 2022-03-20 PROBLEM — K22.2 SCHATZKI'S RING OF DISTAL ESOPHAGUS: Status: ACTIVE | Noted: 2018-04-13

## 2022-03-20 PROBLEM — M25.552 PAIN OF LEFT HIP JOINT: Status: ACTIVE | Noted: 2019-04-16

## 2022-03-20 PROBLEM — E78.5 HYPERLIPIDEMIA: Status: ACTIVE | Noted: 2017-02-16

## 2022-03-20 PROBLEM — M48.061 SPINAL STENOSIS OF LUMBAR REGION: Status: ACTIVE | Noted: 2018-06-12

## 2022-03-20 PROBLEM — M17.9 OSTEOARTHRITIS OF KNEE: Status: ACTIVE | Noted: 2018-06-12

## 2025-07-01 ENCOUNTER — APPOINTMENT (OUTPATIENT)
Facility: HOSPITAL | Age: 89
End: 2025-07-01
Payer: MEDICARE

## 2025-07-01 ENCOUNTER — HOSPITAL ENCOUNTER (EMERGENCY)
Facility: HOSPITAL | Age: 89
Discharge: HOME | End: 2025-07-01
Attending: STUDENT IN AN ORGANIZED HEALTH CARE EDUCATION/TRAINING PROGRAM
Payer: MEDICARE

## 2025-07-01 VITALS
WEIGHT: 165.12 LBS | RESPIRATION RATE: 16 BRPM | SYSTOLIC BLOOD PRESSURE: 125 MMHG | HEART RATE: 77 BPM | OXYGEN SATURATION: 96 % | DIASTOLIC BLOOD PRESSURE: 63 MMHG | TEMPERATURE: 98.5 F

## 2025-07-01 DIAGNOSIS — K59.00 CONSTIPATION, UNSPECIFIED CONSTIPATION TYPE: Primary | ICD-10-CM

## 2025-07-01 PROCEDURE — 74018 RADEX ABDOMEN 1 VIEW: CPT

## 2025-07-01 PROCEDURE — 6370000000 HC RX 637 (ALT 250 FOR IP): Performed by: PHYSICIAN ASSISTANT

## 2025-07-01 PROCEDURE — 99283 EMERGENCY DEPT VISIT LOW MDM: CPT

## 2025-07-01 RX ORDER — FINASTERIDE 5 MG/1
TABLET, FILM COATED ORAL
COMMUNITY
Start: 2025-05-11

## 2025-07-01 RX ORDER — SODIUM PHOSPHATE, DIBASIC AND SODIUM PHOSPHATE, MONOBASIC 7; 19 G/230ML; G/230ML
1 ENEMA RECTAL
Status: COMPLETED | OUTPATIENT
Start: 2025-07-01 | End: 2025-07-01

## 2025-07-01 RX ORDER — EZETIMIBE 10 MG/1
TABLET ORAL
COMMUNITY
Start: 2025-04-10

## 2025-07-01 RX ORDER — SIMVASTATIN 40 MG
TABLET ORAL
COMMUNITY
Start: 2025-04-10

## 2025-07-01 RX ORDER — LEVOTHYROXINE SODIUM 50 UG/1
TABLET ORAL
COMMUNITY
Start: 2025-06-09

## 2025-07-01 RX ORDER — LIDOCAINE HYDROCHLORIDE 20 MG/ML
JELLY TOPICAL PRN
Status: DISCONTINUED | OUTPATIENT
Start: 2025-07-01 | End: 2025-07-01 | Stop reason: HOSPADM

## 2025-07-01 RX ADMIN — LIDOCAINE HYDROCHLORIDE: 20 JELLY TOPICAL at 14:12

## 2025-07-01 RX ADMIN — SODIUM PHOSPHATE, DIBASIC AND SODIUM PHOSPHATE, MONOBASIC 1 ENEMA: 7; 19 ENEMA RECTAL at 14:12

## 2025-07-01 ASSESSMENT — PAIN SCALES - GENERAL: PAINLEVEL_OUTOF10: 7

## 2025-07-01 ASSESSMENT — PAIN DESCRIPTION - LOCATION: LOCATION: RECTUM

## 2025-07-01 NOTE — ED TRIAGE NOTES
Pt c/o rectal pain since yesterday morning. Pt states that he usually has 2 Bms a day and has only had small hard ones the past 2 days. Denies hx of same.

## 2025-07-01 NOTE — DISCHARGE INSTRUCTIONS
Increase water intake as well as fiber intake.  Follow-up with your primary care doctor.  Return to the ER if any persistent or worsening of symptoms.

## 2025-07-01 NOTE — ED PROVIDER NOTES
EMERGENCY DEPARTMENT PHYSICIAN NOTE     Patient: Yohan Bal     Time of Service: 7/1/2025  1:19 PM     Chief complaint:   Chief Complaint   Patient presents with    Constipation    Rectal Pain        HISTORY:  Patient is a 89 y.o. male who presents to the emergency department with complaints of constipation and rectal pain.  Patient states he typically has 2 bowel movements per day and only had 1 very small hard bowel movement this morning.  States he is been having some pain in his rectum since that time.  Patient denies any nausea or vomiting, no abdominal pain.  Denies any fever or chills or urinary symptoms.  No new dietary changes.    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    Bilateral thumb pain 02/01/2017    CAD (coronary artery disease)     Cardiac murmur 10/06/2016    Cervical spondylolysis     Folliculitis 04/04/2018    GERD (gastroesophageal reflux disease)     Hypercholesteremia     Hypothyroid     Left hip pain 02/01/2017    Lumbar radiculitis     Lumbar radiculitis     Lumbar spondylitis     Meniere disease     Mixed hyperlipidemia     Occlusion and stenosis of unspecified carotid artery     Osteoarthritis 02/19/2018    hands    Osteopenia of both hands 02/18/2018    Polyp of cecum 08/02/2017    5mm polyp in cecum, 8 mm polyp in sigmoid colon    Spinal enthesopathy of lumbar region 02/01/2017        Past Surgical History:   Procedure Laterality Date    CATARACT REMOVAL Bilateral     COLONOSCOPY  08/02/2017    tubular adenomas    COLONOSCOPY  12/19/2012    tubular adenoma    CORONARY ANGIOPLASTY WITH STENT PLACEMENT      CORONARY ARTERY BYPASS GRAFT  1996    CORONARY ARTERY BYPASS GRAFT  1996    KNEE ARTHROSCOPY Left 2005    menisectomy    [facility] UNLISTED PROCEDURE CARDIAC SURGERY  04/2008    ROTATOR CUFF REPAIR Left     THYROIDECTOMY  1975    TONSILLECTOMY AND ADENOIDECTOMY

## 2025-07-01 NOTE — ED NOTES
Discharge instructions reviewed with pt and copy given by this RN. Patient educated on fluid intake and changes to diet. Patient verbalized understanding of all education and is ambulatory from ED in no sign of distress to await transport back to home.

## (undated) DEVICE — Z DISCONTINUED NO SUB IDED SET EXTN W/ 4 W STPCOCK M SPIN LOK 36IN

## (undated) DEVICE — NEONATAL-ADULT SPO2 SENSOR: Brand: NELLCOR

## (undated) DEVICE — CONTAINER SPEC 20 ML LID NEUT BUFF FORMALIN 10 % POLYPR STS

## (undated) DEVICE — FORCEPS BX L240CM JAW DIA2.8MM L CAP W/ NDL MIC MESH TOOTH

## (undated) DEVICE — ENDO CARRY-ON PROCEDURE KIT INCLUDES ENZYMATIC SPONGE, GAUZE, BIOHAZARD LABEL, TRAY, LUBRICANT, DIRTY SCOPE LABEL, WATER LABEL, TRAY, DRAWSTRING PAD, AND DEFENDO 4-PIECE KIT.: Brand: ENDO CARRY-ON PROCEDURE KIT

## (undated) DEVICE — BAG SPEC BIOHZD LF 2MIL 6X10IN -- CONVERT TO ITEM 357326

## (undated) DEVICE — Device

## (undated) DEVICE — KENDALL RADIOLUCENT FOAM MONITORING ELECTRODE -RECTANGULAR SHAPE: Brand: KENDALL

## (undated) DEVICE — BAG BELONG PT PERS CLEAR HANDL

## (undated) DEVICE — SOLIDIFIER FLUID 3000 CC ABSORB

## (undated) DEVICE — CATH IV AUTOGRD BC BLU 22GA 25 -- INSYTE

## (undated) DEVICE — SYRINGE MED 20ML STD CLR PLAS LUERLOCK TIP N CTRL DISP

## (undated) DEVICE — NEEDLE HYPO 18GA L1.5IN PNK S STL HUB POLYPR SHLD REG BVL

## (undated) DEVICE — BW-412T DISP COMBO CLEANING BRUSH: Brand: SINGLE USE COMBINATION CLEANING BRUSH

## (undated) DEVICE — 1200 GUARD II KIT W/5MM TUBE W/O VAC TUBE: Brand: GUARDIAN

## (undated) DEVICE — BITE BLK ENDOSCP AD 54FR GRN POLYETH ENDOSCP W STRP SLD

## (undated) DEVICE — ESOPHAGEAL BALLOON DILATATION CATHETER: Brand: CRE FIXED WIRE

## (undated) DEVICE — CANN NASAL O2 CAPNOGRAPHY AD -- FILTERLINE

## (undated) DEVICE — SET ADMIN 16ML TBNG L100IN 2 Y INJ SITE IV PIGGY BK DISP

## (undated) DEVICE — SYR ASSEMB INFL BLLN 60ML --

## (undated) DEVICE — KIT IV STRT W CHLORAPREP PD 1ML